# Patient Record
Sex: MALE | Race: WHITE | NOT HISPANIC OR LATINO
[De-identification: names, ages, dates, MRNs, and addresses within clinical notes are randomized per-mention and may not be internally consistent; named-entity substitution may affect disease eponyms.]

---

## 2017-06-16 ENCOUNTER — APPOINTMENT (OUTPATIENT)
Dept: PULMONOLOGY | Facility: CLINIC | Age: 72
End: 2017-06-16

## 2017-07-21 ENCOUNTER — APPOINTMENT (OUTPATIENT)
Dept: PULMONOLOGY | Facility: CLINIC | Age: 72
End: 2017-07-21

## 2017-07-21 VITALS — SYSTOLIC BLOOD PRESSURE: 128 MMHG | DIASTOLIC BLOOD PRESSURE: 80 MMHG

## 2017-07-21 VITALS — OXYGEN SATURATION: 99 %

## 2017-07-21 DIAGNOSIS — A31.0 PULMONARY MYCOBACTERIAL INFECTION: ICD-10-CM

## 2017-07-21 RX ORDER — EMPAGLIFLOZIN 25 MG/1
25 TABLET, FILM COATED ORAL
Qty: 90 | Refills: 0 | Status: ACTIVE | COMMUNITY
Start: 2017-03-09

## 2017-07-21 RX ORDER — METOPROLOL SUCCINATE 50 MG/1
50 TABLET, EXTENDED RELEASE ORAL
Qty: 90 | Refills: 0 | Status: ACTIVE | COMMUNITY
Start: 2017-06-07

## 2017-07-21 RX ORDER — HYDROCHLOROTHIAZIDE 25 MG/1
25 TABLET ORAL
Qty: 90 | Refills: 0 | Status: ACTIVE | COMMUNITY
Start: 2017-06-07

## 2019-11-26 ENCOUNTER — OUTPATIENT (OUTPATIENT)
Dept: OUTPATIENT SERVICES | Facility: HOSPITAL | Age: 74
LOS: 1 days | End: 2019-11-26
Payer: MEDICARE

## 2019-11-26 VITALS
HEART RATE: 57 BPM | TEMPERATURE: 98 F | SYSTOLIC BLOOD PRESSURE: 117 MMHG | DIASTOLIC BLOOD PRESSURE: 69 MMHG | RESPIRATION RATE: 20 BRPM | WEIGHT: 204.37 LBS

## 2019-11-26 DIAGNOSIS — E11.9 TYPE 2 DIABETES MELLITUS WITHOUT COMPLICATIONS: ICD-10-CM

## 2019-11-26 DIAGNOSIS — I25.10 ATHEROSCLEROTIC HEART DISEASE OF NATIVE CORONARY ARTERY WITHOUT ANGINA PECTORIS: ICD-10-CM

## 2019-11-26 DIAGNOSIS — Z98.89 OTHER SPECIFIED POSTPROCEDURAL STATES: Chronic | ICD-10-CM

## 2019-11-26 DIAGNOSIS — Z09 ENCOUNTER FOR FOLLOW-UP EXAMINATION AFTER COMPLETED TREATMENT FOR CONDITIONS OTHER THAN MALIGNANT NEOPLASM: Chronic | ICD-10-CM

## 2019-11-26 DIAGNOSIS — Z90.49 ACQUIRED ABSENCE OF OTHER SPECIFIED PARTS OF DIGESTIVE TRACT: Chronic | ICD-10-CM

## 2019-11-26 DIAGNOSIS — Z12.9 ENCOUNTER FOR SCREENING FOR MALIGNANT NEOPLASM, SITE UNSPECIFIED: ICD-10-CM

## 2019-11-26 DIAGNOSIS — Z29.9 ENCOUNTER FOR PROPHYLACTIC MEASURES, UNSPECIFIED: ICD-10-CM

## 2019-11-26 DIAGNOSIS — Z90.12 ACQUIRED ABSENCE OF LEFT BREAST AND NIPPLE: Chronic | ICD-10-CM

## 2019-11-26 DIAGNOSIS — Z01.818 ENCOUNTER FOR OTHER PREPROCEDURAL EXAMINATION: ICD-10-CM

## 2019-11-26 LAB
ALBUMIN SERPL ELPH-MCNC: 4.5 G/DL — SIGNIFICANT CHANGE UP (ref 3.3–5.2)
ALP SERPL-CCNC: 55 U/L — SIGNIFICANT CHANGE UP (ref 40–120)
ALT FLD-CCNC: 29 U/L — SIGNIFICANT CHANGE UP
ANION GAP SERPL CALC-SCNC: 17 MMOL/L — SIGNIFICANT CHANGE UP (ref 5–17)
APTT BLD: 34.6 SEC — SIGNIFICANT CHANGE UP (ref 27.5–36.3)
AST SERPL-CCNC: 24 U/L — SIGNIFICANT CHANGE UP
BASOPHILS # BLD AUTO: 0.04 K/UL — SIGNIFICANT CHANGE UP (ref 0–0.2)
BASOPHILS NFR BLD AUTO: 0.5 % — SIGNIFICANT CHANGE UP (ref 0–2)
BILIRUB SERPL-MCNC: 0.7 MG/DL — SIGNIFICANT CHANGE UP (ref 0.4–2)
BUN SERPL-MCNC: 22 MG/DL — HIGH (ref 8–20)
CALCIUM SERPL-MCNC: 9.8 MG/DL — SIGNIFICANT CHANGE UP (ref 8.6–10.2)
CHLORIDE SERPL-SCNC: 97 MMOL/L — LOW (ref 98–107)
CO2 SERPL-SCNC: 24 MMOL/L — SIGNIFICANT CHANGE UP (ref 22–29)
CREAT SERPL-MCNC: 1.04 MG/DL — SIGNIFICANT CHANGE UP (ref 0.5–1.3)
EOSINOPHIL # BLD AUTO: 0.13 K/UL — SIGNIFICANT CHANGE UP (ref 0–0.5)
EOSINOPHIL NFR BLD AUTO: 1.6 % — SIGNIFICANT CHANGE UP (ref 0–6)
GLUCOSE SERPL-MCNC: 132 MG/DL — HIGH (ref 70–115)
HBA1C BLD-MCNC: 7.3 % — HIGH (ref 4–5.6)
HCT VFR BLD CALC: 42.6 % — SIGNIFICANT CHANGE UP (ref 39–50)
HGB BLD-MCNC: 13.4 G/DL — SIGNIFICANT CHANGE UP (ref 13–17)
IMM GRANULOCYTES NFR BLD AUTO: 1.1 % — SIGNIFICANT CHANGE UP (ref 0–1.5)
INR BLD: 1.13 RATIO — SIGNIFICANT CHANGE UP (ref 0.88–1.16)
LYMPHOCYTES # BLD AUTO: 1.53 K/UL — SIGNIFICANT CHANGE UP (ref 1–3.3)
LYMPHOCYTES # BLD AUTO: 18.9 % — SIGNIFICANT CHANGE UP (ref 13–44)
MCHC RBC-ENTMCNC: 27.2 PG — SIGNIFICANT CHANGE UP (ref 27–34)
MCHC RBC-ENTMCNC: 31.5 GM/DL — LOW (ref 32–36)
MCV RBC AUTO: 86.6 FL — SIGNIFICANT CHANGE UP (ref 80–100)
MONOCYTES # BLD AUTO: 1 K/UL — HIGH (ref 0–0.9)
MONOCYTES NFR BLD AUTO: 12.4 % — SIGNIFICANT CHANGE UP (ref 2–14)
NEUTROPHILS # BLD AUTO: 5.3 K/UL — SIGNIFICANT CHANGE UP (ref 1.8–7.4)
NEUTROPHILS NFR BLD AUTO: 65.5 % — SIGNIFICANT CHANGE UP (ref 43–77)
PLATELET # BLD AUTO: 482 K/UL — HIGH (ref 150–400)
POTASSIUM SERPL-MCNC: 5.2 MMOL/L — SIGNIFICANT CHANGE UP (ref 3.5–5.3)
POTASSIUM SERPL-SCNC: 5.2 MMOL/L — SIGNIFICANT CHANGE UP (ref 3.5–5.3)
PROT SERPL-MCNC: 7.4 G/DL — SIGNIFICANT CHANGE UP (ref 6.6–8.7)
PROTHROM AB SERPL-ACNC: 13.1 SEC — HIGH (ref 10–12.9)
RBC # BLD: 4.92 M/UL — SIGNIFICANT CHANGE UP (ref 4.2–5.8)
RBC # FLD: 14.1 % — SIGNIFICANT CHANGE UP (ref 10.3–14.5)
SODIUM SERPL-SCNC: 138 MMOL/L — SIGNIFICANT CHANGE UP (ref 135–145)
WBC # BLD: 8.09 K/UL — SIGNIFICANT CHANGE UP (ref 3.8–10.5)
WBC # FLD AUTO: 8.09 K/UL — SIGNIFICANT CHANGE UP (ref 3.8–10.5)

## 2019-11-26 PROCEDURE — G0463: CPT

## 2019-11-26 PROCEDURE — 36415 COLL VENOUS BLD VENIPUNCTURE: CPT

## 2019-11-26 PROCEDURE — 93010 ELECTROCARDIOGRAM REPORT: CPT

## 2019-11-26 PROCEDURE — 93005 ELECTROCARDIOGRAM TRACING: CPT

## 2019-11-26 PROCEDURE — 71046 X-RAY EXAM CHEST 2 VIEWS: CPT

## 2019-11-26 PROCEDURE — 83036 HEMOGLOBIN GLYCOSYLATED A1C: CPT

## 2019-11-26 PROCEDURE — 80053 COMPREHEN METABOLIC PANEL: CPT

## 2019-11-26 PROCEDURE — 85610 PROTHROMBIN TIME: CPT

## 2019-11-26 PROCEDURE — 85730 THROMBOPLASTIN TIME PARTIAL: CPT

## 2019-11-26 PROCEDURE — 71046 X-RAY EXAM CHEST 2 VIEWS: CPT | Mod: 26

## 2019-11-26 PROCEDURE — 85027 COMPLETE CBC AUTOMATED: CPT

## 2019-11-26 NOTE — H&P PST ADULT - NSICDXPASTMEDICALHX_GEN_ALL_CORE_FT
PAST MEDICAL HISTORY:  Abnormal chest CT     Asbestosis     Coronary artery disease     Diabetes mellitus     Dyspnea on exertion     Heart valve disease leaky heart valve    Hyperlipemia     Hypertension     Urinary frequency

## 2019-11-26 NOTE — H&P PST ADULT - NSICDXPASTSURGICALHX_GEN_ALL_CORE_FT
PAST SURGICAL HISTORY:  S/P angioplasty with stent 2013 4 stents    S/P cholecystectomy     S/P colonoscopy 4 years ago, no polyp    S/P excision of skin lesion, follow-up exam benign testicle    S/P inguinal hernia repair bilateral    S/P mastectomy, left age 10 for benign mass    S/P ORIF (open reduction internal fixation) fracture right elbow 1962, LILLIANA 3 months later    S/P T&A (status post tonsillectomy and adenoidectomy) as child

## 2019-11-26 NOTE — H&P PST ADULT - ASSESSMENT
Pleasant 74 year old male in NAD  , pt has history of cough , diabetes, CAD  and family history of colon cancer . Pt is scheduled for colonoscopy and endoscopy with DR. Galindo on 12/3/19 .   OPIOID RISK TOOL    KELLY EACH BOX THAT APPLIES AND ADD TOTALS AT THE END    FAMILY HISTORY OF SUBSTANCE ABUSE                 FEMALE         MALE                                                Alcohol                             [  ]1 pt          [  ]3pts                                               Illegal Durgs                     [  ]2 pts        [  ]3pts                                               Rx Drugs                           [  ]4 pts        [  ]4 pts    PERSONAL HISTORY OF SUBSTANCE ABUSE                                                                                          Alcohol                             [  ]3 pts       [  ]3 pts                                               Illegal Durgs                     [  ]4 pts        [  ]4 pts                                               Rx Drugs                           [  ]5 pts        [  ]5 pts    AGE BETWEEN 16-45 YEARS                                      [  ]1 pt         [  ]1 pt    HISTORY OF PREADOLESCENT   SEXUAL ABUSE                                                             [  ]3 pts        [  ]0pts    PSYCHOLOGICAL DISEASE                     ADD, OCD, Bipolar, Schizophrenia        [  ]2 pts         [  ]2 pts                      Depression                                               [  ]1 pt           [  ]1 pt           SCORING TOTAL   (add numbers and type here)              (**0*)                                     A score of 3 or lower indicated LOW risk for future opiod abuse  A score of 4 to 7 indicated moderate risk for future opiod abuse  A score of 8 or higher indicates a high risk for opiod abuse  CAPRINI VTE 2.0 SCORE [CLOT updated 2019]    AGE RELATED RISK FACTORS                                                       MOBILITY RELATED FACTORS  [ ] Age 41-60 years                                            (1 Point)                    [ ] Bed rest                                                        (1 Point)  [ ] Age: 61-74 years                                           (2 Points)                  [ ] Plaster cast                                                   (2 Points)  [X ] Age= 75 years                                              (3 Points)                    [ ] Bed bound for more than 72 hours                 (2 Points)    DISEASE RELATED RISK FACTORS                                               GENDER SPECIFIC FACTORS  [X ] Edema in the lower extremities                       (1 Point)              [ ] Pregnancy                                                     (1 Point)  [ ] Varicose veins                                               (1 Point)                     [ ] Post-partum < 6 weeks                                   (1 Point)             [ ] BMI > 25 Kg/m2                                            (1 Point)                     [ ] Hormonal therapy  or oral contraception          (1 Point)                 [ ] Sepsis (in the previous month)                        (1 Point)               [ ] History of pregnancy complications                 (1 point)  [ ] Pneumonia or serious lung disease                                               [ ] Unexplained or recurrent                     (1 Point)           (in the previous month)                               (1 Point)  [ ] Abnormal pulmonary function test                     (1 Point)                 SURGERY RELATED RISK FACTORS  [ ] Acute myocardial infarction                              (1 Point)               [ ]  Section                                             (1 Point)  [ ] Congestive heart failure (in the previous month)  (1 Point)      [ X] Minor surgery                                                  (1 Point)   [ ] Inflammatory bowel disease                             (1 Point)               [ ] Arthroscopic surgery                                        (2 Points)  [ ] Central venous access                                      (2 Points)                [ ] General surgery lasting more than 45 minutes (2 points)  [ ] Malignancy- Present or previous                   (2 Points)                [ ] Elective arthroplasty                                         (5 points)    [ ] Stroke (in the previous month)                          (5 Points)                                                                                                                                                           HEMATOLOGY RELATED FACTORS                                                 TRAUMA RELATED RISK FACTORS  [ ] Prior episodes of VTE                                     (3 Points)                [ ] Fracture of the hip, pelvis, or leg                       (5 Points)  [ ] Positive family history for VTE                         (3 Points)             [ ] Acute spinal cord injury (in the previous month)  (5 Points)  [ ] Prothrombin 54444 A                                     (3 Points)               [ ] Paralysis  (less than 1 month)                             (5 Points)  [ ] Factor V Leiden                                             (3 Points)                  [ ] Multiple Trauma within 1 month                        (5 Points)  [ ] Lupus anticoagulants                                     (3 Points)                                                           [ ] Anticardiolipin antibodies                               (3 Points)                                                       [ ] High homocysteine in the blood                      (3 Points)                                             [ ] Other congenital or acquired thrombophilia      (3 Points)                                                [ ] Heparin induced thrombocytopenia                  (3 Points)                                     Total Score [      5    ]

## 2019-11-26 NOTE — H&P PST ADULT - HISTORY OF PRESENT ILLNESS
74 year old male presents for colonoscopy and endoscopy . Pt has family history of colon cancer ( sister ) last colonoscopy 2008 (normal as per pt ) Pt denies any abdominal pain or bleeding , has lost 40 lb over past 10 yrs and 1 out of 4 recent occult blood teste were positive in PMD office.

## 2019-11-26 NOTE — H&P PST ADULT - NSICDXPROBLEM_GEN_ALL_CORE_FT
PROBLEM DIAGNOSES  Problem: Diabetes mellitus  Assessment and Plan: fingerstick on admit   medical  clearance     Problem: CAD (coronary artery disease)  Assessment and Plan: cardiac clearance     Problem: Screening for cancer  Assessment and Plan: colonoscopy / egd with biopsy with DR. Galindo    Problem: Need for prophylactic measure  Assessment and Plan: caprini scor e5   surgical team assess need for dvt prophylaxis

## 2019-11-26 NOTE — H&P PST ADULT - NSICDXFAMILYHX_GEN_ALL_CORE_FT
FAMILY HISTORY:  Father  Still living? No  Family history of heart attack, Age at diagnosis: Age Unknown    Mother  Still living? No  Family history of cardiomegaly, Age at diagnosis: Age Unknown  Family history of diabetes mellitus, Age at diagnosis: Age Unknown  Family history of stroke, Age at diagnosis: Age Unknown    Sibling  Still living? No  Family history of colon cancer, Age at diagnosis: Age Unknown  Family history of diabetes mellitus, Age at diagnosis: Age Unknown  Family history of pancreatic cancer, Age at diagnosis: Age Unknown

## 2019-11-26 NOTE — H&P PST ADULT - NSANTHOSAYNRD_GEN_A_CORE
No. WILLA screening performed.  STOP BANG Legend: 0-2 = LOW Risk; 3-4 = INTERMEDIATE Risk; 5-8 = HIGH Risk

## 2019-11-26 NOTE — H&P PST ADULT - LAB RESULTS AND INTERPRETATION
hgb a1c 7.3 plt 482  s/w noelle at DR. Galindo office will discuss with DR. Galindo .  K DAmico NP 5 pm 11/26/19

## 2019-12-02 ENCOUNTER — TRANSCRIPTION ENCOUNTER (OUTPATIENT)
Age: 74
End: 2019-12-02

## 2019-12-03 ENCOUNTER — RESULT REVIEW (OUTPATIENT)
Age: 74
End: 2019-12-03

## 2019-12-03 ENCOUNTER — OUTPATIENT (OUTPATIENT)
Dept: OUTPATIENT SERVICES | Facility: HOSPITAL | Age: 74
LOS: 1 days | End: 2019-12-03
Payer: MEDICARE

## 2019-12-03 DIAGNOSIS — Z98.89 OTHER SPECIFIED POSTPROCEDURAL STATES: Chronic | ICD-10-CM

## 2019-12-03 DIAGNOSIS — Z90.49 ACQUIRED ABSENCE OF OTHER SPECIFIED PARTS OF DIGESTIVE TRACT: Chronic | ICD-10-CM

## 2019-12-03 DIAGNOSIS — Z12.11 ENCOUNTER FOR SCREENING FOR MALIGNANT NEOPLASM OF COLON: ICD-10-CM

## 2019-12-03 DIAGNOSIS — Z90.12 ACQUIRED ABSENCE OF LEFT BREAST AND NIPPLE: Chronic | ICD-10-CM

## 2019-12-03 DIAGNOSIS — Z09 ENCOUNTER FOR FOLLOW-UP EXAMINATION AFTER COMPLETED TREATMENT FOR CONDITIONS OTHER THAN MALIGNANT NEOPLASM: Chronic | ICD-10-CM

## 2019-12-03 LAB — GLUCOSE BLDC GLUCOMTR-MCNC: 133 MG/DL — HIGH (ref 70–99)

## 2019-12-03 PROCEDURE — 88342 IMHCHEM/IMCYTCHM 1ST ANTB: CPT

## 2019-12-03 PROCEDURE — 45378 DIAGNOSTIC COLONOSCOPY: CPT

## 2019-12-03 PROCEDURE — 88305 TISSUE EXAM BY PATHOLOGIST: CPT | Mod: 26

## 2019-12-03 PROCEDURE — 82962 GLUCOSE BLOOD TEST: CPT

## 2019-12-03 PROCEDURE — 43239 EGD BIOPSY SINGLE/MULTIPLE: CPT

## 2019-12-03 PROCEDURE — 88305 TISSUE EXAM BY PATHOLOGIST: CPT

## 2019-12-03 PROCEDURE — 88342 IMHCHEM/IMCYTCHM 1ST ANTB: CPT | Mod: 26

## 2019-12-06 LAB — SURGICAL PATHOLOGY STUDY: SIGNIFICANT CHANGE UP

## 2021-09-23 ENCOUNTER — APPOINTMENT (OUTPATIENT)
Dept: PULMONOLOGY | Facility: CLINIC | Age: 76
End: 2021-09-23
Payer: COMMERCIAL

## 2021-09-23 VITALS
OXYGEN SATURATION: 97 % | HEART RATE: 82 BPM | DIASTOLIC BLOOD PRESSURE: 78 MMHG | RESPIRATION RATE: 16 BRPM | SYSTOLIC BLOOD PRESSURE: 110 MMHG | WEIGHT: 189 LBS | HEIGHT: 75 IN | BODY MASS INDEX: 23.5 KG/M2

## 2021-09-23 PROCEDURE — 99204 OFFICE O/P NEW MOD 45 MIN: CPT

## 2021-09-23 NOTE — PHYSICAL EXAM
[No Acute Distress] : no acute distress [Normal Oropharynx] : normal oropharynx [Normal Appearance] : normal appearance [No Neck Mass] : no neck mass [Normal Rate/Rhythm] : normal rate/rhythm [Normal S1, S2] : normal s1, s2 [No Resp Distress] : no resp distress [No Murmurs] : no murmurs [Clear to Auscultation Bilaterally] : clear to auscultation bilaterally [No Abnormalities] : no abnormalities [Benign] : benign [Normal Gait] : normal gait [No Clubbing] : no clubbing [No Cyanosis] : no cyanosis [No Edema] : no edema [FROM] : FROM [Normal Color/ Pigmentation] : normal color/ pigmentation [No Focal Deficits] : no focal deficits [Normal Affect] : normal affect [Oriented x3] : oriented x3

## 2021-09-25 NOTE — H&P PST ADULT - ENMT
Thyroid Supplements Protocol Failed 09/25/2021 11:39 AM   Protocol Details  TSH value between 0.350 and 5.500 IU/ml    TSH test in past 12 months    Appointment in past 12 or next 3 months     Last refilled on 08/19/2020 for # 30 with 0 rf.    Last labs 08/ negative No oral lesions; no gross abnormalities

## 2021-09-29 NOTE — CONSULT LETTER
[Dear  ___] : Dear ~ROSETTA, [Consult Letter:] : I had the pleasure of evaluating your patient, [unfilled]. [Please see my note below.] : Please see my note below. [Consult Closing:] : Thank you very much for allowing me to participate in the care of this patient.  If you have any questions, please do not hesitate to contact me. [Sincerely,] : Sincerely, [FreeTextEntry3] : Solomon Wright MD FCCP\par Pulmonary/Critical Care/Sleep Medicine\par Department of Internal Medicine\par \par [facility] [employer]\par

## 2021-09-29 NOTE — DISCUSSION/SUMMARY
[FreeTextEntry1] : 76-year-old male seen today for the above.  Patient's history of asbestosis is confirmed on his current CAT scan without any significant change from previous studies performed in 2020.  No evidence of malignancy.  Routine follow-up on a yearly basis.

## 2021-09-29 NOTE — HISTORY OF PRESENT ILLNESS
[Never] : never [TextBox_4] : History of tobacco use seen today for routine follow-up of asbestosis.  Patient suffered asbestos exposure while in the  in the early 60s.  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  He denies any complaints of cough wheeze shortness of breath chest pains palpitations lightheadedness or dizziness.  There is no history of weight loss or changes in bowel or urinary habits.

## 2021-09-29 NOTE — CONSULT LETTER
[Dear  ___] : Dear ~ROSETTA, [Consult Letter:] : I had the pleasure of evaluating your patient, [unfilled]. [Please see my note below.] : Please see my note below. [Consult Closing:] : Thank you very much for allowing me to participate in the care of this patient.  If you have any questions, please do not hesitate to contact me. [Sincerely,] : Sincerely, [FreeTextEntry3] : Solomon Wright MD FCCP\par Pulmonary/Critical Care/Sleep Medicine\par Department of Internal Medicine\par \par [facility] [employer]\par

## 2022-01-13 ENCOUNTER — EMERGENCY (EMERGENCY)
Facility: HOSPITAL | Age: 77
LOS: 1 days | Discharge: HOME | End: 2022-01-13
Attending: EMERGENCY MEDICINE
Payer: MEDICARE

## 2022-01-13 VITALS
HEART RATE: 89 BPM | OXYGEN SATURATION: 96 % | WEIGHT: 160.06 LBS | SYSTOLIC BLOOD PRESSURE: 109 MMHG | DIASTOLIC BLOOD PRESSURE: 70 MMHG | RESPIRATION RATE: 16 BRPM | TEMPERATURE: 98 F | HEIGHT: 78 IN

## 2022-01-13 DIAGNOSIS — Z09 ENCOUNTER FOR FOLLOW-UP EXAMINATION AFTER COMPLETED TREATMENT FOR CONDITIONS OTHER THAN MALIGNANT NEOPLASM: Chronic | ICD-10-CM

## 2022-01-13 DIAGNOSIS — Z90.49 ACQUIRED ABSENCE OF OTHER SPECIFIED PARTS OF DIGESTIVE TRACT: Chronic | ICD-10-CM

## 2022-01-13 DIAGNOSIS — Z98.89 OTHER SPECIFIED POSTPROCEDURAL STATES: Chronic | ICD-10-CM

## 2022-01-13 DIAGNOSIS — Z90.12 ACQUIRED ABSENCE OF LEFT BREAST AND NIPPLE: Chronic | ICD-10-CM

## 2022-01-13 LAB
ALBUMIN SERPL ELPH-MCNC: 3.9 G/DL — SIGNIFICANT CHANGE UP (ref 3.3–5.2)
ALP SERPL-CCNC: 93 U/L — SIGNIFICANT CHANGE UP (ref 40–120)
ALT FLD-CCNC: 13 U/L — SIGNIFICANT CHANGE UP
ANION GAP SERPL CALC-SCNC: 20 MMOL/L — HIGH (ref 5–17)
AST SERPL-CCNC: 13 U/L — SIGNIFICANT CHANGE UP
BASOPHILS # BLD AUTO: 0.03 K/UL — SIGNIFICANT CHANGE UP (ref 0–0.2)
BASOPHILS NFR BLD AUTO: 0.3 % — SIGNIFICANT CHANGE UP (ref 0–2)
BILIRUB SERPL-MCNC: 0.8 MG/DL — SIGNIFICANT CHANGE UP (ref 0.4–2)
BUN SERPL-MCNC: 26.8 MG/DL — HIGH (ref 8–20)
CALCIUM SERPL-MCNC: 9 MG/DL — SIGNIFICANT CHANGE UP (ref 8.6–10.2)
CHLORIDE SERPL-SCNC: 93 MMOL/L — LOW (ref 98–107)
CO2 SERPL-SCNC: 22 MMOL/L — SIGNIFICANT CHANGE UP (ref 22–29)
CREAT SERPL-MCNC: 0.9 MG/DL — SIGNIFICANT CHANGE UP (ref 0.5–1.3)
EOSINOPHIL # BLD AUTO: 0.08 K/UL — SIGNIFICANT CHANGE UP (ref 0–0.5)
EOSINOPHIL NFR BLD AUTO: 0.8 % — SIGNIFICANT CHANGE UP (ref 0–6)
FLUAV AG NPH QL: SIGNIFICANT CHANGE UP
FLUBV AG NPH QL: SIGNIFICANT CHANGE UP
GLUCOSE SERPL-MCNC: 168 MG/DL — HIGH (ref 70–99)
HCT VFR BLD CALC: 43.4 % — SIGNIFICANT CHANGE UP (ref 39–50)
HGB BLD-MCNC: 15.4 G/DL — SIGNIFICANT CHANGE UP (ref 13–17)
IMM GRANULOCYTES NFR BLD AUTO: 1.1 % — SIGNIFICANT CHANGE UP (ref 0–1.5)
LYMPHOCYTES # BLD AUTO: 1.64 K/UL — SIGNIFICANT CHANGE UP (ref 1–3.3)
LYMPHOCYTES # BLD AUTO: 15.5 % — SIGNIFICANT CHANGE UP (ref 13–44)
MCHC RBC-ENTMCNC: 31.2 PG — SIGNIFICANT CHANGE UP (ref 27–34)
MCHC RBC-ENTMCNC: 35.5 GM/DL — SIGNIFICANT CHANGE UP (ref 32–36)
MCV RBC AUTO: 87.9 FL — SIGNIFICANT CHANGE UP (ref 80–100)
MONOCYTES # BLD AUTO: 1.4 K/UL — HIGH (ref 0–0.9)
MONOCYTES NFR BLD AUTO: 13.2 % — SIGNIFICANT CHANGE UP (ref 2–14)
NEUTROPHILS # BLD AUTO: 7.33 K/UL — SIGNIFICANT CHANGE UP (ref 1.8–7.4)
NEUTROPHILS NFR BLD AUTO: 69.1 % — SIGNIFICANT CHANGE UP (ref 43–77)
PLATELET # BLD AUTO: 483 K/UL — HIGH (ref 150–400)
POTASSIUM SERPL-MCNC: 3.2 MMOL/L — LOW (ref 3.5–5.3)
POTASSIUM SERPL-SCNC: 3.2 MMOL/L — LOW (ref 3.5–5.3)
PROT SERPL-MCNC: 6.8 G/DL — SIGNIFICANT CHANGE UP (ref 6.6–8.7)
RBC # BLD: 4.94 M/UL — SIGNIFICANT CHANGE UP (ref 4.2–5.8)
RBC # FLD: 12.8 % — SIGNIFICANT CHANGE UP (ref 10.3–14.5)
RSV RNA NPH QL NAA+NON-PROBE: SIGNIFICANT CHANGE UP
SARS-COV-2 RNA SPEC QL NAA+PROBE: DETECTED
SODIUM SERPL-SCNC: 135 MMOL/L — SIGNIFICANT CHANGE UP (ref 135–145)
TROPONIN T SERPL-MCNC: <0.01 NG/ML — SIGNIFICANT CHANGE UP (ref 0–0.06)
TSH SERPL-MCNC: 3.08 UIU/ML — SIGNIFICANT CHANGE UP (ref 0.27–4.2)
WBC # BLD: 10.6 K/UL — HIGH (ref 3.8–10.5)
WBC # FLD AUTO: 10.6 K/UL — HIGH (ref 3.8–10.5)

## 2022-01-13 PROCEDURE — 71045 X-RAY EXAM CHEST 1 VIEW: CPT | Mod: 26

## 2022-01-13 PROCEDURE — 99284 EMERGENCY DEPT VISIT MOD MDM: CPT | Mod: CS,GC

## 2022-01-13 RX ORDER — POTASSIUM CHLORIDE 20 MEQ
40 PACKET (EA) ORAL ONCE
Refills: 0 | Status: COMPLETED | OUTPATIENT
Start: 2022-01-13 | End: 2022-01-13

## 2022-01-13 NOTE — ED ADULT TRIAGE NOTE - CHIEF COMPLAINT QUOTE
Pt A&Ox4 states "Just not feeling great."  BIBA c/o recent wt loss and dec appetite. Patient wife COVID + at home, denies any other complaint

## 2022-01-13 NOTE — ED PROVIDER NOTE - NSFOLLOWUPINSTRUCTIONS_ED_ALL_ED_FT
- Return to the emergency department if your oxygen level falls below 92%  - Follow up with your doctor within 2-3 days.   - Return to the ED for any new or worsening symptoms including but not limited to difficulty breathing, severe weakness, confusion, etc.  - Take Tylenol (Acetaminophen) 650mg as needed for pain or fever  - Stay well hydrated.   - Avoid contact with anybody who is sick OR at risk populations including elderly, young, pregnant patients, immune compromised people  - Wash hands frequently in warm soapy water for at least 20 seconds   - Quarantine yourself at home for at least 10 days  - If you would like an appointment to be tested for COVID-19 (coronavirus) at one of the testing sites, contact [phone] for an appointment.      Viral Respiratory Infection    A viral respiratory infection is an illness that affects parts of the body used for breathing, like the lungs, nose, and throat. It is caused by a germ called a virus. Symptoms can include runny nose, coughing, sneezing, fatigue, body aches, sore throat, fever, or headache. Over the counter medicine can be used to manage the symptoms but the infection typically goes away on its own in 5 to 10 days.     SEEK IMMEDIATE MEDICAL CARE IF YOU HAVE ANY OF THE FOLLOWING SYMPTOMS: shortness of breath, chest pain, fever over 10 days, or lightheadedness/dizziness.

## 2022-01-13 NOTE — ED PROVIDER NOTE - PHYSICAL EXAMINATION
Gen: no acute distress; in back brace  Head: normocephalic, atraumatic  EENT: EOMI, PERRLA; dry mucous membranes  Lung: no increased work of breathing, CTABL, no wheezing, rales, rhonchi  CV: normal s1/s2, rrr, 2+ radial pulses b/l  Abd: soft, non-tender, non-distended, no rebound tenderness or guarding  MSK: No edema, no visible deformities, full range of motion in all 4 extremities  Neuro: CNII-XII intact, UE: 5/5 strength throughout b/l, LE: 5/5 strength throughout b/l, no dysmetria on FTN

## 2022-01-13 NOTE — ED PROVIDER NOTE - ATTENDING CONTRIBUTION TO CARE
Landon: I performed a face to face evaluation of this patient and performed a full history and physical examination on the patient.  I agree with the resident's history, physical examination, and plan of the patient unless otherwise noted. My brief assessment is as follows: pmh as documented, 2 days generalized weakness/fatigue, decreasd appetite, mild congestion, wife felt pt was sob so sent to hospital. pt denies any pain, no other complaints. well appearing, nl sats, afebrile ctab, rrr, abd benign, neuro intact, no swelling b/l LE. moving all extremities with nl strength. labs, flu/covid. reassess.

## 2022-01-13 NOTE — ED PROVIDER NOTE - OBJECTIVE STATEMENT
76yM with pmh htn, hld, DMII, hypothyroidism, asbestosis, CAD s/p stentsx3, on Eliquis presenting with 3 days of weakness. Patient reports "feeling off" for the past 2 days with decreased PO intake, decreased appetite, and fatigue. Wife noticed that patient was short of breath so called the ambulance. Endorses some congestion mostly in right nostril but denies sore throat, body aches, fevers, chills, nausea, vomiting, or diarrhea. Reports wife is covid+ at home. Vaccinated for COVIDx2. Follows with [facility] and has had stents x3

## 2022-01-13 NOTE — ED PROVIDER NOTE - CLINICAL SUMMARY MEDICAL DECISION MAKING FREE TEXT BOX
76yM with pmh htn, hld, DMII, hypothyroidism, CAD s/p stentsx3, on Eliquis presenting with 3 days of weakness. Patient overall well appearing with stable vitals. Not hypoxic at triage. WAs sent in by wife for dyspnea but clear on auscultation. Neuro exam non-focal. Etiology for weakness broad including anemia, electrolyte disturbance, vikash, cardiac, covid, pna. Will obtain UA, normal labs, trop, bnp, covid swab, ekg, and chest xray. 76yM with pmh htn, hld, DMII, hypothyroidism, CAD s/p stentsx3, on Eliquis presenting with 3 days of weakness. Patient overall well appearing with stable vitals. Not hypoxic at triage. WAs sent in by wife for dyspnea but clear on auscultation. Neuro exam non-focal. Etiology for weakness broad including anemia, electrolyte disturbance, vikash, cardiac, covid, pna. Will obtain UA, normal labs, trop, bnp, covid swab, and chest xray.

## 2022-01-13 NOTE — ED PROVIDER NOTE - PATIENT PORTAL LINK FT
You can access the FollowMyHealth Patient Portal offered by [facility] by registering at the following website: [email or web address]. By joining [facility]’s FollowMyHealth portal, you will also be able to view your health information using other applications (apps) compatible with our system.

## 2022-01-13 NOTE — ED ADULT NURSE NOTE - NSIMPLEMENTINTERV_GEN_ALL_ED
Implemented All Universal Safety Interventions:  [place] to call system. Call bell, personal items and telephone within reach. Instruct patient to call for assistance. Room bathroom lighting operational. Non-slip footwear when patient is off stretcher. Physically safe environment: no spills, clutter or unnecessary equipment. Stretcher in lowest position, wheels locked, appropriate side rails in place.

## 2022-01-13 NOTE — ED PROVIDER NOTE - PROGRESS NOTE DETAILS
patient COVID+; 97% on RA sitting and 96% RA ambulatory; Discussed findings with patient's son Jt Machuca and importance of returning to ED if SpO2 <92%. Patient and son confirm they have pulse oximeter at home. Strict return precautions given. Patient's son to  patient. Sydnie Henderson. PGY-1

## 2022-01-14 ENCOUNTER — APPOINTMENT (OUTPATIENT)
Dept: ORTHOPEDIC SURGERY | Facility: CLINIC | Age: 77
End: 2022-01-14

## 2022-01-14 VITALS
DIASTOLIC BLOOD PRESSURE: 62 MMHG | SYSTOLIC BLOOD PRESSURE: 110 MMHG | TEMPERATURE: 97 F | RESPIRATION RATE: 18 BRPM | HEART RATE: 98 BPM | OXYGEN SATURATION: 96 %

## 2022-01-14 PROCEDURE — 84484 ASSAY OF TROPONIN QUANT: CPT

## 2022-01-14 PROCEDURE — 80053 COMPREHEN METABOLIC PANEL: CPT

## 2022-01-14 PROCEDURE — 87637 SARSCOV2&INF A&B&RSV AMP PRB: CPT

## 2022-01-14 PROCEDURE — 85025 COMPLETE CBC W/AUTO DIFF WBC: CPT

## 2022-01-14 PROCEDURE — 84443 ASSAY THYROID STIM HORMONE: CPT

## 2022-01-14 PROCEDURE — 36415 COLL VENOUS BLD VENIPUNCTURE: CPT

## 2022-01-14 PROCEDURE — 99283 EMERGENCY DEPT VISIT LOW MDM: CPT | Mod: 25

## 2022-01-14 PROCEDURE — 71045 X-RAY EXAM CHEST 1 VIEW: CPT

## 2022-01-14 RX ADMIN — Medication 40 MILLIEQUIVALENT(S): at 00:27

## 2022-01-19 ENCOUNTER — INPATIENT (INPATIENT)
Facility: HOSPITAL | Age: 77
LOS: 8 days | Discharge: NURSING, REHAB OR LONG-TERM CARE | DRG: 28 | End: 2022-01-28
Attending: NEUROLOGICAL SURGERY | Admitting: FAMILY MEDICINE
Payer: MEDICARE

## 2022-01-19 VITALS
HEART RATE: 58 BPM | TEMPERATURE: 98 F | RESPIRATION RATE: 20 BRPM | HEIGHT: 78 IN | DIASTOLIC BLOOD PRESSURE: 59 MMHG | OXYGEN SATURATION: 97 % | SYSTOLIC BLOOD PRESSURE: 92 MMHG | WEIGHT: 149.91 LBS

## 2022-01-19 DIAGNOSIS — Z98.89 OTHER SPECIFIED POSTPROCEDURAL STATES: Chronic | ICD-10-CM

## 2022-01-19 DIAGNOSIS — Z90.12 ACQUIRED ABSENCE OF LEFT BREAST AND NIPPLE: Chronic | ICD-10-CM

## 2022-01-19 DIAGNOSIS — Z90.49 ACQUIRED ABSENCE OF OTHER SPECIFIED PARTS OF DIGESTIVE TRACT: Chronic | ICD-10-CM

## 2022-01-19 DIAGNOSIS — Z09 ENCOUNTER FOR FOLLOW-UP EXAMINATION AFTER COMPLETED TREATMENT FOR CONDITIONS OTHER THAN MALIGNANT NEOPLASM: Chronic | ICD-10-CM

## 2022-01-19 PROCEDURE — 99285 EMERGENCY DEPT VISIT HI MDM: CPT | Mod: CS

## 2022-01-19 NOTE — ED ADULT TRIAGE NOTE - CHIEF COMPLAINT QUOTE
C/O of numbness to bilateral feet for the past 2 days causing frequent falls.  +lower back pain. MAEx4. Denies hitting head or LOC. Take Eliquis. COVID positive on 1/13.

## 2022-01-20 ENCOUNTER — TRANSCRIPTION ENCOUNTER (OUTPATIENT)
Age: 77
End: 2022-01-20

## 2022-01-20 DIAGNOSIS — J92.9 PLEURAL PLAQUE WITHOUT ASBESTOS: ICD-10-CM

## 2022-01-20 DIAGNOSIS — S32.010A WEDGE COMPRESSION FRACTURE OF FIRST LUMBAR VERTEBRA, INITIAL ENCOUNTER FOR CLOSED FRACTURE: ICD-10-CM

## 2022-01-20 DIAGNOSIS — R93.89 ABNORMAL FINDINGS ON DIAGNOSTIC IMAGING OF OTHER SPECIFIED BODY STRUCTURES: ICD-10-CM

## 2022-01-20 DIAGNOSIS — U07.1 COVID-19: ICD-10-CM

## 2022-01-20 LAB
ALBUMIN SERPL ELPH-MCNC: 3.8 G/DL — SIGNIFICANT CHANGE UP (ref 3.3–5.2)
ALP SERPL-CCNC: 82 U/L — SIGNIFICANT CHANGE UP (ref 40–120)
ALT FLD-CCNC: 21 U/L — SIGNIFICANT CHANGE UP
ANION GAP SERPL CALC-SCNC: 17 MMOL/L — SIGNIFICANT CHANGE UP (ref 5–17)
APTT BLD: 32.9 SEC — SIGNIFICANT CHANGE UP (ref 27.5–35.5)
AST SERPL-CCNC: 31 U/L — SIGNIFICANT CHANGE UP
B PERT DNA SPEC QL NAA+PROBE: SIGNIFICANT CHANGE UP
BASE EXCESS BLDA CALC-SCNC: -1.9 MMOL/L — SIGNIFICANT CHANGE UP (ref -2–3)
BASOPHILS # BLD AUTO: 0 K/UL — SIGNIFICANT CHANGE UP (ref 0–0.2)
BASOPHILS NFR BLD AUTO: 0 % — SIGNIFICANT CHANGE UP (ref 0–2)
BILIRUB SERPL-MCNC: 0.6 MG/DL — SIGNIFICANT CHANGE UP (ref 0.4–2)
BLD GP AB SCN SERPL QL: SIGNIFICANT CHANGE UP
BUN SERPL-MCNC: 33.9 MG/DL — HIGH (ref 8–20)
C PNEUM DNA SPEC QL NAA+PROBE: SIGNIFICANT CHANGE UP
CALCIUM SERPL-MCNC: 9.1 MG/DL — SIGNIFICANT CHANGE UP (ref 8.6–10.2)
CHLORIDE SERPL-SCNC: 94 MMOL/L — LOW (ref 98–107)
CO2 SERPL-SCNC: 21 MMOL/L — LOW (ref 22–29)
CREAT SERPL-MCNC: 1.19 MG/DL — SIGNIFICANT CHANGE UP (ref 0.5–1.3)
EOSINOPHIL # BLD AUTO: 0.1 K/UL — SIGNIFICANT CHANGE UP (ref 0–0.5)
EOSINOPHIL NFR BLD AUTO: 0.9 % — SIGNIFICANT CHANGE UP (ref 0–6)
FLUAV H1 2009 PAND RNA SPEC QL NAA+PROBE: SIGNIFICANT CHANGE UP
FLUAV H1 RNA SPEC QL NAA+PROBE: SIGNIFICANT CHANGE UP
FLUAV H3 RNA SPEC QL NAA+PROBE: SIGNIFICANT CHANGE UP
FLUAV SUBTYP SPEC NAA+PROBE: SIGNIFICANT CHANGE UP
FLUBV RNA SPEC QL NAA+PROBE: SIGNIFICANT CHANGE UP
GAS PNL BLDA: SIGNIFICANT CHANGE UP
GIANT PLATELETS BLD QL SMEAR: PRESENT — SIGNIFICANT CHANGE UP
GLUCOSE BLDC GLUCOMTR-MCNC: 247 MG/DL — HIGH (ref 70–99)
GLUCOSE SERPL-MCNC: 155 MG/DL — HIGH (ref 70–99)
HADV DNA SPEC QL NAA+PROBE: SIGNIFICANT CHANGE UP
HCO3 BLDA-SCNC: 22 MMOL/L — SIGNIFICANT CHANGE UP (ref 21–28)
HCOV PNL SPEC NAA+PROBE: SIGNIFICANT CHANGE UP
HCT VFR BLD CALC: 41.5 % — SIGNIFICANT CHANGE UP (ref 39–50)
HGB BLD-MCNC: 14.7 G/DL — SIGNIFICANT CHANGE UP (ref 13–17)
HMPV RNA SPEC QL NAA+PROBE: SIGNIFICANT CHANGE UP
HOROWITZ INDEX BLDA+IHG-RTO: SIGNIFICANT CHANGE UP
HPIV1 RNA SPEC QL NAA+PROBE: SIGNIFICANT CHANGE UP
HPIV2 RNA SPEC QL NAA+PROBE: SIGNIFICANT CHANGE UP
HPIV3 RNA SPEC QL NAA+PROBE: SIGNIFICANT CHANGE UP
HPIV4 RNA SPEC QL NAA+PROBE: SIGNIFICANT CHANGE UP
INR BLD: 1.44 RATIO — HIGH (ref 0.88–1.16)
LYMPHOCYTES # BLD AUTO: 0.8 K/UL — LOW (ref 1–3.3)
LYMPHOCYTES # BLD AUTO: 6.9 % — LOW (ref 13–44)
MANUAL SMEAR VERIFICATION: SIGNIFICANT CHANGE UP
MCHC RBC-ENTMCNC: 31.1 PG — SIGNIFICANT CHANGE UP (ref 27–34)
MCHC RBC-ENTMCNC: 35.4 GM/DL — SIGNIFICANT CHANGE UP (ref 32–36)
MCV RBC AUTO: 87.7 FL — SIGNIFICANT CHANGE UP (ref 80–100)
METAMYELOCYTES # FLD: 2.6 % — HIGH (ref 0–0)
MONOCYTES # BLD AUTO: 1.42 K/UL — HIGH (ref 0–0.9)
MONOCYTES NFR BLD AUTO: 12.2 % — SIGNIFICANT CHANGE UP (ref 2–14)
NEUTROPHILS # BLD AUTO: 8.91 K/UL — HIGH (ref 1.8–7.4)
NEUTROPHILS NFR BLD AUTO: 75.6 % — SIGNIFICANT CHANGE UP (ref 43–77)
NEUTS BAND # BLD: 0.9 % — SIGNIFICANT CHANGE UP (ref 0–8)
NT-PROBNP SERPL-SCNC: 2444 PG/ML — HIGH (ref 0–300)
OVALOCYTES BLD QL SMEAR: SLIGHT — SIGNIFICANT CHANGE UP
PCO2 BLDA: 29 MMHG — LOW (ref 35–48)
PH BLDA: 7.48 — HIGH (ref 7.35–7.45)
PLAT MORPH BLD: NORMAL — SIGNIFICANT CHANGE UP
PLATELET # BLD AUTO: 577 K/UL — HIGH (ref 150–400)
PO2 BLDA: 87 MMHG — SIGNIFICANT CHANGE UP (ref 83–108)
POIKILOCYTOSIS BLD QL AUTO: SLIGHT — SIGNIFICANT CHANGE UP
POTASSIUM SERPL-MCNC: 4.6 MMOL/L — SIGNIFICANT CHANGE UP (ref 3.5–5.3)
POTASSIUM SERPL-SCNC: 4.6 MMOL/L — SIGNIFICANT CHANGE UP (ref 3.5–5.3)
PROT SERPL-MCNC: 6.8 G/DL — SIGNIFICANT CHANGE UP (ref 6.6–8.7)
PROTHROM AB SERPL-ACNC: 16.4 SEC — HIGH (ref 10.6–13.6)
RAPID RVP RESULT: DETECTED
RBC # BLD: 4.73 M/UL — SIGNIFICANT CHANGE UP (ref 4.2–5.8)
RBC # FLD: 12.6 % — SIGNIFICANT CHANGE UP (ref 10.3–14.5)
RBC BLD AUTO: NORMAL — SIGNIFICANT CHANGE UP
RV+EV RNA SPEC QL NAA+PROBE: SIGNIFICANT CHANGE UP
SAO2 % BLDA: 98.4 % — HIGH (ref 94–98)
SARS-COV-2 RNA SPEC QL NAA+PROBE: DETECTED
SMUDGE CELLS # BLD: PRESENT — SIGNIFICANT CHANGE UP
SODIUM SERPL-SCNC: 132 MMOL/L — LOW (ref 135–145)
VARIANT LYMPHS # BLD: 0.9 % — SIGNIFICANT CHANGE UP (ref 0–6)
WBC # BLD: 11.65 K/UL — HIGH (ref 3.8–10.5)
WBC # FLD AUTO: 11.65 K/UL — HIGH (ref 3.8–10.5)

## 2022-01-20 PROCEDURE — 72146 MRI CHEST SPINE W/O DYE: CPT | Mod: 26,MA

## 2022-01-20 PROCEDURE — 72148 MRI LUMBAR SPINE W/O DYE: CPT | Mod: 26,MA

## 2022-01-20 PROCEDURE — 99222 1ST HOSP IP/OBS MODERATE 55: CPT

## 2022-01-20 PROCEDURE — 99291 CRITICAL CARE FIRST HOUR: CPT

## 2022-01-20 PROCEDURE — 72131 CT LUMBAR SPINE W/O DYE: CPT | Mod: 26

## 2022-01-20 PROCEDURE — 70450 CT HEAD/BRAIN W/O DYE: CPT | Mod: 26

## 2022-01-20 PROCEDURE — 99223 1ST HOSP IP/OBS HIGH 75: CPT

## 2022-01-20 PROCEDURE — 93010 ELECTROCARDIOGRAM REPORT: CPT

## 2022-01-20 PROCEDURE — 93306 TTE W/DOPPLER COMPLETE: CPT | Mod: 26

## 2022-01-20 PROCEDURE — 71250 CT THORAX DX C-: CPT | Mod: 26

## 2022-01-20 PROCEDURE — 72128 CT CHEST SPINE W/O DYE: CPT | Mod: 26

## 2022-01-20 PROCEDURE — 51703 INSERT BLADDER CATH COMPLEX: CPT

## 2022-01-20 PROCEDURE — 71045 X-RAY EXAM CHEST 1 VIEW: CPT | Mod: 26

## 2022-01-20 RX ORDER — INSULIN LISPRO 100/ML
VIAL (ML) SUBCUTANEOUS EVERY 4 HOURS
Refills: 0 | Status: DISCONTINUED | OUTPATIENT
Start: 2022-01-20 | End: 2022-01-21

## 2022-01-20 RX ORDER — DIPHENHYDRAMINE HCL 50 MG
50 CAPSULE ORAL ONCE
Refills: 0 | Status: COMPLETED | OUTPATIENT
Start: 2022-01-20 | End: 2022-01-20

## 2022-01-20 RX ORDER — INSULIN LISPRO 100/ML
VIAL (ML) SUBCUTANEOUS EVERY 4 HOURS
Refills: 0 | Status: DISCONTINUED | OUTPATIENT
Start: 2022-01-20 | End: 2022-01-20

## 2022-01-20 RX ORDER — DEXTROSE 50 % IN WATER 50 %
25 SYRINGE (ML) INTRAVENOUS ONCE
Refills: 0 | Status: DISCONTINUED | OUTPATIENT
Start: 2022-01-20 | End: 2022-01-21

## 2022-01-20 RX ORDER — MORPHINE SULFATE 50 MG/1
2 CAPSULE, EXTENDED RELEASE ORAL EVERY 4 HOURS
Refills: 0 | Status: DISCONTINUED | OUTPATIENT
Start: 2022-01-20 | End: 2022-01-21

## 2022-01-20 RX ORDER — METOPROLOL TARTRATE 50 MG
25 TABLET ORAL DAILY
Refills: 0 | Status: DISCONTINUED | OUTPATIENT
Start: 2022-01-20 | End: 2022-01-21

## 2022-01-20 RX ORDER — DEXTROSE 50 % IN WATER 50 %
12.5 SYRINGE (ML) INTRAVENOUS ONCE
Refills: 0 | Status: DISCONTINUED | OUTPATIENT
Start: 2022-01-20 | End: 2022-01-21

## 2022-01-20 RX ORDER — DEXTROSE 50 % IN WATER 50 %
15 SYRINGE (ML) INTRAVENOUS ONCE
Refills: 0 | Status: DISCONTINUED | OUTPATIENT
Start: 2022-01-20 | End: 2022-01-21

## 2022-01-20 RX ORDER — OXYCODONE HYDROCHLORIDE 5 MG/1
5 TABLET ORAL EVERY 4 HOURS
Refills: 0 | Status: DISCONTINUED | OUTPATIENT
Start: 2022-01-20 | End: 2022-01-21

## 2022-01-20 RX ORDER — AMIODARONE HYDROCHLORIDE 400 MG/1
0 TABLET ORAL
Qty: 0 | Refills: 0 | DISCHARGE

## 2022-01-20 RX ORDER — ACETAMINOPHEN 500 MG
650 TABLET ORAL EVERY 6 HOURS
Refills: 0 | Status: DISCONTINUED | OUTPATIENT
Start: 2022-01-20 | End: 2022-01-21

## 2022-01-20 RX ORDER — LOSARTAN POTASSIUM 100 MG/1
100 TABLET, FILM COATED ORAL DAILY
Refills: 0 | Status: DISCONTINUED | OUTPATIENT
Start: 2022-01-20 | End: 2022-01-21

## 2022-01-20 RX ORDER — ACETAMINOPHEN 500 MG
650 TABLET ORAL ONCE
Refills: 0 | Status: COMPLETED | OUTPATIENT
Start: 2022-01-20 | End: 2022-01-20

## 2022-01-20 RX ORDER — ATORVASTATIN CALCIUM 80 MG/1
20 TABLET, FILM COATED ORAL AT BEDTIME
Refills: 0 | Status: DISCONTINUED | OUTPATIENT
Start: 2022-01-20 | End: 2022-01-21

## 2022-01-20 RX ORDER — LEVOTHYROXINE SODIUM 125 MCG
50 TABLET ORAL DAILY
Refills: 0 | Status: DISCONTINUED | OUTPATIENT
Start: 2022-01-20 | End: 2022-01-21

## 2022-01-20 RX ORDER — GLUCAGON INJECTION, SOLUTION 0.5 MG/.1ML
1 INJECTION, SOLUTION SUBCUTANEOUS ONCE
Refills: 0 | Status: DISCONTINUED | OUTPATIENT
Start: 2022-01-20 | End: 2022-01-21

## 2022-01-20 RX ORDER — SODIUM CHLORIDE 9 MG/ML
1000 INJECTION INTRAMUSCULAR; INTRAVENOUS; SUBCUTANEOUS
Refills: 0 | Status: DISCONTINUED | OUTPATIENT
Start: 2022-01-20 | End: 2022-01-21

## 2022-01-20 RX ORDER — SODIUM CHLORIDE 9 MG/ML
1000 INJECTION, SOLUTION INTRAVENOUS
Refills: 0 | Status: DISCONTINUED | OUTPATIENT
Start: 2022-01-20 | End: 2022-01-21

## 2022-01-20 RX ORDER — SODIUM CHLORIDE 9 MG/ML
1000 INJECTION, SOLUTION INTRAVENOUS
Refills: 0 | Status: DISCONTINUED | OUTPATIENT
Start: 2022-01-20 | End: 2022-01-20

## 2022-01-20 RX ORDER — INSULIN LISPRO 100/ML
VIAL (ML) SUBCUTANEOUS
Refills: 0 | Status: DISCONTINUED | OUTPATIENT
Start: 2022-01-20 | End: 2022-01-20

## 2022-01-20 RX ORDER — INSULIN LISPRO 100/ML
2 VIAL (ML) SUBCUTANEOUS
Refills: 0 | Status: DISCONTINUED | OUTPATIENT
Start: 2022-01-20 | End: 2022-01-20

## 2022-01-20 RX ADMIN — ATORVASTATIN CALCIUM 20 MILLIGRAM(S): 80 TABLET, FILM COATED ORAL at 21:41

## 2022-01-20 RX ADMIN — Medication 2: at 20:12

## 2022-01-20 RX ADMIN — OXYCODONE HYDROCHLORIDE 5 MILLIGRAM(S): 5 TABLET ORAL at 18:20

## 2022-01-20 RX ADMIN — SODIUM CHLORIDE 60 MILLILITER(S): 9 INJECTION INTRAMUSCULAR; INTRAVENOUS; SUBCUTANEOUS at 20:13

## 2022-01-20 RX ADMIN — Medication 50 MILLIGRAM(S): at 04:19

## 2022-01-20 NOTE — ED PROVIDER NOTE - PHYSICAL EXAMINATION
Gen: no acute distress  Head: normocephalic, atraumatic  Lung: CTAB, no respiratory distress, no wheezing, rales, rhonchi  CV: normal s1/s2, rrr,   Abd: soft, no tenderness, non-distended  Back: midline thoracic back pain, no stepoffs, no midline c/l spine tenderness  MSK: No edema, no visible deformities, full range of motion in all 4 extremities  Neuro: No focal neurologic deficits, 5/5 strength to all extremities, sensation intact, unsteady gait, unable to ambulate  Skin: No rash   Psych: normal affect

## 2022-01-20 NOTE — ED ADULT NURSE NOTE - OBJECTIVE STATEMENT
Patient A&O presents to the ED c/o bilateral extremity numbness.  Patient reports that he has a recent fall.  Patient reports difficulty ambulating due to the numbness.  Patient denies fevers and chills

## 2022-01-20 NOTE — ED PROVIDER NOTE - OBJECTIVE STATEMENT
75 y/o M pt with hx of htn, hld, DMII, hypothyroidism, asbestosis, CAD s/p stentsx3, on Eliquis presenting today with c/o numbness to bilateral feet. States that he recently fell at home and was dx with thoracic spine compression fx approx 2 months ago. Reports today that he noticed that his bilateral feet had become numb and this caused him to have difficulty ambulating and he has fallen several times today. Denies bowel/bladder incontinence, but reports difficulty starting to urinate, this has been present for months. Pt denies saddle anesthesia or worsening of back pain. Pt is incidentally covid+ Pt denies any chest pain, dyspnea, fevers, n/v/d, abdominal pain, dysuria, headache, cough, congestion, sore throat, neck pain, weakness, dizziness, syncope, or other complaint.

## 2022-01-20 NOTE — ED PROVIDER NOTE - PROGRESS NOTE DETAILS
No rectal tone noted on exam. MRI contacted for stat MRI. Will continue to monitor. - Jeffery Cruz, PGY-3 MRI results noted. Stat nsx consult placed, will come down to evaluate the pt. - Jeffery Cruz, PGY-3 Karl: Neurosurgery states that patient can be admitted to medicine for q2h neuro checks. NSGY will follow. Plan for admission.

## 2022-01-20 NOTE — PROGRESS NOTE ADULT - SUBJECTIVE AND OBJECTIVE BOX
Anesthesia Attending pre-op chart review    Chart reviewed in detail.  Aside from the question of when he can come to the OR in light of his covid positive status, the patient is not and  will not be an acceptable candidate for the anesthetic required for the  proposed surgery until he has had a cardiology evaluation to specifically delineate his pump function and valve abnormalities. Please obtain a cardiology consultation, which ideally will include an echocardiogram, prior to bringing the patient to the operating room. Thank you

## 2022-01-20 NOTE — CHART NOTE - NSCHARTNOTEFT_GEN_A_CORE
Called to evaluate the patient, but upon review of the chart the patient follows with Dr. Armenta. Primary team made aware. Thank you.

## 2022-01-20 NOTE — PROCEDURE NOTE - NSURITECHNIQUE_GU_A_CORE
Proper hand hygiene was performed/Sterile gloves were worn for the duration of the procedure/A sterile drape was used to cover all adjacent areas/The site was cleaned with soap/water and sterile solution (betadine)/The catheter was appropriately lubricated/The catheter was secured with a securement device (e.g. StatLock)/The urinary drainage system is closed at the end of the procedure

## 2022-01-20 NOTE — H&P ADULT - NSHPPHYSICALEXAM_GEN_ALL_CORE
T(C): 36.8 (01-20-22 @ 07:11), Max: 36.9 (01-20-22 @ 06:51)  HR: 86 (01-20-22 @ 07:11) (58 - 86)  BP: 108/76 (01-20-22 @ 07:11) (92/59 - 126/74)  RR: 18 (01-20-22 @ 07:11) (18 - 20)  SpO2: 100% (01-20-22 @ 07:11) (97% - 100%)    GEN - NAD  HEENT - NCAT, EOMI, PRANAY, no JVD/bruit.  RESP - CTA BL, no wheeze/stridor/rhonchi/crackles. not on supplemental O2.  CARDIO - NS1S2, RRR. No murmurs/rubs/gallops.  ABD - Soft/mild  tenderness suprapubic area /Non distended. Normal BS x4 quadrants.   Ext - No SHERIF. no signs of venous/arterial stasis   MSK - full ROM of BL upper and lower extremities without pain or restriction. BL 5/5 strength on upper and lower extremities. able to pull both feet against gravity. sensation intact  Neuro - cn 2-12 grossly intact. no visible seizure activity appreciated. no tremor. gait not observed.   Skin - clean, dry, intact. no rashes or lesions.    Psych- AAOx3. appropriate behaviour. attentive. normal affect.

## 2022-01-20 NOTE — CONSULT NOTE ADULT - ATTENDING COMMENTS
[facility] Discharge Instructions Endoscopy      4/19/2018    During your exam, the physician:    Removed polyp, Completed a thorough exam, biopsy/biopsies obtained and Lab results will be called/mailed to you within 7-14 days.  If you have not heard from the doctor within 10 days, call the office for results:      DIET INSTRUCTIONS:  Advance your diet as you tolerate it and Other: Gerd diet    PRESCRIPTIONS/MEDICATIONS  No new prescriptions given today    A RESPONSIBLE ADULT MUST ACCOMPANY YOU AND DRIVE YOU HOME    You had the following procedure(s) today:   Colonoscopy and Upper Endoscopy     1. A polyp/polyps removed today  There is no need to hold any aspirin or anti-inflammatory medications.  2. Following sedation, your judgment, perception and coordination are impaired for a minimum of 24 hours.   Therefore:  · Do not drive. Do not return to work today.  · It is strongly recommended to have someone stay with you at home the day of discharge and provide overnight care.   · Do not operate appliances or machinery that require quick reaction time  · Do not sign legal documents or be involved in work decisions  · Do not smoke or drink alcoholic beverages for 24 hours  · Plan to spend a few hours resting before resuming your normal routine    Please call your physician in the event that you experience any of the following or proceed to the nearest hospital in the event of an emergency:     UPPER ENDOSCOPY:    Difficulty swallowing or breathing  Neck swelling  Excessive pain, you may have mild chest pain or discomfort which should pass within 1-2 hours with the passage of air.  Nausea or Vomiting  Abdominal distention  Fever  A mild sore throat may follow this procedure.  Warm salt-water gargle or lozenges may relieve your discomfort.  ..  COLONOSCOPY  Fever  Severe abdominal distention or pain. Some mild distention and/or cramping are normal after these procedures but should pass within an hour or two with the passage  of air.  Rectal bleeding more than blood streaking on the toilet  tissue  Nausea or Vomiting    If you have any questions or concerns, contact Dr. Ba Jean-Baptiste [phone]    [facility] will be calling you within 3-5 business days to follow up after your procedure.     ADDITIONAL INSTRUCTIONS:     RECOMMENDATIONS:     Continue pantoprazole.  GERD diet.  We will schedule for steroid injection of abdominal wall.    Recommendations:  1. -If adenoma is present, repeat colonoscopy in 5 years.    2.     The patient was advised to return to our office for any significant GI symptoms such as GI bleeding.      Understanding Colon and Rectal Polyps  The colon (also called the large intestine) is a muscular tube that forms the last part of the digestive tract. It absorbs water and stores food waste. The colon is about 4 to 6 feet long. The rectum is the last 6 inches of the colon. The colon and rectum have a smooth lining composed of millions of cells. Changes in these cells can lead to growths in the colon that can become cancerous and should be removed.     The colon has a smooth lining composed of millions of cells.     When the Colon Lining Changes  Changes that occur in the cells that line the colon or rectum can lead to growths called polyps. Over a period of years, polyps can turn cancerous. Removing polyps early may prevent cancer from ever forming.       Polyps  Polyps are fleshy clumps of tissue that form on the lining of the colon or rectum. Small polyps are usually benign (not cancerous). However, over time, cells in a polyp can change and become cancerous. Certain types of polyps known as adenomatous polyps are premalignant. The risk for invasive cancer increases with the size of the polyp and certain cell and gene features. This means that they can become cancerous if they're not removed. Hyperplastic polyps are benign. They can grow quite large and not turn  cancerous.     Cancer  Almost all colorectal cancers start when polyp cells begin growing abnormally. As a cancerous tumor grows, it may involve more and more of the colon or rectum. In time, cancer can also grow beyond the colon or rectum and spread to nearby organs or to glands called lymph nodes. The cells can also travel to other parts of the body. This is known as metastasis. The earlier a cancerous tumor is removed, the better the chance of preventing its spread.  © 5182-3152 [employer]. [address] [place] [address]. All rights reserved. This information is not intended as a substitute for professional medical care. Always follow your healthcare professional's instructions.      Hemorrhoids    Hemorrhoids are swollen and inflamed veins inside the rectum and near the anus. The rectum is the last several inches of the colon. The anus is the passage between the rectum and the outside of the body.  Causes   The veins can become swollen due to increased pressure in them. This is most often caused by:  · Chronic constipation or diarrhea  · Straining when having a bowel movement  · Sitting too long on the toilet  · A low-fiber diet  · Pregnancy  Symptoms   · Bleeding from the rectum (this may be noticeable after bowel movements)  · Lump near the anus  · Itching around the anus  · Pain around the anus  There are different types of hemorrhoids. Depending on the type you have and the severity, you may be able to treat yourself at home. In some cases, a procedure may be the best treatment option. Your healthcare provider can tell you more about this, if needed.  Home care  General care  · To get relief from pain or itching, try:  ¨ Topical products. Your healthcare provider may prescribe or recommend creams, ointments, or pads that can be applied to the hemorrhoid. Use these exactly as directed.  ¨ Medicines. Your healthcare provider may recommend stool softeners, suppositories, or laxatives  to help manage constipation. Use these exactly as directed.  ¨ Sitz baths. A sitz bath involves sitting in a few inches of warm bath water. Be careful not to make the water so hot that you burn yourself--test it before sitting in it. Soak for about 10 to 15 minutes a few times a day. This may help relieve pain.  Tips to help prevent hemorrhoids  · Eat more fiber. Fiber adds bulk to stool and absorbs water as it moves through your colon. This makes stool softer and easier to pass.  ¨ Increase the fiber in your diet with more fiber-rich foods. These include fresh fruit, vegetables, and whole grains.  ¨ Take a fiber supplement or bulking agent, if advised to by your provider. These include products such as psyllium or methylcellulose.  · Drink plenty of water, if directed to by your provider. This can help keep stool soft.  · Be more active. Frequent exercise aids digestion and helps prevent constipation. It may also help make bowel movements more regular.  · Don’t strain during bowel movements. This can make hemorrhoids more likely. Also, don’t sit on the toilet for long periods of time.  Follow-up care  Follow up with your healthcare provider, or as advised. If a culture or imaging tests were done, you will be notified of the results when they are ready. This may take a few days or longer.  When to seek medical advice  Call your healthcare provider right away if any of these occur:  · Increased bleeding from the rectum  · Increased pain around the rectum or anus  · Weakness or dizziness   Call 911   Call 911 or return to the emergency department right away if any of these occur:  · Trouble breathing or swallowing  · Fainting or loss of consciousness  · Unusually fast heart rate  · Vomiting blood  · Large amounts of blood in stool     © 8004-8916 [employer]. [address] [place] [address], [place] [address]. All rights reserved. This information is not intended as a substitute for professional medical care.  Always follow your healthcare professional's instructions.      Esophagitis  Do you often have burning pain in your chest? You may have esophagitis. This is an inflammation of the lining of the esophagus. The esophagus is the tube that connects the throat to the stomach. This sheet tells you more about esophagitis. It also explains your treatment options.  Two Main Types of Esophagitis     With esophagitis, the lining of the esophagus is inflamed.   · Reflux esophagitis. This is the more common type. It’s also called GERD (gastroesophageal reflux disease). Stomach contents with stomach acide rise up into the esophagus. This happens repeatedly and leads to irritation. Risk factors can include:  ¨ Being overweight  ¨ Asthma  ¨ Smoking  ¨ Pregnancy  ¨ Frequent vomiting  ¨ Certain medications (such as aspirin and other anti-inflammatories)  ¨ Hiatal hernia, which is when the upper part of the stomach pushes upward through the diaphragm (the muscle between the chest and the abdomen)  · Infectious esophagitis. This condition is caused by an infection. Certain factors can make this more likely. These include a weakened immune system and poor nutrition. Antibiotic use can also be a factor. The infection is often due to the following:  ¨ A type of fungus (typically candida)  ¨ A virus, such as herpes simplex virus 1 or cytomegalovirus (CMV)  Symptoms of Esophagitis  The following symptoms can occur for both types of esophagitis:  · Pain when swallowing, or trouble swallowing  · Heartburn (pain behind the breastbone)  · Acid regurgitation  · Chronic sore throat  · Inflammation of the gums  · Cavities  · Bad breath  · Nausea  · Bleeding (indicated by bright red vomit or black, tarry stool)  These symptoms occur more often with reflux esophagitis:  · Coughing, wheezing, or asthma  · Hoarseness  Diagnosis of Esophagitis  Your healthcare provider will ask about your health history and symptoms. You’ll also be examined. Sometimes  certain tests are needed. These may include:  · Upper endoscopy. A thin, flexible tube with a tiny light and camera is used. It is inserted through the mouth down into the esophagus. This allows the doctor to look for damage. A biopsy may also be done. This is when a small sample of tissue is removed. The sample is sent to a lab for testing.  · Upper GI x-ray with barium. An x-ray is done after the patient drinks a substance called barium. Barium may make problems in the esophagus easier to see on an x-ray.  · Esophageal pH. A soft, thin tube is passed into the esophagus through the nose or mouth for 24 hours to measure the acid in the esophagus.  · Esophageal Manometry. A soft, thin tube is passed into the esophagus through the nose or mouth to measure muscle contractions in the esophagus.  Treatment of Esophagitis  · Medications can help treat either type. For infectious esophagitis, medications can help clear the infection. For reflux esophagitis, medications are prescribed based on symptoms. For instance, minor symptoms can be treated with antacids. These are taken after meals and at bedtime. For more severe symptoms, certain medications (PPIs or proton pump inhibitors, such as omeprazole, and H2 blockers such as ranitidine) can help reduce the amount of acid in the stomach. Other medications can help food move through the stomach more quickly.  · Lifestyle changes can help reduce irritation and ease symptoms:  ¨ Avoid spicy foods (pepper, chili powder, callaway). Also avoid hard foods (nuts, crackers, raw vegetables) and acidic foods and drinks (tomatoes, citrus fruits and juices). Other problem foods include chocolate, peppermint, nutmeg, and foods high in fat.  ¨ Until you can swallow without pain, follow a combined liquid and soft diet. Try foods such as cooked cereals, mashed potatoes, and soups.  ¨ Take small bites and chew your food thoroughly.  ¨ Avoid large meals and heavy evening meals. Don't lie down  within 2 to 3 hours of eating.  ¨ Get to or maintain a healthy weight.  ¨ Avoid alcohol, caffeine, and smoking or tobacco products.  ¨ Practice good oral hygiene.  ¨ Raise your upper body by 4 to 6 inches when lying in bed. This can be done using a foam wedge. Or put blocks under the legs at the head of your bed.  · Surgery may be needed for severe reflux esophagitis. Your doctor can tell you more.     Why Treatment Is Important  Without treatment, esophagitis can worsen. This is especially true with severe reflux esophagitis. For instance, continued symptoms can cause scarring of the esophagus. Over time, this can create a stricture, or narrowing. This can result in trouble passing food down to the stomach. Continued symptoms can also cause changes in the lining of the esophagus. These changes can put you at a slightly higher risk of cancer of the esophagus.   © 0455-0592 [employer]. [address] [place] [address]. All rights reserved. This information is not intended as a substitute for professional medical care. Always follow your healthcare professional's instructions.      What Is Acid Reflux?    Do you have to clear your throat or cough often? Are you hoarse? Do you have difficulty swallowing? If you have these or other throat symptoms, you may have acid reflux (when stomach acid washes up and irritates your throat).  Why You Have Throat Symptoms  At both ends of the esophagus (the tube that carries food to the stomach) are the esophageal sphincters. These muscles relax to let food pass, then tighten to keep stomach acid down. When the lower esophageal sphincter (LES) doesn’t tighten enough, acid can reflux from the stomach into the esophagus. This may cause heartburn. If the upper esophageal sphincter (UES) also doesn’t work well, acid can travel higher and enter your throat (pharynx). In many cases, this causes throat symptoms.  Common Throat Symptoms  · Frequent need to clear your  76M w/ PMHX  HTN, HLD, DM, hypothyroidism, asbestosis, CAD s/p stents x3, Atrial fibrillation on Eliquis, presents [facility] with severe compression fracture of L1 vertebral body w/ retropulsion.  Patient upgraded to NSICU after being noted to have urinary retention. Planned for OR in am.  Admit to NCCU for close observation with frequent neurochecks.  Pain control.  Plan to reverse DOAC with Kcentra in am - will be off AC for 24 hrs only, high risk of op bleeding.  Rest as above        Pt is critically ill and at high risk of rapid deterioration/death due to abovementioned conditions.   Still requires critical care interventions - ongoing frequent evaluations, interventions and management adjustment by the Attending and ICU team, - and included review of relevant history, clinical examination, review of data and images, discussion of treatment with the multidisciplinary team and any consultants involved in this patient’s care as well as family discussion. throat  · Feeling like you’re choking  · Chronic cough  · Hoarseness  · Trouble swallowing  · Sensation of having “a lump in the throat”  · Sour or acid taste  · Recurrent sore throat   © 3112-7702 [employer]. [address] [place] [address]. All rights reserved. This information is not intended as a substitute for professional medical care. Always follow your healthcare professional's instructions.        Tips to Control Acid Reflux  To control acid reflux, you’ll need to make some basic diet and lifestyle changes. The simple steps outlined below may be all you’ll need to relieve discomfort.  Watch What You Eat    · Avoid fatty foods and spicy foods.  · Eat fewer acidic foods, such as citrus and tomato-based foods. These can increase symptoms.  · Limit drinking alcohol, caffeine, and fizzy beverages. All increase acid reflux.  · Try limiting chocolate, peppermint, and spearmint. These can worsen acid reflux in some people.  Watch When You Eat  · Avoid lying down for 3 hours after eating.  · Do not snack before going to bed.  Raise Your Head    Raising your head and upper body by 4 inches to 6 inches helps limit reflux when you’re lying down. Put blocks under the head of the bed frame to raise it.  Other Changes  · Lose weight, if you need to.  · Don’t work out near bedtime.  · Avoid tight-fitting clothes.  · Limit aspirin and ibuprofen.  · Stop smoking.   © 6911-3645 [employer]. [address] [place] [address]. All rights reserved. This information is not intended as a substitute for professional medical care. Always follow your healthcare professional's instructions.

## 2022-01-20 NOTE — ED PROVIDER NOTE - CLINICAL SUMMARY MEDICAL DECISION MAKING FREE TEXT BOX
Pt with hx of recent thoracic compression fx, presenting today with c/o bilateral feet numbness and difficulty ambulating. Neuro intact, but unable to ambulate. Will order labs, CT t/l spine, reeval. Pt with hx of recent thoracic compression fx, presenting today with c/o bilateral feet numbness, difficulty urinating and difficulty ambulating s/p fall at home. No rectal tone on exam and unable to ambulate. Will order labs, emergent MR spine, reassess.

## 2022-01-20 NOTE — ED ADULT NURSE REASSESSMENT NOTE - NS ED NURSE REASSESS COMMENT FT1
Bladder scan <100
Patient resting in bed, awake, alert and oriented X4, VSS, no acute distress noted, awaiting for CT and dispo, will continue to monitor patient.
Report received from off-going RN at 19:30, VSS, pt resting comfortably in stretcher. No s/s of apparent distress noted. Will continue to monitor at this time.

## 2022-01-20 NOTE — H&P ADULT - NSCORESITESY/N_GEN_A_CORE_RD
INDRA/[employer]ENNIFER [employer]LLY [employer]  Referred by: Shirley Young No

## 2022-01-20 NOTE — H&P ADULT - ASSESSMENT
76M w/ PMHX  HTN, HLD, DM, hypothyroidism, asbestosis, CAD s/p stents x3, Atrial fibrillation on Eliquis, presenting today with c/o numbness to b/l feet.MRI T/L Spine- Severe compression fracture of L1 vertebral body w/ retropulsion.      Severe compression fracture of L1 vertebral body w/ retropulsion  -admit to step down  -- Strict flat bedrest  - CT T/L spine   - Q2 neuro checks at this time  - Pain control PRN  - Final plan for OR SCHEDULE is pending Now. DW neurosurgery, rec to resume diet for now until final date of surgery, Rec Hold AC/AP/ chemical DVT prophylaxis at this time.    Urinary retention sec above  -bladder scan 2200. will place on vel[place]  -renal us    CAD s/p stents x3, Atrial fibrillation on Eliquis,  - Afib on monitor  -EKG  -ECHO,BNP  - c/s cardiology([facility])  for further rec AC/AP/cardiac clearance      DM  -Issc, lantus,meal coverage.a1c  -monitor bg,     HTN, HLD,hypothyroidism, asbestosis  -resume home meds  -cxr  -      DVT PPX- scd's     CARE OF PLAN GARRY PT. CALLED WIFE LEFT MSG  GARRY neurosurgery team  garry rn 76M w/ PMHX  HTN, HLD, DM, hypothyroidism, asbestosis, CAD s/p stents x3, Atrial fibrillation on Eliquis, presenting today with c/o numbness to b/l feet.MRI T/L Spine- Severe compression fracture of L1 vertebral body w/ retropulsion.      Severe compression fracture of L1 vertebral body w/ retropulsion  -admit to step down  -- Strict flat bedrest  - CT T/L spine   - Q2 neuro checks at this time  - Pain control PRN  - Final plan for OR SCHEDULE is pending Now. DW neurosurgery, rec to resume diet for now until final date of surgery, Rec Hold AC/AP/ chemical DVT prophylaxis at this time.    Urinary retention sec above  -bladder scan 2200. will place on [place]  -renal us    covid19 infection  - positive since 01/13 ,result on sunrise  -so2 stable  -   -not a candidate for decadrone/remdisivir    CAD s/p stents x3, Atrial fibrillation on Eliquis,  - Afib on monitor  -EKG  -ECHO,BNP  - c/s cardiology([facility])  for further rec AC/AP/cardiac clearance      DM  -Issc, lantus,meal coverage.a1c  -monitor bg,     HTN, HLD,hypothyroidism, asbestosis  -resume home meds  -cxr  -      DVT PPX- scd's     CARE OF PLAN GARRY PT. CALLED WIFE LEFT MSG  [facility] neurosurgery team  garry rn

## 2022-01-20 NOTE — ED PROVIDER NOTE - ATTENDING CONTRIBUTION TO CARE
Acosta: I performed a face to face bedside interview with patient regarding history of present illness, review of symptoms and past medical history. I completed an independent physical exam.  I have discussed patient's plan of care with resident.   I agree with note as stated above including HISTORY OF PRESENT ILLNESS, HIV, PAST MEDICAL/SURGICAL/FAMILY/SOCIAL HISTORY, ALLERGIES AND HOME MEDICATIONS, REVIEW OF SYSTEMS, PHYSICAL EXAM, MEDICAL DECISION MAKING and any PROGRESS NOTES during the time I functioned as the attending physician for this patient unless otherwise noted. My brief assessment is as follows: Pt with hx of recent thoracic compression fx, presenting today with c/o bilateral feet numbness, difficulty urinating and difficulty ambulating s/p fall at home. No rectal tone on exam and unable to ambulate. Will order labs, emergent MR spine, reassess.

## 2022-01-20 NOTE — CONSULT NOTE ADULT - ATTENDING COMMENTS
NSGY Attg:    see above    patient seen and examined; case d/w his wife, Josefina Dumont ([phone])    Briefly, patient with LE weakness s/p fall on Thanksgiving.  Patient had difficulty standing/ambulating and has required assistance from his son for transfers.  Patient with urinary retention x 2 weeks with dribbling.  LE sensory changes x 24 hours with insertion of velasco earlier today.    Exam:  alert  conversant, confused  FORD to command  LE 4+/5 bilaterally  no saddle anesthesia    I explained the risks, benefits, and alternatives of surgical intervention to the patient and his wife as below:    benefit: hopeful decompression, hopeful stabilization of the spine, hopeful improvement in symptoms, hopeful prevention of progression of deficit   alternative: no surgical intervention; continued conservative therapy (PT, pain management, bracing)  risks: bleeding, infection, CSF leak, failure of procedure or instrumentation, pseudoarthrosis, adjacent level degeneration, need for re-operation, worsening pain, seizure, stroke, coma, death, DVT, PE, MI, PNA, UTI, difficulty/failure to intubate or extubate, new or worsening numbness, tingling, weakness, paralysis, sensory changes, difficulty/inability to ambulate, sexual dysfunction, incontinence    The patient and his wife verbalize their understanding of the above.  I have answered all their questions.  They wish to proceed with operative intervention.    A/P:  - cardiac optimization pending  - to OR tomorrow am if medically optimized

## 2022-01-20 NOTE — CHART NOTE - NSCHARTNOTEFT_GEN_A_CORE
PULMONARY ADDENDUM    FINDINGS:    Lungs/Airways/Pleura: Tubular density in the superior segment left lower   lobe on image 80 series 3. Extensive calcified pleural plaques with   subpleural scarring in the the mid and lower lungs. No pleural effusion   or pneumothorax.    Mediastinum/Lymph nodes: Nothoracic adenopathy. Large hiatal hernia   contains much of the stomach as well as loops of nonobstructed colon    Heart and Vessels: The heart is normal in size. There are three-vessel   coronary artery calcifications. No pericardial effusion. The mid   ascending aorta measures 4.1 cm.    Upper Abdomen: Unremarkable.    Osseous structures and Soft Tissues: There is diffuse qualitative   osteopenia. Nondisplaced right fourth rib fracture. There is mild wedging   deformity of the T6 vertebral body, present on 11/26/2019 lateral chest   radiograph.    IMPRESSION:    1.  Nondisplaced right fourth rib fracture. No pleural effusion or   pneumothorax    2.  Asbestos-related pleural disease    3.  Large hiatal hernia    --- End of Report ---            MACIE MORAN M.D., Attending Radiologist  This document has been electronically signed. Jan 20 2022  4:52PM        reviewed    no PTX no bleed  -coronary calcifications   cardiology  input   -aspiration precautions   - pain management  - please see  earlier  pulmonary note     -abg acceptable

## 2022-01-20 NOTE — PROCEDURE NOTE - ADDITIONAL PROCEDURE DETAILS
>2500 cc urine return in bag called by nurse to place difficult velasco after multiple attempts made by nursing staff. >2500 cc urine return in bag

## 2022-01-20 NOTE — ED ADULT NURSE NOTE - NSIMPLEMENTINTERV_GEN_ALL_ED
Implemented All Fall Risk Interventions:  Artesian to call system. Call bell, personal items and telephone within reach. Instruct patient to call for assistance. Room bathroom lighting operational. Non-slip footwear when patient is off stretcher. Physically safe environment: no spills, clutter or unnecessary equipment. Stretcher in lowest position, wheels locked, appropriate side rails in place. Provide visual cue, wrist band, yellow gown, etc. Monitor gait and stability. Monitor for mental status changes and reorient to person, place, and time. Review medications for side effects contributing to fall risk. Reinforce activity limits and safety measures with patient and family.

## 2022-01-20 NOTE — CONSULT NOTE ADULT - ASSESSMENT
77y/o male never smoker    1- covid +  with pulse ox 97% room air  2- for OR   - severe compression of L1 vertebra l body fracture with  central canal stenosis - feet numbness and urinary retention   3- afib/ CAD  on eliquis   4-  pleural plaque  with  likely restrictive ventilatory defect      - abg room air   -cxr  poor films - f/u official results   -  ct  chest   -d dimer    - if official cxr same then  no absolute pulmonary contraindications to surgery -  at risk for  respiratory complications due to recent covid + ,   extensive pleural  calcifications and advanced age    thank you kindly will follow    Please  feel free to reach out with any questions or suggestions    MIGUELINA TRUONG    PULMONARY and CRITICAL CARE   [facility] Physician [facility]     [phone]    
76M w/ PMHX  HTN, HLD, DM, hypothyroidism, asbestosis, CAD s/p stents x3, Atrial fibrillation on Eliquis, presents to [facility] 1/20/22 c/o numbness to b/l feet. Recent fall at home on Thanksgiving and dx with spinal  fx ~2 months ago on outpatient xray and given back brace that he wears "all the time."  MRI T/L Spine- Severe compression fracture of L1 vertebral body w/ retropulsion.  Patient upgraded to NSICU after being noted to have urinary retention. Cleared by cardiology for OR tomorrow with Dr. Gaitan. Plan for OR tomorrow for spine stabilization. Incidentally COVID+.    Plan:  -D/w Dr. Jimenez   -Q2hr neuro checks   -Plan for OR tomorrow for spine stabilization   -Tylenol PRN   -Morphine 2mg Q4hrs PRN   -Oxy 5mg Q4 PRN   -Strict flat bedrest   -SBP goal 100-140  -Lipitor 20mg daily   -Losartan 100mg daily   -Metoprolol 25mg daily   -Saturating well on room air   -Maintain SpO2>92%  -Cotton   -NS@60  -NPO except meds   -SCDs b/l for DVT prophylaxis   -Hold all AC/AP at this time   -Pending TTE  -No absolute cardiac contraindication to the planned urgent neurosurgical procedure   
76M w/ PMHX  HTN, HLD, DM, hypothyroidism, asbestosis, CAD s/p stents x3, on Eliquis, presenting today with c/o numbness to b/l feet. States that he recently fell at home on Thanksgiving and dx with spinal  fx ~2 months ago on outpatient xray and given back brace that he wears "all the time." Reports today that he noticed that his bilateral feet had become numb and this caused him to have difficulty ambulating and he has fallen several times today. States he was scheduled for an appt with a "back doctor today."  - Severe compression fracture of L1 vertebral body w/ retropulsion         Plan  - Imaging reviewed  - Strict flat bedrest  - CT T/L spine   - Q1 neuro checks at this time  - Pain control PRN  - NPO  - Full blood work   - Medical optimization   - Hold AC/AP/ chemical DVT prophylaxis at this time  - Final plan pending family discussion and AM rounds w/ Dr. Gaitan

## 2022-01-21 ENCOUNTER — APPOINTMENT (OUTPATIENT)
Dept: NEUROSURGERY | Facility: HOSPITAL | Age: 77
End: 2022-01-21
Payer: MEDICARE

## 2022-01-21 LAB
A1C WITH ESTIMATED AVERAGE GLUCOSE RESULT: 7.6 % — HIGH (ref 4–5.6)
ACANTHOCYTES BLD QL SMEAR: SLIGHT — SIGNIFICANT CHANGE UP
ALBUMIN SERPL ELPH-MCNC: 2.5 G/DL — LOW (ref 3.3–5.2)
ALBUMIN SERPL ELPH-MCNC: 2.6 G/DL — LOW (ref 3.3–5.2)
ALP SERPL-CCNC: 64 U/L — SIGNIFICANT CHANGE UP (ref 40–120)
ALP SERPL-CCNC: 68 U/L — SIGNIFICANT CHANGE UP (ref 40–120)
ALT FLD-CCNC: 12 U/L — SIGNIFICANT CHANGE UP
ALT FLD-CCNC: 14 U/L — SIGNIFICANT CHANGE UP
ANION GAP SERPL CALC-SCNC: 13 MMOL/L — SIGNIFICANT CHANGE UP (ref 5–17)
ANION GAP SERPL CALC-SCNC: 14 MMOL/L — SIGNIFICANT CHANGE UP (ref 5–17)
ANION GAP SERPL CALC-SCNC: 16 MMOL/L — SIGNIFICANT CHANGE UP (ref 5–17)
ANION GAP SERPL CALC-SCNC: 21 MMOL/L — HIGH (ref 5–17)
APTT BLD: 30.7 SEC — SIGNIFICANT CHANGE UP (ref 27.5–35.5)
APTT BLD: 31.4 SEC — SIGNIFICANT CHANGE UP (ref 27.5–35.5)
AST SERPL-CCNC: 13 U/L — SIGNIFICANT CHANGE UP
AST SERPL-CCNC: 13 U/L — SIGNIFICANT CHANGE UP
BASOPHILS # BLD AUTO: 0 K/UL — SIGNIFICANT CHANGE UP (ref 0–0.2)
BASOPHILS # BLD AUTO: 0.05 K/UL — SIGNIFICANT CHANGE UP (ref 0–0.2)
BASOPHILS NFR BLD AUTO: 0 % — SIGNIFICANT CHANGE UP (ref 0–2)
BASOPHILS NFR BLD AUTO: 0.5 % — SIGNIFICANT CHANGE UP (ref 0–2)
BILIRUB SERPL-MCNC: 0.6 MG/DL — SIGNIFICANT CHANGE UP (ref 0.4–2)
BILIRUB SERPL-MCNC: 0.6 MG/DL — SIGNIFICANT CHANGE UP (ref 0.4–2)
BUN SERPL-MCNC: 16.5 MG/DL — SIGNIFICANT CHANGE UP (ref 8–20)
BUN SERPL-MCNC: 17.3 MG/DL — SIGNIFICANT CHANGE UP (ref 8–20)
BUN SERPL-MCNC: 20 MG/DL — SIGNIFICANT CHANGE UP (ref 8–20)
BUN SERPL-MCNC: 23.2 MG/DL — HIGH (ref 8–20)
BURR CELLS BLD QL SMEAR: PRESENT — SIGNIFICANT CHANGE UP
CALCIUM SERPL-MCNC: 7.9 MG/DL — LOW (ref 8.6–10.2)
CALCIUM SERPL-MCNC: 8.3 MG/DL — LOW (ref 8.6–10.2)
CALCIUM SERPL-MCNC: 8.4 MG/DL — LOW (ref 8.6–10.2)
CALCIUM SERPL-MCNC: 8.8 MG/DL — SIGNIFICANT CHANGE UP (ref 8.6–10.2)
CHLORIDE SERPL-SCNC: 102 MMOL/L — SIGNIFICANT CHANGE UP (ref 98–107)
CHLORIDE SERPL-SCNC: 103 MMOL/L — SIGNIFICANT CHANGE UP (ref 98–107)
CHLORIDE SERPL-SCNC: 107 MMOL/L — SIGNIFICANT CHANGE UP (ref 98–107)
CHLORIDE SERPL-SCNC: 99 MMOL/L — SIGNIFICANT CHANGE UP (ref 98–107)
CHOLEST SERPL-MCNC: 107 MG/DL — SIGNIFICANT CHANGE UP
CO2 SERPL-SCNC: 14 MMOL/L — LOW (ref 22–29)
CO2 SERPL-SCNC: 18 MMOL/L — LOW (ref 22–29)
CO2 SERPL-SCNC: 19 MMOL/L — LOW (ref 22–29)
CO2 SERPL-SCNC: 21 MMOL/L — LOW (ref 22–29)
CREAT SERPL-MCNC: 0.43 MG/DL — LOW (ref 0.5–1.3)
CREAT SERPL-MCNC: 0.54 MG/DL — SIGNIFICANT CHANGE UP (ref 0.5–1.3)
CREAT SERPL-MCNC: 0.56 MG/DL — SIGNIFICANT CHANGE UP (ref 0.5–1.3)
CREAT SERPL-MCNC: 0.58 MG/DL — SIGNIFICANT CHANGE UP (ref 0.5–1.3)
EOSINOPHIL # BLD AUTO: 0 K/UL — SIGNIFICANT CHANGE UP (ref 0–0.5)
EOSINOPHIL # BLD AUTO: 0.15 K/UL — SIGNIFICANT CHANGE UP (ref 0–0.5)
EOSINOPHIL NFR BLD AUTO: 0 % — SIGNIFICANT CHANGE UP (ref 0–6)
EOSINOPHIL NFR BLD AUTO: 1.4 % — SIGNIFICANT CHANGE UP (ref 0–6)
ESTIMATED AVERAGE GLUCOSE: 171 MG/DL — HIGH (ref 68–114)
GAS PNL BLDA: SIGNIFICANT CHANGE UP
GAS PNL BLDA: SIGNIFICANT CHANGE UP
GIANT PLATELETS BLD QL SMEAR: PRESENT — SIGNIFICANT CHANGE UP
GLUCOSE BLDC GLUCOMTR-MCNC: 116 MG/DL — HIGH (ref 70–99)
GLUCOSE BLDC GLUCOMTR-MCNC: 139 MG/DL — HIGH (ref 70–99)
GLUCOSE BLDC GLUCOMTR-MCNC: 151 MG/DL — HIGH (ref 70–99)
GLUCOSE BLDC GLUCOMTR-MCNC: 191 MG/DL — HIGH (ref 70–99)
GLUCOSE SERPL-MCNC: 121 MG/DL — HIGH (ref 70–99)
GLUCOSE SERPL-MCNC: 123 MG/DL — HIGH (ref 70–99)
GLUCOSE SERPL-MCNC: 125 MG/DL — HIGH (ref 70–99)
GLUCOSE SERPL-MCNC: 185 MG/DL — HIGH (ref 70–99)
HCT VFR BLD CALC: 34.2 % — LOW (ref 39–50)
HCT VFR BLD CALC: 37.2 % — LOW (ref 39–50)
HDLC SERPL-MCNC: 32 MG/DL — LOW
HGB BLD-MCNC: 12 G/DL — LOW (ref 13–17)
HGB BLD-MCNC: 13.4 G/DL — SIGNIFICANT CHANGE UP (ref 13–17)
HIV 1 & 2 AB SERPL IA.RAPID: SIGNIFICANT CHANGE UP
IMM GRANULOCYTES NFR BLD AUTO: 1.5 % — SIGNIFICANT CHANGE UP (ref 0–1.5)
INR BLD: 1.11 RATIO — SIGNIFICANT CHANGE UP (ref 0.88–1.16)
INR BLD: 1.26 RATIO — HIGH (ref 0.88–1.16)
LIPID PNL WITH DIRECT LDL SERPL: 57 MG/DL — SIGNIFICANT CHANGE UP
LYMPHOCYTES # BLD AUTO: 0.52 K/UL — LOW (ref 1–3.3)
LYMPHOCYTES # BLD AUTO: 1.48 K/UL — SIGNIFICANT CHANGE UP (ref 1–3.3)
LYMPHOCYTES # BLD AUTO: 13.6 % — SIGNIFICANT CHANGE UP (ref 13–44)
LYMPHOCYTES # BLD AUTO: 2.6 % — LOW (ref 13–44)
MAGNESIUM SERPL-MCNC: 1.4 MG/DL — LOW (ref 1.6–2.6)
MAGNESIUM SERPL-MCNC: 1.4 MG/DL — LOW (ref 1.8–2.6)
MAGNESIUM SERPL-MCNC: 1.6 MG/DL — SIGNIFICANT CHANGE UP (ref 1.6–2.6)
MANUAL SMEAR VERIFICATION: SIGNIFICANT CHANGE UP
MCHC RBC-ENTMCNC: 31.3 PG — SIGNIFICANT CHANGE UP (ref 27–34)
MCHC RBC-ENTMCNC: 32.2 PG — SIGNIFICANT CHANGE UP (ref 27–34)
MCHC RBC-ENTMCNC: 35.1 GM/DL — SIGNIFICANT CHANGE UP (ref 32–36)
MCHC RBC-ENTMCNC: 36 GM/DL — SIGNIFICANT CHANGE UP (ref 32–36)
MCV RBC AUTO: 86.9 FL — SIGNIFICANT CHANGE UP (ref 80–100)
MCV RBC AUTO: 91.7 FL — SIGNIFICANT CHANGE UP (ref 80–100)
METAMYELOCYTES # FLD: 0.9 % — HIGH (ref 0–0)
MONOCYTES # BLD AUTO: 0.7 K/UL — SIGNIFICANT CHANGE UP (ref 0–0.9)
MONOCYTES # BLD AUTO: 1.33 K/UL — HIGH (ref 0–0.9)
MONOCYTES NFR BLD AUTO: 12.2 % — SIGNIFICANT CHANGE UP (ref 2–14)
MONOCYTES NFR BLD AUTO: 3.5 % — SIGNIFICANT CHANGE UP (ref 2–14)
NEUTROPHILS # BLD AUTO: 18.55 K/UL — HIGH (ref 1.8–7.4)
NEUTROPHILS # BLD AUTO: 7.7 K/UL — HIGH (ref 1.8–7.4)
NEUTROPHILS NFR BLD AUTO: 70.8 % — SIGNIFICANT CHANGE UP (ref 43–77)
NEUTROPHILS NFR BLD AUTO: 93 % — HIGH (ref 43–77)
NON HDL CHOLESTEROL: 75 MG/DL — SIGNIFICANT CHANGE UP
OVALOCYTES BLD QL SMEAR: SLIGHT — SIGNIFICANT CHANGE UP
PHOSPHATE SERPL-MCNC: 3.2 MG/DL — SIGNIFICANT CHANGE UP (ref 2.4–4.7)
PHOSPHATE SERPL-MCNC: 3.4 MG/DL — SIGNIFICANT CHANGE UP (ref 2.4–4.7)
PLAT MORPH BLD: NORMAL — SIGNIFICANT CHANGE UP
PLATELET # BLD AUTO: 479 K/UL — HIGH (ref 150–400)
PLATELET # BLD AUTO: 491 K/UL — HIGH (ref 150–400)
POIKILOCYTOSIS BLD QL AUTO: SIGNIFICANT CHANGE UP
POTASSIUM SERPL-MCNC: 2.9 MMOL/L — CRITICAL LOW (ref 3.5–5.3)
POTASSIUM SERPL-MCNC: 3.4 MMOL/L — LOW (ref 3.5–5.3)
POTASSIUM SERPL-MCNC: 3.9 MMOL/L — SIGNIFICANT CHANGE UP (ref 3.5–5.3)
POTASSIUM SERPL-MCNC: 4 MMOL/L — SIGNIFICANT CHANGE UP (ref 3.5–5.3)
POTASSIUM SERPL-SCNC: 2.9 MMOL/L — CRITICAL LOW (ref 3.5–5.3)
POTASSIUM SERPL-SCNC: 3.4 MMOL/L — LOW (ref 3.5–5.3)
POTASSIUM SERPL-SCNC: 3.9 MMOL/L — SIGNIFICANT CHANGE UP (ref 3.5–5.3)
POTASSIUM SERPL-SCNC: 4 MMOL/L — SIGNIFICANT CHANGE UP (ref 3.5–5.3)
PROT SERPL-MCNC: 4.7 G/DL — LOW (ref 6.6–8.7)
PROT SERPL-MCNC: 4.9 G/DL — LOW (ref 6.6–8.7)
PROTHROM AB SERPL-ACNC: 12.8 SEC — SIGNIFICANT CHANGE UP (ref 10.6–13.6)
PROTHROM AB SERPL-ACNC: 14.5 SEC — HIGH (ref 10.6–13.6)
RBC # BLD: 3.73 M/UL — LOW (ref 4.2–5.8)
RBC # BLD: 4.28 M/UL — SIGNIFICANT CHANGE UP (ref 4.2–5.8)
RBC # FLD: 12.7 % — SIGNIFICANT CHANGE UP (ref 10.3–14.5)
RBC # FLD: 12.9 % — SIGNIFICANT CHANGE UP (ref 10.3–14.5)
RBC BLD AUTO: ABNORMAL
SODIUM SERPL-SCNC: 134 MMOL/L — LOW (ref 135–145)
SODIUM SERPL-SCNC: 136 MMOL/L — SIGNIFICANT CHANGE UP (ref 135–145)
SODIUM SERPL-SCNC: 138 MMOL/L — SIGNIFICANT CHANGE UP (ref 135–145)
SODIUM SERPL-SCNC: 139 MMOL/L — SIGNIFICANT CHANGE UP (ref 135–145)
TRIGL SERPL-MCNC: 88 MG/DL — SIGNIFICANT CHANGE UP
TSH SERPL-MCNC: 2.13 UIU/ML — SIGNIFICANT CHANGE UP (ref 0.27–4.2)
WBC # BLD: 10.87 K/UL — HIGH (ref 3.8–10.5)
WBC # BLD: 19.95 K/UL — HIGH (ref 3.8–10.5)
WBC # FLD AUTO: 10.87 K/UL — HIGH (ref 3.8–10.5)
WBC # FLD AUTO: 19.95 K/UL — HIGH (ref 3.8–10.5)

## 2022-01-21 PROCEDURE — 22614 ARTHRD PST TQ 1NTRSPC EA ADD: CPT

## 2022-01-21 PROCEDURE — 63005 REMOVE SPINE LAMINA 1/2 LMBR: CPT | Mod: 62

## 2022-01-21 PROCEDURE — 22842 INSERT SPINE FIXATION DEVICE: CPT

## 2022-01-21 PROCEDURE — 22614 ARTHRD PST TQ 1NTRSPC EA ADD: CPT | Mod: 82

## 2022-01-21 PROCEDURE — 22325 TREAT SPINE FRACTURE: CPT | Mod: 62

## 2022-01-21 PROCEDURE — 22610 ARTHRD PST TQ 1NTRSPC THRC: CPT

## 2022-01-21 PROCEDURE — 22842 INSERT SPINE FIXATION DEVICE: CPT | Mod: 82

## 2022-01-21 PROCEDURE — 99291 CRITICAL CARE FIRST HOUR: CPT

## 2022-01-21 PROCEDURE — 22610 ARTHRD PST TQ 1NTRSPC THRC: CPT | Mod: 82

## 2022-01-21 DEVICE — OSTEOSELECT DBM PLUS 2.5CC: Type: IMPLANTABLE DEVICE | Status: FUNCTIONAL

## 2022-01-21 DEVICE — SCREW XIA 3 SERRATO 5.5X50MM: Type: IMPLANTABLE DEVICE | Status: FUNCTIONAL

## 2022-01-21 DEVICE — SCREW BLOCKER BONE XIA: Type: IMPLANTABLE DEVICE | Status: FUNCTIONAL

## 2022-01-21 DEVICE — SPONGE GELFOAM SZ 100 UNCOMPRESSED: Type: IMPLANTABLE DEVICE | Status: FUNCTIONAL

## 2022-01-21 DEVICE — SPONGE HSTAT SURGICEL 2X14": Type: IMPLANTABLE DEVICE | Status: FUNCTIONAL

## 2022-01-21 DEVICE — SCREW POLY 6.5X50MM: Type: IMPLANTABLE DEVICE | Status: FUNCTIONAL

## 2022-01-21 DEVICE — KIT A-LINE 1LUM 20GX12CM SAFE KIT: Type: IMPLANTABLE DEVICE | Status: FUNCTIONAL

## 2022-01-21 DEVICE — SCREW SERRATO 6.5X45MM: Type: IMPLANTABLE DEVICE | Status: FUNCTIONAL

## 2022-01-21 DEVICE — IMPLANTABLE DEVICE: Type: IMPLANTABLE DEVICE | Status: FUNCTIONAL

## 2022-01-21 DEVICE — SEALANT FLOSEAL FAST PREP HEMOSTATIC MATRIX 10ML: Type: IMPLANTABLE DEVICE | Status: FUNCTIONAL

## 2022-01-21 DEVICE — GRAFT VITOSIS BIMODAL 10CC: Type: IMPLANTABLE DEVICE | Status: FUNCTIONAL

## 2022-01-21 DEVICE — ROD TITANIUM ALLOY 5.5 X 500MM: Type: IMPLANTABLE DEVICE | Status: FUNCTIONAL

## 2022-01-21 DEVICE — SCREW SERRATO POLY 4.5X45MM: Type: IMPLANTABLE DEVICE | Status: FUNCTIONAL

## 2022-01-21 DEVICE — SURGIFOAM PAD SZ 100: Type: IMPLANTABLE DEVICE | Status: FUNCTIONAL

## 2022-01-21 RX ORDER — INSULIN LISPRO 100/ML
VIAL (ML) SUBCUTANEOUS EVERY 6 HOURS
Refills: 0 | Status: DISCONTINUED | OUTPATIENT
Start: 2022-01-21 | End: 2022-01-21

## 2022-01-21 RX ORDER — MAGNESIUM SULFATE 500 MG/ML
2 VIAL (ML) INJECTION ONCE
Refills: 0 | Status: COMPLETED | OUTPATIENT
Start: 2022-01-21 | End: 2022-01-21

## 2022-01-21 RX ORDER — GLUCAGON INJECTION, SOLUTION 0.5 MG/.1ML
1 INJECTION, SOLUTION SUBCUTANEOUS ONCE
Refills: 0 | Status: DISCONTINUED | OUTPATIENT
Start: 2022-01-21 | End: 2022-01-28

## 2022-01-21 RX ORDER — DEXTROSE 50 % IN WATER 50 %
15 SYRINGE (ML) INTRAVENOUS ONCE
Refills: 0 | Status: DISCONTINUED | OUTPATIENT
Start: 2022-01-21 | End: 2022-01-28

## 2022-01-21 RX ORDER — PROTHROMBIN COMPLEX CONCENTRATE (HUMAN) 25.5; 16.5; 24; 22; 22; 26 [IU]/ML; [IU]/ML; [IU]/ML; [IU]/ML; [IU]/ML; [IU]/ML
3000 POWDER, FOR SOLUTION INTRAVENOUS ONCE
Refills: 0 | Status: COMPLETED | OUTPATIENT
Start: 2022-01-21 | End: 2022-01-21

## 2022-01-21 RX ORDER — METOPROLOL TARTRATE 50 MG
12.5 TABLET ORAL EVERY 12 HOURS
Refills: 0 | Status: DISCONTINUED | OUTPATIENT
Start: 2022-01-21 | End: 2022-01-21

## 2022-01-21 RX ORDER — DEXTROSE 50 % IN WATER 50 %
25 SYRINGE (ML) INTRAVENOUS ONCE
Refills: 0 | Status: DISCONTINUED | OUTPATIENT
Start: 2022-01-21 | End: 2022-01-28

## 2022-01-21 RX ORDER — PHENYLEPHRINE HYDROCHLORIDE 10 MG/ML
0.5 INJECTION INTRAVENOUS
Qty: 160 | Refills: 0 | Status: DISCONTINUED | OUTPATIENT
Start: 2022-01-21 | End: 2022-01-24

## 2022-01-21 RX ORDER — SODIUM CHLORIDE 9 MG/ML
1000 INJECTION, SOLUTION INTRAVENOUS
Refills: 0 | Status: DISCONTINUED | OUTPATIENT
Start: 2022-01-21 | End: 2022-01-28

## 2022-01-21 RX ORDER — SODIUM CHLORIDE 9 MG/ML
500 INJECTION INTRAMUSCULAR; INTRAVENOUS; SUBCUTANEOUS ONCE
Refills: 0 | Status: COMPLETED | OUTPATIENT
Start: 2022-01-21 | End: 2022-01-21

## 2022-01-21 RX ORDER — POTASSIUM CHLORIDE 20 MEQ
10 PACKET (EA) ORAL ONCE
Refills: 0 | Status: COMPLETED | OUTPATIENT
Start: 2022-01-21 | End: 2022-01-21

## 2022-01-21 RX ORDER — ACETAMINOPHEN 500 MG
650 TABLET ORAL EVERY 6 HOURS
Refills: 0 | Status: DISCONTINUED | OUTPATIENT
Start: 2022-01-21 | End: 2022-01-28

## 2022-01-21 RX ORDER — SODIUM CHLORIDE 9 MG/ML
1000 INJECTION INTRAMUSCULAR; INTRAVENOUS; SUBCUTANEOUS
Refills: 0 | Status: DISCONTINUED | OUTPATIENT
Start: 2022-01-21 | End: 2022-01-23

## 2022-01-21 RX ORDER — OXYCODONE HYDROCHLORIDE 5 MG/1
5 TABLET ORAL EVERY 4 HOURS
Refills: 0 | Status: DISCONTINUED | OUTPATIENT
Start: 2022-01-21 | End: 2022-01-28

## 2022-01-21 RX ORDER — DEXTROSE 50 % IN WATER 50 %
12.5 SYRINGE (ML) INTRAVENOUS ONCE
Refills: 0 | Status: DISCONTINUED | OUTPATIENT
Start: 2022-01-21 | End: 2022-01-28

## 2022-01-21 RX ORDER — INSULIN LISPRO 100/ML
VIAL (ML) SUBCUTANEOUS EVERY 6 HOURS
Refills: 0 | Status: DISCONTINUED | OUTPATIENT
Start: 2022-01-21 | End: 2022-01-22

## 2022-01-21 RX ORDER — CEFAZOLIN SODIUM 1 G
2000 VIAL (EA) INJECTION EVERY 8 HOURS
Refills: 0 | Status: COMPLETED | OUTPATIENT
Start: 2022-01-21 | End: 2022-01-23

## 2022-01-21 RX ORDER — LEVOTHYROXINE SODIUM 125 MCG
50 TABLET ORAL DAILY
Refills: 0 | Status: DISCONTINUED | OUTPATIENT
Start: 2022-01-21 | End: 2022-01-28

## 2022-01-21 RX ORDER — ATORVASTATIN CALCIUM 80 MG/1
20 TABLET, FILM COATED ORAL AT BEDTIME
Refills: 0 | Status: DISCONTINUED | OUTPATIENT
Start: 2022-01-21 | End: 2022-01-28

## 2022-01-21 RX ORDER — SODIUM CHLORIDE 9 MG/ML
500 INJECTION, SOLUTION INTRAVENOUS ONCE
Refills: 0 | Status: COMPLETED | OUTPATIENT
Start: 2022-01-21 | End: 2022-01-21

## 2022-01-21 RX ORDER — POTASSIUM CHLORIDE 20 MEQ
10 PACKET (EA) ORAL
Refills: 0 | Status: COMPLETED | OUTPATIENT
Start: 2022-01-21 | End: 2022-01-21

## 2022-01-21 RX ORDER — POTASSIUM CHLORIDE 20 MEQ
40 PACKET (EA) ORAL ONCE
Refills: 0 | Status: COMPLETED | OUTPATIENT
Start: 2022-01-21 | End: 2022-01-21

## 2022-01-21 RX ADMIN — Medication 50 MILLIEQUIVALENT(S): at 21:55

## 2022-01-21 RX ADMIN — MORPHINE SULFATE 2 MILLIGRAM(S): 50 CAPSULE, EXTENDED RELEASE ORAL at 01:12

## 2022-01-21 RX ADMIN — SODIUM CHLORIDE 250 MILLILITER(S): 9 INJECTION INTRAMUSCULAR; INTRAVENOUS; SUBCUTANEOUS at 23:37

## 2022-01-21 RX ADMIN — PROTHROMBIN COMPLEX CONCENTRATE (HUMAN) 400 INTERNATIONAL UNIT(S): 25.5; 16.5; 24; 22; 22; 26 POWDER, FOR SOLUTION INTRAVENOUS at 07:58

## 2022-01-21 RX ADMIN — Medication 25 GRAM(S): at 18:32

## 2022-01-21 RX ADMIN — PHENYLEPHRINE HYDROCHLORIDE 7.04 MICROGRAM(S)/KG/MIN: 10 INJECTION INTRAVENOUS at 21:55

## 2022-01-21 RX ADMIN — Medication 100 MILLIEQUIVALENT(S): at 07:00

## 2022-01-21 RX ADMIN — ATORVASTATIN CALCIUM 20 MILLIGRAM(S): 80 TABLET, FILM COATED ORAL at 21:56

## 2022-01-21 RX ADMIN — Medication 100 MILLIEQUIVALENT(S): at 06:09

## 2022-01-21 RX ADMIN — Medication 40 MILLIEQUIVALENT(S): at 04:43

## 2022-01-21 RX ADMIN — Medication 100 MILLIEQUIVALENT(S): at 04:44

## 2022-01-21 RX ADMIN — Medication 25 GRAM(S): at 04:43

## 2022-01-21 RX ADMIN — Medication 1: at 18:00

## 2022-01-21 RX ADMIN — SODIUM CHLORIDE 1000 MILLILITER(S): 9 INJECTION, SOLUTION INTRAVENOUS at 15:45

## 2022-01-21 RX ADMIN — Medication 100 MILLIGRAM(S): at 21:56

## 2022-01-21 NOTE — PROGRESS NOTE ADULT - SUBJECTIVE AND OBJECTIVE BOX
INTERVAL HPI/OVERNIGHT EVENTS:  76y Male extensive PMH including HTN, HLD, DM, hypothyroidism, asbestosis, CAD s/p PCI, afib on Eliquis, now presents to [facility] 1/20/22 with b/l LE weakness and difficulty ambulating since fall on Thanksgiving, urinary retention with dribbling x 2 weeks, and b/l LE sensory changes prompting ED visit, MRI found with 3 column injury of L1 vertebral body. Patient seen earlier this AM, exam stable    Vital Signs Last 24 Hrs  T(C): 36.9 (21 Jan 2022 02:10), Max: 36.9 (20 Jan 2022 06:51)  T(F): 98.4 (21 Jan 2022 02:10), Max: 98.4 (20 Jan 2022 06:51)  HR: 78 (21 Jan 2022 02:10) (69 - 86)  BP: 119/70 (21 Jan 2022 02:10) (108/76 - 126/74)  BP(mean): --  RR: 18 (21 Jan 2022 02:10) (18 - 18)  SpO2: 99% (21 Jan 2022 02:10) (97% - 100%)    PHYSICAL EXAM:  GENERAL: NAD  HEAD: Atraumatic, normocephalic  CAROLINA COMA SCORE: E- 4 V- 5 M- 6 =15  MENTAL STATUS: AAO x3; Generally confused; answer questions appropriately; following commands  CRANIAL NERVES: Tracks examiner. Face symmetric. Hearing/speech intact/clear   MOTOR: strength 5/5 b/l upper extremities, 4+/5 b/l LE  SENSATION: Diminished b/l LE from knees to feet    LABS:                        14.7   11.65 )-----------( 577      ( 20 Jan 2022 03:55 )             41.5     01-20    132<L>  |  94<L>  |  33.9<H>  ----------------------------<  155<H>  4.6   |  21.0<L>  |  1.19    Ca    9.1      20 Jan 2022 03:55    TPro  6.8  /  Alb  3.8  /  TBili  0.6  /  DBili  x   /  AST  31  /  ALT  21  /  AlkPhos  82  01-20    PT/INR - ( 20 Jan 2022 03:56 )   PT: 16.4 sec;   INR: 1.44 ratio      PTT - ( 20 Jan 2022 03:56 )  PTT:32.9 sec    01-20 @ 07:01  -  01-21 @ 02:22  --------------------------------------------------------  IN: 0 mL / OUT: 2600 mL / NET: -2600 mL    RADIOLOGY & ADDITIONAL TESTS:  CT Lumbar Spine No Cont (01.20.22 @ 10:10)  IMPRESSION:  THORACIC SPINE: No fracture or acute traumatic malalignment involving the   thoracic spine.  LUMBAR SPINE: 3 column fracture involving the L1 vertebral body with   severe compression deformity of the L1 vertebral body. There is   associated retropulsed bony fragment which contributes to high-grade   central canal stenosis as detailed on the corresponding MRI of the lumbar   spine.  Markedly dilated urinary bladder.  Extensive bilateral calcified pleural plaques.    MR Lumbar Spine No Cont (01.20.22 @ 05:06)  IMPRESSION:  Acute to subacute severe compression fracture at L1 with fracture   fragment of the posterior superior aspect of the vertebral body with   retropulsion into the spinal canal contributing to crowding of the cauda   equina nerve roots and compression of the conus medullaris.  T2/STIR signal hyperintensity involving the lamina and pedicles,   suspicious for fracture of the posterior elements. Facet articular   rupture cannot be entirely excluded.

## 2022-01-21 NOTE — PROGRESS NOTE ADULT - SUBJECTIVE AND OBJECTIVE BOX
HPI:  76M w/ PMHX  HTN, HLD, DM, hypothyroidism, asbestosis, CAD s/p stents x3, Atrial fibrillation on Eliquis, presenting today with c/o numbness to b/l feet. States that he recently fell at home on Thanksgiving and dx with spinal  fx ~2 months ago on outpatient xray and given back brace that he wears "all the time." Reports today that he noticed that his bilateral feet had become numb and this caused him to have difficulty ambulating and he has fallen several times today. States he was scheduled for an appt with a "back doctor today."  MRI T/L Spine- Severe compression fracture of L1 vertebral body w/ retropulsion.  c/o lower back discomfort, voiding urine but feeling of incomplete voiding. denies cp,sob,any weakness but has dull feeling of feet.    Denies saddle anesthesia or worsening of back pain. COVID+ since 01/13/22, pt states he completed 5 days of isolation as well her wife. Patient alert and oriented but confused w/ extensive medical questioning. States he heard back fractures heal on their own and has CHRONIC DISCOMFORT. Pt just voids urine but has some suprapubic discomfort on palpation.  (1/20/22)    Admitted to NCCU.  Underwent Laminectomy, thoracolumbar, with fusion 1/21.  VS, imaging, labs reviewed.    Exam: NAd  DRAINS: epidural drain to bulb suction, Serosanguinous drainage noted.  WOUND: Dressing clean dry intact  MENTAL STATUS: AAO x3; Awake; Opens eyes spontaneously; Appropriately conversant without aphasia; following simple commands  CRANIAL NERVES: MIK. EOMI without nystagmus. Facial sensation intact V1-3 distribution b/l. Face symmetric w/ normal eye closure and smile, tongue midline. Hearing grossly intact. Speech clear   MOTOR: strength 5/5 b/l upper and 4+/5 lower extremities  SENSATION: Diminished b/l LE from knees to feet  CHEST/LUNG: +breath sounds bilaterally;   HEART: +S1/+S2;  ABDOMEN: Soft, nontender, nondistended;

## 2022-01-21 NOTE — PROGRESS NOTE ADULT - ASSESSMENT
76y Male extensive PMH including HTN, HLD, DM, hypothyroidism, asbestosis, CAD s/p PCI, afib on Eliquis, now presents to [facility] 1/20/22 with b/l LE weakness and difficulty ambulating since fall on Thanksgiving, urinary retention with dribbling x 2 weeks, and b/l LE sensory changes prompting ED visit, MRI found with 3 column injury of L1 vertebral body    PLAN:  - Will d/w attending  - Q1 neuro checks  - Pulmonology consult/evaluation appreciated- no absolute pulmonary contraindications to surgery; at risk for respiratory complications due to recent COVID  - Cardiology consult/evaluation appreciated- no absolute cardiac contraindication to planned urgent neurosurgical procedure  - F/u with anesthesia in AM confirming surgical clearance now that cardiac evaluation completed  - Hold ACT/APT  - OR in AM if anesthesia accepts cardiac optimization  - Supportive care/further medical management per NSICU  - Final plan pending AM discussion and rounds with attending 76y Male extensive PMH including HTN, HLD, DM, hypothyroidism, asbestosis, CAD s/p PCI, afib on Eliquis, now presents to [facility] 1/20/22 with b/l LE weakness and difficulty ambulating since fall on Thanksgiving, urinary retention with dribbling x 2 weeks, and b/l LE sensory changes prompting ED visit, MRI found with 3 column injury of L1 vertebral body    PLAN:  - Will d/w attending  - Q2 neuro checks  - Pulmonology consult/evaluation appreciated- no absolute pulmonary contraindications to surgery; at risk for respiratory complications due to recent COVID  - Cardiology consult/evaluation appreciated- no absolute cardiac contraindication to planned urgent neurosurgical procedure  - F/u with anesthesia in AM confirming surgical clearance now that cardiac evaluation completed  - Hold ACT/APT  - OR in AM if anesthesia accepts cardiac optimization  - Supportive care/further medical management per NSICU  - Final plan pending AM discussion and rounds with attending

## 2022-01-21 NOTE — PROGRESS NOTE ADULT - ASSESSMENT
76y male with b/l LE weakness and difficulty ambulating since fall on Thanksgiving, urinary retention with dribbling x 2 weeks, and b/l LE sensory changes prompting ED visit, MRI found with 3 column injury of L1 vertebral body. Pt now s/p T12 -L1 laminectomy, T10 to L4 fusion POD#0.     -Pt seen and evaluated post operatively  -transfer pt to ICU  -pain control as needed, avoid over sedation  -SCDs for DVT prophylaxis  -MERCEDES to full suction  -ancef x 5 doses   -f/u labs, replace lytes PRN  -maintain MAP >65  -continue to coordinate care w/ ICU team

## 2022-01-21 NOTE — PROGRESS NOTE ADULT - SUBJECTIVE AND OBJECTIVE BOX
POST-OPERATIVE NOTE    Procedure: T12 -L1 laminectomy, T10 to L4 fusion, MERCEDES x 1    Diagnosis/Indication: L1 burst fracture with retropulsion    Surgeon: Arnie    INTERVAL HPI/ACUTE EVENTS:  76y Male extensive PMH including HTN, HLD, DM, hypothyroidism, asbestosis, CAD s/p PCI, afib on Eliquis, [place], now presents to [facility] 1/20/22 with b/l LE weakness and difficulty ambulating since fall on Thanksgiving, urinary retention with dribbling x 2 weeks, and b/l LE sensory changes prompting ED visit, MRI found with 3 column injury of L1 vertebral body. Pt now s/p T12 -L1 laminectomy, T10 to L4 fusion POD#0.     VITALS:  T(C): 36.6 (01-21-22 @ 16:12), Max: 36.9 (01-21-22 @ 00:03)  HR: 76 (01-21-22 @ 18:00) (68 - 106)  BP: 96/65 (01-21-22 @ 15:45) (73/50 - 157/11)  RR: 19 (01-21-22 @ 18:00) (16 - 19)  SpO2: 94% (01-21-22 @ 18:00) (94% - 100%)  Wt(kg): --    PHYSICAL EXAM:  GENERAL: NAD, well-groomed  HEAD:  AT  DRAINS: epidural drain to bulb suction, Serosanguinous drainage noted.  WOUND: Dressing clean dry intact  [place] COMA SCORE: E-4 V-5 M-6 =15       E: 4= opens eyes spontaneously 3= to voice 2= to noxious 1= no opening       V: 5= oriented 4= confused 3= inappropriate words 2= incomprehensible sounds 1= nonverbal 1T= intubated       M: 6= follows commands 5= localizes 4= withdraws 3= flexor posturing 2= extensor posturing 1= no movement  MENTAL STATUS: AAO x3; Awake; Opens eyes spontaneously; Appropriately conversant without aphasia; following simple commands  CRANIAL NERVES: MIK. EOMI without nystagmus. Facial sensation intact V1-3 distribution b/l. Face symmetric w/ normal eye closure and smile, tongue midline. Hearing grossly intact. Speech clear   REFLEXES: MIK. Negative Franco's b/l. Negative clonus b/l  MOTOR: strength 5/5 b/l upper and lower extremities  Uppers     Delt (C5/6)     Bicep (C5/6)     Wrist Extend (C6)     Tricep (C7)     HG (C8/T1)  R                     5/5                 5/5                         5/5                           5/5                   5/5  L                      5/5                 5/5                         5/5                           5/5                   5/5  Lowers      HF(L1/L2)     KE (L3)     DF (L4)     EHL (L5)     PF (S1)      R                     5/5              5/5           5/5           5/5            5/5  L                     5/5               5/5          5/5            5/5            5/5  SENSATION: grossly intact to light touch all extremities  CHEST/LUNG: +breath sounds bilaterally; no rales, rhonchi, wheezing, appreciated   HEART: +S1/+S2; irregular rate and irregular rhythm; no murmurs, rubs  ABDOMEN: Soft, nontender, nondistended; bowel sounds present   EXTREMITIES: no clubbing, cyanosis  SKIN: Warm, dry    LABS:                        12.0   19.95 )-----------( 491      ( 21 Jan 2022 16:58 )             34.2     01-21    138  |  103  |  17.3  ----------------------------<  185<H>  3.9   |  14.0<L>  |  0.58    Ca    8.8      21 Jan 2022 16:58  Phos  3.4     01-21  Mg     1.4     01-21    TPro  4.9<L>  /  Alb  2.5<L>  /  TBili  0.6  /  DBili  x   /  AST  13  /  ALT  14  /  AlkPhos  68  01-21      CAPRINI SCORE [CLOT]:  Patient has an estimated Caprini score of greater than 5.  However, the patient's unique clinical situation will be addressed in an individual manner to determine appropriate anticoagulation treatment, if any.

## 2022-01-21 NOTE — PROGRESS NOTE ADULT - ASSESSMENT
76M w/ PMHX  HTN, HLD, DM, hypothyroidism, asbestosis, CAD s/p stents x3, Atrial fibrillation on Eliquis, now with severe compression fracture of L1 vertebral body w/ retropulsion.  S/p TL laminectomy/fusion.    Plan:  -Q1hr neuro checks   -drains per NSG  -Tylenol PRN; Morphine 2mg Q4hrs PRN; Oxy 5mg Q4 PRN   -SBP goal 100-140  -Lipitor 20mg daily   -Losartan 100mg daily   -Metoprolol 25mg daily   -Saturating well on room air; maintain SpO2>92%  -Cotton; NS@60 till full PO intake  -SCDs b/l for DVT prophylaxis   -Hold all AC/AP at this time       Pt is critically ill and at high risk of rapid deterioration/death due to abovementioned conditions.   Still requires critical care interventions - ongoing frequent evaluations, interventions and management adjustment by the Attending and ICU team, - and included review of relevant history, clinical examination, review of data and images, discussion of treatment with the multidisciplinary team and any consultants involved in this patient’s care as well as family discussion.

## 2022-01-22 ENCOUNTER — TRANSCRIPTION ENCOUNTER (OUTPATIENT)
Age: 77
End: 2022-01-22

## 2022-01-22 LAB
ANION GAP SERPL CALC-SCNC: 16 MMOL/L — SIGNIFICANT CHANGE UP (ref 5–17)
BUN SERPL-MCNC: 16.4 MG/DL — SIGNIFICANT CHANGE UP (ref 8–20)
CALCIUM SERPL-MCNC: 7.9 MG/DL — LOW (ref 8.6–10.2)
CHLORIDE SERPL-SCNC: 105 MMOL/L — SIGNIFICANT CHANGE UP (ref 98–107)
CO2 SERPL-SCNC: 17 MMOL/L — LOW (ref 22–29)
CREAT SERPL-MCNC: 0.54 MG/DL — SIGNIFICANT CHANGE UP (ref 0.5–1.3)
GLUCOSE BLDC GLUCOMTR-MCNC: 108 MG/DL — HIGH (ref 70–99)
GLUCOSE BLDC GLUCOMTR-MCNC: 114 MG/DL — HIGH (ref 70–99)
GLUCOSE BLDC GLUCOMTR-MCNC: 186 MG/DL — HIGH (ref 70–99)
GLUCOSE BLDC GLUCOMTR-MCNC: 195 MG/DL — HIGH (ref 70–99)
GLUCOSE BLDC GLUCOMTR-MCNC: 206 MG/DL — HIGH (ref 70–99)
GLUCOSE SERPL-MCNC: 121 MG/DL — HIGH (ref 70–99)
HAV IGM SER-ACNC: SIGNIFICANT CHANGE UP
HBV CORE IGM SER-ACNC: SIGNIFICANT CHANGE UP
HBV SURFACE AG SER-ACNC: SIGNIFICANT CHANGE UP
HCT VFR BLD CALC: 33.1 % — LOW (ref 39–50)
HCV AB S/CO SERPL IA: 0.14 S/CO — SIGNIFICANT CHANGE UP (ref 0–0.99)
HCV AB SERPL-IMP: SIGNIFICANT CHANGE UP
HGB BLD-MCNC: 10.9 G/DL — LOW (ref 13–17)
MAGNESIUM SERPL-MCNC: 1.4 MG/DL — LOW (ref 1.6–2.6)
MCHC RBC-ENTMCNC: 31.3 PG — SIGNIFICANT CHANGE UP (ref 27–34)
MCHC RBC-ENTMCNC: 32.9 GM/DL — SIGNIFICANT CHANGE UP (ref 32–36)
MCV RBC AUTO: 95.1 FL — SIGNIFICANT CHANGE UP (ref 80–100)
PHOSPHATE SERPL-MCNC: 2.8 MG/DL — SIGNIFICANT CHANGE UP (ref 2.4–4.7)
PLATELET # BLD AUTO: 512 K/UL — HIGH (ref 150–400)
POTASSIUM SERPL-MCNC: 3.6 MMOL/L — SIGNIFICANT CHANGE UP (ref 3.5–5.3)
POTASSIUM SERPL-SCNC: 3.6 MMOL/L — SIGNIFICANT CHANGE UP (ref 3.5–5.3)
RBC # BLD: 3.48 M/UL — LOW (ref 4.2–5.8)
RBC # FLD: 13.2 % — SIGNIFICANT CHANGE UP (ref 10.3–14.5)
SODIUM SERPL-SCNC: 138 MMOL/L — SIGNIFICANT CHANGE UP (ref 135–145)
WBC # BLD: 14.2 K/UL — HIGH (ref 3.8–10.5)
WBC # FLD AUTO: 14.2 K/UL — HIGH (ref 3.8–10.5)

## 2022-01-22 PROCEDURE — 72128 CT CHEST SPINE W/O DYE: CPT | Mod: 26

## 2022-01-22 PROCEDURE — 72131 CT LUMBAR SPINE W/O DYE: CPT | Mod: 26

## 2022-01-22 PROCEDURE — 99291 CRITICAL CARE FIRST HOUR: CPT

## 2022-01-22 RX ORDER — SODIUM CHLORIDE 9 MG/ML
500 INJECTION, SOLUTION INTRAVENOUS ONCE
Refills: 0 | Status: COMPLETED | OUTPATIENT
Start: 2022-01-22 | End: 2022-01-22

## 2022-01-22 RX ORDER — MAGNESIUM SULFATE 500 MG/ML
2 VIAL (ML) INJECTION ONCE
Refills: 0 | Status: COMPLETED | OUTPATIENT
Start: 2022-01-22 | End: 2022-01-22

## 2022-01-22 RX ORDER — LIDOCAINE 4 G/100G
1 CREAM TOPICAL ONCE
Refills: 0 | Status: COMPLETED | OUTPATIENT
Start: 2022-01-22 | End: 2022-01-22

## 2022-01-22 RX ORDER — POTASSIUM PHOSPHATE, MONOBASIC POTASSIUM PHOSPHATE, DIBASIC 236; 224 MG/ML; MG/ML
30 INJECTION, SOLUTION INTRAVENOUS ONCE
Refills: 0 | Status: COMPLETED | OUTPATIENT
Start: 2022-01-22 | End: 2022-01-22

## 2022-01-22 RX ORDER — INSULIN LISPRO 100/ML
VIAL (ML) SUBCUTANEOUS
Refills: 0 | Status: DISCONTINUED | OUTPATIENT
Start: 2022-01-22 | End: 2022-01-28

## 2022-01-22 RX ORDER — NYSTATIN CREAM 100000 [USP'U]/G
1 CREAM TOPICAL
Refills: 0 | Status: DISCONTINUED | OUTPATIENT
Start: 2022-01-22 | End: 2022-01-28

## 2022-01-22 RX ADMIN — SODIUM CHLORIDE 1000 MILLILITER(S): 9 INJECTION, SOLUTION INTRAVENOUS at 11:45

## 2022-01-22 RX ADMIN — ATORVASTATIN CALCIUM 20 MILLIGRAM(S): 80 TABLET, FILM COATED ORAL at 21:29

## 2022-01-22 RX ADMIN — OXYCODONE HYDROCHLORIDE 5 MILLIGRAM(S): 5 TABLET ORAL at 17:50

## 2022-01-22 RX ADMIN — OXYCODONE HYDROCHLORIDE 5 MILLIGRAM(S): 5 TABLET ORAL at 18:20

## 2022-01-22 RX ADMIN — OXYCODONE HYDROCHLORIDE 5 MILLIGRAM(S): 5 TABLET ORAL at 05:33

## 2022-01-22 RX ADMIN — Medication 1: at 17:12

## 2022-01-22 RX ADMIN — Medication 100 MILLIGRAM(S): at 15:05

## 2022-01-22 RX ADMIN — POTASSIUM PHOSPHATE, MONOBASIC POTASSIUM PHOSPHATE, DIBASIC 83.33 MILLIMOLE(S): 236; 224 INJECTION, SOLUTION INTRAVENOUS at 11:51

## 2022-01-22 RX ADMIN — NYSTATIN CREAM 1 APPLICATION(S): 100000 CREAM TOPICAL at 18:51

## 2022-01-22 RX ADMIN — Medication 2: at 00:40

## 2022-01-22 RX ADMIN — Medication 25 GRAM(S): at 11:51

## 2022-01-22 RX ADMIN — Medication 1: at 21:29

## 2022-01-22 RX ADMIN — Medication 50 MICROGRAM(S): at 05:22

## 2022-01-22 RX ADMIN — Medication 100 MILLIGRAM(S): at 21:29

## 2022-01-22 RX ADMIN — LIDOCAINE 1 APPLICATION(S): 4 CREAM TOPICAL at 19:10

## 2022-01-22 RX ADMIN — SODIUM CHLORIDE 75 MILLILITER(S): 9 INJECTION INTRAMUSCULAR; INTRAVENOUS; SUBCUTANEOUS at 21:28

## 2022-01-22 RX ADMIN — Medication 100 MILLIGRAM(S): at 05:23

## 2022-01-22 NOTE — PROGRESS NOTE ADULT - ASSESSMENT
76M w/ PMHX  HTN, HLD, DM, hypothyroidism, asbestosis, CAD s/p stents x3, Atrial fibrillation on Eliquis, now with severe compression fracture of L1 vertebral body w/ retropulsion.  S/p TL laminectomy/fusion.  Acute postop anemia.    Plan:  -Q2hr neuro checks   -drains per NSG, keep for now  -Tylenol PRN; Morphine 2mg Q4hrs PRN; Oxy 5mg Q4 PRN   -SBP goal 100-140, MAP >65, on Harry  -Lipitor 20mg daily   -d/c hypotensive meds as on Harry  -Saturating well on room air; maintain SpO2>92%  -bladder scan q4hrs - low threshold to replace Cotton, will need Urology involvement; NS@60 till full PO intake  -SCDs b/l for DVT prophylaxis   -Hold all AC/AP at this time   -repeat CBC, may need transfusion      Pt is critically ill and at high risk of rapid deterioration/death due to abovementioned conditions.   Still requires critical care interventions - ongoing frequent evaluations, interventions and management adjustment by the Attending and ICU team, - and included review of relevant history, clinical examination, review of data and images, discussion of treatment with the multidisciplinary team and any consultants involved in this patient’s care as well as family discussion.

## 2022-01-22 NOTE — PROGRESS NOTE ADULT - ASSESSMENT
Assessment:  76y Male extensive PMH including HTN, HLD, DM, hypothyroidism, asbestosis, CAD s/p PCI, afib on Eliquis, now presents to [facility] 1/20/22 with b/l LE weakness and difficulty ambulating since fall on Thanksgiving, urinary retention with dribbling x 2 weeks, and b/l LE sensory changes prompting ED visit, MRI found with 3 column injury of L1 vertebral body    PLAN:  - cont neuro checks q2hrs,   - cont pain management  - monitor MERCEDES output  - Keep MAP >65, eva gtt as needed, wean off if possible, might need midodrin  - cont diet  - d/c velasco in am  - ancef x 5 doses for prophylaxis  - PT/OOB  - medical management as per NSICU

## 2022-01-22 NOTE — PROGRESS NOTE ADULT - SUBJECTIVE AND OBJECTIVE BOX
HPI: 76y Male extensive PMH including HTN, HLD, DM, hypothyroidism, asbestosis, CAD s/p PCI, afib on Eliquis, now presents to [facility] 1/20/22 with b/l LE weakness and difficulty ambulating since fall on Thanksgiving, urinary retention with dribbling x 2 weeks, and b/l LE sensory changes prompting ED visit, MRI found with 3 column injury of L1 vertebral body.    Interval history: 1/21 patient underwent T12-L1 lami with T10-L4 fusion. Patient was hypotensive periop & post op requiring eva gtt for MAP>65. Patient has minimal  back pain when moves.     Physical Exam  Constitutional: NAD, frail, cachectic     Neuro  * Mental Status:  GCS 15: Awake, alert, oriented to conversation. Speech is clear, face symmetric,   * Cranial Nerves: Cnii-Cnxii grossly intact. PERRL, EOMI, tongue midline, no gaze deviation  * Motor: RUE 5/5, LUE 5/5, RLE 5/5, LLE 5/5, no drift  * Sensory: Sensation intact to light touch  * Reflexes: Not assessed  * Gait: Not assessed  * Wound: mepilex dressing is c/d/i, MERCEDES in place    Vital Signs Last 24 Hrs  T(C): 36.6 (21 Jan 2022 16:12), Max: 36.9 (21 Jan 2022 02:10)  T(F): 97.9 (21 Jan 2022 16:12), Max: 98.5 (21 Jan 2022 04:57)  HR: 76 (21 Jan 2022 18:00) (68 - 106)  BP: 96/65 (21 Jan 2022 15:45) (73/50 - 157/11)  BP(mean): 57 (21 Jan 2022 15:30) (57 - 150)  RR: 19 (21 Jan 2022 18:00) (16 - 19)  SpO2: 94% (21 Jan 2022 18:00) (94% - 100%)    I&O's Summary    20 Jan 2022 07:01  -  21 Jan 2022 07:00  --------------------------------------------------------  IN: 120 mL / OUT: 6100 mL / NET: -5980 mL    21 Jan 2022 07:01  -  22 Jan 2022 01:14  --------------------------------------------------------  IN: 931.7 mL / OUT: 1116 mL / NET: -184.3 mL    Labs:                       12.0   19.95 )-----------( 491      ( 21 Jan 2022 16:58 )             34.2     138  |  103  |  17.3  ----------------------------<  185<H>  3.9   |  14.0<L>  |  0.58    Ca    8.8      21 Jan 2022 16:58  Phos  3.4     01-21  Mg     1.4     01-21    TPro  4.9<L>  /  Alb  2.5<L>  /  TBili  0.6  /  DBili  x   /  AST  13  /  ALT  14  /  AlkPhos  68  01-21  LIVER FUNCTIONS - ( 21 Jan 2022 16:58 )  Alb: 2.5 g/dL / Pro: 4.9 g/dL / ALK PHOS: 68 U/L / ALT: 14 U/L / AST: 13 U/L / GGT: x         PT/INR - ( 21 Jan 2022 16:58 )   PT: 12.8 sec;   INR: 1.11 ratio    PTT - ( 21 Jan 2022 16:58 )  PTT:31.4 sec    RADIOLOGY & ADDITIONAL TESTS:  CT Lumbar Spine No Cont (01.20.22 @ 10:10)  IMPRESSION:  THORACIC SPINE: No fracture or acute traumatic malalignment involving the   thoracic spine.  LUMBAR SPINE: 3 column fracture involving the L1 vertebral body with   severe compression deformity of the L1 vertebral body. There is   associated retropulsed bony fragment which contributes to high-grade   central canal stenosis as detailed on the corresponding MRI of the lumbar   spine.  Markedly dilated urinary bladder.  Extensive bilateral calcified pleural plaques.    MR Lumbar Spine No Cont (01.20.22 @ 05:06)  IMPRESSION:  Acute to subacute severe compression fracture at L1 with fracture   fragment of the posterior superior aspect of the vertebral body with   retropulsion into the spinal canal contributing to crowding of the cauda   equina nerve roots and compression of the conus medullaris.  T2/STIR signal hyperintensity involving the lamina and pedicles,   suspicious for fracture of the posterior elements. Facet articular   rupture cannot be entirely excluded.

## 2022-01-22 NOTE — PROGRESS NOTE ADULT - SUBJECTIVE AND OBJECTIVE BOX
HPI:  76M w/ PMHX  HTN, HLD, DM, hypothyroidism, asbestosis, CAD s/p stents x3, Atrial fibrillation on Eliquis, presenting today with c/o numbness to b/l feet. States that he recently fell at home on Thanksgiving and dx with spinal  fx ~2 months ago on outpatient xray and given back brace that he wears "all the time." Reports today that he noticed that his bilateral feet had become numb and this caused him to have difficulty ambulating and he has fallen several times today. States he was scheduled for an appt with a "back doctor today."  MRI T/L Spine- Severe compression fracture of L1 vertebral body w/ retropulsion.  c/o lower back discomfort, voiding urine but feeling of incomplete voiding. denies cp,sob,any weakness but has dull feeling of feet.    Denies saddle anesthesia or worsening of back pain. COVID+ since 01/13/22, pt states he completed 5 days of isolation as well her wife. Patient alert and oriented but confused w/ extensive medical questioning. States he heard back fractures heal on their own and has CHRONIC DISCOMFORT. Pt just voids urine but has some suprapubic discomfort on palpation.  (1/20/22)    24 hr events: Underwent Laminectomy, thoracolumbar, with fusion 1/21.  Still requires Harry to maintain MAP >65. Large drain output noted.    VS, imaging, labs reviewed.    Exam: NAD, lying in bed, cooperative.  DRAINS: epidural drain to bulb suction, Serosanguinous drainage noted.  WOUND: Dressing clean dry intact  MENTAL STATUS: AAO x3; Awake; Opens eyes spontaneously; Appropriately conversant without aphasia; following commands.  CRANIAL NERVES: MIK. EOMI without nystagmus. Facial sensation intact V1-3 distribution b/l. Face symmetric w/ normal eye closure and smile, tongue midline. Hearing grossly intact. Speech clear   MOTOR: strength 5/5 b/l upper and 4+/5 lower extremities  SENSATION: Diminished b/l LE from knees to feet but significantly improved compared to yesterday.  CHEST/LUNG: +breath sounds bilaterally;   HEART: +S1/+S2;  ABDOMEN: Soft, nontender, nondistended;

## 2022-01-23 LAB
ANION GAP SERPL CALC-SCNC: 14 MMOL/L — SIGNIFICANT CHANGE UP (ref 5–17)
BUN SERPL-MCNC: 16.9 MG/DL — SIGNIFICANT CHANGE UP (ref 8–20)
CALCIUM SERPL-MCNC: 7.7 MG/DL — LOW (ref 8.6–10.2)
CHLORIDE SERPL-SCNC: 105 MMOL/L — SIGNIFICANT CHANGE UP (ref 98–107)
CO2 SERPL-SCNC: 20 MMOL/L — LOW (ref 22–29)
CREAT SERPL-MCNC: 0.46 MG/DL — LOW (ref 0.5–1.3)
GLUCOSE BLDC GLUCOMTR-MCNC: 122 MG/DL — HIGH (ref 70–99)
GLUCOSE BLDC GLUCOMTR-MCNC: 165 MG/DL — HIGH (ref 70–99)
GLUCOSE BLDC GLUCOMTR-MCNC: 171 MG/DL — HIGH (ref 70–99)
GLUCOSE BLDC GLUCOMTR-MCNC: 172 MG/DL — HIGH (ref 70–99)
GLUCOSE SERPL-MCNC: 111 MG/DL — HIGH (ref 70–99)
HCT VFR BLD CALC: 31 % — LOW (ref 39–50)
HCT VFR BLD CALC: 31.2 % — LOW (ref 39–50)
HGB BLD-MCNC: 10.8 G/DL — LOW (ref 13–17)
HGB BLD-MCNC: 10.8 G/DL — LOW (ref 13–17)
MAGNESIUM SERPL-MCNC: 1.5 MG/DL — LOW (ref 1.6–2.6)
MCHC RBC-ENTMCNC: 31.5 PG — SIGNIFICANT CHANGE UP (ref 27–34)
MCHC RBC-ENTMCNC: 31.7 PG — SIGNIFICANT CHANGE UP (ref 27–34)
MCHC RBC-ENTMCNC: 34.6 GM/DL — SIGNIFICANT CHANGE UP (ref 32–36)
MCHC RBC-ENTMCNC: 34.8 GM/DL — SIGNIFICANT CHANGE UP (ref 32–36)
MCV RBC AUTO: 90.9 FL — SIGNIFICANT CHANGE UP (ref 80–100)
MCV RBC AUTO: 91 FL — SIGNIFICANT CHANGE UP (ref 80–100)
NT-PROBNP SERPL-SCNC: 6169 PG/ML — HIGH (ref 0–300)
PHOSPHATE SERPL-MCNC: 1.8 MG/DL — LOW (ref 2.4–4.7)
PLATELET # BLD AUTO: 411 K/UL — HIGH (ref 150–400)
PLATELET # BLD AUTO: 428 K/UL — HIGH (ref 150–400)
POTASSIUM SERPL-MCNC: 3.6 MMOL/L — SIGNIFICANT CHANGE UP (ref 3.5–5.3)
POTASSIUM SERPL-SCNC: 3.6 MMOL/L — SIGNIFICANT CHANGE UP (ref 3.5–5.3)
RBC # BLD: 3.41 M/UL — LOW (ref 4.2–5.8)
RBC # BLD: 3.43 M/UL — LOW (ref 4.2–5.8)
RBC # FLD: 13.2 % — SIGNIFICANT CHANGE UP (ref 10.3–14.5)
RBC # FLD: 13.2 % — SIGNIFICANT CHANGE UP (ref 10.3–14.5)
SODIUM SERPL-SCNC: 139 MMOL/L — SIGNIFICANT CHANGE UP (ref 135–145)
TROPONIN T SERPL-MCNC: <0.01 NG/ML — SIGNIFICANT CHANGE UP (ref 0–0.06)
WBC # BLD: 15.36 K/UL — HIGH (ref 3.8–10.5)
WBC # BLD: 15.41 K/UL — HIGH (ref 3.8–10.5)
WBC # FLD AUTO: 15.36 K/UL — HIGH (ref 3.8–10.5)
WBC # FLD AUTO: 15.41 K/UL — HIGH (ref 3.8–10.5)

## 2022-01-23 PROCEDURE — 93010 ELECTROCARDIOGRAM REPORT: CPT

## 2022-01-23 PROCEDURE — 99291 CRITICAL CARE FIRST HOUR: CPT

## 2022-01-23 RX ORDER — MIDODRINE HYDROCHLORIDE 2.5 MG/1
5 TABLET ORAL EVERY 8 HOURS
Refills: 0 | Status: DISCONTINUED | OUTPATIENT
Start: 2022-01-23 | End: 2022-01-24

## 2022-01-23 RX ORDER — POTASSIUM PHOSPHATE, MONOBASIC POTASSIUM PHOSPHATE, DIBASIC 236; 224 MG/ML; MG/ML
30 INJECTION, SOLUTION INTRAVENOUS ONCE
Refills: 0 | Status: COMPLETED | OUTPATIENT
Start: 2022-01-23 | End: 2022-01-23

## 2022-01-23 RX ORDER — MAGNESIUM SULFATE 500 MG/ML
2 VIAL (ML) INJECTION ONCE
Refills: 0 | Status: COMPLETED | OUTPATIENT
Start: 2022-01-23 | End: 2022-01-23

## 2022-01-23 RX ADMIN — Medication 100 MILLIGRAM(S): at 05:17

## 2022-01-23 RX ADMIN — POTASSIUM PHOSPHATE, MONOBASIC POTASSIUM PHOSPHATE, DIBASIC 83.33 MILLIMOLE(S): 236; 224 INJECTION, SOLUTION INTRAVENOUS at 10:09

## 2022-01-23 RX ADMIN — OXYCODONE HYDROCHLORIDE 5 MILLIGRAM(S): 5 TABLET ORAL at 08:00

## 2022-01-23 RX ADMIN — ATORVASTATIN CALCIUM 20 MILLIGRAM(S): 80 TABLET, FILM COATED ORAL at 22:54

## 2022-01-23 RX ADMIN — NYSTATIN CREAM 1 APPLICATION(S): 100000 CREAM TOPICAL at 18:11

## 2022-01-23 RX ADMIN — MIDODRINE HYDROCHLORIDE 5 MILLIGRAM(S): 2.5 TABLET ORAL at 14:09

## 2022-01-23 RX ADMIN — Medication 50 MICROGRAM(S): at 05:17

## 2022-01-23 RX ADMIN — Medication 1: at 22:53

## 2022-01-23 RX ADMIN — Medication 1: at 12:12

## 2022-01-23 RX ADMIN — NYSTATIN CREAM 1 APPLICATION(S): 100000 CREAM TOPICAL at 05:17

## 2022-01-23 RX ADMIN — Medication 25 GRAM(S): at 07:56

## 2022-01-23 RX ADMIN — Medication 1: at 17:49

## 2022-01-23 RX ADMIN — MORPHINE SULFATE 2 MILLIGRAM(S): 50 CAPSULE, EXTENDED RELEASE ORAL at 08:00

## 2022-01-23 RX ADMIN — MIDODRINE HYDROCHLORIDE 5 MILLIGRAM(S): 2.5 TABLET ORAL at 22:54

## 2022-01-23 NOTE — PROGRESS NOTE ADULT - SUBJECTIVE AND OBJECTIVE BOX
HPI: 76y Male extensive PMH including HTN, HLD, DM, hypothyroidism, asbestosis, CAD s/p PCI, afib on Eliquis, now presents to [facility] 1/20/22 with b/l LE weakness and difficulty ambulating since fall on Thanksgiving, urinary retention with dribbling x 2 weeks, and b/l LE sensory changes prompting ED visit, MRI found with 3 column injury of L1 vertebral body.    Interval history: 1/21 patient underwent T12-L1 lami with T10-L4 fusion. Patient was hypotensive periop & post op requiring eva gtt for MAP>65. Patient has minimal  back pain when moves.     Physical Exam  Constitutional: NAD, frail, cachectic     Neuro  * Mental Status:  GCS 15: Awake, alert, oriented to conversation. Speech is clear, face symmetric,   * Cranial Nerves: Cnii-Cnxii grossly intact. PERRL, EOMI, tongue midline, no gaze deviation  * Motor: RUE 5/5, LUE 5/5, RLE 5/5, LLE 5/5, no drift  * Sensory: Sensation intact to light touch  * Reflexes: Not assessed  * Gait: Not assessed  * Wound: mepilex dressing is c/d/i, MERCEDES in place    Vital Signs Last 24 Hrs  T(C): 36.6 (21 Jan 2022 16:12), Max: 36.9 (21 Jan 2022 02:10)  T(F): 97.9 (21 Jan 2022 16:12), Max: 98.5 (21 Jan 2022 04:57)  HR: 76 (21 Jan 2022 18:00) (68 - 106)  BP: 96/65 (21 Jan 2022 15:45) (73/50 - 157/11)  BP(mean): 57 (21 Jan 2022 15:30) (57 - 150)  RR: 19 (21 Jan 2022 18:00) (16 - 19)  SpO2: 94% (21 Jan 2022 18:00) (94% - 100%)    I&O's Summary    20 Jan 2022 07:01  -  21 Jan 2022 07:00  --------------------------------------------------------  IN: 120 mL / OUT: 6100 mL / NET: -5980 mL    21 Jan 2022 07:01  -  22 Jan 2022 01:14  --------------------------------------------------------  IN: 931.7 mL / OUT: 1116 mL / NET: -184.3 mL    Labs:                       12.0   19.95 )-----------( 491      ( 21 Jan 2022 16:58 )             34.2     138  |  103  |  17.3  ----------------------------<  185<H>  3.9   |  14.0<L>  |  0.58    Ca    8.8      21 Jan 2022 16:58  Phos  3.4     01-21  Mg     1.4     01-21    TPro  4.9<L>  /  Alb  2.5<L>  /  TBili  0.6  /  DBili  x   /  AST  13  /  ALT  14  /  AlkPhos  68  01-21  LIVER FUNCTIONS - ( 21 Jan 2022 16:58 )  Alb: 2.5 g/dL / Pro: 4.9 g/dL / ALK PHOS: 68 U/L / ALT: 14 U/L / AST: 13 U/L / GGT: x         PT/INR - ( 21 Jan 2022 16:58 )   PT: 12.8 sec;   INR: 1.11 ratio    PTT - ( 21 Jan 2022 16:58 )  PTT:31.4 sec    RADIOLOGY & ADDITIONAL TESTS:  CT Lumbar Spine No Cont (01.20.22 @ 10:10)  IMPRESSION:  THORACIC SPINE: No fracture or acute traumatic malalignment involving the   thoracic spine.  LUMBAR SPINE: 3 column fracture involving the L1 vertebral body with   severe compression deformity of the L1 vertebral body. There is   associated retropulsed bony fragment which contributes to high-grade   central canal stenosis as detailed on the corresponding MRI of the lumbar   spine.  Markedly dilated urinary bladder.  Extensive bilateral calcified pleural plaques.    MR Lumbar Spine No Cont (01.20.22 @ 05:06)  IMPRESSION:  Acute to subacute severe compression fracture at L1 with fracture   fragment of the posterior superior aspect of the vertebral body with   retropulsion into the spinal canal contributing to crowding of the cauda   equina nerve roots and compression of the conus medullaris.  T2/STIR signal hyperintensity involving the lamina and pedicles,   suspicious for fracture of the posterior elements. Facet articular   rupture cannot be entirely excluded. HPI: 76y Male extensive PMH including HTN, HLD, DM, hypothyroidism, asbestosis, CAD s/p PCI, afib on Eliquis, now presents to [facility] 1/20/22 with b/l LE weakness and difficulty ambulating since fall on Thanksgiving, urinary retention with dribbling x 2 weeks, and b/l LE sensory changes prompting ED visit, MRI found with 3 column injury of L1 vertebral body.    Interval history: 1/21 patient underwent T12-L1 lami with T10-L4 fusion. Patient was hypotensive periop & post op requiring eva gtt for MAP>65. Patient has minimal  back pain when moves.   1/23 Patient remains on eva gtt for MAP >65. Pt is in retention & required urology place a velasco cath.     Physical Exam  Constitutional: NAD, frail, cachectic     Neuro  * Mental Status:  GCS 15: Awake, alert, oriented to conversation. Speech is clear, face symmetric,   * Cranial Nerves: Cnii-Cnxii grossly intact. PERRL, EOMI, tongue midline, no gaze deviation  * Motor: RUE 5/5, LUE 5/5, RLE 5/5, LLE 5/5, no drift  * Sensory: Sensation intact to light touch  * Reflexes: Not assessed  * Gait: Not assessed  * Wound: mepilex dressing is c/d/i, MERCEDES in place    Vital Signs Last 24 Hrs  T(C): 36.7 (22 Jan 2022 12:27), Max: 37 (22 Jan 2022 07:28)  T(F): 98.1 (22 Jan 2022 12:27), Max: 98.6 (22 Jan 2022 07:28)  HR: 82 (23 Jan 2022 01:00) (71 - 90)  BP: 78/54 (23 Jan 2022 01:00) (68/56 - 113/76)  BP(mean): 62 (23 Jan 2022 01:00) (61 - 89)  RR: 19 (23 Jan 2022 01:00) (15 - 22)  SpO2: 97% (23 Jan 2022 01:00) (92% - 100%)    I&O's Summary    21 Jan 2022 07:01  -  22 Jan 2022 07:00  --------------------------------------------------------  IN: 1726.3 mL / OUT: 1941 mL / NET: -214.7 mL    22 Jan 2022 07:01  -  23 Jan 2022 01:20  --------------------------------------------------------  IN: 3139 mL / OUT: 1373 mL / NET: 1766 mL    Labs:                      10.9   14.20 )-----------( 512      ( 22 Jan 2022 05:41 )             33.1     138  |  105  |  16.4  ----------------------------<  121<H>  3.6   |  17.0<L>  |  0.54    Ca    7.9<L>      22 Jan 2022 05:41  Phos  2.8     01-22  Mg     1.4     01-22  TPro  4.9<L>  /  Alb  2.5<L>  /  TBili  0.6  /  DBili  x   /  AST  13  /  ALT  14  /  AlkPhos  68  01-21  LIVER FUNCTIONS - ( 21 Jan 2022 16:58 )  Alb: 2.5 g/dL / Pro: 4.9 g/dL / ALK PHOS: 68 U/L / ALT: 14 U/L / AST: 13 U/L / GGT: x         PT/INR - ( 21 Jan 2022 16:58 )   PT: 12.8 sec;   INR: 1.11 ratio     PTT - ( 21 Jan 2022 16:58 )  PTT:31.4 sec    RADIOLOGY & ADDITIONAL TESTS:  CT Lumbar Spine No Cont (01.20.22 @ 10:10)  IMPRESSION:  THORACIC SPINE: No fracture or acute traumatic malalignment involving the   thoracic spine.  LUMBAR SPINE: 3 column fracture involving the L1 vertebral body with   severe compression deformity of the L1 vertebral body. There is   associated retropulsed bony fragment which contributes to high-grade   central canal stenosis as detailed on the corresponding MRI of the lumbar   spine.  Markedly dilated urinary bladder.  Extensive bilateral calcified pleural plaques.    MR Lumbar Spine No Cont (01.20.22 @ 05:06)  IMPRESSION:  Acute to subacute severe compression fracture at L1 with fracture   fragment of the posterior superior aspect of the vertebral body with   retropulsion into the spinal canal contributing to crowding of the cauda   equina nerve roots and compression of the conus medullaris.  T2/STIR signal hyperintensity involving the lamina and pedicles,   suspicious for fracture of the posterior elements. Facet articular   rupture cannot be entirely excluded.

## 2022-01-23 NOTE — PROGRESS NOTE ADULT - SUBJECTIVE AND OBJECTIVE BOX
HPI:  76M w/ PMHX  HTN, HLD, DM, hypothyroidism, asbestosis, CAD s/p stents x3, Atrial fibrillation on Eliquis, presenting today with c/o numbness to b/l feet. States that he recently fell at home on Thanksgiving and dx with spinal  fx ~2 months ago on outpatient xray and given back brace that he wears "all the time." Reports today that he noticed that his bilateral feet had become numb and this caused him to have difficulty ambulating and he has fallen several times today. States he was scheduled for an appt with a "back doctor today."  MRI T/L Spine- Severe compression fracture of L1 vertebral body w/ retropulsion.  c/o lower back discomfort, voiding urine but feeling of incomplete voiding. denies cp,sob,any weakness but has dull feeling of feet.    Denies saddle anesthesia or worsening of back pain. COVID+ since 01/13/22, pt states he completed 5 days of isolation as well her wife. Patient alert and oriented but confused w/ extensive medical questioning. States he heard back fractures heal on their own and has CHRONIC DISCOMFORT. Pt just voids urine but has some suprapubic discomfort on palpation.  (1/20/22)    24 hr events: none rpeoted. Still requires Harry for MAP>65.  Still requires Harry to maintain MAP >65. Large drain output noted.    VS, imaging, labs reviewed.    Exam: NAD, lying in bed, cooperative.  DRAINS: epidural drain to bulb suction, Serosanguinous drainage noted.  WOUND: Dressing clean dry intact  MENTAL STATUS: AAO x3; Awake; Opens eyes spontaneously; Appropriately conversant without aphasia; following commands.  CRANIAL NERVES: MIK. EOMI without nystagmus. Facial sensation intact V1-3 distribution b/l. Face symmetric w/ normal eye closure and smile, tongue midline. Hearing grossly intact. Speech clear   MOTOR: strength 5/5 b/l upper and 4+/5 lower extremities  SENSATION: Diminished b/l LE from knees to feet but significantly improved compared to yesterday.  CHEST/LUNG: +breath sounds bilaterally;   HEART: +S1/+S2;  ABDOMEN: Soft, nontender, nondistended;

## 2022-01-23 NOTE — PROGRESS NOTE ADULT - ASSESSMENT
Assessment:  76y Male extensive PMH including HTN, HLD, DM, hypothyroidism, asbestosis, CAD s/p PCI, afib on Eliquis, now presents to [facility] 1/20/22 with b/l LE weakness and difficulty ambulating since fall on Thanksgiving, urinary retention with dribbling x 2 weeks, and b/l LE sensory changes prompting ED visit, MRI found with 3 column injury of L1 vertebral body    PLAN:  - cont neuro checks q2hrs,   - cont pain management  - monitor MERCEDES output  - Keep MAP >65, eva gtt as needed, wean off if possible, might need midodrin  - cont diet  - d/c velasco in am  - ancef x 5 doses for prophylaxis  - PT/OOB  - medical management as per NSICU Assessment:  76y Male extensive PMH including HTN, HLD, DM, hypothyroidism, asbestosis, CAD s/p PCI, afib on Eliquis, now presents to [facility] 1/20/22 with b/l LE weakness and difficulty ambulating since fall on Thanksgiving, urinary retention with dribbling x 2 weeks, and b/l LE sensory changes prompting ED visit, MRI found with 3 column injury of L1 vertebral body    PLAN:  - cont neuro checks q2hrs,   - cont pain management  - monitor MERCEDES output  - Keep MAP >65, eva gtt as needed, wean off if possible, might need midodrin  - cont diet  - patient failed TOV & required coude cath by urology  - ancef x 5 doses for prophylaxis  - PT/OOB  - medical management as per NSICU

## 2022-01-23 NOTE — PROGRESS NOTE ADULT - SUBJECTIVE AND OBJECTIVE BOX
Jt was awake and alert in bed as I measured and sized an Aspen TLSO with shoulder straps for him to use OOB. I demonstrated donning and adjustment of the brace which he was already familiar with. The brace was labeled and placed on bedside chair for later use. Printed instructions and contact info were provided.  [facility]  [phone]

## 2022-01-23 NOTE — PROGRESS NOTE ADULT - ASSESSMENT
76M w/ PMHX  HTN, HLD, DM, hypothyroidism, asbestosis, CAD s/p stents x3, Atrial fibrillation on Eliquis, now with severe compression fracture of L1 vertebral body w/ retropulsion.  S/p TL laminectomy/fusion.  Hypotension likely due to acute postop anemia.    Plan:  -Q2hr neuro checks   -drains per NSG  -Tylenol PRN; Morphine 2mg Q4hrs PRN; Oxy 5mg Q4 PRN   -SBP goal 100-140, MAP >65, on Harry; add Midodrine 5 q8hrs  -Lipitor 20mg daily   -Saturating well on room air; maintain SpO2>92%  -bladder scan q4hrs - low threshold to replace Cotton, will need Urology involvement; NS@60 till full PO intake  -SCDs b/l for DVT prophylaxis   -Hold all AC/AP at this time       Pt is critically ill and at high risk of rapid deterioration/death due to abovementioned conditions.   Still requires critical care interventions - ongoing frequent evaluations, interventions and management adjustment by the Attending and ICU team, - and included review of relevant history, clinical examination, review of data and images, discussion of treatment with the multidisciplinary team and any consultants involved in this patient’s care as well as family discussion.

## 2022-01-24 LAB
ANION GAP SERPL CALC-SCNC: 16 MMOL/L — SIGNIFICANT CHANGE UP (ref 5–17)
BUN SERPL-MCNC: 15.2 MG/DL — SIGNIFICANT CHANGE UP (ref 8–20)
CALCIUM SERPL-MCNC: 7.8 MG/DL — LOW (ref 8.6–10.2)
CHLORIDE SERPL-SCNC: 102 MMOL/L — SIGNIFICANT CHANGE UP (ref 98–107)
CO2 SERPL-SCNC: 20 MMOL/L — LOW (ref 22–29)
CREAT SERPL-MCNC: 0.53 MG/DL — SIGNIFICANT CHANGE UP (ref 0.5–1.3)
GLUCOSE BLDC GLUCOMTR-MCNC: 152 MG/DL — HIGH (ref 70–99)
GLUCOSE BLDC GLUCOMTR-MCNC: 154 MG/DL — HIGH (ref 70–99)
GLUCOSE BLDC GLUCOMTR-MCNC: 167 MG/DL — HIGH (ref 70–99)
GLUCOSE BLDC GLUCOMTR-MCNC: 272 MG/DL — HIGH (ref 70–99)
GLUCOSE SERPL-MCNC: 131 MG/DL — HIGH (ref 70–99)
HCT VFR BLD CALC: 29.1 % — LOW (ref 39–50)
HGB BLD-MCNC: 10.1 G/DL — LOW (ref 13–17)
MAGNESIUM SERPL-MCNC: 1.6 MG/DL — SIGNIFICANT CHANGE UP (ref 1.6–2.6)
MCHC RBC-ENTMCNC: 31.7 PG — SIGNIFICANT CHANGE UP (ref 27–34)
MCHC RBC-ENTMCNC: 34.7 GM/DL — SIGNIFICANT CHANGE UP (ref 32–36)
MCV RBC AUTO: 91.2 FL — SIGNIFICANT CHANGE UP (ref 80–100)
PHOSPHATE SERPL-MCNC: 2.1 MG/DL — LOW (ref 2.4–4.7)
PLATELET # BLD AUTO: 424 K/UL — HIGH (ref 150–400)
POTASSIUM SERPL-MCNC: 3.3 MMOL/L — LOW (ref 3.5–5.3)
POTASSIUM SERPL-SCNC: 3.3 MMOL/L — LOW (ref 3.5–5.3)
RBC # BLD: 3.19 M/UL — LOW (ref 4.2–5.8)
RBC # FLD: 12.9 % — SIGNIFICANT CHANGE UP (ref 10.3–14.5)
SODIUM SERPL-SCNC: 138 MMOL/L — SIGNIFICANT CHANGE UP (ref 135–145)
WBC # BLD: 10.95 K/UL — HIGH (ref 3.8–10.5)
WBC # FLD AUTO: 10.95 K/UL — HIGH (ref 3.8–10.5)

## 2022-01-24 PROCEDURE — 99232 SBSQ HOSP IP/OBS MODERATE 35: CPT

## 2022-01-24 RX ORDER — METFORMIN HYDROCHLORIDE 850 MG/1
0 TABLET ORAL
Qty: 0 | Refills: 0 | DISCHARGE

## 2022-01-24 RX ORDER — PHENYLEPHRINE HYDROCHLORIDE 10 MG/ML
0.5 INJECTION INTRAVENOUS
Qty: 160 | Refills: 0 | Status: DISCONTINUED | OUTPATIENT
Start: 2022-01-24 | End: 2022-01-24

## 2022-01-24 RX ORDER — SODIUM,POTASSIUM PHOSPHATES 278-250MG
21 POWDER IN PACKET (EA) ORAL EVERY 4 HOURS
Refills: 0 | Status: DISCONTINUED | OUTPATIENT
Start: 2022-01-24 | End: 2022-01-24

## 2022-01-24 RX ORDER — DULAGLUTIDE 4.5 MG/.5ML
0 INJECTION, SOLUTION SUBCUTANEOUS
Qty: 0 | Refills: 0 | DISCHARGE

## 2022-01-24 RX ORDER — MIDODRINE HYDROCHLORIDE 2.5 MG/1
10 TABLET ORAL EVERY 8 HOURS
Refills: 0 | Status: DISCONTINUED | OUTPATIENT
Start: 2022-01-24 | End: 2022-01-27

## 2022-01-24 RX ORDER — SENNA PLUS 8.6 MG/1
2 TABLET ORAL AT BEDTIME
Refills: 0 | Status: DISCONTINUED | OUTPATIENT
Start: 2022-01-24 | End: 2022-01-28

## 2022-01-24 RX ORDER — LEVOTHYROXINE SODIUM 125 MCG
1 TABLET ORAL
Qty: 0 | Refills: 0 | DISCHARGE

## 2022-01-24 RX ORDER — MAGNESIUM SULFATE 500 MG/ML
2 VIAL (ML) INJECTION ONCE
Refills: 0 | Status: COMPLETED | OUTPATIENT
Start: 2022-01-24 | End: 2022-01-24

## 2022-01-24 RX ORDER — POLYETHYLENE GLYCOL 3350 17 G/17G
17 POWDER, FOR SOLUTION ORAL DAILY
Refills: 0 | Status: DISCONTINUED | OUTPATIENT
Start: 2022-01-24 | End: 2022-01-28

## 2022-01-24 RX ORDER — APIXABAN 2.5 MG/1
0 TABLET, FILM COATED ORAL
Qty: 0 | Refills: 0 | DISCHARGE

## 2022-01-24 RX ORDER — METOPROLOL TARTRATE 50 MG
1 TABLET ORAL
Qty: 0 | Refills: 0 | DISCHARGE

## 2022-01-24 RX ADMIN — ATORVASTATIN CALCIUM 20 MILLIGRAM(S): 80 TABLET, FILM COATED ORAL at 21:55

## 2022-01-24 RX ADMIN — MIDODRINE HYDROCHLORIDE 10 MILLIGRAM(S): 2.5 TABLET ORAL at 21:55

## 2022-01-24 RX ADMIN — Medication 3: at 11:31

## 2022-01-24 RX ADMIN — Medication 1: at 17:18

## 2022-01-24 RX ADMIN — Medication 50 MICROGRAM(S): at 05:39

## 2022-01-24 RX ADMIN — Medication 25 GRAM(S): at 06:47

## 2022-01-24 RX ADMIN — NYSTATIN CREAM 1 APPLICATION(S): 100000 CREAM TOPICAL at 17:00

## 2022-01-24 RX ADMIN — MIDODRINE HYDROCHLORIDE 5 MILLIGRAM(S): 2.5 TABLET ORAL at 05:39

## 2022-01-24 RX ADMIN — NYSTATIN CREAM 1 APPLICATION(S): 100000 CREAM TOPICAL at 05:43

## 2022-01-24 RX ADMIN — Medication 1: at 08:33

## 2022-01-24 RX ADMIN — SENNA PLUS 2 TABLET(S): 8.6 TABLET ORAL at 21:56

## 2022-01-24 RX ADMIN — Medication 1: at 22:46

## 2022-01-24 RX ADMIN — MIDODRINE HYDROCHLORIDE 10 MILLIGRAM(S): 2.5 TABLET ORAL at 14:09

## 2022-01-24 RX ADMIN — OXYCODONE HYDROCHLORIDE 5 MILLIGRAM(S): 5 TABLET ORAL at 05:39

## 2022-01-24 NOTE — PROGRESS NOTE ADULT - SUBJECTIVE AND OBJECTIVE BOX
HPI: 76y Male extensive PMH including HTN, HLD, DM, hypothyroidism, asbestosis, CAD s/p PCI, afib on Eliquis, now presents to [facility] 1/20/22 with b/l LE weakness and difficulty ambulating since fall on Thanksgiving, urinary retention with dribbling x 2 weeks, and b/l LE sensory changes prompting ED visit, MRI found with 3 column injury of L1 vertebral body.    Interval history: 1/21 patient underwent T12-L1 lami with T10-L4 fusion. Patient was hypotensive periop & post op requiring eva gtt for MAP>65. Patient has minimal  back pain when moves.   1/23 Patient remains on eva gtt for MAP >65. Pt is in retention & required urology place a velasco cath.     Physical Exam  Constitutional: NAD, frail, cachectic     Neuro  * Mental Status:  GCS 15: Awake, alert, oriented to conversation. Speech is clear, face symmetric,   * Cranial Nerves: Cnii-Cnxii grossly intact. PERRL, EOMI, tongue midline, no gaze deviation  * Motor: RUE 5/5, LUE 5/5, RLE 5/5, LLE 5/5, no drift  * Sensory: Sensation intact to light touch  * Reflexes: Not assessed  * Gait: Not assessed  * Wound: mepilex dressing is c/d/i, MERCEDES in place    Vital Signs Last 24 Hrs  T(C): 37.1 (23 Jan 2022 22:00), Max: 37.1 (23 Jan 2022 17:30)  T(F): 98.7 (23 Jan 2022 22:00), Max: 98.8 (23 Jan 2022 17:30)  HR: 94 (24 Jan 2022 03:00) (27 - 104)  BP: 103/62 (24 Jan 2022 03:00) (81/62 - 114/61)  BP(mean): 74 (24 Jan 2022 03:00) (58 - 92)  RR: 19 (24 Jan 2022 03:00) (17 - 25)  SpO2: 99% (24 Jan 2022 03:00) (96% - 100%)    I&O's Summary    22 Jan 2022 07:01  -  23 Jan 2022 07:00  --------------------------------------------------------  IN: 3722.7 mL / OUT: 1783 mL / NET: 1939.7 mL    23 Jan 2022 07:01  -  24 Jan 2022 04:08  --------------------------------------------------------  IN: 805.5 mL / OUT: 1718 mL / NET: -912.5 mL    Labs:                      10.8   15.36 )-----------( 428      ( 23 Jan 2022 12:07 )             31.2     139  |  105  |  16.9  ----------------------------<  111<H>  3.6   |  20.0<L>  |  0.46<L>    Ca    7.7<L>      23 Jan 2022 05:25  Phos  1.8     01-23  Mg     1.5     01-23    RADIOLOGY & ADDITIONAL TESTS:  CT Lumbar Spine No Cont (01.20.22 @ 10:10)  IMPRESSION:  THORACIC SPINE: No fracture or acute traumatic malalignment involving the   thoracic spine.  LUMBAR SPINE: 3 column fracture involving the L1 vertebral body with   severe compression deformity of the L1 vertebral body. There is   associated retropulsed bony fragment which contributes to high-grade   central canal stenosis as detailed on the corresponding MRI of the lumbar   spine.  Markedly dilated urinary bladder.  Extensive bilateral calcified pleural plaques.    MR Lumbar Spine No Cont (01.20.22 @ 05:06)  IMPRESSION:  Acute to subacute severe compression fracture at L1 with fracture   fragment of the posterior superior aspect of the vertebral body with   retropulsion into the spinal canal contributing to crowding of the cauda   equina nerve roots and compression of the conus medullaris.  T2/STIR signal hyperintensity involving the lamina and pedicles,   suspicious for fracture of the posterior elements. Facet articular   rupture cannot be entirely excluded.

## 2022-01-24 NOTE — CHART NOTE - NSCHARTNOTEFT_GEN_A_CORE
HPI:  76M w/ PMHX  HTN, HLD, DM, hypothyroidism, asbestosis, CAD s/p stents x3, Atrial fibrillation on Eliquis, presenting today with c/o numbness to b/l feet. States that he recently fell at home on Thanksgiving and dx with spinal  fx ~2 months ago on outpatient xray and given back brace that he wears "all the time." Reports today that he noticed that his bilateral feet had become numb and this caused him to have difficulty ambulating and he has fallen several times today. States he was scheduled for an appt with a "back doctor today."  MRI T/L Spine- Severe compression fracture of L1 vertebral body w/ retropulsion.  c/o lower back discomfort, voiding urine but feeling of incomplete voiding. denies cp,sob,any weakness but has dull feeling of feet.    Denies saddle anesthesia or worsening of back pain. COVID+ since 01/13/22, pt states he completed 5 days of isolation as well her wife. Patient alert and oriented but confused w/ extensive medical questioning. States he heard back fractures heal on their own and has CHRONIC DISCOMFORT. Pt just voids urine but has some suprapubic discomfort on palpation.    Hospital Course:  1/20: Admitted to medicine after MRI T/L Spine showed severe compression fracture of L1 vertebral body w/ retropulsion. Patient was retaining urine, Coude placed by urology. Upgraded to NSICU.   1/21: OR with Dr. Gaitan for T12-L1 laminectomy, T10-L4 fusion. Requiring harry post op for MAP>65.   1/22: Still requiring Harry gtt for BP goal. Cotton removed, patient retaining urine. Urology replaced Coude.   1/23: Midodrine started.   1/24: Midodrine increased, off Harry gtt.       Vital Signs Last 24 Hrs  T(C): 36.4 (24 Jan 2022 11:33), Max: 37.1 (23 Jan 2022 17:30)  T(F): 97.5 (24 Jan 2022 11:33), Max: 98.8 (23 Jan 2022 17:30)  HR: 91 (24 Jan 2022 12:00) (27 - 106)  BP: 97/72 (24 Jan 2022 12:00) (83/46 - 130/92)  BP(mean): 82 (24 Jan 2022 12:00) (58 - 101)  RR: 21 (24 Jan 2022 12:00) (17 - 22)  SpO2: 98% (24 Jan 2022 12:00) (95% - 100%)    NOTE INCOMPLETE HPI:  76M w/ PMHX  HTN, HLD, DM, hypothyroidism, asbestosis, CAD s/p stents x3, Atrial fibrillation on Eliquis, presenting today with c/o numbness to b/l feet. States that he recently fell at home on Thanksgiving and dx with spinal  fx ~2 months ago on outpatient xray and given back brace that he wears "all the time." Reports today that he noticed that his bilateral feet had become numb and this caused him to have difficulty ambulating and he has fallen several times today. States he was scheduled for an appt with a "back doctor today."  MRI T/L Spine- Severe compression fracture of L1 vertebral body w/ retropulsion.  c/o lower back discomfort, voiding urine but feeling of incomplete voiding. denies cp,sob,any weakness but has dull feeling of feet.    Denies saddle anesthesia or worsening of back pain. COVID+ since 01/13/22, pt states he completed 5 days of isolation as well her wife. Patient alert and oriented but confused w/ extensive medical questioning. States he heard back fractures heal on their own and has CHRONIC DISCOMFORT. Pt just voids urine but has some suprapubic discomfort on palpation.    Hospital Course:  1/20: Admitted to medicine after MRI T/L Spine showed severe compression fracture of L1 vertebral body w/ retropulsion. Patient was retaining urine, Coude placed by urology. Upgraded to NSICU.   1/21: OR with Dr. Gaitan for T12-L1 laminectomy, T10-L4 fusion. Requiring harry post op for MAP>65.   1/22: Still requiring Harry gtt for BP goal. Cotton removed, patient retaining urine. Urology replaced Coude.   1/23: Midodrine started.   1/24: Midodrine increased, off Harry gtt.     Vital Signs Last 24 Hrs  T(C): 36.4 (24 Jan 2022 11:33), Max: 37.1 (23 Jan 2022 17:30)  T(F): 97.5 (24 Jan 2022 11:33), Max: 98.8 (23 Jan 2022 17:30)  HR: 91 (24 Jan 2022 12:00) (27 - 106)  BP: 97/72 (24 Jan 2022 12:00) (83/46 - 130/92)  BP(mean): 82 (24 Jan 2022 12:00) (58 - 101)  RR: 21 (24 Jan 2022 12:00) (17 - 22)  SpO2: 98% (24 Jan 2022 12:00) (95% - 100%)    PHYSICAL EXAM:  GENERAL: NAD, calm, pleasant   HEAD: Atraumatic, normocephalic  DRAINS: Epidural MERCEDES drain to full bulb suction with serosanguinous drainage in bulb   WOUND: Dressing C/D/I  [place] COMA SCORE: E-4 V-5 M-6 = 15  MENTAL STATUS: AAO x3; Awake; Opens eyes spontaneously; Appropriately conversant without aphasia; Following commands   CRANIAL NERVES: Visual fields full to confrontation, PERRL. EOMI without nystagmus. Facial sensation intact V1-3 distribution b/l. Face symmetric w/ normal eye closure and smile, tongue midline. Hearing grossly intact  MOTOR: Strength 5/5 RUE, 5/5 LUE, RLE 5/5 with encouragement, LLE 5/5 except HF 4/5 with encouragement   SENSATION: Grossly intact to light touch all extremities  CHEST/LUNG: Nonlabored breathing, no signs of respiratory distress   HEART: +S1/+S2; Regular rate and rhythm  ABDOMEN: Soft, nontender, nondistended  EXTREMITIES: No calf tenderness b/l  SKIN: Warm, dry    LABS:                        10.1   10.95 )-----------( 424      ( 24 Jan 2022 05:28 )             29.1     01-24    138  |  102  |  15.2  ----------------------------<  131<H>  3.3<L>   |  20.0<L>  |  0.53    Ca    7.8<L>      24 Jan 2022 05:28  Phos  2.1     01-24  Mg     1.6     01-24 01-23 @ 07:01  -  01-24 @ 07:00  --------------------------------------------------------  IN: 887.5 mL / OUT: 2040 mL / NET: -1152.5 mL    01-24 @ 07:01  -  01-24 @ 17:18  --------------------------------------------------------  IN: 102.8 mL / OUT: 580 mL / NET: -477.2 mL        RADIOLOGY & ADDITIONAL TESTS:  CT Thoracic & Lumbar Spine No Cont (01.22.22 @ 16:48):  IMPRESSION:  STATUS POST T12-L1 LAMINECTOMY AND T10-L4 POSTERIOR FUSION TO RELIEVE AN   L1 BURST FRACTURE.  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    CT Head No Cont (01.20.22 @ 15:57):  IMPRESSION:  No evidence of acute intracranial abnormality.  No evidence of hemorrhage.  Chronic changes as above.    CT Thoracic & Lumbar Spine No Cont (01.20.22 @ 10:10):  IMPRESSION:  THORACIC SPINE: No fracture or acute traumatic malalignment involving the   thoracic spine.  LUMBAR SPINE: 3 column fracture involving the L1 vertebral body with   severe compression deformity of the L1 vertebral body. There is   associated retropulsed bony fragment which contributes to high-grade   central canal stenosis as detailed on the corresponding MRI of the lumbar   spine.  Markedly dilated urinary bladder.  Extensive bilateral calcified pleural plaques.    MR Thoracic & Lumbar Spine No Cont (01.20.22 @ 05:06):  IMPRESSION:   Partially visualized acute to subacute severe compression fracture at L1  vertebral body for which detailed description will be provided in the MRI   of the lumbar spine.  No fracture is seen in the thoracic spine.    Assessment:  76 year old male with PMH HTN, DM, hypothyroidism, asbestosis, CAD s/p stents x3, Afib on Eliqiuis presents to [facility] 1/20 with worsening b/l foot numbness and difficultly ambulating s/p fall 2 months prior and known spinal fracture. Incidentally COVID+. MRI T/L Spine- Severe compression fracture of L1 vertebral body w/ retropulsion. Patient with urinary retention requiring catheter. Now POD#3 s/p T12-L1 laminectomy, T10-L4 fusion with Dr. Gaitan. Requiring harry post op for MAP goal >65. Now off Harry gtt. Clinically and radiographically stable.     Plan:  -D/w Dr. Garcia  -Patient neurologically stable and appropriate for downgrade to telemetry floor under neurosurgery. Patient signed out to neurosurgery team, all questions answered   -Q4hr neuro checks   -Epidural MERCEDES drain in place - monitor and record output. Will likely D/C 1/25   -Oxycodone 5mg Q4hrs and Tylenol PRN for pain control   -MAP goal >65  -Midodrine 10mg Q8hrs - wean as tolerated   -Lipitor 20mg QHS  -Saturating well on room air   -Maintain SpO2 >92%  -Soft and bite sized diet   -Senna / Miralax   -Coude catheter in place   -SCDs b/l for DVT prophylaxis   -Hold AC/AP use at this time   -Synthroid 50mcg daily   -ROSALINDA  -PT/OT  -OOB with assist as tolerated

## 2022-01-24 NOTE — PROGRESS NOTE ADULT - ASSESSMENT
Assessment:  76y Male extensive PMH including HTN, HLD, DM, hypothyroidism, asbestosis, CAD s/p PCI, afib on Eliquis, now presents to [facility] 1/20/22 with b/l LE weakness and difficulty ambulating since fall on Thanksgiving, urinary retention with dribbling x 2 weeks, and b/l LE sensory changes prompting ED visit, MRI found with 3 column injury of L1 vertebral body    PLAN:  - cont neuro checks q2hrs,   - cont pain management  - monitor MERCEDES output  - Keep MAP >65, eva gtt as needed, wean off if possible, cont midodrin  - cont diet  - patient failed TOV & required coude cath by urology  - Eliquis in 2 weeks post -op  - mechanical DVt prophylaxis with SCDs  - PT/OOB  - medical management as per NSICU

## 2022-01-24 NOTE — PROGRESS NOTE ADULT - SUBJECTIVE AND OBJECTIVE BOX
EVENTS:   No acute events overnight.    VITALS:  T(C): , Max: 37.1 (01-23-22 @ 17:30)  HR:  (27 - 106)  BP:  (81/62 - 130/92)  ABP: --  RR:  (17 - 22)  SpO2:  (95% - 100%)  Wt(kg): --      01-23-22 @ 07:01  -  01-24-22 @ 07:00  --------------------------------------------------------  IN: 887.5 mL / OUT: 2040 mL / NET: -1152.5 mL    01-24-22 @ 07:01  -  01-24-22 @ 09:14  --------------------------------------------------------  IN: 102.8 mL / OUT: 130 mL / NET: -27.2 mL      LABS:  Na: 138 (01-24 @ 05:28), 139 (01-23 @ 05:25), 138 (01-22 @ 05:41), 138 (01-21 @ 16:58), 139 (01-21 @ 11:03)  K: 3.3 (01-24 @ 05:28), 3.6 (01-23 @ 05:25), 3.6 (01-22 @ 05:41), 3.9 (01-21 @ 16:58), 3.4 (01-21 @ 11:03)  Cl: 102 (01-24 @ 05:28), 105 (01-23 @ 05:25), 105 (01-22 @ 05:41), 103 (01-21 @ 16:58), 107 (01-21 @ 11:03)  CO2: 20.0 (01-24 @ 05:28), 20.0 (01-23 @ 05:25), 17.0 (01-22 @ 05:41), 14.0 (01-21 @ 16:58), 18.0 (01-21 @ 11:03)  BUN: 15.2 (01-24 @ 05:28), 16.9 (01-23 @ 05:25), 16.4 (01-22 @ 05:41), 17.3 (01-21 @ 16:58), 16.5 (01-21 @ 11:03)  Cr: 0.53 (01-24 @ 05:28), 0.46 (01-23 @ 05:25), 0.54 (01-22 @ 05:41), 0.58 (01-21 @ 16:58), 0.43 (01-21 @ 11:03)  Glu: 131(01-24 @ 05:28), 111(01-23 @ 05:25), 121(01-22 @ 05:41), 185(01-21 @ 16:58), 121(01-21 @ 11:03)    Hgb: 10.1 (01-24 @ 05:28), 10.8 (01-23 @ 12:07), 10.8 (01-23 @ 05:25), 10.9 (01-22 @ 05:41), 12.0 (01-21 @ 16:58)  Hct: 29.1 (01-24 @ 05:28), 31.2 (01-23 @ 12:07), 31.0 (01-23 @ 05:25), 33.1 (01-22 @ 05:41), 34.2 (01-21 @ 16:58)  WBC: 10.95 (01-24 @ 05:28), 15.36 (01-23 @ 12:07), 15.41 (01-23 @ 05:25), 14.20 (01-22 @ 05:41), 19.95 (01-21 @ 16:58)  Plt: 424 (01-24 @ 05:28), 428 (01-23 @ 12:07), 411 (01-23 @ 05:25), 512 (01-22 @ 05:41), 491 (01-21 @ 16:58)    INR: 1.11 01-21-22 @ 16:58  PTT: 31.4 01-21-22 @ 16:58    MEDICATIONS:  acetaminophen     Tablet .. 650 milliGRAM(s) Oral every 6 hours PRN  atorvastatin 20 milliGRAM(s) Oral at bedtime  dextrose 40% Gel 15 Gram(s) Oral once  dextrose 5%. 1000 milliLiter(s) IV Continuous <Continuous>  dextrose 5%. 1000 milliLiter(s) IV Continuous <Continuous>  dextrose 50% Injectable 25 Gram(s) IV Push once  dextrose 50% Injectable 12.5 Gram(s) IV Push once  dextrose 50% Injectable 25 Gram(s) IV Push once  glucagon  Injectable 1 milliGRAM(s) IntraMuscular once  insulin lispro (ADMELOG) corrective regimen sliding scale   SubCutaneous Before meals and at bedtime  levothyroxine 50 MICROGram(s) Oral daily  midodrine 10 milliGRAM(s) Oral every 8 hours  nystatin Powder 1 Application(s) Topical two times a day  oxyCODONE    IR 5 milliGRAM(s) Oral every 4 hours PRN  phenylephrine    Infusion 0.5 MICROgram(s)/kG/Min IV Continuous <Continuous>    EXAMINATION:  General:  in NAD  HEENT:  MMM  Neuro:  awake, alert, oriented x 3, follows commands, EOMI, face symmetric, no PD, DF 5/5   Cards:  RRR  Respiratory:  no respiratory distress  Abdomen:  soft  Extremities:  no LE edema    Assessment/Plan:   76M w/ PMHX  HTN, HLD, DM, hypothyroidism, asbestosis, CAD s/p stents x3, Atrial fibrillation on Eliquis, now with severe compression fracture of L1 vertebral body w/ retropulsion.  S/p TL laminectomy/fusion.  Hypotension likely due to acute postop anemia.    Plan:  -Q2hr neuro checks   -drains per NSG  -Tylenol PRN; Morphine 2mg Q4hrs PRN; Oxy 5mg Q4 PRN   -SBP goal 100-140, MAP >65, on Harry; add Midodrine 5 q8hrs  -Lipitor 20mg daily   -Saturating well on room air; maintain SpO2>92%  -bladder scan q4hrs - low threshold to replace Cotton, will need Urology involvement; NS@60 till full PO intake  -SCDs b/l for DVT prophylaxis   -Hold all AC/AP at this time

## 2022-01-25 LAB
ANION GAP SERPL CALC-SCNC: 13 MMOL/L — SIGNIFICANT CHANGE UP (ref 5–17)
BUN SERPL-MCNC: 14.3 MG/DL — SIGNIFICANT CHANGE UP (ref 8–20)
CALCIUM SERPL-MCNC: 7.6 MG/DL — LOW (ref 8.6–10.2)
CHLORIDE SERPL-SCNC: 100 MMOL/L — SIGNIFICANT CHANGE UP (ref 98–107)
CO2 SERPL-SCNC: 22 MMOL/L — SIGNIFICANT CHANGE UP (ref 22–29)
CREAT SERPL-MCNC: 0.43 MG/DL — LOW (ref 0.5–1.3)
GLUCOSE BLDC GLUCOMTR-MCNC: 136 MG/DL — HIGH (ref 70–99)
GLUCOSE BLDC GLUCOMTR-MCNC: 138 MG/DL — HIGH (ref 70–99)
GLUCOSE BLDC GLUCOMTR-MCNC: 151 MG/DL — HIGH (ref 70–99)
GLUCOSE BLDC GLUCOMTR-MCNC: 156 MG/DL — HIGH (ref 70–99)
GLUCOSE SERPL-MCNC: 155 MG/DL — HIGH (ref 70–99)
HCT VFR BLD CALC: 29.9 % — LOW (ref 39–50)
HGB BLD-MCNC: 10.5 G/DL — LOW (ref 13–17)
MAGNESIUM SERPL-MCNC: 1.7 MG/DL — SIGNIFICANT CHANGE UP (ref 1.6–2.6)
MCHC RBC-ENTMCNC: 31.9 PG — SIGNIFICANT CHANGE UP (ref 27–34)
MCHC RBC-ENTMCNC: 35.1 GM/DL — SIGNIFICANT CHANGE UP (ref 32–36)
MCV RBC AUTO: 90.9 FL — SIGNIFICANT CHANGE UP (ref 80–100)
PHOSPHATE SERPL-MCNC: 2.2 MG/DL — LOW (ref 2.4–4.7)
PLATELET # BLD AUTO: 455 K/UL — HIGH (ref 150–400)
POTASSIUM SERPL-MCNC: 3.6 MMOL/L — SIGNIFICANT CHANGE UP (ref 3.5–5.3)
POTASSIUM SERPL-SCNC: 3.6 MMOL/L — SIGNIFICANT CHANGE UP (ref 3.5–5.3)
RBC # BLD: 3.29 M/UL — LOW (ref 4.2–5.8)
RBC # FLD: 13.1 % — SIGNIFICANT CHANGE UP (ref 10.3–14.5)
SODIUM SERPL-SCNC: 134 MMOL/L — LOW (ref 135–145)
WBC # BLD: 11.67 K/UL — HIGH (ref 3.8–10.5)
WBC # FLD AUTO: 11.67 K/UL — HIGH (ref 3.8–10.5)

## 2022-01-25 RX ORDER — ENOXAPARIN SODIUM 100 MG/ML
40 INJECTION SUBCUTANEOUS DAILY
Refills: 0 | Status: DISCONTINUED | OUTPATIENT
Start: 2022-01-25 | End: 2022-01-28

## 2022-01-25 RX ADMIN — ENOXAPARIN SODIUM 40 MILLIGRAM(S): 100 INJECTION SUBCUTANEOUS at 15:08

## 2022-01-25 RX ADMIN — ATORVASTATIN CALCIUM 20 MILLIGRAM(S): 80 TABLET, FILM COATED ORAL at 22:15

## 2022-01-25 RX ADMIN — OXYCODONE HYDROCHLORIDE 5 MILLIGRAM(S): 5 TABLET ORAL at 16:00

## 2022-01-25 RX ADMIN — Medication 1: at 17:40

## 2022-01-25 RX ADMIN — MIDODRINE HYDROCHLORIDE 10 MILLIGRAM(S): 2.5 TABLET ORAL at 15:08

## 2022-01-25 RX ADMIN — OXYCODONE HYDROCHLORIDE 5 MILLIGRAM(S): 5 TABLET ORAL at 16:14

## 2022-01-25 RX ADMIN — Medication 1: at 22:20

## 2022-01-25 RX ADMIN — NYSTATIN CREAM 1 APPLICATION(S): 100000 CREAM TOPICAL at 17:40

## 2022-01-25 RX ADMIN — Medication 50 MICROGRAM(S): at 05:56

## 2022-01-25 RX ADMIN — NYSTATIN CREAM 1 APPLICATION(S): 100000 CREAM TOPICAL at 05:56

## 2022-01-25 RX ADMIN — SENNA PLUS 2 TABLET(S): 8.6 TABLET ORAL at 22:15

## 2022-01-25 RX ADMIN — MIDODRINE HYDROCHLORIDE 10 MILLIGRAM(S): 2.5 TABLET ORAL at 22:16

## 2022-01-25 RX ADMIN — MIDODRINE HYDROCHLORIDE 10 MILLIGRAM(S): 2.5 TABLET ORAL at 05:56

## 2022-01-25 RX ADMIN — POLYETHYLENE GLYCOL 3350 17 GRAM(S): 17 POWDER, FOR SOLUTION ORAL at 12:55

## 2022-01-25 NOTE — PROGRESS NOTE ADULT - SUBJECTIVE AND OBJECTIVE BOX
HPI: 76M s/p T10-L4 fusion for L1 compression Fx POD#4, no issues overnight, has velasco in place    Na 138  Crit 29%  Plt 424    Afebrile 83 20 104/66  MERCEDES 85mL/ 12h  MERCEDES removed, DSD applied    Imp Postop as above    Plan begin Lovenox,, OOB, PT/OT

## 2022-01-25 NOTE — PROGRESS NOTE ADULT - REASON FOR ADMISSION
L1 compression fracture
back pain
vertebral fracture

## 2022-01-26 LAB
ANION GAP SERPL CALC-SCNC: 13 MMOL/L — SIGNIFICANT CHANGE UP (ref 5–17)
BUN SERPL-MCNC: 15.9 MG/DL — SIGNIFICANT CHANGE UP (ref 8–20)
CALCIUM SERPL-MCNC: 7.8 MG/DL — LOW (ref 8.6–10.2)
CHLORIDE SERPL-SCNC: 100 MMOL/L — SIGNIFICANT CHANGE UP (ref 98–107)
CO2 SERPL-SCNC: 22 MMOL/L — SIGNIFICANT CHANGE UP (ref 22–29)
CREAT SERPL-MCNC: 0.47 MG/DL — LOW (ref 0.5–1.3)
GLUCOSE BLDC GLUCOMTR-MCNC: 118 MG/DL — HIGH (ref 70–99)
GLUCOSE BLDC GLUCOMTR-MCNC: 135 MG/DL — HIGH (ref 70–99)
GLUCOSE BLDC GLUCOMTR-MCNC: 136 MG/DL — HIGH (ref 70–99)
GLUCOSE BLDC GLUCOMTR-MCNC: 158 MG/DL — HIGH (ref 70–99)
GLUCOSE SERPL-MCNC: 126 MG/DL — HIGH (ref 70–99)
POTASSIUM SERPL-MCNC: 4.1 MMOL/L — SIGNIFICANT CHANGE UP (ref 3.5–5.3)
POTASSIUM SERPL-SCNC: 4.1 MMOL/L — SIGNIFICANT CHANGE UP (ref 3.5–5.3)
SODIUM SERPL-SCNC: 135 MMOL/L — SIGNIFICANT CHANGE UP (ref 135–145)

## 2022-01-26 RX ORDER — SODIUM CHLORIDE 9 MG/ML
1 INJECTION INTRAMUSCULAR; INTRAVENOUS; SUBCUTANEOUS EVERY 8 HOURS
Refills: 0 | Status: DISCONTINUED | OUTPATIENT
Start: 2022-01-26 | End: 2022-01-27

## 2022-01-26 RX ADMIN — SODIUM CHLORIDE 1 GRAM(S): 9 INJECTION INTRAMUSCULAR; INTRAVENOUS; SUBCUTANEOUS at 12:31

## 2022-01-26 RX ADMIN — ATORVASTATIN CALCIUM 20 MILLIGRAM(S): 80 TABLET, FILM COATED ORAL at 22:31

## 2022-01-26 RX ADMIN — MIDODRINE HYDROCHLORIDE 10 MILLIGRAM(S): 2.5 TABLET ORAL at 22:31

## 2022-01-26 RX ADMIN — OXYCODONE HYDROCHLORIDE 5 MILLIGRAM(S): 5 TABLET ORAL at 23:00

## 2022-01-26 RX ADMIN — MIDODRINE HYDROCHLORIDE 10 MILLIGRAM(S): 2.5 TABLET ORAL at 05:01

## 2022-01-26 RX ADMIN — MIDODRINE HYDROCHLORIDE 10 MILLIGRAM(S): 2.5 TABLET ORAL at 13:21

## 2022-01-26 RX ADMIN — NYSTATIN CREAM 1 APPLICATION(S): 100000 CREAM TOPICAL at 12:31

## 2022-01-26 RX ADMIN — OXYCODONE HYDROCHLORIDE 5 MILLIGRAM(S): 5 TABLET ORAL at 18:26

## 2022-01-26 RX ADMIN — OXYCODONE HYDROCHLORIDE 5 MILLIGRAM(S): 5 TABLET ORAL at 12:39

## 2022-01-26 RX ADMIN — OXYCODONE HYDROCHLORIDE 5 MILLIGRAM(S): 5 TABLET ORAL at 03:34

## 2022-01-26 RX ADMIN — POLYETHYLENE GLYCOL 3350 17 GRAM(S): 17 POWDER, FOR SOLUTION ORAL at 12:32

## 2022-01-26 RX ADMIN — OXYCODONE HYDROCHLORIDE 5 MILLIGRAM(S): 5 TABLET ORAL at 22:31

## 2022-01-26 RX ADMIN — Medication 50 MICROGRAM(S): at 05:01

## 2022-01-26 RX ADMIN — SENNA PLUS 2 TABLET(S): 8.6 TABLET ORAL at 22:31

## 2022-01-26 RX ADMIN — SODIUM CHLORIDE 1 GRAM(S): 9 INJECTION INTRAMUSCULAR; INTRAVENOUS; SUBCUTANEOUS at 22:32

## 2022-01-26 RX ADMIN — ENOXAPARIN SODIUM 40 MILLIGRAM(S): 100 INJECTION SUBCUTANEOUS at 12:31

## 2022-01-26 RX ADMIN — NYSTATIN CREAM 1 APPLICATION(S): 100000 CREAM TOPICAL at 17:01

## 2022-01-26 RX ADMIN — Medication 1: at 22:32

## 2022-01-26 NOTE — PHYSICAL THERAPY INITIAL EVALUATION ADULT - PERTINENT HX OF CURRENT PROBLEM, REHAB EVAL
76M w/ PMHX  HTN, HLD, DM, hypothyroidism, asbestosis, CAD s/p stents x3, Atrial fibrillation on Eliquis, presenting today with c/o numbness to b/l feet. States that he recently fell at home on Thanksgiving and dx with spinal  fx ~2 months ago on outpatient xray and given back brace that he wears "all the time." Reports today that he noticed that his bilateral feet had become numb and this caused him to have difficulty ambulating and he has fallen several times today. now s/p TL lami/fusion.

## 2022-01-26 NOTE — PHYSICAL THERAPY INITIAL EVALUATION ADULT - GAIT DEVIATIONS NOTED, PT EVAL
flexed posture/decreased trav/decreased step length/decreased stride length/decreased weight-shifting ability

## 2022-01-26 NOTE — PROGRESS NOTE ADULT - SUBJECTIVE AND OBJECTIVE BOX
HPI:  76M w/ PMHX  HTN, HLD, DM, hypothyroidism, asbestosis, CAD s/p stents x3, Atrial fibrillation on Eliquis, presenting today with c/o numbness to b/l feet. States that he recently fell at home on Thanksgiving and dx with spinal  fx ~2 months ago on outpatient xray and given back brace that he wears "all the time." Reports today that he noticed that his bilateral feet had become numb and this caused him to have difficulty ambulating and he has fallen several times today. States he was scheduled for an appt with a "back doctor today."  MRI T/L Spine- Severe compression fracture of L1 vertebral body w/ retropulsion.  c/o lower back discomfort, voiding urine but feeling of incomplete voiding. denies cp,sob,any weakness but has dull feeling of feet.    Denies saddle anesthesia or worsening of back pain. COVID+ since 01/13/22, pt states he completed 5 days of isolation as well her wife. Patient alert and oriented but confused w/ extensive medical questioning. States he heard back fractures heal on their own and has CHRONIC DISCOMFORT. Pt just voids urine but has some suprapubic discomfort on palpation.    76M s/p T10-L4 fusion for L1 comp fx POD#5, TLSO at bedside, no issues overnight, pain control adequate, not OOB yet, velasco in place    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  S/P ORIF (open reduction internal fixation) fracture  right elbow 1962, LILLIANA 3 months later  S/P cholecystectomy  S/P inguinal hernia repair  bilateral  S/P T&amp;A (status post tonsillectomy and adenoidectomy)  as child  S/P angioplasty with stent  2013 4 stents  S/P excision of skin lesion, follow-up exam  benign testicle  S/P colonoscopy  4 years ago, no polyp      FAMILY HISTORY:  Family history of heart attack (Father)  Family history of stroke (Mother)  Family history of diabetes mellitus (Mother, Sibling)  Family history of cardiomegaly (Mother)  Family history of colon cancer (Sibling)  Family history of pancreatic cancer (Sibling)      social history:  Remote smoker,denies any alcohol/illicit drugs uses. (20 Jan 2022 11:20)        MEDICATIONS  (STANDING):  atorvastatin 20 milliGRAM(s) Oral at bedtime  dextrose 40% Gel 15 Gram(s) Oral once  dextrose 5%. 1000 milliLiter(s) (50 mL/Hr) IV Continuous <Continuous>  dextrose 5%. 1000 milliLiter(s) (100 mL/Hr) IV Continuous <Continuous>  dextrose 50% Injectable 25 Gram(s) IV Push once  dextrose 50% Injectable 12.5 Gram(s) IV Push once  dextrose 50% Injectable 25 Gram(s) IV Push once  enoxaparin Injectable 40 milliGRAM(s) SubCutaneous daily  glucagon  Injectable 1 milliGRAM(s) IntraMuscular once  insulin lispro (ADMELOG) corrective regimen sliding scale   SubCutaneous Before meals and at bedtime  levothyroxine 50 MICROGram(s) Oral daily  midodrine 10 milliGRAM(s) Oral every 8 hours  nystatin Powder 1 Application(s) Topical two times a day  polyethylene glycol 3350 17 Gram(s) Oral daily  senna 2 Tablet(s) Oral at bedtime    MEDICATIONS  (PRN):  acetaminophen     Tablet .. 650 milliGRAM(s) Oral every 6 hours PRN Temp greater or equal to 38C (100.4F), Mild Pain (1 - 3)  oxyCODONE    IR 5 milliGRAM(s) Oral every 4 hours PRN Moderate Pain (4 - 6)      SARS-CoV-2: Detected (20 Jan 2022 03:55)                          10.5   11.67 )-----------( 455      ( 25 Jan 2022 17:40 )             29.9     01-25    134<L>  |  100  |  14.3  ----------------------------<  155<H>  3.6   |  22.0  |  0.43<L>    Ca    7.6<L>      25 Jan 2022 17:40  Phos  2.2     01-25  Mg     1.7     01-25        Vital Signs Last 24 Hrs  T(C): 36.8 (26 Jan 2022 04:57), Max: 36.8 (26 Jan 2022 04:57)  T(F): 98.2 (26 Jan 2022 04:57), Max: 98.2 (26 Jan 2022 04:57)  HR: 95 (26 Jan 2022 04:57) (91 - 95)  BP: 118/77 (26 Jan 2022 04:57) (107/71 - 118/77)  BP(mean): --  RR: 19 (26 Jan 2022 04:57) (18 - 19)  SpO2: 95% (26 Jan 2022 04:57) (95% - 95%)  I&O's Detail    25 Jan 2022 07:01  -  26 Jan 2022 07:00  --------------------------------------------------------  IN:  Total IN: 0 mL    OUT:    Voided (mL): 1400 mL  Total OUT: 1400 mL    Total NET: -1400 mL          PHYSICAL EXAM:  AVSS DSD intact  SCD's in place  M 4/5 IP Quad TA EHL Gastroc b/l  SILT L3-S1  DTR 2+ = K/A no clonus  no calf tenderness      Impression postop T10-L4 fusion, hyponatremic     Plan salt tabs, repeat labs, OOB with assistance & TLSO

## 2022-01-26 NOTE — PHYSICAL THERAPY INITIAL EVALUATION ADULT - ADDITIONAL COMMENTS
done pt states he lives with his wife and son in a 1 story house with 3 steps to enter (+2 rails). pt has a cane, RW, shower chair, raised toilet seat. amb with devices at times. son not working and assisted with ADLs.

## 2022-01-26 NOTE — PHYSICAL THERAPY INITIAL EVALUATION ADULT - IMPAIRED TRANSFERS: SIT/STAND, REHAB EVAL
D&C, hysteroscopy polypectomy, laparoscopic right sided salpingo-oophorectomy
impaired balance/pain/decreased strength

## 2022-01-26 NOTE — PHYSICAL THERAPY INITIAL EVALUATION ADULT - NEUROVASCULAR ASSESSMENT LUE
warm/no numbness/no tingling/no discoloration Rhombic Flap Text: Given the location of the defect, inherent tension at the surgical site, and the proximity to free margins a rhombic transposition flap was deemed most appropriate for wound reconstruction. The risks, benefits, and possible outcomes of this procedure were discussed along with the risks, benefits, and possible outcomes of other options for wound repair (including but not limited to: complex closure, other flaps, grafts, and second intention). The patient verbalized understanding and consent to the outlined procedure. The patient verbalized understanding that intraoperative conditions may necessitate a change in the outlined procedure resulting in modification of the original surgical plan. This decision may be made at the discretion of the surgeon, and is due to factors subject to change or that are difficult to predict preoperatively. Using a sterile surgical marker, an appropriate rhombic transposition flap was drawn incorporating the defect and placing the expected incisions within the relaxed skin tension lines where possible. The surgical site and surrounding skin were prepped and draped in sterile fashion.  A single standing cone was removed at the expected pivot point of the transposition flap in the appropriate surgical plane, repositioning as necessary based upon local tension, proximity to free margins/other anatomic structures, and position of the relaxed skin tension lines. The flap (designed approximately 90 degrees from one surgical axis) was incised and raised in the appropriate surgical plane (just below the level of the nasalis musculature). The resulting defect was undermined widely and bilaterally in the appropriate surgical plane taking care to preserve local arteries, veins, nerves, and other structures of anatomic importance. This created a flap capable of transposing across the defect to close the wound under minimal tension, without distortion of adjacent free margins. Hemostasis was achieved and then the wound was sutured in layered fashion.

## 2022-01-27 LAB
ANION GAP SERPL CALC-SCNC: 16 MMOL/L — SIGNIFICANT CHANGE UP (ref 5–17)
BUN SERPL-MCNC: 15.4 MG/DL — SIGNIFICANT CHANGE UP (ref 8–20)
CALCIUM SERPL-MCNC: 7.8 MG/DL — LOW (ref 8.6–10.2)
CHLORIDE SERPL-SCNC: 97 MMOL/L — LOW (ref 98–107)
CO2 SERPL-SCNC: 22 MMOL/L — SIGNIFICANT CHANGE UP (ref 22–29)
CREAT SERPL-MCNC: 0.46 MG/DL — LOW (ref 0.5–1.3)
GLUCOSE BLDC GLUCOMTR-MCNC: 130 MG/DL — HIGH (ref 70–99)
GLUCOSE BLDC GLUCOMTR-MCNC: 133 MG/DL — HIGH (ref 70–99)
GLUCOSE BLDC GLUCOMTR-MCNC: 166 MG/DL — HIGH (ref 70–99)
GLUCOSE BLDC GLUCOMTR-MCNC: 175 MG/DL — HIGH (ref 70–99)
GLUCOSE SERPL-MCNC: 134 MG/DL — HIGH (ref 70–99)
HCT VFR BLD CALC: 32.2 % — LOW (ref 39–50)
HGB BLD-MCNC: 10.9 G/DL — LOW (ref 13–17)
MAGNESIUM SERPL-MCNC: 1.5 MG/DL — LOW (ref 1.6–2.6)
MCHC RBC-ENTMCNC: 31.7 PG — SIGNIFICANT CHANGE UP (ref 27–34)
MCHC RBC-ENTMCNC: 33.9 GM/DL — SIGNIFICANT CHANGE UP (ref 32–36)
MCV RBC AUTO: 93.6 FL — SIGNIFICANT CHANGE UP (ref 80–100)
PHOSPHATE SERPL-MCNC: 3 MG/DL — SIGNIFICANT CHANGE UP (ref 2.4–4.7)
PLATELET # BLD AUTO: 463 K/UL — HIGH (ref 150–400)
POTASSIUM SERPL-MCNC: 3.3 MMOL/L — LOW (ref 3.5–5.3)
POTASSIUM SERPL-SCNC: 3.3 MMOL/L — LOW (ref 3.5–5.3)
RBC # BLD: 3.44 M/UL — LOW (ref 4.2–5.8)
RBC # FLD: 13.7 % — SIGNIFICANT CHANGE UP (ref 10.3–14.5)
SODIUM SERPL-SCNC: 135 MMOL/L — SIGNIFICANT CHANGE UP (ref 135–145)
WBC # BLD: 10.86 K/UL — HIGH (ref 3.8–10.5)
WBC # FLD AUTO: 10.86 K/UL — HIGH (ref 3.8–10.5)

## 2022-01-27 PROCEDURE — 99222 1ST HOSP IP/OBS MODERATE 55: CPT

## 2022-01-27 RX ORDER — MAGNESIUM SULFATE 500 MG/ML
2 VIAL (ML) INJECTION ONCE
Refills: 0 | Status: COMPLETED | OUTPATIENT
Start: 2022-01-27 | End: 2022-01-27

## 2022-01-27 RX ORDER — POTASSIUM CHLORIDE 20 MEQ
40 PACKET (EA) ORAL ONCE
Refills: 0 | Status: COMPLETED | OUTPATIENT
Start: 2022-01-27 | End: 2022-01-27

## 2022-01-27 RX ORDER — MULTIVIT-MIN/FERROUS GLUCONATE 9 MG/15 ML
1 LIQUID (ML) ORAL DAILY
Refills: 0 | Status: DISCONTINUED | OUTPATIENT
Start: 2022-01-27 | End: 2022-01-28

## 2022-01-27 RX ORDER — ASCORBIC ACID 60 MG
500 TABLET,CHEWABLE ORAL DAILY
Refills: 0 | Status: DISCONTINUED | OUTPATIENT
Start: 2022-01-27 | End: 2022-01-28

## 2022-01-27 RX ORDER — SODIUM CHLORIDE 9 MG/ML
1 INJECTION INTRAMUSCULAR; INTRAVENOUS; SUBCUTANEOUS EVERY 12 HOURS
Refills: 0 | Status: DISCONTINUED | OUTPATIENT
Start: 2022-01-27 | End: 2022-01-28

## 2022-01-27 RX ORDER — MIDODRINE HYDROCHLORIDE 2.5 MG/1
5 TABLET ORAL EVERY 8 HOURS
Refills: 0 | Status: DISCONTINUED | OUTPATIENT
Start: 2022-01-27 | End: 2022-01-28

## 2022-01-27 RX ORDER — POTASSIUM CHLORIDE 20 MEQ
10 PACKET (EA) ORAL
Refills: 0 | Status: DISCONTINUED | OUTPATIENT
Start: 2022-01-27 | End: 2022-01-27

## 2022-01-27 RX ADMIN — MIDODRINE HYDROCHLORIDE 5 MILLIGRAM(S): 2.5 TABLET ORAL at 21:47

## 2022-01-27 RX ADMIN — NYSTATIN CREAM 1 APPLICATION(S): 100000 CREAM TOPICAL at 18:10

## 2022-01-27 RX ADMIN — Medication 25 GRAM(S): at 17:11

## 2022-01-27 RX ADMIN — Medication 1: at 15:53

## 2022-01-27 RX ADMIN — Medication 1: at 21:27

## 2022-01-27 RX ADMIN — OXYCODONE HYDROCHLORIDE 5 MILLIGRAM(S): 5 TABLET ORAL at 09:51

## 2022-01-27 RX ADMIN — Medication 40 MILLIEQUIVALENT(S): at 21:36

## 2022-01-27 RX ADMIN — SODIUM CHLORIDE 1 GRAM(S): 9 INJECTION INTRAMUSCULAR; INTRAVENOUS; SUBCUTANEOUS at 05:04

## 2022-01-27 RX ADMIN — OXYCODONE HYDROCHLORIDE 5 MILLIGRAM(S): 5 TABLET ORAL at 21:36

## 2022-01-27 RX ADMIN — ENOXAPARIN SODIUM 40 MILLIGRAM(S): 100 INJECTION SUBCUTANEOUS at 15:51

## 2022-01-27 RX ADMIN — Medication 100 MILLIEQUIVALENT(S): at 17:12

## 2022-01-27 RX ADMIN — ATORVASTATIN CALCIUM 20 MILLIGRAM(S): 80 TABLET, FILM COATED ORAL at 21:34

## 2022-01-27 RX ADMIN — MIDODRINE HYDROCHLORIDE 10 MILLIGRAM(S): 2.5 TABLET ORAL at 05:04

## 2022-01-27 RX ADMIN — MIDODRINE HYDROCHLORIDE 10 MILLIGRAM(S): 2.5 TABLET ORAL at 15:52

## 2022-01-27 RX ADMIN — NYSTATIN CREAM 1 APPLICATION(S): 100000 CREAM TOPICAL at 05:05

## 2022-01-27 RX ADMIN — SODIUM CHLORIDE 1 GRAM(S): 9 INJECTION INTRAMUSCULAR; INTRAVENOUS; SUBCUTANEOUS at 15:52

## 2022-01-27 RX ADMIN — Medication 40 MILLIEQUIVALENT(S): at 17:12

## 2022-01-27 RX ADMIN — SENNA PLUS 2 TABLET(S): 8.6 TABLET ORAL at 21:28

## 2022-01-27 RX ADMIN — OXYCODONE HYDROCHLORIDE 5 MILLIGRAM(S): 5 TABLET ORAL at 22:30

## 2022-01-27 RX ADMIN — Medication 500 MILLIGRAM(S): at 17:12

## 2022-01-27 RX ADMIN — Medication 50 MICROGRAM(S): at 05:04

## 2022-01-27 RX ADMIN — POLYETHYLENE GLYCOL 3350 17 GRAM(S): 17 POWDER, FOR SOLUTION ORAL at 15:51

## 2022-01-27 NOTE — OCCUPATIONAL THERAPY INITIAL EVALUATION ADULT - LIVES WITH, PROFILE
Pt reports lives with his wife and son in a 1 story house with 3 steps to enter with bilateral hand rails (however too far to reach at same time) and no steps inside to negotiate, bed/bath on main level./children/spouse

## 2022-01-27 NOTE — OCCUPATIONAL THERAPY INITIAL EVALUATION ADULT - PHYSICAL ASSIST/NONPHYSICAL ASSIST: SIT/STAND, REHAB EVAL
pt limited by pain and weakness/verbal cues/2 person assist Purse String (Intermediate) Text: Given the location of the defect and the characteristics of the surrounding skin a purse string intermediate closure was deemed most appropriate.  Undermining was performed circumfirentially around the surgical defect.  A purse string suture was then placed and tightened.

## 2022-01-27 NOTE — OCCUPATIONAL THERAPY INITIAL EVALUATION ADULT - GENERAL OBSERVATIONS, REHAB EVAL
Received pt semifowler in bed, NAD, +alarm, +IV lock, +Tele/, +Cotton, +bilateral VCB, +on RA with brace bedside. TLSO donned for session. Pre/post pain 9/10-back. Patient agreeable to OT evaluation

## 2022-01-27 NOTE — DIETITIAN INITIAL EVALUATION ADULT. - OTHER INFO
76M w/ PMHX  HTN, HLD, DM, hypothyroidism, asbestosis, CAD s/p stents x3, Atrial fibrillation on Eliquis, presenting today with c/o numbness to B/L feet. States that he recently fell at home on Thanksgiving and dx with spinal  fx ~2 months ago on outpatient xray and given back brace that he wears "all the time." Reports today that he noticed that his B/L feet had become numb and this caused him to have difficulty ambulating and he has fallen several times today. MRI T/L Spine- Severe compression fracture of L1 vertebral body w/ retropulsion. COVID+ since 01/13/22, pt states he completed 5 days of isolation as well her wife. Patient alert and oriented but confused w/ extensive medical questioning. Pt s/p T10-L4 fusion for L1 compression fx (1/21).

## 2022-01-27 NOTE — PROGRESS NOTE ADULT - ASSESSMENT
76y Male extensive PMH including HTN, HLD, DM, hypothyroidism, asbestosis, CAD s/p PCI, afib on Eliquis, now presents to [facility] 1/20/22 with b/l LE weakness and difficulty ambulating since fall on Thanksgiving, urinary retention with dribbling x 2 weeks, and b/l LE sensory changes prompting ED visit, MRI found with 3 column injury of L1 vertebral body, now s/p T12-L1 laminectomies, T10-L4 posterior fusion 1/21/22 now POD#6    PLAN:  - D/w Dr. Gaitan  - Q4 neuro checks  - TLSO when OOB  - Midodrine- decreased to 5 Q8, will monitor pressures  - On diet soft bite size; ensure enlive/pudding added per dietician recommendations; Miralax/senna  - On salt tabs Q8   - Continue synthroid 50 mcg  - Continue velasco for now- unable to add cardura/flomax at this time with soft pressures; pending tolerance to decrease in midodrine will follow up. Otherwise, will likely need eventual urology follow up to determine  - Lovenox 40 for DVT ppx   - PT/OT/PMR following- candidate for AR  - D/w case coordinator, process started for AR, dispo pending bed acceptance 76y Male extensive PMH including HTN, HLD, DM, hypothyroidism, asbestosis, CAD s/p PCI, afib on Eliquis, now presents to [facility] 1/20/22 with b/l LE weakness and difficulty ambulating since fall on Thanksgiving, urinary retention with dribbling x 2 weeks, and b/l LE sensory changes prompting ED visit, MRI found with 3 column injury of L1 vertebral body, now s/p T12-L1 laminectomies, T10-L4 posterior fusion 1/21/22 now POD#6    PLAN:  - D/w Dr. Gaitan  - Q4 neuro checks  - TLSO when OOB  - Midodrine- decreased to 5 Q8, will monitor pressures  - On diet soft bite size; ensure enlive/pudding added per dietician recommendations; Miralax/senna  - On salt tabs Q8   - Continue synthroid 50 mcg  - Continue velasco for now- unable to add cardura/flomax at this time with soft pressures; pending tolerance to decrease in midodrine will follow up. Otherwise, will likely need eventual urology follow up to determine  - Lovenox 40 for DVT ppx   - PT/OT/PMR following- candidate for AR  - D/w case coordinator, process started for AR, dispo pending bed acceptance  - D/w RN, encourage OOB daily. Advised to obtain wedge to rotate placing on each side of patient to avoid constant pressure on incision to promote optimal wound healing

## 2022-01-27 NOTE — DIETITIAN INITIAL EVALUATION ADULT. - PERTINENT LABORATORY DATA
01-26 Na135 mmol/L Glu 126 mg/dL<H> K+ 4.1 mmol/L Cr  0.47 mg/dL<L> BUN 15.9 mg/dL Phos n/a   Alb n/a   PAB n/a

## 2022-01-27 NOTE — DIETITIAN INITIAL EVALUATION ADULT. - ORAL INTAKE PTA/DIET HISTORY
Brief interview conducted, Pt dismissive of RD. Pt reports UBW x few weeks ago 185lbs, current bedscale weight 180lbs, unclear accuracy of admission weight. Pt states not liking foods offered in house, drinking fluids well. Pt refused to provide food preferences.

## 2022-01-27 NOTE — PATIENT PROFILE ADULT - FALL HARM RISK - FACTORS
March 5, 2019     Patient: Lizz Hays   YOB: 2014   Date of Visit: 3/5/2019       To Whom it May Concern:    Lizz Hays was seen in my clinic on 3/5/2019 at 8:25 am. Please excuse Dub Aime for her absence from school on this day to make the appointment. She may return to school today. If you have any questions or concerns, please don't hesitate to call.       Sincerely,         Ede Crain MD
Post Op/Weakness

## 2022-01-27 NOTE — OCCUPATIONAL THERAPY INITIAL EVALUATION ADULT - LEVEL OF INDEPENDENCE: SIT/STAND, REHAB EVAL
Attempted with maxAx2 + assistance of ariel; unable to achieve full upright posture and hip/trunk extension/maximum assist (25% patients effort)

## 2022-01-27 NOTE — OCCUPATIONAL THERAPY INITIAL EVALUATION ADULT - PHYSICAL ASSIST/NONPHYSICAL ASSIST: SUPINE/SIT, REHAB EVAL
Pt with Right lateral lean noted, difficulty maintaining upright position with trunk in midline. Attempted to use bilateral hands and bed rails to support self. Pt with poor tolerance EOB due to pain, required mod A at times to maintain upright posture/position/verbal cues/1 person assist

## 2022-01-27 NOTE — PROGRESS NOTE ADULT - ATTENDING COMMENTS
NSGY Attg:    see above    patient seen and examined    LE 4+/5 bilaterally  LT grossly intact    I re-discussed the plan with the patient and his wife and answered all their additional questions.  They wish to proceed with surgical intervention.    A/P:  - to OR for T12-L1 laminectomy, T10-L4 fusion, possible L1 corpectomy, possible multilevel thoracolumbar decompression and fusion
NSGY Attg:    see above    patient seen and examined     post-op CT reviewed along with official report    agree with exam and plan as above
NSGY Attg:    see above    patient seen and examined    LE 4+/5 bilaterally  LT grossly intact    I re-discussed the plan with the patient and his wife and answered all their additional questions.  They wish to proceed with surgical intervention.    A/P:  - to OR for T12-L1 laminectomy, T10-L4 fusion, possible L1 corpectomy, possible multilevel thoracolumbar decompression and fusion
NSGY Attg:    see above    patient seen and examined    agree with exam and plan as above

## 2022-01-27 NOTE — CONSULT NOTE ADULT - SUBJECTIVE AND OBJECTIVE BOX
Patient is a 76 year old Male who presents with a chief complaint of b/l feet numbness       HISTORY OF PRESENT ILLNESS:   76 year old Male w/ PMHX  HTN, HLD, DM, hypothyroidism, asbestosis, CAD s/p stents x3, on Eliquis, presenting today with c/o numbness to b/l feet. States that he recently fell at home on Thanksgiving and dx with spinal  fx ~2 months ago on outpatient xray and given back brace that he wears "all the time." Reports today that he noticed that his bilateral feet had become numb and this caused him to have difficulty ambulating and he has fallen several times today. States he was scheduled for an appt with a "back doctor today." Denies bowel/bladder incontinence, but reports difficulty starting to urinate, this has been present for months. Denies saddle anesthesia or worsening of back pain. Incidentally COVID+. Patient alert and oriented but confused w/ extensive medical questioning. 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  S/P ORIF (open reduction internal fixation) fracture  right elbow 1962, LILLIANA 3 months later  S/P cholecystectomy  S/P inguinal hernia repair  bilateral  S/P T&amp;A (status post tonsillectomy and adenoidectomy)  as child  S/P angioplasty with stent  2013 4 stents  S/P excision of skin lesion, follow-up exam  benign testicle  S/P colonoscopy  4 years ago, no polyp      FAMILY HISTORY:  Family history of heart attack (Father)  Family history of stroke (Mother)  Family history of diabetes mellitus (Mother, Sibling)  Family history of cardiomegaly (Mother)  Family history of colon cancer (Sibling)  Family history of pancreatic cancer (Sibling)      Allergies  No Known Allergies    REVIEW OF SYSTEMS  Negative except as noted in HPI    HOME MEDICATIONS:  amiodarone 100 mg oral tablet: orally once a day (26 Nov 2019 11:36)  Aspirin Low Dose 81 mg oral delayed release tablet: 1 tab(s) orally once a day (26 Nov 2019 11:36)  atorvastatin 20 mg oral tablet: 1 tab(s) orally once a day (26 Nov 2019 11:36)  Eliquis 5 mg oral tablet: orally once a day (26 Nov 2019 11:36)  hydroCHLOROthiazide 25 mg oral tablet: 1 tab(s) orally once a day (26 Nov 2019 11:36)  irbesartan 300 mg oral tablet: 1 tab(s) orally once a day (at bedtime) (26 Nov 2019 11:36)  Jardiance 25 mg oral tablet: 1 tab(s) orally once a day (in the morning) (26 Nov 2019 11:36)  levothyroxine 50 mcg (0.05 mg) oral tablet: 1 tab(s) orally once a day (26 Nov 2019 11:36)  metFORMIN 500 mg oral tablet: orally 3 times a day (26 Nov 2019 11:36)  Metoprolol Succinate ER 25 mg oral tablet, extended release: 1 tab(s) orally once a day (26 Nov 2019 11:36)  Multi Hazel Bets and Fluoride and Iron: 1 cap(s)  once a day (26 Nov 2019 11:36)      MEDICATIONS:  Antibiotics:    Neuro:  acetaminophen     Tablet .. 650 milliGRAM(s) Oral once    Anticoagulation:    OTHER:    IVF:      Vital Signs Last 24 Hrs  T(C): 36.4 (19 Jan 2022 23:59), Max: 36.4 (19 Jan 2022 23:59)  T(F): 97.5 (19 Jan 2022 23:59), Max: 97.5 (19 Jan 2022 23:59)  HR: 58 (19 Jan 2022 23:59) (58 - 58)  BP: 92/59 (19 Jan 2022 23:59) (92/59 - 92/59)  BP(mean): --  RR: 20 (19 Jan 2022 23:59) (20 - 20)  SpO2: 97% (19 Jan 2022 23:59) (97% - 97%)      PHYSICAL EXAM:  GENERAL: NAD, well-groomed, well-developed  HEAD: Atraumatic, normocephalic  [place] COMA SCORE: E-4 V-5 M-6 = 15       E: 4= opens eyes spontaneously 3= to voice 2= to noxious 1= no opening       V: 5= oriented 4= confused 3= inappropriate words 2= incomprehensible sounds 1= nonverbal 1T= intubated       M: 6= follows commands 5= localizes 4= withdraws 3= flexor posturing 2= extensor posturing 1= no movement  MENTAL STATUS: AAO x3; Awake; Opens eyes spontaneously; Appropriately conversant without aphasia following simple commands  CRANIAL NERVES: Visual acuity normal for age. EOMI without nystagmus. Face symmetric w/ normal eye closure and smile, tongue midline. Hearing grossly intact. Speech slurred (no teeth)  REFLEXES: + rectal tone. Clonus evaluation b/l attempted but patient unable to cooperate  MOTOR: strength 5/5 b/l upper and lower extremities; no drift   SENSATION: grossly intact to light touch in b/l UE. Diminished sensation below b/l knee. Brisker sensation above knee to sharp/dull discrimination. No sensation to dorsal aspect of L foot.   SKIN: Warm, dry      LABS:             14.7   11.65 )-----------( 577      ( 20 Jan 2022 03:55 )             41.5     01-20    132<L>  |  94<L>  |  33.9<H>  ----------------------------<  155<H>  4.6   |  21.0<L>  |  1.19    Ca    9.1      20 Jan 2022 03:55    TPro  6.8  /  Alb  3.8  /  TBili  0.6  /  DBili  x   /  AST  31  /  ALT  21  /  AlkPhos  82  01-20    PT/INR - ( 20 Jan 2022 03:56 )   PT: 16.4 sec;   INR: 1.44 ratio      PTT - ( 20 Jan 2022 03:56 )  PTT:32.9 sec      CULTURES:        RADIOLOGY & ADDITIONAL STUDIES:  MR Thoracic Spine No Cont (01.20.22 @ 05:06)   IMPRESSION:  1. No definitive fracture or dislocation in the thoracic spine.  2. Partially visualized acute to subacute severe compression fracture at   L1 vertebral body.    MR Lumbar Spine No Cont (01.20.22 @ 05:06)   IMPRESSION:  Acute to subacute severe compression fracture of L1 vertebral body with   large fracture fragment retropulsion causing severe canal stenosis with   crowding of the cauda equina. No other fracture. No other definitive area of canal   stenosis or cord compression.      
                                                    [facility]                                          [address], [address], [place] [address]                                               PHONE: [phone]    FAX: [phone] [address], [address], [place] [address]                                       PHONE: [phone]    FAX: [phone]  *******************************************************************************    Reason for Consult: Pre-operative cardiac risk stratification    HPI:  DARIEL STRATTON is a 76y Male with HTN, HLD, DM, CAD s/p remote stents, persistent AF on Eliquis, c/o numbness to b/l feet. States that he recently fell at home on Thanksgiving and dx with spinal  fx ~2 months ago on outpatient xray and given back brace that he wears "all the time." Reports today that he noticed that his bilateral feet had become numb and this caused him to have difficulty ambulating and he has fallen several times today. Incidentally COVID+, but seemingly asymptomatic from this standpoint.  Evaluated by Neurosurgery and found to have acute to subacute severe compression fracture of L1 vertebral body with large fracture fragment retropulsion causing severe canal stenosis with crowding of the cauda equina for which surgical intervention is being considered.  Denies chest pain, palpitations, dizziness, lightheadedness, and syncope.  Denies SOB, RHODES, orthopnea, PND, and edema.    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 1000 milliLiter(s) (50 mL/Hr) IV Continuous <Continuous>  dextrose 5%. 1000 milliLiter(s) (100 mL/Hr) IV Continuous <Continuous>  dextrose 50% Injectable 25 Gram(s) IV Push once  dextrose 50% Injectable 12.5 Gram(s) IV Push once  dextrose 50% Injectable 25 Gram(s) IV Push once  glucagon  Injectable 1 milliGRAM(s) IntraMuscular once  insulin lispro (ADMELOG) corrective regimen sliding scale   SubCutaneous every 4 hours  levothyroxine 50 MICROGram(s) Oral daily  losartan 100 milliGRAM(s) Oral daily  metoprolol succinate ER 25 milliGRAM(s) Oral daily    MEDICATIONS  (PRN):  acetaminophen     Tablet .. 650 milliGRAM(s) Oral every 6 hours PRN Temp greater or equal to 38C (100.4F), Mild Pain (1 - 3)  morphine  - Injectable 2 milliGRAM(s) IV Push every 4 hours PRN Severe Pain (7 - 10)  oxyCODONE    IR 5 milliGRAM(s) Oral every 4 hours PRN Moderate Pain (4 - 6)      Social History: no active tobacco / EtOH / IVDA    Family History: Family history of heart attack (Father)    Family history of stroke (Mother)    Family history of diabetes mellitus (Mother, Sibling)    Family history of cardiomegaly (Mother)    Family history of colon cancer (Sibling)    Family history of pancreatic cancer (Sibling)        ROS: As noted above.  All other systems were reviewed and are negative.    Vital Signs Last 24 Hrs  T(C): 36.3 (20 Jan 2022 16:10), Max: 36.9 (20 Jan 2022 06:51)  T(F): 97.4 (20 Jan 2022 16:10), Max: 98.4 (20 Jan 2022 06:51)  HR: 69 (20 Jan 2022 16:10) (58 - 86)  BP: 118/75 (20 Jan 2022 16:10) (92/59 - 126/74)  BP(mean): --  RR: 18 (20 Jan 2022 16:10) (18 - 20)  SpO2: 97% (20 Jan 2022 16:10) (97% - 100%)    I&O's Detail    20 Jan 2022 07:01  -  20 Jan 2022 16:58  --------------------------------------------------------  IN:  Total IN: 0 mL    OUT:    Indwelling Catheter - Urethral (mL): 2500 mL    Voided (mL): 100 mL  Total OUT: 2600 mL    Total NET: -2600 mL        I&O's Summary    20 Jan 2022 07:01  -  20 Jan 2022 16:58  --------------------------------------------------------  IN: 0 mL / OUT: 2600 mL / NET: -2600 mL            PHYSICAL EXAM:  General: Appears well developed, well nourished, no acute distress  HEENT: Head: normocephalic, atraumatic  Eyes: Pupils equal and reactive  Neck: Supple, no carotid bruit, no JVD, no HJR  CARDIOVASCULAR: irreg irreg, Normal S1 and S2, no murmur, rub, or gallop  LUNGS: Clear to auscultation bilaterally, no rales, rhonchi or wheeze  ABDOMEN: Soft, nontender, non-distended, positive bowel sounds, no mass or bruit  EXTREMITIES: No edema, distal pulses WNL  SKIN: Warm and dry with normal turgor  NEURO: Alert & oriented x 3, diminished sensation below knees, motor grossly intact  PSYCH: appropriate mood and affect    LABS:                        14.7   11.65 )-----------( 577      ( 20 Jan 2022 03:55 )             41.5     01-20    132<L>  |  94<L>  |  33.9<H>  ----------------------------<  155<H>  4.6   |  21.0<L>  |  1.19    Ca    9.1      20 Jan 2022 03:55    TPro  6.8  /  Alb  3.8  /  TBili  0.6  /  DBili  x   /  AST  31  /  ALT  21  /  AlkPhos  82  01-20        PT/INR - ( 20 Jan 2022 03:56 )   PT: 16.4 sec;   INR: 1.44 ratio         PTT - ( 20 Jan 2022 03:56 )  PTT:32.9 sec    RADIOLOGY & ADDITIONAL STUDIES:  < from: CT Lumbar Spine No Cont (01.20.22 @ 10:10) >  IMPRESSION:  THORACIC SPINE: No fracture or acute traumatic malalignment involving the   thoracic spine.  LUMBAR SPINE: 3 column fracture involving the L1 vertebral body with   severe compression deformity of the L1 vertebral body. There is   associated retropulsed bony fragment which contributes to high-grade   central canal stenosis as detailed on the corresponding MRI of the lumbar   spine.  Markedly dilated urinary bladder.  Extensive bilateral calcified pleural plaques.    < end of copied text >    ECG: AF 77bpm, nonspecific STT changes, low voltage in limb leads    ECHO:  11/2020 EF 55-60%, diastolic dysfunction, mild LVH, mild LAE, mild MT/TR    NUC STRESS TEST: 11/2019 negative for ischemia      Assessment and Plan:  In summary, DARIEL STRATTON is a 76y Male with HTN, HLD, DM, CAD s/p remote stents, persistent AF on Eliquis, c/o numbness to b/l feet. States that he recently fell at home on Thanksgiving and dx with spinal  fx ~2 months ago on outpatient xray and given back brace that he wears "all the time." Incidentally COVID+, but seemingly asymptomatic from this standpoint.  Evaluated by Neurosurgery and found to have acute to subacute severe compression fracture of L1 vertebral body with large fracture fragment retropulsion causing severe canal stenosis with  crowding of the cauda equina for which surgical intervention is being considered.    - No active chest pain, evidence of ischemia, decompensated CHF, significant valvular abnormality, or unstable arrhythmia and therefore no absolute cardiac contraindication to the planned urgent neurosurgical procedure   - History of CAD with prior stents.  ASA 81mg daily ideally needs to be continued daily without interruption to prevents stent thrombosis.  - Persistent AF with elevated CHADSVASc.  Maintained on Eliquis.  Last dose 1/19/22 in AM.  This is on hold ahead of procedure.  Restart when safe to do so post operatively  - Echo 11/2020 EF 55-60%, diastolic dysfunction, mild LVH, mild LAE, mild MT/TR  - NUC STRESS TEST: 11/2019 negative for ischemia  - Continue Lipitor 20, Toprol XL 25mg daily, Losartan 100; titrate prn.  Also on HCTZ 25mg daily at home  - Supportive management of COVID as per primary team  - We will follow as needed.    Thank you for allowing me to participate in the care of your patient.      Sincerely,    Nicolas Dia MD 
76yM was admitted on 01-20    Patient is a 76y old  Male who presents with a chief complaint of back pain (25 Jan 2022 13:09)    HPI:  Mr. Dumont is a 76 year old male with a past medical history of HTN, HLD, DM, hypothyroidism, asbestosis, CAD s/p stents x3, Atrial fibrillation on Eliquis, who presented with the complaint of numbness to b/l feet and difficulty walking.MRI T/L Spine- Severe compression fracture of L1 vertebral body with retropulsion.  He is now s/p T10-L4 fusion for L1 compression fracture.  Today he reports his strength is improving.  He was able to take a few steps with PT which is an improvement.        Imaging reviewed showed:  ACC: [id number] EXAM:  MR SPINE LUMBAR                          PROCEDURE DATE:  01/20/2022    INTERPRETATION:  [facility] PRELIMINARY REPORT    PROCEDURE INFORMATION:  Exam: MR Lumbar Spine Without Contrast.  Exam date and time: 1/20/2022 4:38 AM  Age: 76 years old  Clinical indication: Low back pain and lower extremity weakness.    TECHNIQUE:  Imaging protocol: Multiplanar magnetic resonance images of the lumbar   spine  without contrast.    COMPARISON:  No prior similar studies have been submitted for comparison.    FINDINGS:  Vertebrae: Grade 1 anterolisthesis of L5 over S1 with chronic bilateral   pars  fractures.  Acute to subacute severe compression fracture of L1 vertebral body  with large fracture fragment retropulsion causing severe canal stenosis   with crowding of the cauda equina and compression of the conus   medullaris. There is T2/STIR signal hyperintensity involving the   bilateral pedicles at L1 with the lamina, best seen on series 7 image 12.    Discs/Spinal canal/Neural foramina: moderate bilateral foraminal stenosis   at  L4-L5 and L5-S1.  Soft tissues: Unremarkable.    IMPRESSION:  Acute to subacute severe compression fracture at L1 with fracture   fragment of the posterior superior aspect of the vertebral body with   retropulsion into the spinal canal contributing to crowding of the cauda   equina nerve roots and compression of the conus medullaris.    T2/STIR signal hyperintensity involving the lamina and pedicles,   suspicious for fracture of the posterior elements. Facet articular   rupture cannot be entirely excluded.    REVIEW OF SYSTEMS  Constitutional - No fever, No weight loss, No fatigue  HEENT - No eye pain, No visual disturbances, No difficulty hearing, No tinnitus, No vertigo, No neck pain  Respiratory - No cough, No wheezing, No shortness of breath  Cardiovascular - No chest pain, No palpitations  Gastrointestinal - No abdominal pain, No nausea, No vomiting, No diarrhea, No constipation  Genitourinary - +Retention   Neurological - No headaches, No memory loss, + loss of strength, + numbness, No tremors  Skin - No itching, No rashes, No lesions   Endocrine - No temperature intolerance  Musculoskeletal - + joint pain, No joint swelling, No muscle pain  Psychiatric - No depression, No anxiety    VITALS  T(C): 36.7 (01-27-22 @ 05:02), Max: 37 (01-26-22 @ 11:03)  HR: 83 (01-27-22 @ 05:02) (75 - 83)  BP: 108/69 (01-27-22 @ 05:02) (106/78 - 108/69)  RR: 18 (01-27-22 @ 05:02) (18 - 18)  SpO2: 95% (01-27-22 @ 05:02) (95% - 99%)  Wt(kg): --    PAST MEDICAL & SURGICAL HISTORY  Diabetes mellitus    Hypertension    Hyperlipemia    Coronary artery disease    Asbestosis    Heart valve disease    Dyspnea on exertion    Urinary frequency    Abnormal chest CT    S/P mastectomy, left    S/P ORIF (open reduction internal fixation) fracture    S/P cholecystectomy    S/P inguinal hernia repair    S/P T&amp;A (status post tonsillectomy and adenoidectomy)    S/P angioplasty with stent    S/P excision of skin lesion, follow-up exam    S/P colonoscopy        SOCIAL HISTORY - as per documentation/history  Smoking - None  EtOH - None  Drugs - None    FUNCTIONAL HISTORY  Previously independent.  Lives with wife and son.  3 steps to enter home.        CURRENT FUNCTIONAL STATUS    Bed Mobility: Rolling/Turning:     · Level of [place]	moderate assist (50% patients effort)  · Physical Assist/Nonphysical Assist	1 person assist    Bed Mobility: Sit to Supine:     · Level of [place]	moderate assist (50% patients effort); via right logrolling  · Physical Assist/Nonphysical Assist	1 person assist; verbal cues    Bed Mobility: Supine to Sit:     · Level of [place]	moderate assist (50% patients effort); via right logrolling  · Physical Assist/Nonphysical Assist	1 person assist; verbal cues    Bed Mobility Analysis:     · Bed Mobility Limitations	decreased ability to use legs for bridging/pushing; impaired ability to control trunk for mobility  · Impairments Contributing to Impaired Bed Mobility	decreased strength; pain; impaired balance    Transfer: Bed to Chair:     Transfer Skill: Bed to Chair   · Level of [place]	unable to perform; safety concerns due to decreased strength and pain with movement    Transfer: Chair to Bed:     · Level of [place]	unable to perform; safety concerns due to decreased strength and pain with movement    Transfer: Sit to Stand:     · Level of [place]	moderate assist (50% patients effort)  · Physical Assist/Nonphysical Assist	2 person assist; verbal cues  · Assistive Device	rolling walker; TLSO    Transfer: Stand to Sit:     · Level of [place]	moderate assist (50% patients effort)  · Physical Assist/Nonphysical Assist	2 person assist; verbal cues  · Assistive Device	rolling walker; TLSO    Sit/Stand Transfer Safety Analysis:     · Transfer Safety Concerns Noted	decreased weight-shifting ability  · Impairments Contributing to Impaired Transfers	impaired balance; pain; decreased strength    Gait Skills:     · Level of [place]	moderate assist (50% patients effort)  · Physical Assist/Nonphysical Assist	2 person assist; verbal cues  · Assistive Device	rolling walker  · Gait Distance	5 feet; sidestepping at bedside  · Brace/Orthotics	TLSO      FAMILY HISTORY   Family history of heart attack (Father)    Family history of stroke (Mother)    Family history of diabetes mellitus (Mother, Sibling)    Family history of cardiomegaly (Mother)    Family history of colon cancer (Sibling)    Family history of pancreatic cancer (Sibling)        RECENT LABS - Reviewed  CBC Full  -  ( 25 Jan 2022 17:40 )  WBC Count : 11.67 K/uL  RBC Count : 3.29 M/uL  Hemoglobin : 10.5 g/dL  Hematocrit : 29.9 %  Platelet Count - Automated : 455 K/uL  Mean Cell Volume : 90.9 fl  Mean Cell Hemoglobin : 31.9 pg  Mean Cell Hemoglobin Concentration : 35.1 gm/dL  Auto Neutrophil # : x  Auto Lymphocyte # : x  Auto Monocyte # : x  Auto Eosinophil # : x  Auto Basophil # : x  Auto Neutrophil % : x  Auto Lymphocyte % : x  Auto Monocyte % : x  Auto Eosinophil % : x  Auto Basophil % : x    01-26    135  |  100  |  15.9  ----------------------------<  126<H>  4.1   |  22.0  |  0.47<L>    Ca    7.8<L>      26 Jan 2022 16:47  Phos  2.2     01-25  Mg     1.7     01-25          ALLERGIES  No Known Allergies      MEDICATIONS   acetaminophen     Tablet .. 650 milliGRAM(s) Oral every 6 hours PRN  atorvastatin 20 milliGRAM(s) Oral at bedtime  dextrose 40% Gel 15 Gram(s) Oral once  dextrose 5%. 1000 milliLiter(s) IV Continuous <Continuous>  dextrose 5%. 1000 milliLiter(s) IV Continuous <Continuous>  dextrose 50% Injectable 25 Gram(s) IV Push once  dextrose 50% Injectable 12.5 Gram(s) IV Push once  dextrose 50% Injectable 25 Gram(s) IV Push once  enoxaparin Injectable 40 milliGRAM(s) SubCutaneous daily  glucagon  Injectable 1 milliGRAM(s) IntraMuscular once  insulin lispro (ADMELOG) corrective regimen sliding scale   SubCutaneous Before meals and at bedtime  levothyroxine 50 MICROGram(s) Oral daily  midodrine 10 milliGRAM(s) Oral every 8 hours  nystatin Powder 1 Application(s) Topical two times a day  oxyCODONE    IR 5 milliGRAM(s) Oral every 4 hours PRN  polyethylene glycol 3350 17 Gram(s) Oral daily  senna 2 Tablet(s) Oral at bedtime  sodium chloride 1 Gram(s) Oral every 8 hours      ----------------------------------------------------------------------------------------  PHYSICAL EXAM  Constitutional - NAD, Comfortable  HEENT - NCAT, EOMI  Neck - Supple, No limited ROM  Chest - Breathing comfortably, No wheezing  Cardiovascular - No cyanosis   Abdomen - Soft   Extremities - No C/C/E, No calf tenderness   Neurologic Exam -                    Cognitive - Awake, Alert, AAO to self, place, date, year, situation     Communication - Fluent, No dysarthria     Cranial Nerves - CN 2-12 intact     Motor - 5/5 in b/l UE.  3/5 in b/l hip flexion, knee extension, 4/5 in b/l ankle dorsi-plantar flexion         Sensory - Intact to LT     Reflexes - No clonus      Coordination - FTN intact  Psychiatric - Mood stable, Affect WNL  ----------------------------------------------------------------------------------------  ASSESSMENT/PLAN  76yMale with functional deficits after compression fracture with retropulsion now s/p T10-L4 fusion.  T10-L4 fusion for L1 compression fracture - TLSO   Pain - Tylenol, oxycodone   Urinary retention-  Cotton catheter   DVT PPX - SCDs, Lovenox   Rehab - Continue PT, consult OT.  Recommend ACUTE inpatient rehabilitation for the functional deficits consisting of 3 hours of therapy/day & 24 hour RN/daily PMR physician for comorbid medical management. Patient will be able to tolerate 3 hours a day.    Will continue to follow. Functional progress will determine ongoing rehab dispo recommendations, which may change.    Continue bedside therapy as well as OOB throughout the day with mobilization by staff to maintain cardiopulmonary function and prevention of secondary complications related to debility. 
HPI:  76M w/ PMHX  HTN, HLD, DM, hypothyroidism, asbestosis, CAD s/p stents x3, Atrial fibrillation on Eliquis, presents to [facility] 1/20/22 c/o numbness to b/l feet. States that he recently fell at home on Thanksgiving and dx with spinal  fx ~2 months ago on outpatient xray and given back brace that he wears "all the time." Reports today that he noticed that his bilateral feet had become numb and this caused him to have difficulty ambulating and he has fallen several times today. States he was scheduled for an appt with a "back doctor today."  MRI T/L Spine- Severe compression fracture of L1 vertebral body w/ retropulsion.  c/o lower back discomfort, voiding urine but feeling of incomplete voiding. denies cp,sob,any weakness but has dull feeling of feet.    Denies saddle anesthesia or worsening of back pain. COVID+ since 01/13/22, pt states he completed 5 days of isolation as well her wife. Patient alert and oriented but confused w/ extensive medical questioning.   (20 Jan 2022 11:20)  Patient upgraded to NSICU today after being noted to have urinary retention. Cleared by cardiology for OR tomorrow with Dr. Gaitan. Plan for OR tomorrow for spine stabilization.       PAST MEDICAL & SURGICAL HISTORY:  Diabetes mellitus  Hypertension  Hyperlipemia  Coronary artery disease  Asbestosis  Heart valve disease - leaky heart valve  Dyspnea on exertion  Urinary frequency  Abnormal chest CT  S/P mastectomy, left - age 10 for benign mass  S/P ORIF (open reduction internal fixation) fracture  right elbow 1962, LILLIANA 3 months later  S/P cholecystectomy  S/P inguinal hernia repair  bilateral  S/P T&amp;A (status post tonsillectomy and adenoidectomy)  as child  S/P angioplasty with stent  2013 4 stents  S/P excision of skin lesion, follow-up exam  benign testicle  S/P colonoscopy  4 years ago, no polyp      FAMILY HISTORY:  Family history of heart attack (Father)  Family history of stroke (Mother)  Family history of diabetes mellitus (Mother, Sibling)  Family history of cardiomegaly (Mother)  Family history of colon cancer (Sibling)  Family history of pancreatic cancer (Sibling)        SOCIAL HISTORY:  Remote smoker, denies any alcohol/illicit drugs uses.    Allergies:  No Known Allergies      REVIEW OF SYSTEMS  Negative except as noted in HPI      HOME MEDICATIONS:  Home Medications:  atorvastatin 20 mg oral tablet: 1 tab(s) orally once a day (20 Jan 2022 10:45)  Eliquis 5 mg oral tablet: orally once a day (20 Jan 2022 10:45)  hydroCHLOROthiazide 25 mg oral tablet: 1 tab(s) orally once a day (20 Jan 2022 10:45)  irbesartan 300 mg oral tablet: 1 tab(s) orally once a day (at bedtime) (20 Jan 2022 10:45)  Jardiance 25 mg oral tablet: 1 tab(s) orally once a day (in the morning) (20 Jan 2022 10:45)  levothyroxine 50 mcg (0.05 mg) oral tablet: 1 tab(s) orally once a day (20 Jan 2022 10:45)  metFORMIN 500 mg oral tablet: orally 3 times a day (20 Jan 2022 10:45)  Metoprolol Succinate ER 25 mg oral tablet, extended release: 1 tab(s) orally once a day (20 Jan 2022 10:45)  Multi Hazel Bets and Fluoride and Iron: 1 cap(s)  once a day (20 Jan 2022 10:45)  Trulicity Pen 1.5 mg/0.5 mL subcutaneous solution:  (20 Jan 2022 10:45)      MEDICATIONS:  Antibiotics:    Neuro:  acetaminophen     Tablet .. 650 milliGRAM(s) Oral every 6 hours PRN  morphine  - Injectable 2 milliGRAM(s) IV Push every 4 hours PRN  oxyCODONE    IR 5 milliGRAM(s) Oral every 4 hours PRN    Anticoagulation:    OTHER:  atorvastatin 20 milliGRAM(s) Oral at bedtime  dextrose 40% Gel 15 Gram(s) Oral once  dextrose 50% Injectable 25 Gram(s) IV Push once  dextrose 50% Injectable 12.5 Gram(s) IV Push once  dextrose 50% Injectable 25 Gram(s) IV Push once  glucagon  Injectable 1 milliGRAM(s) IntraMuscular once  insulin lispro (ADMELOG) corrective regimen sliding scale   SubCutaneous every 4 hours  levothyroxine 50 MICROGram(s) Oral daily  losartan 100 milliGRAM(s) Oral daily  metoprolol succinate ER 25 milliGRAM(s) Oral daily    IVF:  dextrose 5%. 1000 milliLiter(s) IV Continuous <Continuous>  dextrose 5%. 1000 milliLiter(s) IV Continuous <Continuous>      Vital Signs Last 24 Hrs  T(C): 36.3 (20 Jan 2022 16:10), Max: 36.9 (20 Jan 2022 06:51)  T(F): 97.4 (20 Jan 2022 16:10), Max: 98.4 (20 Jan 2022 06:51)  HR: 69 (20 Jan 2022 16:10) (58 - 86)  BP: 118/75 (20 Jan 2022 16:10) (92/59 - 126/74)  RR: 18 (20 Jan 2022 16:10) (18 - 20)  SpO2: 97% (20 Jan 2022 16:10) (97% - 100%)      PHYSICAL EXAM:  GENERAL: NAD, well-groomed, well-developed  HEAD: Atraumatic, normocephalic  CAR[place]INA COMA SCORE: E-4 V-5 M-6 = 15  MENTAL STATUS: AAO x3; Awake; Opens eyes spontaneously; Appropriately conversant without aphasia; Following commands  CRANIAL NERVES: Visual acuity normal for age. EOMI without nystagmus. Face symmetric w/ normal eye closure and smile, tongue midline. Hearing grossly intact. Speech slurred (poor senior living)  REFLEXES: + rectal tone  MOTOR: Strength 5/5 b/l upper and lower extremities; no drift   SENSATION: Grossly intact to light touch in b/l UE. Diminished sensation below b/l knee. Brisker sensation above knee to sharp/dull discrimination. No sensation to dorsal aspect of L foot  SKIN: Warm, dry    LABS:                        14.7   11.65 )-----------( 577      ( 20 Jan 2022 03:55 )             41.5     01-20    132<L>  |  94<L>  |  33.9<H>  ----------------------------<  155<H>  4.6   |  21.0<L>  |  1.19    Ca    9.1      20 Jan 2022 03:55    TPro  6.8  /  Alb  3.8  /  TBili  0.6  /  DBili  x   /  AST  31  /  ALT  21  /  AlkPhos  82  01-20    PT/INR - ( 20 Jan 2022 03:56 )   PT: 16.4 sec;   INR: 1.44 ratio         PTT - ( 20 Jan 2022 03:56 )  PTT:32.9 sec      CULTURES:      RADIOLOGY & ADDITIONAL STUDIES:  CT Chest No Cont (01.20.22 @ 15:57):  IMPRESSION:  1.  Nondisplaced right fourth rib fracture. No pleural effusion or   pneumothorax  2.  Asbestos-related pleural disease  3.  Large hiatal hernia    CT Head No Cont (01.20.22 @ 15:57):  IMPRESSION:  No evidence of acute intracranial abnormality.  No evidence of hemorrhage.  Chronic changes as above.    CT Thoracic & Lumbar Spine No Cont (01.20.22 @ 10:10):  IMPRESSION:  THORACIC SPINE: No fracture or acute traumatic malalignment involving the   thoracic spine.  LUMBAR SPINE: 3 column fracture involving the L1 vertebral body with   severe compression deformity of the L1 vertebral body. There is   associated retropulsed bony fragment which contributes to high-grade   central canal stenosis as detailed on the corresponding MRI of the lumbar   spine.  Markedly dilated urinary bladder.  Extensive bilateral calcified pleural plaques.    MR Thoracic & Lumbar Spine No Cont (01.20.22 @ 05:06):  IMPRESSION:   Partially visualized acute to subacute severe compression fracture at L1  vertebral body for which detailed description will be provided in the MRI   of the lumbar spine.  No fracture is seen in the thoracic spine.  
PULMONARY CONSULT NOTE    DARIEL STRATTON  MRN  [id number]    Patient is a 76y old  Male who presents with a chief complaint of     BRIEF HOSPITAL COURSE:/ HPI    76y/omale with  h/o HTN   never smoker,  DM  hypothyroid asbestosis cad  s/p stent x 3 afib on eliquis   presented to ED with numbness feet -- recent fall thanksgiving fx x two months out pt  given back brace wears all the time  MRI  severe compression fracture L1   -covid + 1/13/2021  completed five days isolation  some confusion per  wife  no chest pain  no   sob no cough    pulse ox room air in ED  97%       REVIEW OF SYSTEMS     CONSTITUTIONAL   no fevers  no loss of appetite  no weight loss   HEENT  no sore throat   no ringing in ears  NECK   no pain   RESPIRATORY  see HPI   CARDIOVASCULAR  no chest  pain no palpitations   GASTROINTESTINAL no vomiting  no diarrhea    no   gerd   MUSCULOSKELETAL  no joint pains    no  back pain   SKIN   no rash   no itchiness   GENITOURINARY  no dysuria   HEME    no bleeding or bruising   ENDOCRINE     no   warmth   no  sweating   no  cold intolerance   NEUROLOGIC  no tremors  no seizures  no    weakness    PSYCHIATRIC   no mood disorder    no delirium       PAST MEDICAL & SURGICAL HISTORY:  Diabetes mellitus    Hypertension    Hyperlipemia    Coronary artery disease    Asbestosis    Heart valve disease  leaky heart valve    Dyspnea on exertion    Urinary frequency    Abnormal chest CT    S/P mastectomy, left  age 10 for benign mass    S/P ORIF (open reduction internal fixation) fracture  right elbow 1962, LILLIANA 3 months later    S/P cholecystectomy    S/P inguinal hernia repair  bilateral    S/P T&amp;A (status post tonsillectomy and adenoidectomy)  as child    S/P angioplasty with stent  2013 4 stents    S/P excision of skin lesion, follow-up exam  benign testicle    S/P colonoscopy  4 years ago, no polyp          Medications:    losartan 100 milliGRAM(s) Oral daily  metoprolol succinate ER 25 milliGRAM(s) Oral daily                atorvastatin 20 milliGRAM(s) Oral at bedtime  dextrose 40% Gel 15 Gram(s) Oral once  dextrose 50% Injectable 25 Gram(s) IV Push once  dextrose 50% Injectable 12.5 Gram(s) IV Push once  dextrose 50% Injectable 25 Gram(s) IV Push once  glucagon  Injectable 1 milliGRAM(s) IntraMuscular once  insulin lispro (ADMELOG) corrective regimen sliding scale   SubCutaneous three times a day before meals  insulin lispro Injectable (ADMELOG) 2 Unit(s) SubCutaneous three times a day before meals  levothyroxine 50 MICROGram(s) Oral daily    dextrose 5%. 1000 milliLiter(s) IV Continuous <Continuous>  dextrose 5%. 1000 milliLiter(s) IV Continuous <Continuous>                ICU Vital Signs Last 24 Hrs  T(C): 36.8 (20 Jan 2022 07:11), Max: 36.9 (20 Jan 2022 06:51)  T(F): 98.2 (20 Jan 2022 07:11), Max: 98.4 (20 Jan 2022 06:51)  HR: 86 (20 Jan 2022 07:11) (58 - 86)  BP: 108/76 (20 Jan 2022 07:11) (92/59 - 126/74)  BP(mean): --  ABP: --  ABP(mean): --  RR: 18 (20 Jan 2022 07:11) (18 - 20)  SpO2: 100% (20 Jan 2022 07:11) (97% - 100%)          I&O's Detail        LABS:                        14.7   11.65 )-----------( 577      ( 20 Jan 2022 03:55 )             41.5     01-20    132<L>  |  94<L>  |  33.9<H>  ----------------------------<  155<H>  4.6   |  21.0<L>  |  1.19    Ca    9.1      20 Jan 2022 03:55    TPro  6.8  /  Alb  3.8  /  TBili  0.6  /  DBili  x   /  AST  31  /  ALT  21  /  AlkPhos  82  01-20          CAPILLARY BLOOD GLUCOSE        PT/INR - ( 20 Jan 2022 03:56 )   PT: 16.4 sec;   INR: 1.44 ratio         PTT - ( 20 Jan 2022 03:56 )  PTT:32.9 sec    CULTURES:  Rapid RVP Result: Detected (01-20 @ 03:55)      Physical Examination:    General: No acute distress.   frail male  alert  and communicative     HEENT: Pupils equal, reactive to light.  Symmetric. no thrush        PULM:   bilateral air entry   decreased bases no w/r/r    NECK: Supple, no lymphadenopathy, trachea midline    CVS: Regular rate and rhythm, no murmurs, rubs, or gallops    ABD: Soft, nondistended, nontender, normoactive bowel sounds, no masses    EXT: No edema, nontender    SKIN: Warm and well perfused, no rashes noted.    NEURO: Alert, oriented, interactive, nonfocal    DEVICES:     RADIOLOGY: *IMPRESSION: Exuberant pleural calcifications again noted somewhat   increased from prior. No definitive acute lung disease.    --- End of Report ---            KELLEE GARZON MD; Attending Radiologist  This document has been electronically signed. Jan 14 2022 2:04PM    **    CRITICAL CARE TIME SPENT: ***

## 2022-01-27 NOTE — PROGRESS NOTE ADULT - SUBJECTIVE AND OBJECTIVE BOX
INTERVAL HPI/OVERNIGHT EVENTS:  76y Male extensive PMH including HTN, HLD, DM, hypothyroidism, asbestosis, CAD s/p PCI, afib on Eliquis, now presents to [facility] 1/20/22 with b/l LE weakness and difficulty ambulating since fall on Thanksgiving, urinary retention with dribbling x 2 weeks, and b/l LE sensory changes prompting ED visit, MRI found with 3 column injury of L1 vertebral body, now s/p T12-L1 laminectomies, T10-L4 posterior fusion 1/21/22 now POD#6. Patient seen earlier today, doing well, c/o some incisional pain    Vital Signs Last 24 Hrs  T(C): 36.7 (27 Jan 2022 11:13), Max: 36.7 (27 Jan 2022 05:02)  T(F): 98 (27 Jan 2022 11:13), Max: 98 (27 Jan 2022 05:02)  HR: 80 (27 Jan 2022 11:13) (80 - 83)  BP: 108/69 (27 Jan 2022 11:13) (108/69 - 108/69)  BP(mean): --  RR: 18 (27 Jan 2022 11:13) (18 - 18)  SpO2: 95% (27 Jan 2022 11:13) (95% - 95%)    PHYSICAL EXAM:  GENERAL: NAD, well-groomed  HEAD: Atraumatic, normocephalic  [place] COMA SCORE: E- 4 V- 5 M- 6=15  MENTAL STATUS: AAO x3; Appropriately conversant without aphasia; following commands  CRANIAL NERVES: PERRL. EOMI without nystagmus. Facial sensation intact V1-3 distribution b/l. Face symmetric w/ normal eye closure and smile, tongue midline  MOTOR: strength 5/5 b/l upper and lower extremities  SENSATION: grossly intact to light touch all extremities    LABS:                        10.9   10.86 )-----------( 463      ( 27 Jan 2022 13:40 )             32.2     01-27    135  |  97<L>  |  15.4  ----------------------------<  134<H>  3.3<L>   |  22.0  |  0.46<L>    Ca    7.8<L>      27 Jan 2022 13:40  Phos  3.0     01-27  Mg     1.5     01-27 01-26 @ 07:01  -  01-27 @ 07:00  --------------------------------------------------------  IN: 0 mL / OUT: 1450 mL / NET: -1450 mL    RADIOLOGY & ADDITIONAL TESTS:  CT Lumbar Spine No Cont (01.22.22 @ 16:48)  IMPRESSION:  STATUS POST T12-L1 LAMINECTOMY AND T10-L4 POSTERIOR FUSION TO RELIEVE AN   L1 BURST FRACTURE.  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   INTERVAL HPI/OVERNIGHT EVENTS:  76y Male extensive PMH including HTN, HLD, DM, hypothyroidism, asbestosis, CAD s/p PCI, afib on Eliquis, now presents to [facility] 1/20/22 with b/l LE weakness and difficulty ambulating since fall on Thanksgiving, urinary retention with dribbling x 2 weeks, and b/l LE sensory changes prompting ED visit, MRI found with 3 column injury of L1 vertebral body, now s/p T12-L1 laminectomies, T10-L4 posterior fusion 1/21/22 now POD#6. Patient seen earlier today, doing well, c/o some incisional pain    Vital Signs Last 24 Hrs  T(C): 36.7 (27 Jan 2022 11:13), Max: 36.7 (27 Jan 2022 05:02)  T(F): 98 (27 Jan 2022 11:13), Max: 98 (27 Jan 2022 05:02)  HR: 80 (27 Jan 2022 11:13) (80 - 83)  BP: 108/69 (27 Jan 2022 11:13) (108/69 - 108/69)  BP(mean): --  RR: 18 (27 Jan 2022 11:13) (18 - 18)  SpO2: 95% (27 Jan 2022 11:13) (95% - 95%)    PHYSICAL EXAM:  GENERAL: NAD, well-groomed  HEAD: Atraumatic, normocephalic  [place] COMA SCORE: E- 4 V- 5 M- 6=15  MENTAL STATUS: AAO x3; Appropriately conversant without aphasia; following commands  CRANIAL NERVES: PERRL. EOMI without nystagmus. Facial sensation intact V1-3 distribution b/l. Face symmetric w/ normal eye closure and smile, tongue midline  MOTOR: Limited by pain, but with encouragement, strength 5/5 b/l upper and lower extremities  SENSATION: grossly intact to light touch all extremities  SPINE: Incision c/d/i. New dressing placed     LABS:                        10.9   10.86 )-----------( 463      ( 27 Jan 2022 13:40 )             32.2     01-27    135  |  97<L>  |  15.4  ----------------------------<  134<H>  3.3<L>   |  22.0  |  0.46<L>    Ca    7.8<L>      27 Jan 2022 13:40  Phos  3.0     01-27  Mg     1.5     01-27 01-26 @ 07:01  -  01-27 @ 07:00  --------------------------------------------------------  IN: 0 mL / OUT: 1450 mL / NET: -1450 mL    RADIOLOGY & ADDITIONAL TESTS:  CT Lumbar Spine No Cont (01.22.22 @ 16:48)  IMPRESSION:  STATUS POST T12-L1 LAMINECTOMY AND T10-L4 POSTERIOR FUSION TO RELIEVE AN   L1 BURST FRACTURE.  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

## 2022-01-27 NOTE — PATIENT PROFILE ADULT - FALL HARM RISK - HARM RISK INTERVENTIONS

## 2022-01-27 NOTE — OCCUPATIONAL THERAPY INITIAL EVALUATION ADULT - PERTINENT HX OF CURRENT PROBLEM, REHAB EVAL
Pt presents to [facility] on 1/20/22 with c/o numbness to b/l feet.MRI T/L Spine- Severe compression fracture of L1 vertebral body w/ retropulsion. Pt with hx of fall on Thanksgiving

## 2022-01-27 NOTE — CONSULT NOTE ADULT - CONSULT REASON
L1 severe compression fx
pre op CV eval
L1 severe compression fx
S/P Fusion
pre-op clearance neursx  L1 fracture  covid

## 2022-01-27 NOTE — OCCUPATIONAL THERAPY INITIAL EVALUATION ADULT - RANGE OF MOTION EXAMINATION
Bilateral UE AROM all joints WFL except for bilateral shoulder flexion only assessed to 90 degrees within spinal precautions

## 2022-01-27 NOTE — DIETITIAN INITIAL EVALUATION ADULT. - ETIOLOGY
related to inability to meet sufficient protein-energy needs in setting of recent dx COVID PNA, poor dentition, s/p T1-L4 fusion

## 2022-01-27 NOTE — DIETITIAN NUTRITION RISK NOTIFICATION - ADDITIONAL COMMENTS/DIETITIAN RECOMMENDATIONS
1) Add Ensure Enlive TID   2) Add Ensure Pudding TID   3) Rx MVI and vit C 500mg daily   4) Encourage HBV protein sources  5) Monitor weights daily for trend/accuracy

## 2022-01-28 ENCOUNTER — TRANSCRIPTION ENCOUNTER (OUTPATIENT)
Age: 77
End: 2022-01-28

## 2022-01-28 VITALS
HEART RATE: 98 BPM | TEMPERATURE: 98 F | OXYGEN SATURATION: 98 % | RESPIRATION RATE: 17 BRPM | SYSTOLIC BLOOD PRESSURE: 112 MMHG | DIASTOLIC BLOOD PRESSURE: 68 MMHG

## 2022-01-28 LAB
ANION GAP SERPL CALC-SCNC: 11 MMOL/L — SIGNIFICANT CHANGE UP (ref 5–17)
BUN SERPL-MCNC: 14.6 MG/DL — SIGNIFICANT CHANGE UP (ref 8–20)
CALCIUM SERPL-MCNC: 7.9 MG/DL — LOW (ref 8.6–10.2)
CHLORIDE SERPL-SCNC: 96 MMOL/L — LOW (ref 98–107)
CO2 SERPL-SCNC: 26 MMOL/L — SIGNIFICANT CHANGE UP (ref 22–29)
CREAT SERPL-MCNC: 0.52 MG/DL — SIGNIFICANT CHANGE UP (ref 0.5–1.3)
GLUCOSE BLDC GLUCOMTR-MCNC: 172 MG/DL — HIGH (ref 70–99)
GLUCOSE BLDC GLUCOMTR-MCNC: 295 MG/DL — HIGH (ref 70–99)
GLUCOSE SERPL-MCNC: 154 MG/DL — HIGH (ref 70–99)
HCT VFR BLD CALC: 33.3 % — LOW (ref 39–50)
HGB BLD-MCNC: 11.2 G/DL — LOW (ref 13–17)
MAGNESIUM SERPL-MCNC: 1.8 MG/DL — SIGNIFICANT CHANGE UP (ref 1.6–2.6)
MCHC RBC-ENTMCNC: 31.4 PG — SIGNIFICANT CHANGE UP (ref 27–34)
MCHC RBC-ENTMCNC: 33.6 GM/DL — SIGNIFICANT CHANGE UP (ref 32–36)
MCV RBC AUTO: 93.3 FL — SIGNIFICANT CHANGE UP (ref 80–100)
PHOSPHATE SERPL-MCNC: 2.5 MG/DL — SIGNIFICANT CHANGE UP (ref 2.4–4.7)
PLATELET # BLD AUTO: 470 K/UL — HIGH (ref 150–400)
POTASSIUM SERPL-MCNC: 3.7 MMOL/L — SIGNIFICANT CHANGE UP (ref 3.5–5.3)
POTASSIUM SERPL-SCNC: 3.7 MMOL/L — SIGNIFICANT CHANGE UP (ref 3.5–5.3)
RBC # BLD: 3.57 M/UL — LOW (ref 4.2–5.8)
RBC # FLD: 14.3 % — SIGNIFICANT CHANGE UP (ref 10.3–14.5)
SODIUM SERPL-SCNC: 133 MMOL/L — LOW (ref 135–145)
WBC # BLD: 11.09 K/UL — HIGH (ref 3.8–10.5)
WBC # FLD AUTO: 11.09 K/UL — HIGH (ref 3.8–10.5)

## 2022-01-28 PROCEDURE — 99285 EMERGENCY DEPT VISIT HI MDM: CPT | Mod: 25

## 2022-01-28 PROCEDURE — 70450 CT HEAD/BRAIN W/O DYE: CPT

## 2022-01-28 PROCEDURE — 76000 FLUOROSCOPY <1 HR PHYS/QHP: CPT

## 2022-01-28 PROCEDURE — 86901 BLOOD TYPING SEROLOGIC RH(D): CPT

## 2022-01-28 PROCEDURE — 80074 ACUTE HEPATITIS PANEL: CPT

## 2022-01-28 PROCEDURE — 82947 ASSAY GLUCOSE BLOOD QUANT: CPT

## 2022-01-28 PROCEDURE — 82435 ASSAY OF BLOOD CHLORIDE: CPT

## 2022-01-28 PROCEDURE — 99238 HOSP IP/OBS DSCHRG MGMT 30/<: CPT

## 2022-01-28 PROCEDURE — 84484 ASSAY OF TROPONIN QUANT: CPT

## 2022-01-28 PROCEDURE — 80061 LIPID PANEL: CPT

## 2022-01-28 PROCEDURE — 86850 RBC ANTIBODY SCREEN: CPT

## 2022-01-28 PROCEDURE — C1889: CPT

## 2022-01-28 PROCEDURE — C8929: CPT

## 2022-01-28 PROCEDURE — 36415 COLL VENOUS BLD VENIPUNCTURE: CPT

## 2022-01-28 PROCEDURE — 99223 1ST HOSP IP/OBS HIGH 75: CPT

## 2022-01-28 PROCEDURE — 82330 ASSAY OF CALCIUM: CPT

## 2022-01-28 PROCEDURE — 97163 PT EVAL HIGH COMPLEX 45 MIN: CPT

## 2022-01-28 PROCEDURE — 0225U NFCT DS DNA&RNA 21 SARSCOV2: CPT

## 2022-01-28 PROCEDURE — 72148 MRI LUMBAR SPINE W/O DYE: CPT | Mod: MA

## 2022-01-28 PROCEDURE — 71250 CT THORAX DX C-: CPT

## 2022-01-28 PROCEDURE — 72131 CT LUMBAR SPINE W/O DYE: CPT | Mod: MA

## 2022-01-28 PROCEDURE — 86923 COMPATIBILITY TEST ELECTRIC: CPT

## 2022-01-28 PROCEDURE — 85018 HEMOGLOBIN: CPT

## 2022-01-28 PROCEDURE — 80048 BASIC METABOLIC PNL TOTAL CA: CPT

## 2022-01-28 PROCEDURE — 93005 ELECTROCARDIOGRAM TRACING: CPT

## 2022-01-28 PROCEDURE — 94760 N-INVAS EAR/PLS OXIMETRY 1: CPT

## 2022-01-28 PROCEDURE — 83880 ASSAY OF NATRIURETIC PEPTIDE: CPT

## 2022-01-28 PROCEDURE — 83036 HEMOGLOBIN GLYCOSYLATED A1C: CPT

## 2022-01-28 PROCEDURE — 86900 BLOOD TYPING SEROLOGIC ABO: CPT

## 2022-01-28 PROCEDURE — 71045 X-RAY EXAM CHEST 1 VIEW: CPT

## 2022-01-28 PROCEDURE — 82962 GLUCOSE BLOOD TEST: CPT

## 2022-01-28 PROCEDURE — 99024 POSTOP FOLLOW-UP VISIT: CPT

## 2022-01-28 PROCEDURE — 84100 ASSAY OF PHOSPHORUS: CPT

## 2022-01-28 PROCEDURE — 84295 ASSAY OF SERUM SODIUM: CPT

## 2022-01-28 PROCEDURE — 84132 ASSAY OF SERUM POTASSIUM: CPT

## 2022-01-28 PROCEDURE — 99233 SBSQ HOSP IP/OBS HIGH 50: CPT

## 2022-01-28 PROCEDURE — 85730 THROMBOPLASTIN TIME PARTIAL: CPT

## 2022-01-28 PROCEDURE — 84443 ASSAY THYROID STIM HORMONE: CPT

## 2022-01-28 PROCEDURE — 85027 COMPLETE CBC AUTOMATED: CPT

## 2022-01-28 PROCEDURE — 83735 ASSAY OF MAGNESIUM: CPT

## 2022-01-28 PROCEDURE — 72128 CT CHEST SPINE W/O DYE: CPT

## 2022-01-28 PROCEDURE — C1713: CPT

## 2022-01-28 PROCEDURE — 86703 HIV-1/HIV-2 1 RESULT ANTBDY: CPT

## 2022-01-28 PROCEDURE — 97167 OT EVAL HIGH COMPLEX 60 MIN: CPT

## 2022-01-28 PROCEDURE — 80053 COMPREHEN METABOLIC PANEL: CPT

## 2022-01-28 PROCEDURE — 85610 PROTHROMBIN TIME: CPT

## 2022-01-28 PROCEDURE — 96374 THER/PROPH/DIAG INJ IV PUSH: CPT

## 2022-01-28 PROCEDURE — 82803 BLOOD GASES ANY COMBINATION: CPT

## 2022-01-28 PROCEDURE — 85025 COMPLETE CBC W/AUTO DIFF WBC: CPT

## 2022-01-28 PROCEDURE — 85014 HEMATOCRIT: CPT

## 2022-01-28 PROCEDURE — C1769: CPT

## 2022-01-28 PROCEDURE — 72146 MRI CHEST SPINE W/O DYE: CPT | Mod: MA

## 2022-01-28 PROCEDURE — 83605 ASSAY OF LACTIC ACID: CPT

## 2022-01-28 RX ORDER — POLYETHYLENE GLYCOL 3350 17 G/17G
17 POWDER, FOR SOLUTION ORAL
Qty: 0 | Refills: 0 | DISCHARGE
Start: 2022-01-28

## 2022-01-28 RX ORDER — ACETAMINOPHEN 500 MG
2 TABLET ORAL
Qty: 0 | Refills: 0 | DISCHARGE
Start: 2022-01-28

## 2022-01-28 RX ORDER — IRBESARTAN 75 MG/1
1 TABLET ORAL
Qty: 0 | Refills: 0 | DISCHARGE

## 2022-01-28 RX ORDER — SODIUM CHLORIDE 9 MG/ML
1000 INJECTION, SOLUTION INTRAVENOUS
Qty: 0 | Refills: 0 | DISCHARGE
Start: 2022-01-28

## 2022-01-28 RX ORDER — NYSTATIN CREAM 100000 [USP'U]/G
1 CREAM TOPICAL
Qty: 0 | Refills: 0 | DISCHARGE
Start: 2022-01-28

## 2022-01-28 RX ORDER — METOPROLOL TARTRATE 50 MG
25 TABLET ORAL DAILY
Refills: 0 | Status: DISCONTINUED | OUTPATIENT
Start: 2022-01-28 | End: 2022-01-28

## 2022-01-28 RX ORDER — OXYCODONE HYDROCHLORIDE 5 MG/1
1 TABLET ORAL
Qty: 0 | Refills: 0 | DISCHARGE
Start: 2022-01-28

## 2022-01-28 RX ORDER — APIXABAN 2.5 MG/1
1 TABLET, FILM COATED ORAL
Qty: 0 | Refills: 0 | DISCHARGE

## 2022-01-28 RX ORDER — SENNA PLUS 8.6 MG/1
2 TABLET ORAL
Qty: 0 | Refills: 0 | DISCHARGE
Start: 2022-01-28

## 2022-01-28 RX ORDER — INSULIN LISPRO 100/ML
1 VIAL (ML) SUBCUTANEOUS
Qty: 0 | Refills: 0 | DISCHARGE
Start: 2022-01-28

## 2022-01-28 RX ORDER — MIDODRINE HYDROCHLORIDE 2.5 MG/1
1 TABLET ORAL
Qty: 0 | Refills: 0 | DISCHARGE
Start: 2022-01-28

## 2022-01-28 RX ORDER — SODIUM CHLORIDE 9 MG/ML
1 INJECTION INTRAMUSCULAR; INTRAVENOUS; SUBCUTANEOUS
Qty: 0 | Refills: 0 | DISCHARGE
Start: 2022-01-28

## 2022-01-28 RX ORDER — MULTIVIT-MIN/FERROUS GLUCONATE 9 MG/15 ML
1 LIQUID (ML) ORAL
Qty: 0 | Refills: 0 | DISCHARGE
Start: 2022-01-28

## 2022-01-28 RX ORDER — ASCORBIC ACID 60 MG
1 TABLET,CHEWABLE ORAL
Qty: 0 | Refills: 0 | DISCHARGE
Start: 2022-01-28

## 2022-01-28 RX ORDER — ENOXAPARIN SODIUM 100 MG/ML
40 INJECTION SUBCUTANEOUS
Qty: 0 | Refills: 0 | DISCHARGE
Start: 2022-01-28

## 2022-01-28 RX ADMIN — Medication 50 MICROGRAM(S): at 05:38

## 2022-01-28 RX ADMIN — SODIUM CHLORIDE 1 GRAM(S): 9 INJECTION INTRAMUSCULAR; INTRAVENOUS; SUBCUTANEOUS at 05:36

## 2022-01-28 RX ADMIN — Medication 3: at 12:08

## 2022-01-28 RX ADMIN — MIDODRINE HYDROCHLORIDE 5 MILLIGRAM(S): 2.5 TABLET ORAL at 15:37

## 2022-01-28 RX ADMIN — Medication 1: at 08:05

## 2022-01-28 RX ADMIN — ENOXAPARIN SODIUM 40 MILLIGRAM(S): 100 INJECTION SUBCUTANEOUS at 12:07

## 2022-01-28 RX ADMIN — NYSTATIN CREAM 1 APPLICATION(S): 100000 CREAM TOPICAL at 05:36

## 2022-01-28 RX ADMIN — Medication 650 MILLIGRAM(S): at 08:05

## 2022-01-28 RX ADMIN — MIDODRINE HYDROCHLORIDE 5 MILLIGRAM(S): 2.5 TABLET ORAL at 05:37

## 2022-01-28 RX ADMIN — Medication 500 MILLIGRAM(S): at 12:07

## 2022-01-28 RX ADMIN — POLYETHYLENE GLYCOL 3350 17 GRAM(S): 17 POWDER, FOR SOLUTION ORAL at 12:08

## 2022-01-28 RX ADMIN — OXYCODONE HYDROCHLORIDE 5 MILLIGRAM(S): 5 TABLET ORAL at 12:07

## 2022-01-28 RX ADMIN — Medication 25 MILLIGRAM(S): at 12:07

## 2022-01-28 RX ADMIN — Medication 1 TABLET(S): at 12:07

## 2022-01-28 NOTE — DISCHARGE NOTE NURSING/CASE MANAGEMENT/SOCIAL WORK - PATIENT PORTAL LINK FT
You can access the FollowMyHealth Patient Portal offered by [facility] by registering at the following website: [email or web address]. By joining [facility]’s FollowMyHealth portal, you will also be able to view your health information using other applications (apps) compatible with our system.

## 2022-01-28 NOTE — DISCHARGE NOTE NURSING/CASE MANAGEMENT/SOCIAL WORK - NSDCPEFALRISK_GEN_ALL_CORE
For information on Fall & Injury Prevention, visit: [email or web address] OR  [email or web address] OR  https://www.cdc.gov/steadi/patient.html

## 2022-01-28 NOTE — DISCHARGE NOTE PROVIDER - CARE PROVIDER_API CALL
Scooby Gaitan)  Neurosurgery  [address] [place] [address]  Phone: [phone]  Fax: [phone]  Follow Up Time: 2 weeks

## 2022-01-28 NOTE — PROGRESS NOTE ADULT - NUTRITIONAL ASSESSMENT
This patient has been assessed with a concern for Malnutrition and has been determined to have a diagnosis/diagnoses of Moderate protein-calorie malnutrition.    This patient is being managed with:   Diet Soft and Bite Sized-  Supplement Feeding Modality:  Oral  Ensure Enlive Cans or Servings Per Day:  1       Frequency:  Three Times a day  Ensure Pudding Cans or Servings Per Day:  1       Frequency:  Three Times a day  Entered: Jan 27 2022  3:46PM

## 2022-01-28 NOTE — DISCHARGE NOTE PROVIDER - NSDCFUADDINST_GEN_ALL_CORE_FT
ok to shower 5 days after surgery without dressing while avoiding direct water to the incision, ok to have water run over the incision, use mild soap/body wash while showering/ pat dry afterwards, assist as needed/ shower chair/ fall precaution   cover incision as needed w an island dressing or a large band-aid as needed to avoid irritation/ rubbing/friction from clothes.   no hot tubs /jacuzzi until cleared by Dr Gaitan / office NP at outpatient/ office visit.  call office if have wound discharge/swelling/redness/ worsening pain or fever or return to the hospital ED ok to shower 5 days after surgery without dressing while avoiding direct water to the incision, ok to have water run over the incision, use mild soap/body wash while showering/ pat dry afterwards, assist as needed/ shower chair/ fall precaution   cover incision as needed w an island dressing or a large band-aid as needed to avoid irritation/ rubbing/friction from clothes.   no hot tubs /jacuzzi until cleared by Dr Gaitan / office NP at outpatient/ office visit.  call office if have wound discharge/swelling/redness/ worsening pain or fever or return to the hospital ED  OOB w TLSO brace   ok to resume ASA 81 1/29/22 and Eliquis 2/4/22 from NSx standpoint

## 2022-01-28 NOTE — PROGRESS NOTE ADULT - SUBJECTIVE AND OBJECTIVE BOX
Patient known to hospitalist team from admission . Called by Neurosurgery PA to clear patient for discharge .   Patient seen and examined , AAOX3 , c/o lower back pain 5/10 but controlled with pain medications     CC : low back pain     HPI:  76M w/ PMHX  HTN, HLD, DM, hypothyroidism, asbestosis, CAD s/p stents x3, Atrial fibrillation on Eliquis, presenting today with c/o numbness to b/l feet. States that he recently fell at home on Thanksgiving and dx with spinal  fx ~2 months ago on outpatient xray and given back brace that he wears "all the time." Reports today that he noticed that his bilateral feet had become numb and this caused him to have difficulty ambulating and he has fallen several times today. States he was scheduled for an appt with a "back doctor today."  MRI T/L Spine- Severe compression fracture of L1 vertebral body w/ retropulsion.  c/o lower back discomfort, voiding urine but feeling of incomplete voiding. denies cp,sob,any weakness but has dull feeling of feet.    Denies saddle anesthesia or worsening of back pain. COVID+ since 01/13/22, pt states he completed 5 days of isolation as well her wife. Patient alert and oriented but confused w/ extensive medical questioning. States he heard back fractures heal on their own and has CHRONIC DISCOMFORT. Pt just voids urine but has some suprapubic discomfort on palpation.    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  S/P ORIF (open reduction internal fixation) fracture  right elbow 1962, LILLIANA 3 months later  S/P cholecystectomy  S/P inguinal hernia repair  bilateral  S/P T&amp;A (status post tonsillectomy and adenoidectomy)  as child  S/P angioplasty with stent  2013 4 stents  S/P excision of skin lesion, follow-up exam  benign testicle  S/P colonoscopy  4 years ago, no polyp      FAMILY HISTORY:  Family history of heart attack (Father)  Family history of stroke (Mother)  Family history of diabetes mellitus (Mother, Sibling)  Family history of cardiomegaly (Mother)  Family history of colon cancer (Sibling)  Family history of pancreatic cancer (Sibling)      social history:  Remote smoker,denies any alcohol/illicit drugs uses. 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 1000 milliLiter(s) (50 mL/Hr) IV Continuous <Continuous>  dextrose 5%. 1000 milliLiter(s) (100 mL/Hr) IV Continuous <Continuous>  dextrose 50% Injectable 25 Gram(s) IV Push once  dextrose 50% Injectable 12.5 Gram(s) IV Push once  dextrose 50% Injectable 25 Gram(s) IV Push once  enoxaparin Injectable 40 milliGRAM(s) SubCutaneous daily  glucagon  Injectable 1 milliGRAM(s) IntraMuscular once  insulin lispro (ADMELOG) corrective regimen sliding scale   SubCutaneous Before meals and at bedtime  levothyroxine 50 MICROGram(s) Oral daily  midodrine 5 milliGRAM(s) Oral every 8 hours  multivitamin/minerals 1 Tablet(s) Oral daily  nystatin Powder 1 Application(s) Topical two times a day  polyethylene glycol 3350 17 Gram(s) Oral daily  senna 2 Tablet(s) Oral at bedtime  sodium chloride 1 Gram(s) Oral every 12 hours    MEDICATIONS  (PRN):  acetaminophen     Tablet .. 650 milliGRAM(s) Oral every 6 hours PRN Temp greater or equal to 38C (100.4F), Mild Pain (1 - 3)  oxyCODONE    IR 5 milliGRAM(s) Oral every 4 hours PRN Moderate Pain (4 - 6)      LABS:                          11.2   11.09 )-----------( 470      ( 28 Jan 2022 11:22 )             33.3     01-27    135  |  97<L>  |  15.4  ----------------------------<  134<H>  3.3<L>   |  22.0  |  0.46<L>    Ca    7.8<L>      27 Jan 2022 13:40  Phos  3.0     01-27  Mg     1.5     01-27      RADIOLOGY & ADDITIONAL TESTS:  < from: MR Lumbar Spine No Cont (01.20.22 @ 05:06) >    ACC: [id number] EXAM:  MR SPINE LUMBAR                          PROCEDURE DATE:  01/20/2022          INTERPRETATION:  [facility] RADIOLOGIST PRELIMINARY REPORT    PROCEDURE INFORMATION:  Exam: MR Lumbar Spine Without Contrast.  Exam date and time: 1/20/2022 4:38 AM  Age: 76 years old  Clinical indication: Low back pain and lower extremity weakness  < from: CT Head No Cont (01.20.22 @ 15:57) >    ACC: [id number] EXAM:  CT BRAIN                          PROCEDURE DATE:  01/20/2022          INTERPRETATION:  Exam Date: 1/20/2022 3:57 PM    CT head without IV contrast    CLINICAL INFORMATION: fall -on eliquis    TECHNIQUE: Contiguous axial sections were obtained through the head.     Coronal and sagittal reformats were obtained.    < end of copied text >  < from: CT Head No Cont (01.20.22 @ 15:57) >  The ventricles and sulci are prominent, compatible with age-related   generalized cerebral volume loss.   There is no CT evidence for acute   cerebral cortical infarct. There is no evidence of hemorrhage. There is   periventricular and subcortical white matter hypoattenuation,  most   compatible with chronic microvascular ischemic changes.   No mass effect   is found in the brain. There is no midline shift or herniation pattern.      Complete opacification of the right maxillary sinus is present and tiny   retention cyst versus polyp along the medial wall of the left maxillary   sinus.    IMPRESSION:    No evidence of acute intracranial abnormality.  No evidence of hemorrhage.    Chronic changes as above.    < end of copied text >    < end of copied text >  < from: MR Lumbar Spine No Cont (01.20.22 @ 05:06) >      IMPRESSION:  Acute to subacute severe compression fracture at L1 with fracture   fragment of the posterior superior aspect of the vertebral body with   retropulsion into the spinal canal contributing to crowding of the cauda   equina nerve roots and compression of the conus medullaris.    T2/STIR signal hyperintensity involving the lamina and pedicles,   suspicious for fracture of the posterior elements. Facet articular   rupture cannot be entirely excluded.    Miriam DOLAN MD, the attending neuroradiologist reviewed the study and   agree with the above preliminary report provided by [facility].      < from: CT Thoracic Spine No Cont (01.22.22 @ 16:48) >    ACC: [id number] EXAM:  CT LUMBAR SPINE                        ACC: [id number] EXAM:  CT THORACIC SPINE                          PROCEDURE DATE:  01/22/2022          INTERPRETATION:  CT THORACIC SPINE, CT LUMBAR SPINE    CLINICAL INFORMATION: Postop    TECHNIQUE:  Noncontrast CT.  Axial acquisition. Sagittal and coronal reformations.    < end of copied text >  < from: CT Thoracic Spine No Cont (01.22.22 @ 16:48) >    IMPRESSION:  STATUS POST T12-L1 LAMINECTOMY AND T10-L4 POSTERIOR FUSION TO RELIEVE AN   L1 BURST FRACTURE.  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    < from: 12 Lead ECG (01.23.22 @ 12:18) >    Ventricular Rate 92 BPM    Atrial Rate 90 BPM    QRS Duration 98 ms    Q-T Interval 454 ms    QTC Calculation(Bazett) 561 ms    R Axis 39 degrees    T Axis 160 degrees    Diagnosis Line Atrial fibrillation  ST & T wave abnormality, consider lateralischemia  Prolonged QT  Abnormal ECG    < from: TTE Echo Complete w/ Contrast w/ Doppler (01.20.22 @ 18:57) >    EXAM:  ECHO TTE WITH CON COMP W DOPP      PROCEDURE DATE:  Jan 20 2022   .      INTERPRETATION:  TRANSTHORACIC ECHOCARDIOGRAM REPORT      < end of copied text >  < from: TTE Echo Complete w/ Contrast w/ Doppler (01.20.22 @ 18:57) >    Summary:   1. Very limited study.   2. Unable to accurately assess LVEF based on these views. LV function   appears hyperdynamic.   3. The mitral in-flow pattern reveals no discernable A-wave, therefore   no comment on diastolic function can be made.   4. Mild mitral annular calcification.   5. Thickening and calcification of the anterior and posterior mitral   valve leaflets.   6. Trace mitral valve regurgitation.   7. Aortic valve was not well visualized. Appears normally opening based   ongradient. No visualized aortic regurgitation.   8. Trivial tricuspid regurgitation is visualized. Adequate TR velocity   was not obtained to accurately assess RVSP.   9. No prior studies are available for comparison.    DONG Donovan Electronicallysigned on 1/20/2022 at 10:38:18 PM      < end of copied text >            REVIEW OF SYSTEMS:    As above , all other systems are reviewed and are negative .     Vital Signs Last 24 Hrs  T(C): 36.7 (28 Jan 2022 05:19), Max: 37.2 (27 Jan 2022 21:24)  T(F): 98 (28 Jan 2022 05:19), Max: 98.9 (27 Jan 2022 21:24)  HR: 85 (28 Jan 2022 05:19) (85 - 92)  BP: 113/72 (28 Jan 2022 05:19) (113/72 - 122/75)  BP(mean): --  RR: 18 (28 Jan 2022 05:19) (18 - 19)  SpO2: 94% (28 Jan 2022 05:19) (91% - 94%)  PHYSICAL EXAM:    GENERAL: NAD, well-groomed, well-developed  HEAD:  Atraumatic, Normocephalic  EYES: EOMI, PERRLA, conjunctiva and sclera clear  NECK: Supple, No JVD, Normal thyroid  NERVOUS SYSTEM:  Alert & Oriented X3, no focal deficit  CHEST/LUNG: CTA b/l ,  no  rales, rhonchi, wheezing, or rubs  HEART: irregularly irregular  No murmurs, rubs, or gallops  ABDOMEN: Soft, Nontender, Nondistended; Bowel sounds present  EXTREMITIES:  2+ Peripheral Pulses, No clubbing, cyanosis, or edema  LYMPH: No lymphadenopathy noted  SKIN: No rashes or lesions  Low back dressing + , clean and dry

## 2022-01-28 NOTE — DISCHARGE NOTE PROVIDER - NSDCCPTREATMENT_GEN_ALL_CORE_FT
PRINCIPAL PROCEDURE  Procedure: Laminectomy with fusion of thoracolumbar spine  Findings and Treatment: T12-L1 laminectomy and T10-L4 posterior fusion

## 2022-01-28 NOTE — DISCHARGE NOTE PROVIDER - NSDCCAREPROVSEEN_GEN_ALL_CORE_FT
Scooby Gaitan Scooby Gaitan Attg:    see above    patient seen and examined    agree with exam and plan as above

## 2022-01-28 NOTE — PROGRESS NOTE ADULT - SUBJECTIVE AND OBJECTIVE BOX
Patient feels well.  Reports the SCDs are heavy.     REVIEW OF SYSTEMS  Constitutional - No fever,  +fatigue  HEENT - No vertigo, No neck pain  Neurological - No headaches, +loss of strength, No numbness, No tremors  Musculoskeletal - +joint pain, No joint swelling, +muscle pain    FUNCTIONAL PROGRESS  1/27 OT  Bathing Training:     · Level of [place]	maximum assist (25% patients effort); to sponge bathe  · Physical Assist/Nonphysical Assist	1 person assist; verbal cues; set-up required    Upper Body Dressing Training:     · Level of [place]	maximum assist (25% patients effort); to don/doff gown and brace  · Physical Assist/Nonphysical Assist	1 person assist; verbal cues; set-up required; Pt required additional assist to maintain posture and    Lower Body Dressing Training:     · Level of [place]	dependent (less than 25% patients effort); to don/doff socks    Toilet Hygiene Training:     · Level of [place]	dependent (less than 25% patients effort); to assess; at time of eval pt +Cotton    Grooming Training:     · Level of [place]	minimum assist (75% patients effort); seated  · Physical Assist/Nonphysical Assist	1 person assist; verbal cues; set-up required    1/26 PT  Bed Mobility: Rolling/Turning:     · Level of [place]	moderate assist (50% patients effort)  · Physical Assist/Nonphysical Assist	1 person assist    Bed Mobility: Sit to Supine:     · Level of [place]	moderate assist (50% patients effort); via right logrolling  · Physical Assist/Nonphysical Assist	1 person assist; verbal cues    Bed Mobility: Supine to Sit:     · Level of [place]	moderate assist (50% patients effort); via right logrolling  · Physical Assist/Nonphysical Assist	1 person assist; verbal cues    Bed Mobility Analysis:     · Bed Mobility Limitations	decreased ability to use legs for bridging/pushing; impaired ability to control trunk for mobility  · Impairments Contributing to Impaired Bed Mobility	decreased strength; pain; impaired balance    Transfer: Bed to Chair:     Transfer Skill: Bed to Chair   · Level of [place]	unable to perform; safety concerns due to decreased strength and pain with movement    Transfer: Chair to Bed:     · Level of [place]	unable to perform; safety concerns due to decreased strength and pain with movement    Transfer: Sit to Stand:     · Level of [place]	moderate assist (50% patients effort)  · Physical Assist/Nonphysical Assist	2 person assist; verbal cues  · Assistive Device	rolling walker; TLSO    Transfer: Stand to Sit:     · Level of [place]	moderate assist (50% patients effort)  · Physical Assist/Nonphysical Assist	2 person assist; verbal cues  · Assistive Device	rolling walker; TLSO    Sit/Stand Transfer Safety Analysis:     · Transfer Safety Concerns Noted	decreased weight-shifting ability  · Impairments Contributing to Impaired Transfers	impaired balance; pain; decreased strength    Gait Skills:     · Level of [place]	moderate assist (50% patients effort)  · Physical Assist/Nonphysical Assist	2 person assist; verbal cues  · Assistive Device	rolling walker  · Gait Distance	5 feet; sidestepping at bedside  · Brace/Orthotics	TLSO    Gait Analysis:     · Gait Pattern Used	3-point gait  · Gait Deviations Noted	decreased trav; decreased weight-shifting ability; decreased step length; decreased stride length; flexed posture  · Impairments Contributing to Gait Deviations	impaired balance; decreased strength; pain    Stair Negotiation:     · Level of [place]	unable to perform; safety concerns due to pain, decreased strength and balance.        VITALS  T(C): 36.7 (01-28-22 @ 05:19), Max: 37.2 (01-27-22 @ 21:24)  HR: 85 (01-28-22 @ 05:19) (80 - 92)  BP: 113/72 (01-28-22 @ 05:19) (108/69 - 122/75)  RR: 18 (01-28-22 @ 05:19) (18 - 19)  SpO2: 94% (01-28-22 @ 05:19) (91% - 95%)  Wt(kg): --    MEDICATIONS   acetaminophen     Tablet .. 650 milliGRAM(s) every 6 hours PRN  ascorbic acid 500 milliGRAM(s) daily  atorvastatin 20 milliGRAM(s) at bedtime  dextrose 40% Gel 15 Gram(s) once  dextrose 5%. 1000 milliLiter(s) <Continuous>  dextrose 5%. 1000 milliLiter(s) <Continuous>  dextrose 50% Injectable 25 Gram(s) once  dextrose 50% Injectable 12.5 Gram(s) once  dextrose 50% Injectable 25 Gram(s) once  enoxaparin Injectable 40 milliGRAM(s) daily  glucagon  Injectable 1 milliGRAM(s) once  insulin lispro (ADMELOG) corrective regimen sliding scale   Before meals and at bedtime  levothyroxine 50 MICROGram(s) daily  midodrine 5 milliGRAM(s) every 8 hours  multivitamin/minerals 1 Tablet(s) daily  nystatin Powder 1 Application(s) two times a day  oxyCODONE    IR 5 milliGRAM(s) every 4 hours PRN  polyethylene glycol 3350 17 Gram(s) daily  senna 2 Tablet(s) at bedtime  sodium chloride 1 Gram(s) every 12 hours      RECENT LABS/IMAGING                          10.9   10.86 )-----------( 463      ( 27 Jan 2022 13:40 )             32.2     01-27    135  |  97<L>  |  15.4  ----------------------------<  134<H>  3.3<L>   |  22.0  |  0.46<L>    Ca    7.8<L>      27 Jan 2022 13:40  Phos  3.0     01-27  Mg     1.5     01-27                    MRI L SPINE 1/20 - Acute to subacute severe compression fracture at L1 with fracture   fragment of the posterior superior aspect of the vertebral body with   retropulsion into the spinal canal contributing to crowding of the cauda   equina nerve roots and compression of the conus medullaris.    T2/STIR signal hyperintensity involving the lamina and pedicles,   suspicious for fracture of the posterior elements. Facet articular   rupture cannot be entirely excluded.  ----------------------------------------------------------------------------------------  PHYSICAL EXAM  Constitutional - NAD, Comfortable  Extremities - No C/C/E, No calf tenderness   Neurologic Exam -                    Cognitive - AAOx self, PART of situation      Motor -                      LEFT    UE - C5 5/5, C6 5/5, C7 5/5, C8 5/5, T1 5/5                    RIGHT UE - C5 5/5, C6 5/5, C7 5/5, C8 5/5, T1 5/5                    LEFT    LE - L2 3/5, L3 3/5, L4 4/5, L5 4/5, S1 4/5                    RIGHT LE - L2 3/5, L3 3/5, L4 4/5, L5 4/5, S1 4/5      Sensory - Intact to LT  Psychiatric - Mood anxious  ----------------------------------------------------------------------------------------  ASSESSMENT/PLAN  76yMale with functional deficits after a fall sustaining an L1 fracture  L1 compression fracture s/p T10-L4 fusion - TLSO OOB  HypoTN - Midodrine  CAD - Lipitor   Pain - Tylenol, Oxycodone   Urinary Retention - Cotton catheter   DVT PPX - SCDs, Lovenox   Rehab - Continue to recommend ACUTE inpatient rehabilitation for the functional deficits consisting of 3 hours of therapy/day & 24 hour RN/daily PMR physician for comorbid medical management. Will continue to follow for ongoing rehab needs and recommendations. Patient will be able to tolerate 3 hours a day.    Continue bedside therapy as well as OOB throughout the day with mobilization throughout the day with staff to maintain cardiopulmonary function and prevention of secondary complications related to debility.     Discussed with rehab clinical team.     no

## 2022-01-28 NOTE — PROGRESS NOTE ADULT - PROVIDER SPECIALTY LIST ADULT
Hospitalist
NSICU
Neurosurgery
Rehab Medicine
Anesthesia
NSICU
Neurosurgery
Neurosurgery
Orthopedics
Neurosurgery
NSICU
NSICU

## 2022-01-28 NOTE — DISCHARGE NOTE PROVIDER - HOSPITAL COURSE
DARIEL STRATTON  [id number]    76y Male extensive PMH including HTN, HLD, DM, hypothyroidism, asbestosis, CAD s/p PCI, afib on Eliquis, now presents to [facility] 1/20/22 with b/l LE weakness and difficulty ambulating since fall on Thanksgiving, urinary retention with dribbling x 2 weeks, and b/l LE sensory changes prompting ED visit, MRI found with 3 column injury of L1 vertebral body,   1/21/22 s/p T12-L1 laminectomies, T10-L4 posterior fusion  POD#7. Patient seen earlier today, doing well, c/o some incisional pain, pain is otherwise well controlled   pt seen by NSx and deemed stable for d/c to [facility] today     Vital Signs Last 24 Hrs  T(C): 36.7 (28 Jan 2022 05:19), Max: 37.2 (27 Jan 2022 21:24)  T(F): 98 (28 Jan 2022 05:19), Max: 98.9 (27 Jan 2022 21:24)  HR: 85 (28 Jan 2022 05:19) (80 - 92)  BP: 113/72 (28 Jan 2022 05:19) (108/69 - 122/75)  BP(mean): --  RR: 18 (28 Jan 2022 05:19) (18 - 19)  SpO2: 94% (28 Jan 2022 05:19) (91% - 95%)  I&O's Summary                        10.9   10.86 )-----------( 463      ( 27 Jan 2022 13:40 )             32.2     01-27    135  |  97<L>  |  15.4  ----------------------------<  134<H>  3.3<L>   |  22.0  |  0.46<L>    Ca    7.8<L>      27 Jan 2022 13:40  Phos  3.0     01-27  Mg     1.5     01-27        MEDICATIONS  (STANDING):  ascorbic acid 500 milliGRAM(s) Oral daily  atorvastatin 20 milliGRAM(s) Oral at bedtime  dextrose 40% Gel 15 Gram(s) Oral once  dextrose 5%. 1000 milliLiter(s) (50 mL/Hr) IV Continuous <Continuous>  dextrose 5%. 1000 milliLiter(s) (100 mL/Hr) IV Continuous <Continuous>  dextrose 50% Injectable 25 Gram(s) IV Push once  dextrose 50% Injectable 12.5 Gram(s) IV Push once  dextrose 50% Injectable 25 Gram(s) IV Push once  enoxaparin Injectable 40 milliGRAM(s) SubCutaneous daily  glucagon  Injectable 1 milliGRAM(s) IntraMuscular once  insulin lispro (ADMELOG) corrective regimen sliding scale   SubCutaneous Before meals and at bedtime  levothyroxine 50 MICROGram(s) Oral daily  midodrine 5 milliGRAM(s) Oral every 8 hours  multivitamin/minerals 1 Tablet(s) Oral daily  nystatin Powder 1 Application(s) Topical two times a day  polyethylene glycol 3350 17 Gram(s) Oral daily  senna 2 Tablet(s) Oral at bedtime  sodium chloride 1 Gram(s) Oral every 12 hours    MEDICATIONS  (PRN):  acetaminophen     Tablet .. 650 milliGRAM(s) Oral every 6 hours PRN Temp greater or equal to 38C (100.4F), Mild Pain (1 - 3)  oxyCODONE    IR 5 milliGRAM(s) Oral every 4 hours PRN Moderate Pain (4 - 6)      Physical exam:  GENERAL: NAD, well-groomed  HEAD: Atraumatic, normocephalic  [place] COMA SCORE: E- 4 V- 5 M- 6=15  MENTAL STATUS: AAO x3; Appropriately conversant without aphasia; following commands  CRANIAL NERVES: PERRL. EOMI without nystagmus. Facial sensation intact V1-3 distribution b/l. Face symmetric w/ normal eye closure and smile, tongue midline  MOTOR: Limited by pain, but with encouragement, strength is grossly intact and 5/5 b/l upper and 4-3/5 lower extremities w repeated attempts   SENSATION: grossly intact to light touch all extremities  SPINE: Incision c/d/i. New dressing placed    DARIEL STRATTON  [id number]  76y Male extensive PMH including HTN, HLD, DM, hypothyroidism, asbestosis, CAD s/p PCI, afib on Eliquis, now presents to [facility] 1/20/22 with b/l LE weakness and difficulty ambulating since fall on Thanksgiving, urinary retention with dribbling x 2 weeks, and b/l LE sensory changes prompting ED visit, MRI found with 3 column injury of L1 vertebral body,   1/21/22 s/p T12-L1 laminectomies, T10-L4 posterior fusion  POD#7. Patient seen earlier today, doing well, c/o some incisional pain, pain is otherwise well controlled   pt seen by NSx and deemed stable for d/c to [place] today     Vital Signs Last 24 Hrs  T(C): 36.7 (28 Jan 2022 05:19), Max: 37.2 (27 Jan 2022 21:24)  T(F): 98 (28 Jan 2022 05:19), Max: 98.9 (27 Jan 2022 21:24)  HR: 85 (28 Jan 2022 05:19) (80 - 92)  BP: 113/72 (28 Jan 2022 05:19) (108/69 - 122/75)  BP(mean): --  RR: 18 (28 Jan 2022 05:19) (18 - 19)  SpO2: 94% (28 Jan 2022 05:19) (91% - 95%)  I&O's Summary              10.9   10.86 )-----------( 463      ( 27 Jan 2022 13:40 )             32.2     01-27    135  |  97<L>  |  15.4  ----------------------------<  134<H>  3.3<L>   |  22.0  |  0.46<L>    Ca    7.8<L>      27 Jan 2022 13:40  Phos  3.0     01-27  Mg     1.5     01-27    Physical exam:  GENERAL: NAD, well-groomed  HEAD: Atraumatic, normocephalic  [place] COMA SCORE: E- 4 V- 5 M- 6=15  MENTAL STATUS: AAO x3; Appropriately conversant without aphasia; following commands  CRANIAL NERVES: PERRL. EOMI without nystagmus. Facial sensation intact V1-3 distribution b/l. Face symmetric w/ normal eye closure and smile, tongue midline  MOTOR: Limited by pain, but with encouragement, strength is grossly intact and 5/5 b/l upper and 4-3/5 lower extremities w repeated attempts   SENSATION: grossly intact to light touch all extremities  SPINE: Incision c/d/i. New dressing placed       Medical/ hospitalist recommendations:  1. L1 fracture - s/p  1/21/22 s/p T12-L1 laminectomies, T10-L4 posterior fusion .   POD#7-   PT/OT/pain mgmt  DVT prophylaxis-   Incentive spirometry  Prophylaxis of opioid  induced constipation.   wound care as per NS     2. DM - 2 - A1C - 7.4 - continue ADA, ISSC , accu checks ,   may restart home hypoglycemics on discharge     3. Hx of HTN - episodes of hypotension - HCTZ/ Irbesartan on hold   on Midodrine 5 mg TID - taper down Midodrine and consider to restart Irbesartan / HCTZ once BP allows     4. Postop urinary retention - Cotton placed , consider TOV in 2-3 days     5. Chronic AfIb - restart Toprol ER 25 mg with parameters , restart Eliquis as per primary team     6. CAD / stens - restart ASA 81 mg daily if OK with primary team , continue BB/ statins     7. Leucocytosis - no S/S of infection , no fever - trend WBC / fever     8. Hypomagnesemia/ Hypokalemia - Labs pending .   May supplement before discharge if needed .     9. Mild Hyponatremia - on Na Tabs - continue x 1wk and d/c if Na level stable DARIEL STRATTON  [id number]  76y Male extensive PMH including HTN, HLD, DM, hypothyroidism, asbestosis, CAD s/p PCI, afib on Eliquis, now presents to [facility] 1/20/22 with b/l LE weakness and difficulty ambulating since fall on Thanksgiving, urinary retention with dribbling x 2 weeks, and b/l LE sensory changes prompting ED visit, MRI found with 3 column injury of L1 vertebral body,   1/21/22 s/p T12-L1 laminectomies, T10-L4 posterior fusion  POD#7. Patient seen earlier today, doing well, c/o some incisional pain, pain is otherwise well controlled   pt seen by NSx and deemed stable for d/c to [place] today   ok to resume ASA 81 1/29/22 and Eliquis 2/4/22 from NSx standpoint per Dr Gaitan     Vital Signs Last 24 Hrs  T(C): 36.7 (28 Jan 2022 05:19), Max: 37.2 (27 Jan 2022 21:24)  T(F): 98 (28 Jan 2022 05:19), Max: 98.9 (27 Jan 2022 21:24)  HR: 85 (28 Jan 2022 05:19) (80 - 92)  BP: 113/72 (28 Jan 2022 05:19) (108/69 - 122/75)  BP(mean): --  RR: 18 (28 Jan 2022 05:19) (18 - 19)  SpO2: 94% (28 Jan 2022 05:19) (91% - 95%)  I&O's Summary              10.9   10.86 )-----------( 463      ( 27 Jan 2022 13:40 )             32.2     01-27    135  |  97<L>  |  15.4  ----------------------------<  134<H>  3.3<L>   |  22.0  |  0.46<L>    Ca    7.8<L>      27 Jan 2022 13:40  Phos  3.0     01-27  Mg     1.5     01-27    Physical exam:  GENERAL: NAD, well-groomed  HEAD: Atraumatic, normocephalic  [place] COMA SCORE: E- 4 V- 5 M- 6=15  MENTAL STATUS: AAO x3; Appropriately conversant without aphasia; following commands  CRANIAL NERVES: PERRL. EOMI without nystagmus. Facial sensation intact V1-3 distribution b/l. Face symmetric w/ normal eye closure and smile, tongue midline  MOTOR: Limited by pain, but with encouragement, strength is grossly intact and 5/5 b/l upper and 4-3/5 lower extremities w repeated attempts   SENSATION: grossly intact to light touch all extremities  SPINE: Incision c/d/i. New dressing placed       Medical/ hospitalist recommendations:  1. L1 fracture - s/p  1/21/22 s/p T12-L1 laminectomies, T10-L4 posterior fusion .   POD#7-   PT/OT/pain mgmt  DVT prophylaxis-   Incentive spirometry  Prophylaxis of opioid  induced constipation.   wound care as per NS     2. DM - 2 - A1C - 7.4 - continue ADA, ISSC , accu checks ,   may restart home hypoglycemics on discharge     3. Hx of HTN - episodes of hypotension - HCTZ/ Irbesartan on hold   on Midodrine 5 mg TID - taper down Midodrine and consider to restart Irbesartan / HCTZ once BP allows     4. Postop urinary retention - Cotton placed , consider TOV in 2-3 days     5. Chronic AfIb - restart Toprol ER 25 mg with parameters , restart Eliquis as per primary team     6. CAD / stens - restart ASA 81 mg daily if OK with primary team , continue BB/ statins     7. Leucocytosis - no S/S of infection , no fever - trend WBC / fever     8. Hypomagnesemia/ Hypokalemia - Labs pending .   May supplement before discharge if needed .     9. Mild Hyponatremia - on Na Tabs - continue x 1wk and d/c if Na level stable

## 2022-01-28 NOTE — DISCHARGE NOTE PROVIDER - NSDCMRMEDTOKEN_GEN_ALL_CORE_FT
acetaminophen 325 mg oral tablet: 2 tab(s) orally every 6 hours, As needed, Temp greater or equal to 38C (100.4F), Mild Pain (1 - 3)  ascorbic acid 500 mg oral tablet: 1 tab(s) orally once a day  atorvastatin 20 mg oral tablet: 1 tab(s) orally once a day  Dextrose 5% in Water intravenous solution: 1000 milliliter(s) intravenous   Dextrose 5% in Water intravenous solution: 1000 milliliter(s) intravenous   Eliquis 5 mg oral tablet: 1 tab(s) orally 2 times a day  enoxaparin: 40 milligram(s) subcutaneous in the morning and at bedtime  hydroCHLOROthiazide 25 mg oral tablet: 1 tab(s) orally once a day  insulin lispro 100 units/mL injectable solution: ISS  irbesartan 300 mg oral tablet: 1 tab(s) orally once a day (at bedtime)  Jardiance 25 mg oral tablet: 1 tab(s) orally once a day (in the morning)  levothyroxine 50 mcg (0.05 mg) oral tablet: 1 tab(s) orally once a day  metFORMIN 500 mg oral tablet: orally 2 times a day  Metoprolol Succinate ER 25 mg oral tablet, extended release: 1 tab(s) orally once a day  midodrine 5 mg oral tablet: 1 tab(s) orally every 8 hours  Multiple Vitamins with Minerals oral tablet: 1 tab(s) orally once a day  nystatin 100,000 units/g topical powder: 1 application topically 2 times a day  oxyCODONE 5 mg oral tablet: 1 tab(s) orally every 4 hours, As needed, Moderate Pain (4 - 6)  polyethylene glycol 3350 oral powder for reconstitution: 17 gram(s) orally once a day  senna oral tablet: 2 tab(s) orally once a day (at bedtime)  sodium chloride 1 g oral tablet: 1 tab(s) orally every 12 hours   acetaminophen 325 mg oral tablet: 2 tab(s) orally every 6 hours, As needed, Temp greater or equal to 38C (100.4F), Mild Pain (1 - 3)  ascorbic acid 500 mg oral tablet: 1 tab(s) orally once a day  aspirin 81 mg oral delayed release tablet: 1 tab(s) orally once a day   atorvastatin 20 mg oral tablet: 1 tab(s) orally once a day  Eliquis 5 mg oral tablet: 1 tab(s) orally 2 times a day staring 1/29/22 from NSx standpoint   insulin lispro 100 units/mL injectable solution: ISS  Jardiance 25 mg oral tablet: 1 tab(s) orally once a day (in the morning)  levothyroxine 50 mcg (0.05 mg) oral tablet: 1 tab(s) orally once a day  metFORMIN 500 mg oral tablet: orally 2 times a day  Metoprolol Succinate ER 25 mg oral tablet, extended release: 1 tab(s) orally once a day  midodrine 5 mg oral tablet: 1 tab(s) orally every 8 hours  titrate off   Multiple Vitamins with Minerals oral tablet: 1 tab(s) orally once a day  nystatin 100,000 units/g topical powder: 1 application topically 2 times a day  oxyCODONE 5 mg oral tablet: 1 tab(s) orally every 4 hours, As needed, Moderate Pain (4 - 6)  polyethylene glycol 3350 oral powder for reconstitution: 17 gram(s) orally once a day  senna oral tablet: 2 tab(s) orally once a day (at bedtime)  sodium chloride 1 g oral tablet: 1 tab(s) orally every 12 hours   acetaminophen 325 mg oral tablet: 2 tab(s) orally every 6 hours, As needed, Temp greater or equal to 38C (100.4F), Mild Pain (1 - 3)  ascorbic acid 500 mg oral tablet: 1 tab(s) orally once a day  aspirin 81 mg oral delayed release tablet: 1 tab(s) orally once a day resume on 1/29/22  atorvastatin 20 mg oral tablet: 1 tab(s) orally once a day  Eliquis 5 mg oral tablet: 1 tab(s) orally 2 times a day   resume on 2/4/22 from NSx standpoint   insulin lispro 100 units/mL injectable solution: ISS  Jardiance 25 mg oral tablet: 1 tab(s) orally once a day (in the morning)  levothyroxine 50 mcg (0.05 mg) oral tablet: 1 tab(s) orally once a day  metFORMIN 500 mg oral tablet: orally 2 times a day  Metoprolol Succinate ER 25 mg oral tablet, extended release: 1 tab(s) orally once a day  midodrine 5 mg oral tablet: 1 tab(s) orally every 8 hours  titrate off   Multiple Vitamins with Minerals oral tablet: 1 tab(s) orally once a day  nystatin 100,000 units/g topical powder: 1 application topically 2 times a day  oxyCODONE 5 mg oral tablet: 1 tab(s) orally every 4 hours, As needed, Moderate Pain (4 - 6)  polyethylene glycol 3350 oral powder for reconstitution: 17 gram(s) orally once a day  senna oral tablet: 2 tab(s) orally once a day (at bedtime)  sodium chloride 1 g oral tablet: 1 tab(s) orally every 12 hours

## 2022-01-28 NOTE — DISCHARGE NOTE PROVIDER - NSDCFUADDAPPT_GEN_ALL_CORE_FT
Medical/ hospitalist recommendations:  1. L1 fracture - s/p  1/21/22 s/p T12-L1 laminectomies, T10-L4 posterior fusion .   POD#7-   PT/OT/pain mgmt  DVT prophylaxis-   Incentive spirometry  Prophylaxis of opioid  induced constipation.   wound care as per NS   2. DM - 2 - A1C - 7.4 - continue ADA, ISSC , accu checks ,   may restart home hypoglycemics on discharge   3. Hx of HTN - episodes of hypotension - HCTZ/ Irbesartan on hold   on Midodrine 5 mg TID - taper down Midodrine and consider to restart Irbesartan / HCTZ once BP allows   4. Postop urinary retention - Cotton placed , consider TOV in 2-3 days   5. Chronic AfIb - restart Toprol ER 25 mg with parameters , restart Eliquis as per primary team   6. CAD / stens - restart ASA 81 mg daily if OK with primary team , continue BB/ statins   7. Leucocytosis - no S/S of infection , no fever - trend WBC / fever   8. Hypomagnesemia/ Hypokalemia - Labs pending .   May supplement before discharge if needed .   9. Mild Hyponatremia - on Na Tabs - continue x 1wk and d/c if Na level stable

## 2022-01-28 NOTE — DISCHARGE NOTE PROVIDER - NSDCCPCAREPLAN_GEN_ALL_CORE_FT
PRINCIPAL DISCHARGE DIAGNOSIS  Diagnosis: Compression fracture of L1 vertebra  Assessment and Plan of Treatment:

## 2022-01-28 NOTE — PROGRESS NOTE ADULT - ASSESSMENT
76y Male extensive PMH including HTN, HLD, DM- 2 , hypothyroidism, asbestosis, CAD s/p PCI, afib on Eliquis,  presents to [facility] 1/20/22 with b/l LE weakness and difficulty ambulating since fall on Thanksgiving, urinary retention with dribbling x 2 weeks, and b/l LE sensory changes prompting ED visit, MRI found with 3 column injury of L1 vertebral body, 1/21/22 s/p T12-L1 laminectomies, T10-L4 posterior fusion .   POD#7. Medicine called to clear patient for discharge     1. L1 fracture - s/p  1/21/22 s/p T12-L1 laminectomies, T10-L4 posterior fusion .   POD#7-   PT/OT/pain mgmt  DVT prophylaxis-   Incentive spirometry  Prophylaxis of opioid  induced constipation.   wound care as per NS       2. DM - 2 - A1C - 7.4 - continue ADA, ISSC , accu checks ,   may restart home hypoglycemics on discharge     3. Hx of HTN - episodes of hypotension - HCTZ/ Irbesartan on hold   on Midodrine 5 mg TID - taper down Midodrine and consider to restart Irbesartan / HCTZ once BP   allows     4. Postop urinary retention - Cotton placed , consider TOV in 2-3 days     5. Chronic AfIb - restart Toprol ER 25 mg with parameters , restart Eliquis as per   primary team     6. CAD / stens - restart ASA 81 mg daily if OK with primary team ,   continue BB/ statins     7. Leucocytosis - no S/S of infection , no fever - trend WBC / fever     8. Hypomagnesemia/ Hypokalemia - Labs pending .   May supplement before discharge if needed .     9. Mild Hyponatremia - on Na Tabs - continue x 1wk and d/c if Na level stable      Dispo plan is Rehab   .

## 2022-01-29 RX ORDER — ASPIRIN/CALCIUM CARB/MAGNESIUM 324 MG
1 TABLET ORAL
Qty: 30 | Refills: 0
Start: 2022-01-29 | End: 2022-02-27

## 2022-01-29 RX ORDER — APIXABAN 2.5 MG/1
1 TABLET, FILM COATED ORAL
Qty: 0 | Refills: 0 | DISCHARGE
Start: 2022-01-29

## 2022-02-04 RX ORDER — APIXABAN 2.5 MG/1
1 TABLET, FILM COATED ORAL
Qty: 0 | Refills: 0 | DISCHARGE
Start: 2022-02-04

## 2022-02-07 ENCOUNTER — APPOINTMENT (OUTPATIENT)
Dept: NEUROSURGERY | Facility: CLINIC | Age: 77
End: 2022-02-07
Payer: MEDICARE

## 2022-02-07 PROCEDURE — 99024 POSTOP FOLLOW-UP VISIT: CPT

## 2022-02-07 NOTE — REVIEW OF SYSTEMS
[As Noted in HPI] : as noted in HPI [Leg Weakness] : leg weakness [Difficulty Walking] : difficulty walking [Inability to Walk] : inability to walk [Negative] : Heme/Lymph [Feeling Tired] : not feeling tired [Depression] : no depression [Numbness] : no numbness [Tingling] : no tingling [FreeTextEntry8] : urinary catheter in place.

## 2022-02-07 NOTE — PHYSICAL EXAM
[General Appearance - Alert] : alert [General Appearance - In No Acute Distress] : in no acute distress [Longitudinal] : longitudinal [Clean] : clean [Dry] : dry [Intact] : intact [Serous] : exhibiting serous drainage [Lower Portion] : lower portion [Tender] : tender [Normal Skin Turgor] : skin turgor was normal [Oriented To Time, Place, And Person] : oriented to person, place, and time [Person] : oriented to person [Place] : oriented to place [Time] : oriented to time [Short Term Intact] : short term memory intact [Fluency] : fluency intact [Comprehension] : comprehension intact [Cranial Nerves Optic (II)] : visual acuity intact bilaterally,  pupils equal round and reactive to light [Cranial Nerves Oculomotor (III)] : extraocular motion intact [Cranial Nerves Trigeminal (V)] : facial sensation intact symmetrically [Cranial Nerves Facial (VII)] : face symmetrical [Cranial Nerves Glossopharyngeal (IX)] : tongue and palate midline [Cranial Nerves Accessory (XI - Cranial And Spinal)] : head turning and shoulder shrug symmetric [Cranial Nerves Hypoglossal (XII)] : there was no tongue deviation with protrusion [Motor Tone] : muscle tone was normal in all four extremities [Involuntary Movements] : no involuntary movements were seen [4] : L3 quadriceps 4/5 [5] : S1 toe walking 5/5 [Sensation Tactile Decrease] : light touch was intact [Sensation Pain / Temperature Decrease] : pain and temperature was intact [Non-ambulatory] : Non-ambulatory [Erythema] : not erythematous [FreeTextEntry1] : thoracic incision  [Over the Past 2 Weeks, Have You Felt Down, Depressed, or Hopeless?] : 1.) Over the past 2 weeks, have you felt down, depressed, or hopeless? No [Over the Past 2 Weeks, Have You Felt Little Interest or Pleasure Doing Things?] : 2.) Over the past 2 weeks, have you felt little interest or pleasure doing things? No [FreeTextEntry8] : presents in a stretcher.

## 2022-02-07 NOTE — REASON FOR VISIT
[Spouse] : spouse [de-identified] : 1/21/22 s/p T12-L1 laminectomies, T10-L4 posterior fusion  [de-identified] : Hospital Course: \par Discharge Date	28-Jan-2022 \par Admission Date	20-Jan-2022 09:49 \par Reason for Admission	L1 VB unstable fracture \par Hospital Course	 \par DARIEL STRATTON \par [id number] \par 76y Male extensive PMH including HTN, HLD, DM, hypothyroidism, asbestosis, CAD \par s/p PCI, afib on Eliquis, now presents to [facility] 1/20/22 with b/l LE weakness and \par difficulty ambulating since fall on Thanksgiving, urinary retention with \par dribbling x 2 weeks, and b/l LE sensory changes prompting ED visit, MRI found \par with 3 column injury of L1 vertebral body, \par 1/21/22 s/p T12-L1 laminectomies, T10-L4 posterior fusion \par POD#7. Patient seen earlier today, doing well, c/o some incisional pain, pain \par is otherwise well controlled \par pt seen by NSx and deemed stable for d/c to [facility] today \par ok to resume ASA 81 1/29/22 and Eliquis 2/4/22 from NSx standpoint per  \palma Gaitan \par Patient presents for post op visit and removal of staples.  He presents in a stretcher accompanied by his wife.  He is wearing the TLSO brace which he states is uncomfortable.  He is currently at the [facility].  Patient endorses mild thoracic pain when lying on his back prolonged periods of time.  Pain intensity 5/10.  He endorses minimal lower extremity paresthesias.  He has a urinary catheter in place.  Per patient, there are working with him at the facility with physical therapy.  His incision is dry and intact.  The distal aspect of incision has scant serosanguineous drainage. It does have any any evidence of infection. Patient is kyphotic which exacerbates his back pain when wearing brace.  Denies any fever or chills.

## 2022-02-07 NOTE — ASSESSMENT
[FreeTextEntry1] : Mr. Dumont presents with above history.  Patient is doing well from post operative perspective. \par He is working on improving lower extremity strength.\par Plan:\par TLSO brace when OOB, brief periods without for wound check.  \par Maintain fall precautions.\par Dressing to the distal aspect to monitor drainage.\par Assess vital signs for any clinical changes.\par Turn and position to avoid skin breakdown.\par Follow up in one month \par Patient has been given an opportunity to ask and have their questions answered to their satisfaction.\par Patient knows to call the office if there are any new or worsening symptoms.\par \par

## 2022-02-11 ENCOUNTER — NON-APPOINTMENT (OUTPATIENT)
Age: 77
End: 2022-02-11

## 2022-02-24 ENCOUNTER — INPATIENT (INPATIENT)
Facility: HOSPITAL | Age: 77
LOS: 5 days | Discharge: HOME | DRG: 559 | End: 2022-03-02
Attending: NEUROLOGICAL SURGERY | Admitting: NEUROLOGICAL SURGERY
Payer: MEDICARE

## 2022-02-24 ENCOUNTER — APPOINTMENT (OUTPATIENT)
Dept: NEUROSURGERY | Facility: CLINIC | Age: 77
End: 2022-02-24
Payer: MEDICARE

## 2022-02-24 VITALS
HEART RATE: 93 BPM | SYSTOLIC BLOOD PRESSURE: 102 MMHG | DIASTOLIC BLOOD PRESSURE: 99 MMHG | HEIGHT: 75 IN | TEMPERATURE: 97.5 F

## 2022-02-24 VITALS
HEIGHT: 75 IN | OXYGEN SATURATION: 96 % | TEMPERATURE: 97 F | SYSTOLIC BLOOD PRESSURE: 119 MMHG | HEART RATE: 98 BPM | WEIGHT: 179.9 LBS | DIASTOLIC BLOOD PRESSURE: 76 MMHG | RESPIRATION RATE: 22 BRPM

## 2022-02-24 DIAGNOSIS — Z90.12 ACQUIRED ABSENCE OF LEFT BREAST AND NIPPLE: Chronic | ICD-10-CM

## 2022-02-24 DIAGNOSIS — Z98.89 OTHER SPECIFIED POSTPROCEDURAL STATES: Chronic | ICD-10-CM

## 2022-02-24 DIAGNOSIS — T81.89XA OTHER COMPLICATIONS OF PROCEDURES, NOT ELSEWHERE CLASSIFIED, INITIAL ENCOUNTER: ICD-10-CM

## 2022-02-24 DIAGNOSIS — Z90.49 ACQUIRED ABSENCE OF OTHER SPECIFIED PARTS OF DIGESTIVE TRACT: Chronic | ICD-10-CM

## 2022-02-24 DIAGNOSIS — Z09 ENCOUNTER FOR FOLLOW-UP EXAMINATION AFTER COMPLETED TREATMENT FOR CONDITIONS OTHER THAN MALIGNANT NEOPLASM: Chronic | ICD-10-CM

## 2022-02-24 LAB
ALBUMIN SERPL ELPH-MCNC: 2.8 G/DL — LOW (ref 3.3–5.2)
ALP SERPL-CCNC: 103 U/L — SIGNIFICANT CHANGE UP (ref 40–120)
ALT FLD-CCNC: 12 U/L — SIGNIFICANT CHANGE UP
ANION GAP SERPL CALC-SCNC: 14 MMOL/L — SIGNIFICANT CHANGE UP (ref 5–17)
ANION GAP SERPL CALC-SCNC: 9 MMOL/L — SIGNIFICANT CHANGE UP (ref 5–17)
APPEARANCE UR: ABNORMAL
AST SERPL-CCNC: 18 U/L — SIGNIFICANT CHANGE UP
BACTERIA # UR AUTO: ABNORMAL
BASOPHILS # BLD AUTO: 0.04 K/UL — SIGNIFICANT CHANGE UP (ref 0–0.2)
BASOPHILS # BLD AUTO: 0.05 K/UL — SIGNIFICANT CHANGE UP (ref 0–0.2)
BASOPHILS NFR BLD AUTO: 0.5 % — SIGNIFICANT CHANGE UP (ref 0–2)
BASOPHILS NFR BLD AUTO: 0.6 % — SIGNIFICANT CHANGE UP (ref 0–2)
BILIRUB SERPL-MCNC: 0.6 MG/DL — SIGNIFICANT CHANGE UP (ref 0.4–2)
BILIRUB UR-MCNC: ABNORMAL
BUN SERPL-MCNC: 11.9 MG/DL — SIGNIFICANT CHANGE UP (ref 8–20)
BUN SERPL-MCNC: 12.4 MG/DL — SIGNIFICANT CHANGE UP (ref 8–20)
CALCIUM SERPL-MCNC: 8.4 MG/DL — LOW (ref 8.6–10.2)
CALCIUM SERPL-MCNC: 8.4 MG/DL — LOW (ref 8.6–10.2)
CHLORIDE SERPL-SCNC: 97 MMOL/L — LOW (ref 98–107)
CHLORIDE SERPL-SCNC: 98 MMOL/L — SIGNIFICANT CHANGE UP (ref 98–107)
CO2 SERPL-SCNC: 24 MMOL/L — SIGNIFICANT CHANGE UP (ref 22–29)
CO2 SERPL-SCNC: 28 MMOL/L — SIGNIFICANT CHANGE UP (ref 22–29)
COLOR SPEC: ABNORMAL
CREAT SERPL-MCNC: 0.38 MG/DL — LOW (ref 0.5–1.3)
CREAT SERPL-MCNC: 0.41 MG/DL — LOW (ref 0.5–1.3)
CRP SERPL-MCNC: 11 MG/L — HIGH
DIFF PNL FLD: ABNORMAL
EOSINOPHIL # BLD AUTO: 0.13 K/UL — SIGNIFICANT CHANGE UP (ref 0–0.5)
EOSINOPHIL # BLD AUTO: 0.19 K/UL — SIGNIFICANT CHANGE UP (ref 0–0.5)
EOSINOPHIL NFR BLD AUTO: 1.7 % — SIGNIFICANT CHANGE UP (ref 0–6)
EOSINOPHIL NFR BLD AUTO: 2.3 % — SIGNIFICANT CHANGE UP (ref 0–6)
EPI CELLS # UR: SIGNIFICANT CHANGE UP
ERYTHROCYTE [SEDIMENTATION RATE] IN BLOOD: 39 MM/HR — HIGH (ref 0–20)
GLUCOSE BLDC GLUCOMTR-MCNC: 149 MG/DL — HIGH (ref 70–99)
GLUCOSE SERPL-MCNC: 137 MG/DL — HIGH (ref 70–99)
GLUCOSE SERPL-MCNC: 143 MG/DL — HIGH (ref 70–99)
GLUCOSE UR QL: NEGATIVE — SIGNIFICANT CHANGE UP
HCT VFR BLD CALC: 33.3 % — LOW (ref 39–50)
HCT VFR BLD CALC: 39.4 % — SIGNIFICANT CHANGE UP (ref 39–50)
HGB BLD-MCNC: 10.8 G/DL — LOW (ref 13–17)
HGB BLD-MCNC: 12.9 G/DL — LOW (ref 13–17)
IMM GRANULOCYTES NFR BLD AUTO: 1.2 % — SIGNIFICANT CHANGE UP (ref 0–1.5)
IMM GRANULOCYTES NFR BLD AUTO: 1.7 % — HIGH (ref 0–1.5)
KETONES UR-MCNC: ABNORMAL
LEUKOCYTE ESTERASE UR-ACNC: ABNORMAL
LYMPHOCYTES # BLD AUTO: 1.58 K/UL — SIGNIFICANT CHANGE UP (ref 1–3.3)
LYMPHOCYTES # BLD AUTO: 1.59 K/UL — SIGNIFICANT CHANGE UP (ref 1–3.3)
LYMPHOCYTES # BLD AUTO: 19.5 % — SIGNIFICANT CHANGE UP (ref 13–44)
LYMPHOCYTES # BLD AUTO: 20.4 % — SIGNIFICANT CHANGE UP (ref 13–44)
MAGNESIUM SERPL-MCNC: 1.8 MG/DL — SIGNIFICANT CHANGE UP (ref 1.6–2.6)
MAGNESIUM SERPL-MCNC: 1.8 MG/DL — SIGNIFICANT CHANGE UP (ref 1.6–2.6)
MCHC RBC-ENTMCNC: 30.3 PG — SIGNIFICANT CHANGE UP (ref 27–34)
MCHC RBC-ENTMCNC: 30.7 PG — SIGNIFICANT CHANGE UP (ref 27–34)
MCHC RBC-ENTMCNC: 32.4 GM/DL — SIGNIFICANT CHANGE UP (ref 32–36)
MCHC RBC-ENTMCNC: 32.7 GM/DL — SIGNIFICANT CHANGE UP (ref 32–36)
MCV RBC AUTO: 93.5 FL — SIGNIFICANT CHANGE UP (ref 80–100)
MCV RBC AUTO: 93.8 FL — SIGNIFICANT CHANGE UP (ref 80–100)
MONOCYTES # BLD AUTO: 0.76 K/UL — SIGNIFICANT CHANGE UP (ref 0–0.9)
MONOCYTES # BLD AUTO: 0.89 K/UL — SIGNIFICANT CHANGE UP (ref 0–0.9)
MONOCYTES NFR BLD AUTO: 11 % — SIGNIFICANT CHANGE UP (ref 2–14)
MONOCYTES NFR BLD AUTO: 9.7 % — SIGNIFICANT CHANGE UP (ref 2–14)
NEUTROPHILS # BLD AUTO: 5.14 K/UL — SIGNIFICANT CHANGE UP (ref 1.8–7.4)
NEUTROPHILS # BLD AUTO: 5.32 K/UL — SIGNIFICANT CHANGE UP (ref 1.8–7.4)
NEUTROPHILS NFR BLD AUTO: 65.5 % — SIGNIFICANT CHANGE UP (ref 43–77)
NEUTROPHILS NFR BLD AUTO: 65.9 % — SIGNIFICANT CHANGE UP (ref 43–77)
NITRITE UR-MCNC: POSITIVE
PH UR: 6.5 — SIGNIFICANT CHANGE UP (ref 5–8)
PHOSPHATE SERPL-MCNC: 2.9 MG/DL — SIGNIFICANT CHANGE UP (ref 2.4–4.7)
PLATELET # BLD AUTO: 507 K/UL — HIGH (ref 150–400)
PLATELET # BLD AUTO: 551 K/UL — HIGH (ref 150–400)
POTASSIUM SERPL-MCNC: 3.8 MMOL/L — SIGNIFICANT CHANGE UP (ref 3.5–5.3)
POTASSIUM SERPL-MCNC: 4.3 MMOL/L — SIGNIFICANT CHANGE UP (ref 3.5–5.3)
POTASSIUM SERPL-SCNC: 3.8 MMOL/L — SIGNIFICANT CHANGE UP (ref 3.5–5.3)
POTASSIUM SERPL-SCNC: 4.3 MMOL/L — SIGNIFICANT CHANGE UP (ref 3.5–5.3)
PROT SERPL-MCNC: 5.9 G/DL — LOW (ref 6.6–8.7)
PROT UR-MCNC: 30 MG/DL
RBC # BLD: 3.56 M/UL — LOW (ref 4.2–5.8)
RBC # BLD: 4.2 M/UL — SIGNIFICANT CHANGE UP (ref 4.2–5.8)
RBC # FLD: 15.2 % — HIGH (ref 10.3–14.5)
RBC # FLD: 15.4 % — HIGH (ref 10.3–14.5)
RBC CASTS # UR COMP ASSIST: >50 /HPF (ref 0–4)
SODIUM SERPL-SCNC: 135 MMOL/L — SIGNIFICANT CHANGE UP (ref 135–145)
SODIUM SERPL-SCNC: 135 MMOL/L — SIGNIFICANT CHANGE UP (ref 135–145)
SP GR SPEC: 1.02 — SIGNIFICANT CHANGE UP (ref 1.01–1.02)
UROBILINOGEN FLD QL: 4
WBC # BLD: 7.8 K/UL — SIGNIFICANT CHANGE UP (ref 3.8–10.5)
WBC # BLD: 8.12 K/UL — SIGNIFICANT CHANGE UP (ref 3.8–10.5)
WBC # FLD AUTO: 7.8 K/UL — SIGNIFICANT CHANGE UP (ref 3.8–10.5)
WBC # FLD AUTO: 8.12 K/UL — SIGNIFICANT CHANGE UP (ref 3.8–10.5)
WBC UR QL: ABNORMAL /HPF (ref 0–5)

## 2022-02-24 PROCEDURE — 72128 CT CHEST SPINE W/O DYE: CPT | Mod: 26,MA

## 2022-02-24 PROCEDURE — 72131 CT LUMBAR SPINE W/O DYE: CPT | Mod: 26,MA

## 2022-02-24 PROCEDURE — 99285 EMERGENCY DEPT VISIT HI MDM: CPT | Mod: CS,GC

## 2022-02-24 PROCEDURE — 99024 POSTOP FOLLOW-UP VISIT: CPT

## 2022-02-24 RX ORDER — INSULIN LISPRO 100/ML
VIAL (ML) SUBCUTANEOUS
Refills: 0 | Status: DISCONTINUED | OUTPATIENT
Start: 2022-02-24 | End: 2022-03-02

## 2022-02-24 RX ORDER — SENNA PLUS 8.6 MG/1
2 TABLET ORAL AT BEDTIME
Refills: 0 | Status: DISCONTINUED | OUTPATIENT
Start: 2022-02-24 | End: 2022-03-02

## 2022-02-24 RX ORDER — GLUCAGON INJECTION, SOLUTION 0.5 MG/.1ML
1 INJECTION, SOLUTION SUBCUTANEOUS ONCE
Refills: 0 | Status: DISCONTINUED | OUTPATIENT
Start: 2022-02-24 | End: 2022-03-02

## 2022-02-24 RX ORDER — APIXABAN 2.5 MG/1
5 TABLET, FILM COATED ORAL
Refills: 0 | Status: DISCONTINUED | OUTPATIENT
Start: 2022-02-24 | End: 2022-03-02

## 2022-02-24 RX ORDER — MULTIVIT-MIN/FERROUS GLUCONATE 9 MG/15 ML
1 LIQUID (ML) ORAL DAILY
Refills: 0 | Status: DISCONTINUED | OUTPATIENT
Start: 2022-02-24 | End: 2022-03-02

## 2022-02-24 RX ORDER — DEXTROSE 50 % IN WATER 50 %
15 SYRINGE (ML) INTRAVENOUS ONCE
Refills: 0 | Status: DISCONTINUED | OUTPATIENT
Start: 2022-02-24 | End: 2022-03-02

## 2022-02-24 RX ORDER — ATORVASTATIN CALCIUM 80 MG/1
20 TABLET, FILM COATED ORAL AT BEDTIME
Refills: 0 | Status: DISCONTINUED | OUTPATIENT
Start: 2022-02-24 | End: 2022-03-02

## 2022-02-24 RX ORDER — DEXTROSE 50 % IN WATER 50 %
25 SYRINGE (ML) INTRAVENOUS ONCE
Refills: 0 | Status: DISCONTINUED | OUTPATIENT
Start: 2022-02-24 | End: 2022-03-02

## 2022-02-24 RX ORDER — SODIUM CHLORIDE 9 MG/ML
1000 INJECTION, SOLUTION INTRAVENOUS
Refills: 0 | Status: DISCONTINUED | OUTPATIENT
Start: 2022-02-24 | End: 2022-03-02

## 2022-02-24 RX ORDER — DEXTROSE 50 % IN WATER 50 %
12.5 SYRINGE (ML) INTRAVENOUS ONCE
Refills: 0 | Status: DISCONTINUED | OUTPATIENT
Start: 2022-02-24 | End: 2022-03-02

## 2022-02-24 RX ORDER — ASPIRIN/CALCIUM CARB/MAGNESIUM 324 MG
81 TABLET ORAL DAILY
Refills: 0 | Status: DISCONTINUED | OUTPATIENT
Start: 2022-02-24 | End: 2022-03-02

## 2022-02-24 RX ORDER — POLYETHYLENE GLYCOL 3350 17 G/17G
17 POWDER, FOR SOLUTION ORAL DAILY
Refills: 0 | Status: DISCONTINUED | OUTPATIENT
Start: 2022-02-24 | End: 2022-03-02

## 2022-02-24 RX ORDER — LEVOTHYROXINE SODIUM 125 MCG
50 TABLET ORAL DAILY
Refills: 0 | Status: DISCONTINUED | OUTPATIENT
Start: 2022-02-24 | End: 2022-03-02

## 2022-02-24 RX ORDER — ACETAMINOPHEN 500 MG
650 TABLET ORAL EVERY 6 HOURS
Refills: 0 | Status: DISCONTINUED | OUTPATIENT
Start: 2022-02-24 | End: 2022-03-02

## 2022-02-24 RX ORDER — METOPROLOL TARTRATE 50 MG
25 TABLET ORAL DAILY
Refills: 0 | Status: DISCONTINUED | OUTPATIENT
Start: 2022-02-24 | End: 2022-02-28

## 2022-02-24 RX ADMIN — Medication 650 MILLIGRAM(S): at 04:18

## 2022-02-24 RX ADMIN — Medication 50 MICROGRAM(S): at 21:12

## 2022-02-24 RX ADMIN — Medication 1 TABLET(S): at 21:12

## 2022-02-24 RX ADMIN — APIXABAN 5 MILLIGRAM(S): 2.5 TABLET, FILM COATED ORAL at 21:11

## 2022-02-24 RX ADMIN — SENNA PLUS 2 TABLET(S): 8.6 TABLET ORAL at 21:12

## 2022-02-24 RX ADMIN — Medication 650 MILLIGRAM(S): at 21:13

## 2022-02-24 RX ADMIN — Medication 25 MILLIGRAM(S): at 21:12

## 2022-02-24 RX ADMIN — ATORVASTATIN CALCIUM 20 MILLIGRAM(S): 80 TABLET, FILM COATED ORAL at 21:11

## 2022-02-24 RX ADMIN — Medication 81 MILLIGRAM(S): at 21:11

## 2022-02-24 NOTE — H&P ADULT - NSHPPHYSICALEXAM_GEN_ALL_CORE
PHYSICAL EXAM:  GENERAL: NAD, well-groomed  HEAD:  Atraumatic, normocephalic  WOUND: Spine incision erythematous with bulging at superior pole, no signs of pus/drainage   EYES: Conjunctiva and sclera clear; corneal reflex intact  ENMT: No tonsillar erythema, exudates, or enlargement  NECK: Supple, no JVD, normal thyroid  [place] COMA SCORE: E-4 V-4 M-6 = 14  MENTAL STATUS: AAO x3; Awake; Opens eyes spontaneously; Appropriately conversant without aphasia; following simple commands  CRANIAL NERVES: PERRL. EOMI without nystagmus. Face symmetric w/ normal eye closure and smile, tongue midline.   MOTOR: strength 5/5 b/l upper extremities, RLE 5/5, LLE HF 3/5 KE 4+/5 DF/PF 5/5  SENSATION: grossly intact to light touch all extremities  SKIN: Warm, dry; no rashes or lesions

## 2022-02-24 NOTE — ED PROVIDER NOTE - PROGRESS NOTE DETAILS
neurosurgery consulted for CT results JK - patient stable, VSS, resting comfortably. Admitted to neurosurgery for CT findings: new fluid collection surround pedicle screws at T10-T12 concerning for infection. Patient ready and agreeable to admission.

## 2022-02-24 NOTE — REVIEW OF SYSTEMS
[Feeling Tired] : not feeling tired [Depression] : no depression [As Noted in HPI] : as noted in HPI [Leg Weakness] : leg weakness [Numbness] : no numbness [Tingling] : no tingling [Difficulty Walking] : difficulty walking [Inability to Walk] : inability to walk [Negative] : Heme/Lymph [FreeTextEntry8] : urinary catheter in place.

## 2022-02-24 NOTE — ED PROVIDER NOTE - CLINICAL SUMMARY MEDICAL DECISION MAKING FREE TEXT BOX
75 yo male with recent back surgery presenting with possible hardware complication. Will obtain CT Thoracic/Lumbar spine, monitor and reassess.

## 2022-02-24 NOTE — H&P ADULT - HISTORY OF PRESENT ILLNESS
Patient is a 76y old  Male who presents with a chief complaint of hardware complication.    HPI: Patient is a 77 y/o M extensive PMH, known to the neurosurgery service, presenting to [facility] ED s/p spinal surgery 1/21/22. Patient underwent T12-L1 laminectomy & T10-L4 fusion after MRI revealed a 3 column injury of L1 VB. Patient was discharged on 1/28/22 to acute rehab. Patient went for a follow up visit with Dr. Gaitan in his office this afternoon and was recommended to come to the ED for a CT T/L spine to R/O hardware complication after concerns for erythema/bulging in area of incision. Patient denies any new symptoms. Denies fever/chills, incisional pain, back pain, new weakness, numbness/tingling, N/V/D, new bowel/bladder dysfunction.   Patient is a 76y old  Male who presents with a chief complaint of hardware complication.    HPI: Patient is a 75 y/o M extensive PMH, known to the neurosurgery service, presenting to [facility] ED s/p spinal surgery 1/21/22. Patient underwent T12-L1 laminectomy & T10-L4 fusion after MRI revealed a 3 column injury of L1 VB. Patient was discharged on 1/28/22 to acute rehab. Patient has been ambulating with a rolling walker at rehab. Patient went for a follow up visit with Dr. Gaitan in his office this afternoon and was recommended to come to the ED for a CT T/L spine to R/O hardware complication after concerns for erythema/bulging in area of incision. Patient denies any new symptoms. Denies fever/chills, incisional pain, back pain, new weakness, numbness/tingling, N/V/D, new bowel/bladder dysfunction.

## 2022-02-24 NOTE — REASON FOR VISIT
[de-identified] : 1/21/22 s/p T12-L1 laminectomies, T10-L4 posterior fusion  [de-identified] : 01/21/2022 [de-identified] : Hospital Course: \par Discharge Date	28-Jan-2022 \par Admission Date	20-Jan-2022 09:49 \par Reason for Admission	L1 VB unstable fracture \par Hospital Course	 \par DARIEL STRATTON \par [id number] \par 76y Male extensive PMH including HTN, HLD, DM, hypothyroidism, asbestosis, CAD \par s/p PCI, afib on Eliquis, now presents to [facility] 1/20/22 with b/l LE weakness and \par difficulty ambulating since fall on Thanksgiving, urinary retention with \par dribbling x 2 weeks, and b/l LE sensory changes prompting ED visit, MRI found \par with 3 column injury of L1 vertebral body, \par 1/21/22 s/p T12-L1 laminectomies, T10-L4 posterior fusion \par POD#7. Patient seen earlier today, doing well, c/o some incisional pain, pain \par is otherwise well controlled \par pt seen by NSx and deemed stable for d/c to AR today \par ok to resume ASA 81 1/29/22 and Eliquis 2/4/22 from NSx standpoint per  \palma Gaitan \par Patient presents for post op visit and removal of staples.  He presents in a stretcher accompanied by his wife.  He is wearing the TLSO brace which he states is uncomfortable.  He is currently at the [facility].  Patient endorses mild thoracic pain when lying on his back prolonged periods of time.  Pain intensity 5/10.  He endorses minimal lower extremity paresthesias.  He has a urinary catheter in place.  Per patient, there are working with him at the facility with physical therapy.  His incision is dry and intact.  The distal aspect of incision has scant serosanguineous drainage. It does have any any evidence of infection. Patient is kyphotic which exacerbates his back pain when wearing brace.  Denies any fever or chills.

## 2022-02-24 NOTE — ED ADULT TRIAGE NOTE - CHIEF COMPLAINT QUOTE
lumbar fusion performed by MD Arnie herrera 3 months prior. Presented to Neuro Surg office for follow up. hardware noted to be close to pushing through skin, area reddened. patient offering not complaints. ambulating w/o difficulty

## 2022-02-24 NOTE — ED PROVIDER NOTE - ATTENDING CONTRIBUTION TO CARE
77 yo male hx of HTN, HLD, DM, Hypothyroidism, Asbestosis, CAD s/p stents x 3, AFIB on Eliquis presents with possible hardware complications stemming from his recent surgery back in January 2022. Pt denies any complaints states that he was at Dr. Gaitan office for follow up and he saw the incision sites and was concerned about hardware displacement. no neuro deficits

## 2022-02-24 NOTE — ASSESSMENT
[FreeTextEntry1] : Mr. Dumont presents with above history.  \par Patient will be transported to [facility] for expetided CT thoracic spine to assess for hardware malfunction and wound dehiscence. \par Spoke with Wes DERAS at the the [facility] about the plan  \par Maintain fall precautions.\par \par Patient has been given an opportunity to ask and have their questions answered to their satisfaction.\par Patient knows to call the office if there are any new or worsening symptoms.\par \par

## 2022-02-24 NOTE — ED PROVIDER NOTE - OBJECTIVE STATEMENT
75 yo male hx of HTN, HLD, DM, Hypothyroidism, Asbestosis, CAD s/p stents x 3, AFIB on Eliquis presents with possible hardware complications stemming from his recent surgery back in January 2022. Patient presented to the ED on 1/19/22 complaining if b/l numbness in his feet. He had been diagnosed w a spinal fracture two months prior after falling on Thanksgiving in November. Patient was initially treated conservatively with a back brace, however, with these worsening symptoms the decision was made to operate on the patient. MRI performed in January 2022 revealed L1 compression fracture with retropulsion. He underwent T12-L1 Laminectomy, T10-L4 posterior fusion by Dr. Gaitan on 1/22/22. Patient was at followup appointment with Dr. Gaitan today where he was told there was concern for some of the surgical hardware to be pushing through the skin. Patient himself denies worsening of his symptoms. He does have indwelling velasco and reports reddening of his urine over the past few days. Denies new weakness/numbness/tingling in any of his extremities. Denies saddle anesthesia, other paresthesias. Denies fever/chills, cough, sore throat, sob, abd pain, n/v/d, urinary complaints.

## 2022-02-24 NOTE — H&P ADULT - ASSESSMENT
77 y/o M extensive PMH, known to the neurosurgery service, presenting to [facility] ED s/p spinal surgery 1/21/22. Patient underwent T12-L1 laminectomy & T10-L4 fusion after MRI revealed a 3 column injury of L1 VB. Patient went for a follow up visit with Dr. Gaitan in his office this afternoon and was recommended to come to the ED for a CT T/L spine to R/O hardware complication after concerns for erythema/bulging in area of incision. CT T/L spine reveals a new lucency around pedicle screws of T10-T12 with mild migration of pedicle screw at T10, fluid collection surrounding spinous processes of L2-L4, cannot R/O infection.    Plan:  -D/w Dr. Gaitan  -CT T/L spine reviewed   -Q4h neuro checks  -CBC w/ diff, ESR/CRP, BMP, Mg/Phos STAT  -Please obtain wedge to avoid constant pressure on incision to promote optimal wound healing  -Pain control PRN; avoid oversedation   --160  -Consistent carb renal diet  -Resume home meds  -Eliquis 5 BID for AFIB management   -ASA 81 for CAD/stents  -MISS  -Bedrest with logroll permitted   -Admit to neurosurgery floor with telemetry  -Further plan to follow in AM

## 2022-02-24 NOTE — H&P ADULT - NSHPLABSRESULTS_GEN_ALL_CORE
Vital Signs Last 24 Hrs  T(C): 36.3 (2022 13:26), Max: 36.3 (2022 13:26)  T(F): 97.4 (2022 13:26), Max: 97.4 (2022 13:26)  HR: 98 (2022 13:26) (98 - 98)  BP: 119/76 (2022 13:26) (119/76 - 119/76)  BP(mean): --  RR: 22 (2022 13:26) (22 - 22)  SpO2: 96% (2022 13:26) (96% - 96%)    LABS:                        10.8   8.12  )-----------( 507      ( 2022 17:31 )             33.3     02    135  |  98  |  12.4  ----------------------------<  137<H>  3.8   |  28.0  |  0.41<L>    Ca    8.4<L>      2022 17:31  Mg     1.8         TPro  5.9<L>  /  Alb  2.8<L>  /  TBili  0.6  /  DBili  x   /  AST  18  /  ALT  12  /  AlkPhos  103  02-      Urinalysis Basic - ( 2022 19:05 )    Color: Brown / Appearance: very cloudy / S.020 / pH: x  Gluc: x / Ketone: Small  / Bili: Small / Urobili: 4   Blood: x / Protein: 30 mg/dL / Nitrite: Positive   Leuk Esterase: Small / RBC: >50 /HPF / WBC 6-10 /HPF   Sq Epi: x / Non Sq Epi: Occasional / Bacteria: Few      RADIOLOGY & ADDITIONAL STUDIES:  < from: CT Lumbar Spine No Cont (22 @ 18:11) >    IMPRESSION:  *  STATUS POST T12-L1 LAMINECTOMY AND T10-L4 POSTERIOR FUSION  *  NEW LUCENCY SURROUNDING THE PEDICLE SCREWS AT T10-T12. MILD ASSOCIATED   MIGRATION OF THE PEDICLE SCREWS AT T10. FINDINGS ARE COMPATIBLE WITH   LOOSENING. INFECTION CANNOT BE EXCLUDED.  *  NEW FLUID COLLECTION ABOUT THE SPINOUS PROCESSES OF L2-L4.  *  INCREASING EDEMA WITHIN THE DORSAL SOFT TISSUES.  *  ENLARGING BILATERAL PLEURAL EFFUSIONS WHICH MAY BE PARTLY LOCULATED.    --- End of Report ---    NICOLE CERDA MD; Attending Radiologist  This document has been electronically signed. 2022  6:47PM    < end of copied text >

## 2022-02-24 NOTE — ED PROVIDER NOTE - PHYSICAL EXAMINATION
Gen: Well appearing in NAD  Head: NC/AT  Neck: trachea midline  Resp:  No distress  : Cotton catheter in place, reddened urine seen in catheter bag.   Ext: no deformities  Back: Thoracic/Lumbar spine areas show minimal surrounding redness at surgical incision site.   Neuro:  A&O appears non focal  Skin:  Warm and dry as visualized  Psych:  Normal affect and mood

## 2022-02-25 LAB
CULTURE RESULTS: NO GROWTH — SIGNIFICANT CHANGE UP
GLUCOSE BLDC GLUCOMTR-MCNC: 115 MG/DL — HIGH (ref 70–99)
GLUCOSE BLDC GLUCOMTR-MCNC: 132 MG/DL — HIGH (ref 70–99)
GLUCOSE BLDC GLUCOMTR-MCNC: 135 MG/DL — HIGH (ref 70–99)
GLUCOSE BLDC GLUCOMTR-MCNC: 168 MG/DL — HIGH (ref 70–99)
SARS-COV-2 RNA SPEC QL NAA+PROBE: DETECTED
SPECIMEN SOURCE: SIGNIFICANT CHANGE UP

## 2022-02-25 PROCEDURE — 71045 X-RAY EXAM CHEST 1 VIEW: CPT | Mod: 26

## 2022-02-25 RX ADMIN — APIXABAN 5 MILLIGRAM(S): 2.5 TABLET, FILM COATED ORAL at 18:13

## 2022-02-25 RX ADMIN — ATORVASTATIN CALCIUM 20 MILLIGRAM(S): 80 TABLET, FILM COATED ORAL at 22:44

## 2022-02-25 RX ADMIN — Medication 1 TABLET(S): at 14:21

## 2022-02-25 RX ADMIN — APIXABAN 5 MILLIGRAM(S): 2.5 TABLET, FILM COATED ORAL at 06:13

## 2022-02-25 RX ADMIN — Medication 81 MILLIGRAM(S): at 14:23

## 2022-02-25 RX ADMIN — SENNA PLUS 2 TABLET(S): 8.6 TABLET ORAL at 22:44

## 2022-02-25 NOTE — PROGRESS NOTE ADULT - ASSESSMENT
ASSESSMENT  76M pmh HTN, HLD, DM, Hypothyroidism, Asbestosis, CAD s/p PCI, Afib on Eliquis, s/p recent admissison for T12-L1 laminectomy, T10-L4 posterior fusion on 1/21/22, readmitted from outpatient follow up revealing surgical site associated erythema/bulging with concern for hardware complication. CT T/L spine revealed new lucency around pedicle screws of T10-12 with mild migration of pedicle screw at T10, fluid collection surrounding spinous processes of L2-L4, without any clinical signs of infection at this time.       PLAN  - case d/w team  - no acute neurosurgical intervention indicated at this time  - con't neuro checks q4  - MRI T/L spine  - wedge in bed to avoid direct incisional pressure  - Plastics to see patient tomorrow to assess wound  - pain control as needed, avoid oversedation  - CXR ordered  - NPO until official speech & swallow  - monitor WBC, fever curve  - -160  - Eliquis 5 BID for known A fib  - ASA 81 for known CAD/stents  - MISS  - Bedrest with logroll as needed ASSESSMENT  76M pmh HTN, HLD, DM, Hypothyroidism, Asbestosis, CAD s/p PCI, Afib on Eliquis, s/p recent admissison for T12-L1 laminectomy, T10-L4 posterior fusion on 1/21/22, readmitted from outpatient follow up revealing surgical site associated erythema/bulging with concern for hardware complication. CT T/L spine revealed new lucency around pedicle screws of T10-12 with mild migration of pedicle screw at T10, fluid collection surrounding spinous processes of L2-L4, without any clinical signs of infection at this time.       PLAN  - case d/w team  - no acute neurosurgical intervention indicated at this time  - con't neuro checks q4  - MRI T/L spine  - wedge in bed to avoid direct incisional pressure  - Plastics to see patient tomorrow to assess wound  - pain control as needed, avoid oversedation  - CXR ordered  - NPO until official speech & swallow  - monitor WBC, fever curve  - -160  - Eliquis 5 BID for known A fib  - SCD for DVT ppx  - ASA 81 for known CAD/stents  - MISS  - Bedrest with logroll as needed

## 2022-02-25 NOTE — SWALLOW BEDSIDE ASSESSMENT ADULT - SWALLOW EVAL: DIAGNOSIS
Oral dysphagia suspected with intake of thin fluids, otherwise WFL. Pharyngeal stage of swallow clinically unremarkable for puree, regular solids & mildly thick fluids. Pharyngeal dysphagia suspected for thin fluids: cough post swallow

## 2022-02-25 NOTE — PATIENT PROFILE ADULT - FALL HARM RISK - RISK INTERVENTIONS
Assistance OOB with selected safe patient handling equipment/Assistance with ambulation/Communicate Fall Risk and Risk Factors to all staff, patient, and family/Discuss with provider need for PT consult/Monitor gait and stability/Reinforce activity limits and safety measures with patient and family/Visual Cue: Yellow wristband/Bed in lowest position, wheels locked, appropriate side rails in place/Call bell, personal items and telephone in reach/Instruct patient to call for assistance before getting out of bed or chair/Non-slip footwear when patient is out of bed/[place] to call system/Physically safe environment - no spills, clutter or unnecessary equipment/Purposeful Proactive Rounding/Room/bathroom lighting operational, light cord in reach

## 2022-02-25 NOTE — SWALLOW BEDSIDE ASSESSMENT ADULT - COMMENTS
As per MD note: "76M pmh HTN, HLD, DM, Hypothyroidism, Asbestosis, CAD s/p PCI, Afib on Eliquis, s/p recent admissison for T12-L1 laminectomy, T10-L4 posterior fusion on 1/21/22, readmitted from outpatient follow up revealing surgical site associated erythema/bulging with concern for hardware complication. CT T/L spine revealed new lucency around pedicle screws of T10-12 with mild migration of pedicle screw at T10, fluid collection surrounding spinous processes of L2-L4, without any clinical signs of infection at this time."

## 2022-02-25 NOTE — PROGRESS NOTE ADULT - SUBJECTIVE AND OBJECTIVE BOX
HPI:  Patient is a 76y old  Male who presents with a chief complaint of hardware complication.    HPI: Patient is a 77 y/o M extensive PMH, known to the neurosurgery service, presenting to [facility] ED s/p spinal surgery 22. Patient underwent T12-L1 laminectomy & T10-L4 fusion after MRI revealed a 3 column injury of L1 VB. Patient was discharged on 22 to acute rehab. Patient has been ambulating with a rolling walker at rehab. Patient went for a follow up visit with Dr. Gaitan in his office this afternoon and was recommended to come to the ED for a CT T/L spine to R/O hardware complication after concerns for erythema/bulging in area of incision. Patient denies any new symptoms. Denies fever/chills, incisional pain, back pain, new weakness, numbness/tingling, N/V/D, new bowel/bladder dysfunction.   (2022 20:22)      INTERVAL HPI/OVERNIGHT EVENTS:  76y Male seen lying in bed choking on oatmeal. Made NPO for time being, S&S ordered, as well as chest xray to assess for aspiration. Patient otherwise complaining of b/l anterior thigh pain starting at time of examination. Denies numbness, tingling, back pain, HA, SOB, N/V, chest pain.       Vital Signs Last 24 Hrs  T(C): 36.4 (2022 08:11), Max: 36.6 (2022 23:34)  T(F): 97.5 (2022 08:11), Max: 97.8 (2022 23:34)  HR: 84 (2022 08:11) (71 - 98)  BP: 127/87 (2022 08:11) (95/61 - 145/88)  BP(mean): --  RR: 19 (2022 05:23) (19 - 22)  SpO2: 96% (2022 05:23) (96% - 96%)      PHYSICAL EXAM:  GENERAL: NAD, well-groomed, well-developed  HEAD:  Atraumatic, normocephalic  WOUND: erythema/ bony protrusions along spinal surgical site, small wound dehiscence at lower pole   CAR[place] COMA SCORE: E4 V5 M6 =15       E: 4= opens eyes spontaneously 3= to voice 2= to noxious 1= no opening       V: 5= oriented 4= confused 3= inappropriate words 2= incomprehensible sounds 1= nonverbal 1T= intubated       M: 6= follows commands 5= localizes 4= withdraws 3= flexor posturing 2= extensor posturing 1= no movement  MENTAL STATUS: AAO x3; Awake. Opens eyes spontaneously. Appropriately conversant without aphasia, following simple commands.  CRANIAL NERVES: Visual acuity normal for age, visual fields full to confrontation, PERRL. EOMI without nystagmus. Facial sensation intact V1-3 distribution b/l. Face symmetric w/ normal eye closure and smile, tongue midline. Hearing grossly intact. Speech clear.   MOTOR: RUE/LUE 5/5, RLE LLE HF 3/5, KE 4+/5, DF/PF 5/5, LLE 4+/5  SENSATION: grossly intact to light touch all extremities      LABS:                        12.9   7.80  )-----------( 551      ( 2022 21:45 )             39.4     -    135  |  97<L>  |  11.9  ----------------------------<  143<H>  4.3   |  24.0  |  0.38<L>    Ca    8.4<L>      2022 21:45  Phos  2.9       Mg     1.8         TPro  5.9<L>  /  Alb  2.8<L>  /  TBili  0.6  /  DBili  x   /  AST  18  /  ALT  12  /  AlkPhos  103  -      Urinalysis Basic - ( 2022 19:05 )    Color: Brown / Appearance: very cloudy / S.020 / pH: x  Gluc: x / Ketone: Small  / Bili: Small / Urobili: 4   Blood: x / Protein: 30 mg/dL / Nitrite: Positive   Leuk Esterase: Small / RBC: >50 /HPF / WBC 6-10 /HPF   Sq Epi: x / Non Sq Epi: Occasional / Bacteria: Few          RADIOLOGY & ADDITIONAL TESTS:  < from: CT Lumbar Spine No Cont (22 @ 18:11) >  IMPRESSION:  *  STATUS POST T12-L1 LAMINECTOMY AND T10-L4 POSTERIOR FUSION  *  NEW LUCENCY SURROUNDING THE PEDICLE SCREWS AT T10-T12. MILD ASSOCIATED   MIGRATION OF THE PEDICLE SCREWS AT T10. FINDINGS ARE COMPATIBLE WITH   LOOSENING. INFECTION CANNOT BE EXCLUDED.  *  NEW FLUID COLLECTION ABOUT THE SPINOUS PROCESSES OF L2-L4.  *  INCREASING EDEMA WITHIN THE DORSAL SOFT TISSUES.  *  ENLARGING BILATERAL PLEURAL EFFUSIONS WHICH MAY BE PARTLY LOCULATED.      < end of copied text >    < from: CT Lumbar Spine No Cont (22 @ 16:48) >    IMPRESSION:  STATUS POST T12-L1 LAMINECTOMY AND T10-L4 POSTERIOR FUSION TO RELIEVE AN   L1 BURST FRACTURE.  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    < end of copied text >          CAPRINI SCORE [CLOT]:  Patient has an estimated Caprini score of greater than 5.  However, the patient's unique clinical situation will be addressed in an individual manner to determine appropriate anticoagulation treatment, if any.

## 2022-02-26 LAB
ANION GAP SERPL CALC-SCNC: 12 MMOL/L — SIGNIFICANT CHANGE UP (ref 5–17)
APPEARANCE UR: ABNORMAL
BACTERIA # UR AUTO: ABNORMAL
BILIRUB UR-MCNC: NEGATIVE — SIGNIFICANT CHANGE UP
BUN SERPL-MCNC: 10.1 MG/DL — SIGNIFICANT CHANGE UP (ref 8–20)
CALCIUM SERPL-MCNC: 8 MG/DL — LOW (ref 8.6–10.2)
CHLORIDE SERPL-SCNC: 101 MMOL/L — SIGNIFICANT CHANGE UP (ref 98–107)
CO2 SERPL-SCNC: 25 MMOL/L — SIGNIFICANT CHANGE UP (ref 22–29)
COLOR SPEC: YELLOW — SIGNIFICANT CHANGE UP
CREAT SERPL-MCNC: 0.37 MG/DL — LOW (ref 0.5–1.3)
DIFF PNL FLD: ABNORMAL
EPI CELLS # UR: SIGNIFICANT CHANGE UP
GLUCOSE BLDC GLUCOMTR-MCNC: 152 MG/DL — HIGH (ref 70–99)
GLUCOSE BLDC GLUCOMTR-MCNC: 171 MG/DL — HIGH (ref 70–99)
GLUCOSE BLDC GLUCOMTR-MCNC: 183 MG/DL — HIGH (ref 70–99)
GLUCOSE BLDC GLUCOMTR-MCNC: 194 MG/DL — HIGH (ref 70–99)
GLUCOSE SERPL-MCNC: 139 MG/DL — HIGH (ref 70–99)
GLUCOSE UR QL: NEGATIVE MG/DL — SIGNIFICANT CHANGE UP
HCT VFR BLD CALC: 30.9 % — LOW (ref 39–50)
HGB BLD-MCNC: 10 G/DL — LOW (ref 13–17)
KETONES UR-MCNC: NEGATIVE — SIGNIFICANT CHANGE UP
LEUKOCYTE ESTERASE UR-ACNC: ABNORMAL
MAGNESIUM SERPL-MCNC: 1.8 MG/DL — SIGNIFICANT CHANGE UP (ref 1.6–2.6)
MCHC RBC-ENTMCNC: 30.1 PG — SIGNIFICANT CHANGE UP (ref 27–34)
MCHC RBC-ENTMCNC: 32.4 GM/DL — SIGNIFICANT CHANGE UP (ref 32–36)
MCV RBC AUTO: 93.1 FL — SIGNIFICANT CHANGE UP (ref 80–100)
NITRITE UR-MCNC: NEGATIVE — SIGNIFICANT CHANGE UP
PH UR: 8 — SIGNIFICANT CHANGE UP (ref 5–8)
PHOSPHATE SERPL-MCNC: 2.8 MG/DL — SIGNIFICANT CHANGE UP (ref 2.4–4.7)
PLATELET # BLD AUTO: 515 K/UL — HIGH (ref 150–400)
POTASSIUM SERPL-MCNC: 3.5 MMOL/L — SIGNIFICANT CHANGE UP (ref 3.5–5.3)
POTASSIUM SERPL-SCNC: 3.5 MMOL/L — SIGNIFICANT CHANGE UP (ref 3.5–5.3)
PROT UR-MCNC: NEGATIVE — SIGNIFICANT CHANGE UP
RBC # BLD: 3.32 M/UL — LOW (ref 4.2–5.8)
RBC # FLD: 15.1 % — HIGH (ref 10.3–14.5)
RBC CASTS # UR COMP ASSIST: >50 /HPF (ref 0–4)
SODIUM SERPL-SCNC: 138 MMOL/L — SIGNIFICANT CHANGE UP (ref 135–145)
SP GR SPEC: 1.01 — SIGNIFICANT CHANGE UP (ref 1.01–1.02)
UROBILINOGEN FLD QL: 12 MG/DL
WBC # BLD: 6.55 K/UL — SIGNIFICANT CHANGE UP (ref 3.8–10.5)
WBC # FLD AUTO: 6.55 K/UL — SIGNIFICANT CHANGE UP (ref 3.8–10.5)
WBC UR QL: SIGNIFICANT CHANGE UP /HPF (ref 0–5)

## 2022-02-26 PROCEDURE — 72157 MRI CHEST SPINE W/O & W/DYE: CPT | Mod: 26

## 2022-02-26 PROCEDURE — 93970 EXTREMITY STUDY: CPT | Mod: 26

## 2022-02-26 PROCEDURE — 72158 MRI LUMBAR SPINE W/O & W/DYE: CPT | Mod: 26

## 2022-02-26 PROCEDURE — 71045 X-RAY EXAM CHEST 1 VIEW: CPT | Mod: 26

## 2022-02-26 RX ORDER — LIDOCAINE HCL 20 MG/ML
5 VIAL (ML) INJECTION ONCE
Refills: 0 | Status: COMPLETED | OUTPATIENT
Start: 2022-02-26 | End: 2022-02-26

## 2022-02-26 RX ADMIN — Medication 25 MILLIGRAM(S): at 05:13

## 2022-02-26 RX ADMIN — APIXABAN 5 MILLIGRAM(S): 2.5 TABLET, FILM COATED ORAL at 05:13

## 2022-02-26 RX ADMIN — Medication 1 TABLET(S): at 11:24

## 2022-02-26 RX ADMIN — Medication 2: at 18:00

## 2022-02-26 RX ADMIN — Medication 2: at 09:34

## 2022-02-26 RX ADMIN — Medication 50 MICROGRAM(S): at 05:13

## 2022-02-26 RX ADMIN — ATORVASTATIN CALCIUM 20 MILLIGRAM(S): 80 TABLET, FILM COATED ORAL at 21:27

## 2022-02-26 RX ADMIN — Medication 2: at 13:17

## 2022-02-26 RX ADMIN — Medication 5 MILLILITER(S): at 18:00

## 2022-02-26 RX ADMIN — Medication 81 MILLIGRAM(S): at 11:24

## 2022-02-26 RX ADMIN — SENNA PLUS 2 TABLET(S): 8.6 TABLET ORAL at 21:28

## 2022-02-26 RX ADMIN — APIXABAN 5 MILLIGRAM(S): 2.5 TABLET, FILM COATED ORAL at 17:57

## 2022-02-26 NOTE — PROGRESS NOTE ADULT - SUBJECTIVE AND OBJECTIVE BOX
HPI: Patient is a 77 y/o M extensive PMH, known to the neurosurgery service, presenting to [facility] ED s/p spinal surgery 22. Patient underwent T12-L1 laminectomy & T10-L4 fusion after MRI revealed a 3 column injury of L1 VB. Patient was discharged on 22 to acute rehab. Patient has been ambulating with a rolling walker at rehab. Patient went for a follow up visit with Dr. Gaitan in his office this afternoon and was recommended to come to the ED for a CT T/L spine to R/O hardware complication after concerns for erythema/bulging in area of incision. Patient denies any new symptoms. Denies fever/chills, incisional pain, back pain, new weakness, numbness/tingling, N/V/D, new bowel/bladder dysfunction.   (2022 20:22)      INTERVAL OVERNIGHT EVENTS:   76y Male seen lying comfortably in bed. Awaiting MRI's & Plastic eval.     Vital Signs Last 24 Hrs  T(C): 37.1 (2022 08:22), Max: 37.1 (2022 08:22)  T(F): 98.8 (2022 08:22), Max: 98.8 (2022 08:22)  HR: 96 (2022 08:22) (84 - 113)  BP: 114/74 (2022 08:22) (114/74 - 139/76)  BP(mean): --  RR: 18 (2022 04:37) (18 - 20)  SpO2: 93% (2022 08:22) (93% - 96%)    PHYSICAL EXAM:  GENERAL: Well-groomed, well-developed male  HEAD:  Atraumatic, normocephalic  WOUND: An area of granulation tissue seen at the inferior aspect of incision   MENTAL STATUS:  Awake, alert, oriented to place, situation & name.  Appropriately conversant without aphasia, sounds garbled at times secondary to dentures, following simple commands. Face symmetrical, EOMI.   Motor: FORD x4 5/5 in bilateral uppers, anti-gravity in lowers, no drift, not completely cooperative with exam . Bilateral lower extremity +2 edema  SENSATION: grossly intact to light touch all extremities      LABS:                        10.0   6.55  )-----------( 515      ( 2022 08:00 )             30.9         138  |  101  |  10.1  ----------------------------<  139<H>  3.5   |  25.0  |  0.37<L>    Ca    8.0<L>      2022 08:00  Phos  2.8       Mg     1.8         TPro  5.9<L>  /  Alb  2.8<L>  /  TBili  0.6  /  DBili  x   /  AST  18  /  ALT  12  /  AlkPhos  103        Urinalysis Basic - ( 2022 19:05 )    Color: Brown / Appearance: very cloudy / S.020 / pH: x  Gluc: x / Ketone: Small  / Bili: Small / Urobili: 4   Blood: x / Protein: 30 mg/dL / Nitrite: Positive   Leuk Esterase: Small / RBC: >50 /HPF / WBC 6-10 /HPF   Sq Epi: x / Non Sq Epi: Occasional / Bacteria: Few         @ 07:01  -   @ 07:00  --------------------------------------------------------  IN: 0 mL / OUT: 925 mL / NET: -925 mL        RADIOLOGY & ADDITIONAL TESTS:     CT Thoracic & Lumbar Spine No Cont (22 @ 18:11)     Patient is status post a T12-L1 laminectomy as well as posterior fusion.   This was performed to relieve a burst fracture of the L1 vertebra which  had severe loss of height as well as significant retropulsion.    Pedicle screws are seen bilaterally at T10, T11, T12, L2, L3, and L4   which attach to posterior rods associated horizontal bar at the L1 level.   Previously seen surgical drain at the laminectomy site has been removed.   Bone graft material is present along the posterior elements from T10   through T12 as well as L2 through L4.    There is new lucency surrounding the pedicle screws at T10-T12. This is   compatible with loosening. Infection cannot be excluded.  At T10 there is medial migration of the right-sided screw and lateral   migration of the left-sided pedicle screw. Lucency surrounding the   right-sided pedicle screw involves/extends into the spinal canal.  Lucency surrounding the right greater than left pedicle screws at T11   also involve the lateral margins of the spinal canal also   involves/extends into the spinal canal.    Previously seen soft tissue air has resolved. There is a new fluid   collection about the spinous processes of L2 through L4. This measures   8.9 cm craniocaudad by approximately 4.9 cm in width by 3.6 cm anterior   to posterior. There is increasing edema within the dorsal soft tissues.   Previously seen dorsal surgical skin staples have been removed    Calcified pleural plaques are seen bilaterally. There are enlarging   pleural effusions which may be partly loculated. There is a large hiatus   hernia which appears to contain the stomach as well as a loop of colon    An exaggerated thoracic kyphosis again noted. There is no thoracic   subluxation. Mild chronic loss of height of the T7 and T8 vertebral   bodies are seen. Spondylosis of the thoracic spine is present with   bridging osteophytes.    Again noted is chronic bilateral spondylolysis of L5 with grade 1   spondylolisthesis of 7 mm. There is narrowing of the L4-5 and L5-S1 disc   spaces with vacuum disc phenomenon and endplate degenerative changes.      IMPRESSION:  *  STATUS POST T12-L1 LAMINECTOMY AND T10-L4 POSTERIOR FUSION  *  NEW LUCENCY SURROUNDING THE PEDICLE SCREWS AT T10-T12. MILD ASSOCIATED   MIGRATION OF THE PEDICLE SCREWS AT T10. FINDINGS ARE COMPATIBLE WITH   LOOSENING. INFECTION CANNOT BE EXCLUDED.  *  NEW FLUID COLLECTION ABOUT THE SPINOUS PROCESSES OF L2-L4.  *  INCREASING EDEMA WITHIN THE DORSAL SOFT TISSUES.  *  ENLARGING BILATERAL PLEURAL EFFUSIONS WHICH MAY BE PARTLY LOCULATED.    CAPRINI SCORE [CLOT]:  Patient has an estimated Caprini score of greater than 5.  However, the patient's unique clinical situation will be addressed in an individual manner to determine appropriate anticoagulation treatment, if any.     HPI: Patient is a 77 y/o M extensive PMH, known to the neurosurgery service, presenting to [facility] ED s/p spinal surgery 22. Patient underwent T12-L1 laminectomy & T10-L4 fusion after MRI revealed a 3 column injury of L1 VB. Patient was discharged on 22 to acute rehab. Patient has been ambulating with a rolling walker at rehab. Patient went for a follow up visit with Dr. Gaitan in his office this afternoon and was recommended to come to the ED for a CT T/L spine to R/O hardware complication after concerns for erythema/bulging in area of incision. Patient denies any new symptoms. Denies fever/chills, incisional pain, back pain, new weakness, numbness/tingling, N/V/D, new bowel/bladder dysfunction.   (2022 20:22)      INTERVAL OVERNIGHT EVENTS:   76y Male seen lying comfortably in bed. Awaiting MRI's & Plastic eval. Pt was fitted for an Aspen TLSO which is not with him, his wife has it in her car. He was not 100% compliant in wearing it , his wife states it hurt him.     Vital Signs Last 24 Hrs  T(C): 37.1 (2022 08:22), Max: 37.1 (2022 08:22)  T(F): 98.8 (2022 08:22), Max: 98.8 (2022 08:22)  HR: 96 (2022 08:22) (84 - 113)  BP: 114/74 (2022 08:22) (114/74 - 139/76)  BP(mean): --  RR: 18 (2022 04:37) (18 - 20)  SpO2: 93% (2022 08:22) (93% - 96%)    PHYSICAL EXAM:  GENERAL: Well-groomed, well-developed male  HEAD:  Atraumatic, normocephalic  WOUND: An area of granulation tissue seen at the inferior aspect of incision   MENTAL STATUS:  Awake, alert, oriented to place, situation & name.  Appropriately conversant without aphasia, sounds garbled at times secondary to dentures, following simple commands. Face symmetrical, EOMI.   Motor: FORD x4 5/5 in bilateral uppers, anti-gravity in lowers, no drift, not completely cooperative with exam . Bilateral lower extremity +2 edema  SENSATION: grossly intact to light touch all extremities      LABS:                        10.0   6.55  )-----------( 515      ( 2022 08:00 )             30.9     02-    138  |  101  |  10.1  ----------------------------<  139<H>  3.5   |  25.0  |  0.37<L>    Ca    8.0<L>      2022 08:00  Phos  2.8       Mg     1.8         TPro  5.9<L>  /  Alb  2.8<L>  /  TBili  0.6  /  DBili  x   /  AST  18  /  ALT  12  /  AlkPhos  103        Urinalysis Basic - ( 2022 19:05 )    Color: Brown / Appearance: very cloudy / S.020 / pH: x  Gluc: x / Ketone: Small  / Bili: Small / Urobili: 4   Blood: x / Protein: 30 mg/dL / Nitrite: Positive   Leuk Esterase: Small / RBC: >50 /HPF / WBC 6-10 /HPF   Sq Epi: x / Non Sq Epi: Occasional / Bacteria: Few         @ 07:01  -   @ 07:00  --------------------------------------------------------  IN: 0 mL / OUT: 925 mL / NET: -925 mL        RADIOLOGY & ADDITIONAL TESTS:     CT Thoracic & Lumbar Spine No Cont (22 @ 18:11)     Patient is status post a T12-L1 laminectomy as well as posterior fusion.   This was performed to relieve a burst fracture of the L1 vertebra which  had severe loss of height as well as significant retropulsion.    Pedicle screws are seen bilaterally at T10, T11, T12, L2, L3, and L4   which attach to posterior rods associated horizontal bar at the L1 level.   Previously seen surgical drain at the laminectomy site has been removed.   Bone graft material is present along the posterior elements from T10   through T12 as well as L2 through L4.    There is new lucency surrounding the pedicle screws at T10-T12. This is   compatible with loosening. Infection cannot be excluded.  At T10 there is medial migration of the right-sided screw and lateral   migration of the left-sided pedicle screw. Lucency surrounding the   right-sided pedicle screw involves/extends into the spinal canal.  Lucency surrounding the right greater than left pedicle screws at T11   also involve the lateral margins of the spinal canal also   involves/extends into the spinal canal.    Previously seen soft tissue air has resolved. There is a new fluid   collection about the spinous processes of L2 through L4. This measures   8.9 cm craniocaudad by approximately 4.9 cm in width by 3.6 cm anterior   to posterior. There is increasing edema within the dorsal soft tissues.   Previously seen dorsal surgical skin staples have been removed    Calcified pleural plaques are seen bilaterally. There are enlarging   pleural effusions which may be partly loculated. There is a large hiatus   hernia which appears to contain the stomach as well as a loop of colon    An exaggerated thoracic kyphosis again noted. There is no thoracic   subluxation. Mild chronic loss of height of the T7 and T8 vertebral   bodies are seen. Spondylosis of the thoracic spine is present with   bridging osteophytes.    Again noted is chronic bilateral spondylolysis of L5 with grade 1   spondylolisthesis of 7 mm. There is narrowing of the L4-5 and L5-S1 disc   spaces with vacuum disc phenomenon and endplate degenerative changes.      IMPRESSION:  *  STATUS POST T12-L1 LAMINECTOMY AND T10-L4 POSTERIOR FUSION  *  NEW LUCENCY SURROUNDING THE PEDICLE SCREWS AT T10-T12. MILD ASSOCIATED   MIGRATION OF THE PEDICLE SCREWS AT T10. FINDINGS ARE COMPATIBLE WITH   LOOSENING. INFECTION CANNOT BE EXCLUDED.  *  NEW FLUID COLLECTION ABOUT THE SPINOUS PROCESSES OF L2-L4.  *  INCREASING EDEMA WITHIN THE DORSAL SOFT TISSUES.  *  ENLARGING BILATERAL PLEURAL EFFUSIONS WHICH MAY BE PARTLY LOCULATED.    CAPRINI SCORE [CLOT]:  Patient has an estimated Caprini score of greater than 5.  However, the patient's unique clinical situation will be addressed in an individual manner to determine appropriate anticoagulation treatment, if any.

## 2022-02-26 NOTE — PROGRESS NOTE ADULT - ASSESSMENT
76M pmh HTN, HLD, DM, Hypothyroidism, Asbestosis, CAD s/p PCI, Afib on Eliquis, s/p recent admissison for T12-L1 laminectomy, T10-L4 posterior fusion on 1/21/22, readmitted from outpatient follow up revealing surgical site associated erythema/bulging with concern for hardware complication. CT T/L spine revealed new lucency around pedicle screws of T10-12 with mild migration of pedicle screw at T10, fluid collection surrounding spinous processes of L2-L4, without any clinical signs of infection at this time.       1. No acute neurosurgical intervention at this time. Pt is adamantly refusing surgical course if needed  2. Neuro checks q4  3. MRI T/L spine w/wo mat  4. Wedge in bed to avoid direct incisional pressure  5. Plastics to see patient today to assess wound, Dr Schumacher called  6. Pain control as needed, avoid oversedation   7. Monitor WBC, fever curve  8. -160  9. Eliquis 5 BID for known A fib  10. SCD for DVT ppx  11. Bedrest with logroll as needed  12. Will order bilateral lower extremity dopplers for lower extremity swelling.

## 2022-02-27 LAB
GLUCOSE BLDC GLUCOMTR-MCNC: 135 MG/DL — HIGH (ref 70–99)
GLUCOSE BLDC GLUCOMTR-MCNC: 154 MG/DL — HIGH (ref 70–99)
GLUCOSE BLDC GLUCOMTR-MCNC: 173 MG/DL — HIGH (ref 70–99)
GLUCOSE BLDC GLUCOMTR-MCNC: 187 MG/DL — HIGH (ref 70–99)

## 2022-02-27 RX ORDER — DOXAZOSIN MESYLATE 4 MG
4 TABLET ORAL AT BEDTIME
Refills: 0 | Status: DISCONTINUED | OUTPATIENT
Start: 2022-02-27 | End: 2022-03-02

## 2022-02-27 RX ORDER — DOXAZOSIN MESYLATE 4 MG
4 TABLET ORAL AT BEDTIME
Refills: 0 | Status: DISCONTINUED | OUTPATIENT
Start: 2022-02-27 | End: 2022-02-27

## 2022-02-27 RX ADMIN — ATORVASTATIN CALCIUM 20 MILLIGRAM(S): 80 TABLET, FILM COATED ORAL at 22:38

## 2022-02-27 RX ADMIN — Medication 25 MILLIGRAM(S): at 05:28

## 2022-02-27 RX ADMIN — Medication 1 TABLET(S): at 11:44

## 2022-02-27 RX ADMIN — APIXABAN 5 MILLIGRAM(S): 2.5 TABLET, FILM COATED ORAL at 18:03

## 2022-02-27 RX ADMIN — SENNA PLUS 2 TABLET(S): 8.6 TABLET ORAL at 22:37

## 2022-02-27 RX ADMIN — Medication 50 MICROGRAM(S): at 05:28

## 2022-02-27 RX ADMIN — APIXABAN 5 MILLIGRAM(S): 2.5 TABLET, FILM COATED ORAL at 05:28

## 2022-02-27 RX ADMIN — Medication 81 MILLIGRAM(S): at 11:43

## 2022-02-27 RX ADMIN — Medication 2: at 11:44

## 2022-02-27 RX ADMIN — Medication 2: at 09:08

## 2022-02-27 NOTE — PROGRESS NOTE ADULT - ASSESSMENT
ASSESSMENT        PLAN ASSESSMENT  76M pmh HTN, HLD, DM, Hypothyroidism, Asbestosis, CAD s/p PCI, Afib on Eliquis, s/p recent admissison for T12-L1 laminectomy, T10-L4 posterior fusion on 1/21/22, readmitted from outpatient follow up revealing surgical site associated erythema/bulging with concern for hardware complication. CT T/L spine revealed new lucency around pedicle screws of T10-12 with mild migration of pedicle screw at T10, fluid collection surrounding spinous processes of L2-L4. MRI technically difficult study.  Patient still expressing desire to leave and not undergo surgery by any means despite associated risk.      PLAN  - case d/w team  - no acute neurosurgical intervention indicated at this time, will continue discuss for further surgical planning in AM  - con't neuro checks q4  - bedrest, logroll permitted as necessary  - TLSO brace, wife has not brought it in  - wedge when in bed to avoid direct compression of wound  - con't Eliquis, Metoprolol for known Afib  - con't ASA  - Lipitor 20 at bedtime for hyperlipidemia  - Tylenol for pain  -Cardura 4 at bedtime  - Cotton replaced yesterday, 2/26 -- TOV in 2 days, if failure needs urology consult  -Rocephin 1g x7 days for positive UA  - SCD for DVT ppx  - MISS  - con't Levothyroxine 50  - Regular diet with supervision for aspiration risk  - MVI  - Senna, Miralax ASSESSMENT  76M pmh HTN, HLD, DM, Hypothyroidism, Asbestosis, CAD s/p PCI, Afib on Eliquis, s/p recent admissison for T12-L1 laminectomy, T10-L4 posterior fusion on 1/21/22, readmitted from outpatient follow up revealing surgical site associated erythema/bulging with concern for hardware complication. CT T/L spine revealed new lucency around pedicle screws of T10-12 with mild migration of pedicle screw at T10, fluid collection surrounding spinous processes of L2-L4. MRI technically difficult study.  Patient still expressing desire to leave and not undergo surgery by any means despite associated risk.      PLAN  - case d/w team  - no acute neurosurgical intervention indicated at this time, will continue discuss for further surgical planning in AM  - con't neuro checks q4  - bedrest, logroll permitted as necessary  - TLSO brace, wife has not brought it in  - wedge when in bed to avoid direct compression of wound  - con't Eliquis, Metoprolol for known Afib  - con't ASA  - Lipitor 20 at bedtime for hyperlipidemia  - Tylenol for pain  -Cardura 4 at bedtime  - Cotton replaced yesterday, 2/26 -- TOV in 2 days, if failure needs urology consult  - SCD for DVT ppx  - MISS  - con't Levothyroxine 50  - Regular diet with supervision for aspiration risk  - MVI  - Senna, Miralax

## 2022-02-27 NOTE — PROGRESS NOTE ADULT - SUBJECTIVE AND OBJECTIVE BOX
HPI: Patient is a 76y old  Male who presents with a chief complaint of hardware complication.    HPI: Patient is a 75 y/o M extensive PMH, known to the neurosurgery service, presenting to [facility] ED s/p spinal surgery 22. Patient underwent T12-L1 laminectomy & T10-L4 fusion after MRI revealed a 3 column injury of L1 VB. Patient was discharged on 22 to acute rehab. Patient has been ambulating with a rolling walker at rehab. Patient went for a follow up visit with Dr. Gaitan in his office this afternoon and was recommended to come to the ED for a CT T/L spine to R/O hardware complication after concerns for erythema/bulging in area of incision. Patient denies any new symptoms. Denies fever/chills, incisional pain, back pain, new weakness, numbness/tingling, N/V/D, new bowel/bladder dysfunction.   (2022 20:22)      INTERVAL HPI/OVERNIGHT EVENTS:  76y Male s/p __ seen lying comfortably in bed. Tolerating diet. Passing gas/BM. Voiding. Cotton in with __ clear urine. Denies headache, weakness, numbness, n/v/d, fevers, chills, chest pain, SOB.       Vital Signs Last 24 Hrs  T(C): 37.2 (2022 09:13), Max: 37.2 (2022 09:13)  T(F): 99 (2022 09:13), Max: 99 (2022 09:13)  HR: 92 (2022 09:13) (78 - 107)  BP: 114/73 (2022 09:13) (100/64 - 128/80)  BP(mean): --  RR: 18 (2022 06:01) (18 - 18)  SpO2: 90% (2022 09:13) (89% - 95%)      PHYSICAL EXAM:  GENERAL: NAD, well-groomed, well-developed  HEAD:  Atraumatic, normocephalic  DRAINS:   WOUND: Dressing clean dry intact  [place] COMA SCORE: E- V- M- =       E: 4= opens eyes spontaneously 3= to voice 2= to noxious 1= no opening       V: 5= oriented 4= confused 3= inappropriate words 2= incomprehensible sounds 1= nonverbal 1T= intubated       M: 6= follows commands 5= localizes 4= withdraws 3= flexor posturing 2= extensor posturing 1= no movement  MENTAL STATUS: AAO x3; Awake/Comatose; Opens eyes spontaneously/to voice/to light touch/to noxious stimuli; Appropriately conversant without aphasia/Nonverbal; following simple commands/mimicking/not following commands  CRANIAL NERVES: Visual acuity normal for age, visual fields full to confrontation, PERRL. EOMI without nystagmus. Facial sensation intact V1-3 distribution b/l. Face symmetric w/ normal eye closure and smile, tongue midline. Hearing grossly intact. Speech clear. Head turning and shoulder shrug intact.   REFLEXES: PERRL. Corneals intact b/l. Gag intact. Cough intact. Oculocephalic reflex intact (Doll's eye). Negative Franco's b/l. Negative clonus b/l  MOTOR: strength 5/5 b/l upper and lower extremities  Uppers     Delt (C5/6)     Bicep (C5/6)     Wrist Extend (C6)     Tricep (C7)     HG (C8/T1)  R                     5/5                 5/5                         5/5                           5/5                   5/5  L                      5/5                 5/5                         5/5                           5/5                   5/5  Lowers      HF(L1/L2)     KE (L3)     DF (L4)     EHL (L5)     PF (S1)      R                     5/5              5/5           5/5           5/5            5/5  L                     5/5               5/5          5/5            5/5            5/5  SENSATION: grossly intact to light touch all extremities  COORDINATION: Gait intact; rapid alternating movements intact; heel to shin intact; no upper extremity dysmetria  CHEST/LUNG: Clear to auscultation bilaterally; no rales, rhonchi, wheezing, or rubs  HEART: +S1/+S2; Regular rate and rhythm; no murmurs, rubs, or gallops  ABDOMEN: Soft, nontender, nondistended; bowel sounds present all four quadrants  EXTREMITIES:  2+ peripheral pulses, no clubbing, cyanosis, or edema  SKIN: Warm, dry; no rashes or lesions      LABS:                        10.0   6.55  )-----------( 515      ( 2022 08:00 )             30.9     02-26    138  |  101  |  10.1  ----------------------------<  139<H>  3.5   |  25.0  |  0.37<L>    Ca    8.0<L>      2022 08:00  Phos  2.8       Mg     1.8             Urinalysis Basic - ( 2022 18:46 )    Color: Yellow / Appearance: very cloudy / S.015 / pH: x  Gluc: x / Ketone: Negative  / Bili: Negative / Urobili: 12 mg/dL   Blood: x / Protein: Negative / Nitrite: Negative   Leuk Esterase: Trace / RBC: >50 /HPF / WBC 3-5 /HPF   Sq Epi: x / Non Sq Epi: Occasional / Bacteria: Few          RADIOLOGY & ADDITIONAL TESTS:          CAPRINI SCORE [CLOT]:  Patient has an estimated Caprini score of greater than 5.  However, the patient's unique clinical situation will be addressed in an individual manner to determine appropriate anticoagulation treatment, if any. HPI: Patient is a 76y old  Male who presents with a chief complaint of hardware complication.    HPI: Patient is a 77 y/o M extensive PMH, known to the neurosurgery service, presenting to [facility] ED s/p spinal surgery 22. Patient underwent T12-L1 laminectomy & T10-L4 fusion after MRI revealed a 3 column injury of L1 VB. Patient was discharged on 22 to acute rehab. Patient has been ambulating with a rolling walker at rehab. Patient went for a follow up visit with Dr. Gaitan in his office this afternoon and was recommended to come to the ED for a CT T/L spine to R/O hardware complication after concerns for erythema/bulging in area of incision. Patient denies any new symptoms. Denies fever/chills, incisional pain, back pain, new weakness, numbness/tingling, N/V/D, new bowel/bladder dysfunction.   (2022 20:22)      INTERVAL HPI/OVERNIGHT EVENTS:  76y Male seen lying in bed with wife at bedside, expressing desire to leave and die in nursing home, not amendable to any surgical intervention regardless of associated risk. States he rather "be in a wheelchair" and "lay on the floor" than have to continue to stay in this hospital. MRI done yesterday, motion degraded, poor study.   Plastics saw patient at bedside yesterday & encouraged surgery, patient continued to decline intervention.   Cotton removed yesterday, repeat UA dirty, abx started today. Cardura also to start tonight to attempt TOV eventually.  Bcx sent initially on admission, NGTD,  LED ordered yesterday for LE edema, negative for DVT.        Vital Signs Last 24 Hrs  T(C): 37.2 (2022 09:13), Max: 37.2 (2022 09:13)  T(F): 99 (2022 09:13), Max: 99 (2022 09:13)  HR: 92 (2022 09:13) (78 - 107)  BP: 114/73 (2022 09:13) (100/64 - 128/80)  BP(mean): --  RR: 18 (2022 06:01) (18 - 18)  SpO2: 90% (2022 09:13) (89% - 95%)      PHYSICAL EXAM:  GENERAL: NAD, well-groomed, well-developed  HEAD:  Atraumatic, normocephalic  WOUND: granulation tissue formation at inferior aspect of surgical site  CAR[place] COMA SCORE: E4 V5 M6 =15       E: 4= opens eyes spontaneously 3= to voice 2= to noxious 1= no opening       V: 5= oriented 4= confused 3= inappropriate words 2= incomprehensible sounds 1= nonverbal 1T= intubated       M: 6= follows commands 5= localizes 4= withdraws 3= flexor posturing 2= extensor posturing 1= no movement  MENTAL STATUS: AAO x3; Awake. Opens eyes spontaneously. Appropriately conversant without aphasia, following simple commands.  CRANIAL NERVES: Visual acuity normal for age, visual fields full to confrontation, PERRL. EOMI without nystagmus. Facial sensation intact V1-3 distribution b/l. Face symmetric w/ normal eye closure and smile, tongue midline. Speech dysarthric secondary to dentures.  MOTOR: strength 5/5 b/l upper extremities, LE AG without drift, poor cooperation for assessment of strength against resistance in b/l LE  SENSATION: grossly intact to light touch all extremities        LABS:                        10.0   6.55  )-----------( 515      ( 2022 08:00 )             30.9     02-    138  |  101  |  10.1  ----------------------------<  139<H>  3.5   |  25.0  |  0.37<L>    Ca    8.0<L>      2022 08:00  Phos  2.8       Mg     1.8             Urinalysis Basic - ( 2022 18:46 )    Color: Yellow / Appearance: very cloudy / S.015 / pH: x  Gluc: x / Ketone: Negative  / Bili: Negative / Urobili: 12 mg/dL   Blood: x / Protein: Negative / Nitrite: Negative   Leuk Esterase: Trace / RBC: >50 /HPF / WBC 3-5 /HPF   Sq Epi: x / Non Sq Epi: Occasional / Bacteria: Few          RADIOLOGY & ADDITIONAL TESTS:  < from: MR Lumbar Spine w/wo IV Cont (22 @ 15:46) >  *  EXAMS ARE MARKEDLY DEGRADED BY MOTION.  *  PATIENT IS KNOWN TO BE STATUS POST A T12-L1 LAMINECTOMY AND T10-L4   POSTERIOR STABILIZATION/FUSION. THIS WAS PERFORMED TO STABILIZE A T12   BURST FRACTURE.  *  LUCENCIES SURROUNDING THE T10-T12 PEDICLE SCREWS WERE BETTER   DEMONSTRATED ON CT.  *  MIGRATION OF THE PEDICLE SCREWS AT T10 IS PARTLY SEEN WITH THE LEFT   MIGRATING LATERALLY IN THE RIGHT MEDIALLY. REGION OF INCREASED T2 SIGNAL,   POSSIBLY FLUID IS NOTED MEDIAL TO THE RIGHT T10 PEDICLE SCREW WITHIN THE   VERTEBRAL BODY AND PEDICLE WHICH MAY INVOLVE THE RIGHT SIDE OF THE SPINAL   CANAL. THORACIC CORD APPEARS DEVIATED TOWARDS THE LEFT.  *  SMALL FLUID COLLECTION DORSAL AND LATERAL TO THE LEFT-SIDED SPINAL JACKY   AT THE T10 LEVEL.  *  DORSAL FLUID COLLECTION FROM THE T12-L4 LEVEL AT THE MIDLINE.    < end of copied text >    < from: CT Lumbar Spine No Cont (22 @ 18:11) >  IMPRESSION:  *  STATUS POST T12-L1 LAMINECTOMY AND T10-L4 POSTERIOR FUSION  *  NEW LUCENCY SURROUNDING THE PEDICLE SCREWS AT T10-T12. MILD ASSOCIATED   MIGRATION OF THE PEDICLE SCREWS AT T10. FINDINGS ARE COMPATIBLE WITH   LOOSENING. INFECTION CANNOT BE EXCLUDED.  *  NEW FLUID COLLECTION ABOUT THE SPINOUS PROCESSES OF L2-L4.  *  INCREASING EDEMA WITHIN THE DORSAL SOFT TISSUES.  *  ENLARGING BILATERAL PLEURAL EFFUSIONS WHICH MAY BE PARTLY LOCULATED.      < end of copied text >          CAPRINI SCORE [CLOT]:  Patient has an estimated Caprini score of greater than 5.  However, the patient's unique clinical situation will be addressed in an individual manner to determine appropriate anticoagulation treatment, if any. HPI: Patient is a 76y old  Male who presents with a chief complaint of hardware complication.    HPI: Patient is a 77 y/o M extensive PMH, known to the neurosurgery service, presenting to [facility] ED s/p spinal surgery 22. Patient underwent T12-L1 laminectomy & T10-L4 fusion after MRI revealed a 3 column injury of L1 VB. Patient was discharged on 22 to acute rehab. Patient has been ambulating with a rolling walker at rehab. Patient went for a follow up visit with Dr. Gaitan in his office this afternoon and was recommended to come to the ED for a CT T/L spine to R/O hardware complication after concerns for erythema/bulging in area of incision. Patient denies any new symptoms. Denies fever/chills, incisional pain, back pain, new weakness, numbness/tingling, N/V/D, new bowel/bladder dysfunction.   (2022 20:22)      INTERVAL HPI/OVERNIGHT EVENTS:  76y Male seen lying in bed with wife at bedside, expressing desire to leave and die in nursing home, not amendable to any surgical intervention regardless of associated risk. States he rather "be in a wheelchair" and "lay on the floor" than have to continue to stay in this hospital. MRI done yesterday, motion degraded, poor study.   Plastics saw patient at bedside yesterday & encouraged surgery, patient continued to decline intervention.   Velasco replaced yesterday. Urine culture from  NGTD, no role for ABX at this time. Dirty UA previously drawn from velasco from nursing home. Cardura to start tonight to facilitate TOV eventually.  Bcx sent initially on admission, NGTD,  LED ordered yesterday for LE edema, negative for DVT.        Vital Signs Last 24 Hrs  T(C): 37.2 (2022 09:13), Max: 37.2 (2022 09:13)  T(F): 99 (2022 09:13), Max: 99 (2022 09:13)  HR: 92 (2022 09:13) (78 - 107)  BP: 114/73 (2022 09:13) (100/64 - 128/80)  BP(mean): --  RR: 18 (2022 06:01) (18 - 18)  SpO2: 90% (2022 09:13) (89% - 95%)      PHYSICAL EXAM:  GENERAL: NAD, well-groomed, well-developed  HEAD:  Atraumatic, normocephalic  WOUND: granulation tissue formation at inferior aspect of surgical site  [place] COMA SCORE: E4 V5 M6 =15       E: 4= opens eyes spontaneously 3= to voice 2= to noxious 1= no opening       V: 5= oriented 4= confused 3= inappropriate words 2= incomprehensible sounds 1= nonverbal 1T= intubated       M: 6= follows commands 5= localizes 4= withdraws 3= flexor posturing 2= extensor posturing 1= no movement  MENTAL STATUS: AAO x3; Awake. Opens eyes spontaneously. Appropriately conversant without aphasia, following simple commands.  CRANIAL NERVES: Visual acuity normal for age, visual fields full to confrontation, PERRL. EOMI without nystagmus. Facial sensation intact V1-3 distribution b/l. Face symmetric w/ normal eye closure and smile, tongue midline. Speech dysarthric secondary to dentures.  MOTOR: strength 5/5 b/l upper extremities, LE AG without drift, poor cooperation for assessment of strength against resistance in b/l LE  SENSATION: grossly intact to light touch all extremities        LABS:                        10.0   6.55  )-----------( 515      ( 2022 08:00 )             30.9     02-26    138  |  101  |  10.1  ----------------------------<  139<H>  3.5   |  25.0  |  0.37<L>    Ca    8.0<L>      2022 08:00  Phos  2.8     02-  Mg     1.8     -        Urinalysis Basic - ( 2022 18:46 )    Color: Yellow / Appearance: very cloudy / S.015 / pH: x  Gluc: x / Ketone: Negative  / Bili: Negative / Urobili: 12 mg/dL   Blood: x / Protein: Negative / Nitrite: Negative   Leuk Esterase: Trace / RBC: >50 /HPF / WBC 3-5 /HPF   Sq Epi: x / Non Sq Epi: Occasional / Bacteria: Few          RADIOLOGY & ADDITIONAL TESTS:  < from: MR Lumbar Spine w/wo IV Cont (22 @ 15:46) >  *  EXAMS ARE MARKEDLY DEGRADED BY MOTION.  *  PATIENT IS KNOWN TO BE STATUS POST A T12-L1 LAMINECTOMY AND T10-L4   POSTERIOR STABILIZATION/FUSION. THIS WAS PERFORMED TO STABILIZE A T12   BURST FRACTURE.  *  LUCENCIES SURROUNDING THE T10-T12 PEDICLE SCREWS WERE BETTER   DEMONSTRATED ON CT.  *  MIGRATION OF THE PEDICLE SCREWS AT T10 IS PARTLY SEEN WITH THE LEFT   MIGRATING LATERALLY IN THE RIGHT MEDIALLY. REGION OF INCREASED T2 SIGNAL,   POSSIBLY FLUID IS NOTED MEDIAL TO THE RIGHT T10 PEDICLE SCREW WITHIN THE   VERTEBRAL BODY AND PEDICLE WHICH MAY INVOLVE THE RIGHT SIDE OF THE SPINAL   CANAL. THORACIC CORD APPEARS DEVIATED TOWARDS THE LEFT.  *  SMALL FLUID COLLECTION DORSAL AND LATERAL TO THE LEFT-SIDED SPINAL JACKY   AT THE T10 LEVEL.  *  DORSAL FLUID COLLECTION FROM THE T12-L4 LEVEL AT THE MIDLINE.    < end of copied text >    < from: CT Lumbar Spine No Cont (22 @ 18:11) >  IMPRESSION:  *  STATUS POST T12-L1 LAMINECTOMY AND T10-L4 POSTERIOR FUSION  *  NEW LUCENCY SURROUNDING THE PEDICLE SCREWS AT T10-T12. MILD ASSOCIATED   MIGRATION OF THE PEDICLE SCREWS AT T10. FINDINGS ARE COMPATIBLE WITH   LOOSENING. INFECTION CANNOT BE EXCLUDED.  *  NEW FLUID COLLECTION ABOUT THE SPINOUS PROCESSES OF L2-L4.  *  INCREASING EDEMA WITHIN THE DORSAL SOFT TISSUES.  *  ENLARGING BILATERAL PLEURAL EFFUSIONS WHICH MAY BE PARTLY LOCULATED.      < end of copied text >          CAPRINI SCORE [CLOT]:  Patient has an estimated Caprini score of greater than 5.  However, the patient's unique clinical situation will be addressed in an individual manner to determine appropriate anticoagulation treatment, if any.

## 2022-02-28 LAB
ANION GAP SERPL CALC-SCNC: 11 MMOL/L — SIGNIFICANT CHANGE UP (ref 5–17)
BUN SERPL-MCNC: 10.2 MG/DL — SIGNIFICANT CHANGE UP (ref 8–20)
CALCIUM SERPL-MCNC: 8 MG/DL — LOW (ref 8.6–10.2)
CHLORIDE SERPL-SCNC: 102 MMOL/L — SIGNIFICANT CHANGE UP (ref 98–107)
CO2 SERPL-SCNC: 25 MMOL/L — SIGNIFICANT CHANGE UP (ref 22–29)
CREAT SERPL-MCNC: 0.35 MG/DL — LOW (ref 0.5–1.3)
CULTURE RESULTS: NO GROWTH — SIGNIFICANT CHANGE UP
GLUCOSE BLDC GLUCOMTR-MCNC: 134 MG/DL — HIGH (ref 70–99)
GLUCOSE BLDC GLUCOMTR-MCNC: 135 MG/DL — HIGH (ref 70–99)
GLUCOSE BLDC GLUCOMTR-MCNC: 163 MG/DL — HIGH (ref 70–99)
GLUCOSE BLDC GLUCOMTR-MCNC: 243 MG/DL — HIGH (ref 70–99)
GLUCOSE SERPL-MCNC: 140 MG/DL — HIGH (ref 70–99)
HCT VFR BLD CALC: 31.3 % — LOW (ref 39–50)
HGB BLD-MCNC: 9.9 G/DL — LOW (ref 13–17)
MAGNESIUM SERPL-MCNC: 1.8 MG/DL — SIGNIFICANT CHANGE UP (ref 1.6–2.6)
MCHC RBC-ENTMCNC: 29.6 PG — SIGNIFICANT CHANGE UP (ref 27–34)
MCHC RBC-ENTMCNC: 31.6 GM/DL — LOW (ref 32–36)
MCV RBC AUTO: 93.7 FL — SIGNIFICANT CHANGE UP (ref 80–100)
PHOSPHATE SERPL-MCNC: 2.9 MG/DL — SIGNIFICANT CHANGE UP (ref 2.4–4.7)
PLATELET # BLD AUTO: 474 K/UL — HIGH (ref 150–400)
POTASSIUM SERPL-MCNC: 3.2 MMOL/L — LOW (ref 3.5–5.3)
POTASSIUM SERPL-SCNC: 3.2 MMOL/L — LOW (ref 3.5–5.3)
RBC # BLD: 3.34 M/UL — LOW (ref 4.2–5.8)
RBC # FLD: 15.2 % — HIGH (ref 10.3–14.5)
SODIUM SERPL-SCNC: 137 MMOL/L — SIGNIFICANT CHANGE UP (ref 135–145)
SPECIMEN SOURCE: SIGNIFICANT CHANGE UP
WBC # BLD: 5.48 K/UL — SIGNIFICANT CHANGE UP (ref 3.8–10.5)
WBC # FLD AUTO: 5.48 K/UL — SIGNIFICANT CHANGE UP (ref 3.8–10.5)

## 2022-02-28 PROCEDURE — 99223 1ST HOSP IP/OBS HIGH 75: CPT

## 2022-02-28 RX ORDER — POTASSIUM CHLORIDE 20 MEQ
40 PACKET (EA) ORAL EVERY 4 HOURS
Refills: 0 | Status: COMPLETED | OUTPATIENT
Start: 2022-02-28 | End: 2022-02-28

## 2022-02-28 RX ORDER — METOPROLOL TARTRATE 50 MG
12.5 TABLET ORAL EVERY 12 HOURS
Refills: 0 | Status: DISCONTINUED | OUTPATIENT
Start: 2022-03-01 | End: 2022-03-02

## 2022-02-28 RX ADMIN — Medication 40 MILLIEQUIVALENT(S): at 14:34

## 2022-02-28 RX ADMIN — Medication 4 MILLIGRAM(S): at 21:27

## 2022-02-28 RX ADMIN — SENNA PLUS 2 TABLET(S): 8.6 TABLET ORAL at 21:27

## 2022-02-28 RX ADMIN — Medication 40 MILLIEQUIVALENT(S): at 11:38

## 2022-02-28 RX ADMIN — APIXABAN 5 MILLIGRAM(S): 2.5 TABLET, FILM COATED ORAL at 05:21

## 2022-02-28 RX ADMIN — ATORVASTATIN CALCIUM 20 MILLIGRAM(S): 80 TABLET, FILM COATED ORAL at 21:27

## 2022-02-28 RX ADMIN — Medication 1 TABLET(S): at 11:37

## 2022-02-28 RX ADMIN — Medication 2: at 08:20

## 2022-02-28 RX ADMIN — POLYETHYLENE GLYCOL 3350 17 GRAM(S): 17 POWDER, FOR SOLUTION ORAL at 11:42

## 2022-02-28 RX ADMIN — Medication 25 MILLIGRAM(S): at 05:21

## 2022-02-28 RX ADMIN — APIXABAN 5 MILLIGRAM(S): 2.5 TABLET, FILM COATED ORAL at 17:08

## 2022-02-28 RX ADMIN — Medication 81 MILLIGRAM(S): at 11:37

## 2022-02-28 RX ADMIN — Medication 50 MICROGRAM(S): at 05:21

## 2022-02-28 NOTE — BH CONSULTATION LIAISON ASSESSMENT NOTE - NSBHCHARTREVIEWVS_PSY_A_CORE FT
Vital Signs Last 24 Hrs  T(C): 36.8 (28 Feb 2022 07:54), Max: 37.1 (27 Feb 2022 16:25)  T(F): 98.2 (28 Feb 2022 07:54), Max: 98.8 (27 Feb 2022 16:25)  HR: 92 (28 Feb 2022 07:54) (78 - 97)  BP: 117/72 (28 Feb 2022 07:54) (93/54 - 119/73)  BP(mean): --  RR: 16 (28 Feb 2022 04:18) (16 - 18)  SpO2: 94% (28 Feb 2022 07:54) (90% - 94%)

## 2022-02-28 NOTE — PROGRESS NOTE ADULT - SUBJECTIVE AND OBJECTIVE BOX
INTERVAL HPI/OVERNIGHT EVENTS:  76y Male extensive PMH including HTN, HLD, DM, hypothyroidism, asbestosis, CAD s/p PCI, afib on Eliquis, well known to our service after found with L1 burst fracture s/p T12-L1 laminectomy and T10-L4 fusion 22, seen by Dr. Gaitan outpatient found with tenting of skin at the lower thoracic region concerning for displaced hardware and sent into ED for inpatient neurosurgical evaluation. Patient seen earlier this AM, asking if he could have regular water and when he is able to go home. He is adamant that he does not want surgical intervention    Vital Signs Last 24 Hrs  T(C): 36.8 (2022 07:54), Max: 37.1 (2022 16:25)  T(F): 98.2 (2022 07:54), Max: 98.8 (2022 16:25)  HR: 92 (2022 07:54) (78 - 97)  BP: 117/72 (2022 07:54) (93/54 - 119/73)  BP(mean): --  RR: 16 (2022 04:18) (16 - 18)  SpO2: 94% (2022 07:54) (90% - 94%)    PHYSICAL EXAM:  GENERAL: NAD  HEAD:  Atraumatic, normocephalic  [place] COMA SCORE: E- 4 V- 5 M- 6=15  MENTAL STATUS: AAO x3; Appropriately conversant without aphasia; following commands  CRANIAL NERVES: PERRL. EOMI without nystagmus. Facial sensation intact V1-3 distribution b/l. Face symmetric w/ normal eye closure and smile, tongue midline. Hearing grossly intact. Speech clear  MOTOR: strength 5/5 b/l upper and lower extremities  SENSATION: grossly intact to light touch all extremities  SPINE: ~ 1 cm inferior wound dehiscence with some granulation tissue; no active drainage noted coming from dehiscence. Superior wound tenting without skin disruption with minimal erythema  CHEST/LUNG: Nonlabored breathing  ABDOMEN: Soft, nontender, nondistended  EXTREMITIES: Non edematous; no pain to palpation    LABS:                        9.9    5.48  )-----------( 474      ( 2022 06:44 )             31.3     02-28    137  |  102  |  10.2  ----------------------------<  140<H>  3.2<L>   |  25.0  |  0.35<L>    Ca    8.0<L>      2022 06:44  Phos  2.9       Mg     1.8         Urinalysis Basic - ( 2022 18:46 )    Color: Yellow / Appearance: very cloudy / S.015 / pH: x  Gluc: x / Ketone: Negative  / Bili: Negative / Urobili: 12 mg/dL   Blood: x / Protein: Negative / Nitrite: Negative   Leuk Esterase: Trace / RBC: >50 /HPF / WBC 3-5 /HPF   Sq Epi: x / Non Sq Epi: Occasional / Bacteria: Few     @ 07:01  -   @ 07:00  --------------------------------------------------------  IN: 0 mL / OUT: 1190 mL / NET: -1190 mL    RADIOLOGY & ADDITIONAL TESTS:  US Duplex Venous Lower Ext Complete, Bilateral (22 @ 17:15)  IMPRESSION:  No evidence of deep venous thrombosis in either lower extremity.    MR Lumbar Spine w/wo IV Cont (22 @ 15:46)  IMPRESSION:  *  EXAMS ARE MARKEDLY DEGRADED BY MOTION.  *  PATIENT IS KNOWN TO BE STATUS POST A T12-L1 LAMINECTOMY AND T10-L4   POSTERIOR STABILIZATION/FUSION. THIS WAS PERFORMED TO STABILIZE A T12   BURST FRACTURE.  *  LUCENCIES SURROUNDING THE T10-T12 PEDICLE SCREWS WERE BETTER   DEMONSTRATED ON CT.  *  MIGRATION OF THE PEDICLE SCREWS AT T10 IS PARTLY SEEN WITH THE LEFT   MIGRATING LATERALLY IN THE RIGHT MEDIALLY. REGION OF INCREASED T2 SIGNAL,   POSSIBLY FLUID IS NOTED MEDIAL TO THE RIGHT T10 PEDICLE SCREW WITHIN THE   VERTEBRAL BODY AND PEDICLE WHICH MAY INVOLVE THE RIGHT SIDE OF THE SPINAL   CANAL. THORACIC CORD APPEARS DEVIATED TOWARDS THE LEFT.  *  SMALL FLUID COLLECTION DORSAL AND LATERAL TO THE LEFT-SIDED SPINAL JACKY   AT THE T10 LEVEL.  *  DORSAL FLUID COLLECTION FROM THE T12-L4 LEVEL AT THE MIDLINE.    CT Lumbar Spine No Cont (22 @ 18:11)  IMPRESSION:  *  STATUS POST T12-L1 LAMINECTOMY AND T10-L4 POSTERIOR FUSION  *  NEW LUCENCY SURROUNDING THE PEDICLE SCREWS AT T10-T12. MILD ASSOCIATED   MIGRATION OF THE PEDICLE SCREWS AT T10. FINDINGS ARE COMPATIBLE WITH   LOOSENING. INFECTION CANNOT BE EXCLUDED.  *  NEW FLUID COLLECTION ABOUT THE SPINOUS PROCESSES OF L2-L4.  *  INCREASING EDEMA WITHIN THE DORSAL SOFT TISSUES.  *  ENLARGING BILATERAL PLEURAL EFFUSIONS WHICH MAY BE PARTLY LOCULATED.

## 2022-02-28 NOTE — BH CONSULTATION LIAISON ASSESSMENT NOTE - SUMMARY
Based upon my evaluation, in a general sense  pt does have understanding of his condition ,  appreciates the current situation and its consequences and possibility that condition can worsen (ie like screws may come out, infection or spinal instability) ,  is able to rationally manipulate information to make decisions as evidenced by and does express a clear choice.   The patient's decision making capacity is not clouded by depression, hamida, psychosis or cognitive impairments.  Pt denies any suicidal ideation/intent or plan.   There is also no current emergency medical condition.   Can  comment that in patient in a general sense has capacity to make medical decisions, however at there is time there was no specific course of action recommended.  If patients clinical condition or complexity of decision changes this may need to be reassessed as capacity is time and question specific.  Would involve pt's wife in decision making and reconsult psychiatry as needed.

## 2022-02-28 NOTE — BH CONSULTATION LIAISON ASSESSMENT NOTE - HPI (INCLUDE ILLNESS QUALITY, SEVERITY, DURATION, TIMING, CONTEXT, MODIFYING FACTORS, ASSOCIATED SIGNS AND SYMPTOMS)
Patient is a 77 y/o male w/ no known psychiatric history, with  PMH including HTN, HLD, DM, hypothyroidism, asbestosis, CAD s/p PCI, afib on Eliquis, well known to  Neurosurgery service r found with L1 burst fracture s/p T12-L1 laminectomy and T10-L4 fusion 1/21/22, seen by Dr. Gaitan outpatient found with tenting of skin at the lower thoracic region concerning for displaced hardware and sent into ED for inpatient neurosurgical evaluation. Behavioral health consult was requested to determine if patient has capacity to make decisions regarding possible surgery.        Writer  and Neurosurgical PA spoke with patient.   Neurosurgery team to discuss with wife and patient goals of care for possible surgical intervention versus conservative management. No definitive decision made at this current time w/ regards to plan.  Patient reports that at this time he feels physically fine.  He wants to return to nursing home. He understands the potential for a screw to pop out of place and possibility for infection or instability of spine.  He reports that he wants to return to nursing home where he was getting excellent care.  He reports discussing with wife.  He reports he can return to hospital if condition worsens.  He states "If there is any problems, I can come back".   He reports that he wants to be active and see his grand daughter graduate.  He states he has things to do. He denies any S/H I/I/P. No psychotic sx's were elicited. He was able to state that month, say of the week and  year and reported that he was in a [facility]. No manic sx's were elicited.  He has not been aggressive or agitated.   He has been compliant with medications

## 2022-02-28 NOTE — PROGRESS NOTE ADULT - ASSESSMENT
76y Male extensive PMH including HTN, HLD, DM, hypothyroidism, asbestosis, CAD s/p PCI, afib on Eliquis, well known to our service after found with L1 burst fracture s/p T12-L1 laminectomy and T10-L4 fusion 1/21/22, seen by Dr. Gaitan outpatient found with tenting of skin at the lower thoracic region concerning for displaced hardware and sent into ED for inpatient neurosurgical evaluation.  - Exam stable, no focal neurologic deficits    PLAN:  - D/w Dr. Gaitan  - Q4 neuro checks  - Dr. Gaitan to discuss with wife today goals of care for possible surgical intervention versus conservative management. Further plan pending discussion  - Plastic surgery (Dr. Schumacher) evaluated for wound care, reached out, will follow up recommendations if any regarding wound  - Continue home ASA 81 for CAD; on Metoprolol 25 ER-- will change to tartrate 12.5 BID starting 3/1 in acute inpatient setting to avoid hypotension with parameters in place; potassium repleted, goal >4 with cardiac history. continue home lipitor 20 QHS  - Tolerating CCB diet with mildly thick liquids. Senna/miralax for bowel regimen  - Speech to re-eval to see if liquids can be advanced  - Cotton in place. Urine culture no growth. Cardura to help with urination if able to administer within parameters  - Continue home synthroid 50 mcg QD  - Sliding scale before meals  - Continue Eliquis 5 BID, adequate for DVT ppx  - SCDs for DVT ppx  76y Male extensive PMH including HTN, HLD, DM, hypothyroidism, asbestosis, CAD s/p PCI, afib on Eliquis, well known to our service after found with L1 burst fracture s/p T12-L1 laminectomy and T10-L4 fusion 1/21/22, seen by Dr. Gaitan outpatient found with tenting of skin at the lower thoracic region concerning for displaced hardware and sent into ED for inpatient neurosurgical evaluation.  - Exam stable, no focal neurologic deficits    PLAN:  - D/w Dr. Gaitan  - Q4 neuro checks  - Dr. Gaitan to discuss with wife today goals of care for possible surgical intervention versus conservative management. Further plan pending discussion  - Plastic surgery (Dr. Schumacher) evaluated for wound care-- no specific wound recommendations as of yet, plan to keep open wound areas covered  - Continue home ASA 81 for CAD; on Metoprolol 25 ER-- will change to tartrate 12.5 BID starting 3/1 in acute inpatient setting to avoid hypotension with parameters in place; potassium repleted, goal >4 with cardiac history. continue home lipitor 20 QHS  - Tolerating CCB diet with mildly thick liquids. Senna/miralax for bowel regimen  - Speech to re-eval to see if liquids can be advanced  - Cotton in place. Urine culture no growth. Cardura to help with urination if able to administer within parameters  - Continue home synthroid 50 mcg QD  - Sliding scale before meals  - Continue Eliquis 5 BID, adequate for DVT ppx  - SCDs for DVT ppx  76y Male extensive PMH including HTN, HLD, DM, hypothyroidism, asbestosis, CAD s/p PCI, afib on Eliquis, well known to our service after found with L1 burst fracture s/p T12-L1 laminectomy and T10-L4 fusion 1/21/22, seen by Dr. Gaitan outpatient found with tenting of skin at the lower thoracic region concerning for displaced hardware and sent into ED for inpatient neurosurgical evaluation.  - Exam stable, no focal neurologic deficits    PLAN:  - D/w Dr. Gaitan  - Q4 neuro checks  - Dr. Gaitan to discuss with wife today goals of care for possible surgical intervention versus conservative management. Further plan pending discussion  - Will obtain behavioral health consult to determine if patient has capacity to make his own healthcare decisions  - Plastic surgery (Dr. Schumacher) evaluated for wound care-- no specific wound recommendations as of yet, plan to keep open wound areas covered  - Continue home ASA 81 for CAD; on Metoprolol 25 ER-- will change to tartrate 12.5 BID starting 3/1 in acute inpatient setting to avoid hypotension with parameters in place; potassium repleted, goal >4 with cardiac history. continue home lipitor 20 QHS  - Tolerating CCB diet with mildly thick liquids. Senna/miralax for bowel regimen  - Speech to re-eval to see if liquids can be advanced  - Cotton in place. Urine culture no growth. Cardura to help with urination if able to administer within parameters  - Continue home synthroid 50 mcg QD  - Sliding scale before meals  - Continue Eliquis 5 BID, adequate for DVT ppx  - SCDs for DVT ppx   - Bedrest for now until plan confirmed

## 2022-02-28 NOTE — BH CONSULTATION LIAISON ASSESSMENT NOTE - CURRENT MEDICATION
MEDICATIONS  (STANDING):  apixaban 5 milliGRAM(s) Oral two times a day  aspirin enteric coated 81 milliGRAM(s) Oral daily  atorvastatin 20 milliGRAM(s) Oral at bedtime  dextrose 40% Gel 15 Gram(s) Oral once  dextrose 5%. 1000 milliLiter(s) (50 mL/Hr) IV Continuous <Continuous>  dextrose 5%. 1000 milliLiter(s) (100 mL/Hr) IV Continuous <Continuous>  dextrose 50% Injectable 25 Gram(s) IV Push once  dextrose 50% Injectable 12.5 Gram(s) IV Push once  dextrose 50% Injectable 25 Gram(s) IV Push once  doxazosin 4 milliGRAM(s) Oral at bedtime  glucagon  Injectable 1 milliGRAM(s) IntraMuscular once  insulin lispro (ADMELOG) corrective regimen sliding scale   SubCutaneous three times a day before meals  levothyroxine 50 MICROGram(s) Oral daily  multivitamin/minerals 1 Tablet(s) Oral daily  polyethylene glycol 3350 17 Gram(s) Oral daily  potassium chloride    Tablet ER 40 milliEquivalent(s) Oral every 4 hours  senna 2 Tablet(s) Oral at bedtime    MEDICATIONS  (PRN):  acetaminophen     Tablet .. 650 milliGRAM(s) Oral every 6 hours PRN Temp greater or equal to 38C (100.4F), Mild Pain (1 - 3)

## 2022-02-28 NOTE — CHART NOTE - NSCHARTNOTEFT_GEN_A_CORE
chart note -neurosurgery  76ym presented  with hx of thoracic lumbar fusion of T10-T12, L2-L4.  Pt and patient wife spoken to, the patient was informed that the T10 screw was noted to have some migration and therefore, the integrity of the skin was being observed.  Pt was informed that as a result of skin thinning there was a concern for a potential wound developing. At this time the patient and the wife are requesting for the patient to be transferred by to the nursing home. Care Coordination was spoken today earlier about the potential transfer and the patient is in agreement and will transfer as soon as the bed is avail.

## 2022-03-01 ENCOUNTER — TRANSCRIPTION ENCOUNTER (OUTPATIENT)
Age: 77
End: 2022-03-01

## 2022-03-01 LAB
ANION GAP SERPL CALC-SCNC: 14 MMOL/L — SIGNIFICANT CHANGE UP (ref 5–17)
BUN SERPL-MCNC: 11 MG/DL — SIGNIFICANT CHANGE UP (ref 8–20)
CALCIUM SERPL-MCNC: 7.9 MG/DL — LOW (ref 8.6–10.2)
CHLORIDE SERPL-SCNC: 103 MMOL/L — SIGNIFICANT CHANGE UP (ref 98–107)
CO2 SERPL-SCNC: 22 MMOL/L — SIGNIFICANT CHANGE UP (ref 22–29)
CREAT SERPL-MCNC: 0.25 MG/DL — LOW (ref 0.5–1.3)
EGFR: 129 ML/MIN/1.73M2 — SIGNIFICANT CHANGE UP
GLUCOSE BLDC GLUCOMTR-MCNC: 158 MG/DL — HIGH (ref 70–99)
GLUCOSE BLDC GLUCOMTR-MCNC: 160 MG/DL — HIGH (ref 70–99)
GLUCOSE BLDC GLUCOMTR-MCNC: 199 MG/DL — HIGH (ref 70–99)
GLUCOSE BLDC GLUCOMTR-MCNC: 215 MG/DL — HIGH (ref 70–99)
GLUCOSE SERPL-MCNC: 154 MG/DL — HIGH (ref 70–99)
HCT VFR BLD CALC: 30.9 % — LOW (ref 39–50)
HGB BLD-MCNC: 10 G/DL — LOW (ref 13–17)
MAGNESIUM SERPL-MCNC: 1.9 MG/DL — SIGNIFICANT CHANGE UP (ref 1.6–2.6)
MCHC RBC-ENTMCNC: 30.1 PG — SIGNIFICANT CHANGE UP (ref 27–34)
MCHC RBC-ENTMCNC: 32.4 GM/DL — SIGNIFICANT CHANGE UP (ref 32–36)
MCV RBC AUTO: 93.1 FL — SIGNIFICANT CHANGE UP (ref 80–100)
PHOSPHATE SERPL-MCNC: 2.7 MG/DL — SIGNIFICANT CHANGE UP (ref 2.4–4.7)
PLATELET # BLD AUTO: 461 K/UL — HIGH (ref 150–400)
POTASSIUM SERPL-MCNC: 3.8 MMOL/L — SIGNIFICANT CHANGE UP (ref 3.5–5.3)
POTASSIUM SERPL-SCNC: 3.8 MMOL/L — SIGNIFICANT CHANGE UP (ref 3.5–5.3)
RBC # BLD: 3.32 M/UL — LOW (ref 4.2–5.8)
RBC # FLD: 15 % — HIGH (ref 10.3–14.5)
SARS-COV-2 RNA SPEC QL NAA+PROBE: SIGNIFICANT CHANGE UP
SODIUM SERPL-SCNC: 138 MMOL/L — SIGNIFICANT CHANGE UP (ref 135–145)
WBC # BLD: 4.48 K/UL — SIGNIFICANT CHANGE UP (ref 3.8–10.5)
WBC # FLD AUTO: 4.48 K/UL — SIGNIFICANT CHANGE UP (ref 3.8–10.5)

## 2022-03-01 PROCEDURE — 99221 1ST HOSP IP/OBS SF/LOW 40: CPT

## 2022-03-01 PROCEDURE — 74230 X-RAY XM SWLNG FUNCJ C+: CPT | Mod: 26

## 2022-03-01 RX ADMIN — Medication 50 MICROGRAM(S): at 05:41

## 2022-03-01 RX ADMIN — Medication 2: at 12:46

## 2022-03-01 RX ADMIN — Medication 2: at 18:03

## 2022-03-01 RX ADMIN — ATORVASTATIN CALCIUM 20 MILLIGRAM(S): 80 TABLET, FILM COATED ORAL at 22:27

## 2022-03-01 RX ADMIN — Medication 1 TABLET(S): at 12:40

## 2022-03-01 RX ADMIN — Medication 650 MILLIGRAM(S): at 22:28

## 2022-03-01 RX ADMIN — Medication 81 MILLIGRAM(S): at 12:41

## 2022-03-01 RX ADMIN — Medication 4: at 08:32

## 2022-03-01 RX ADMIN — APIXABAN 5 MILLIGRAM(S): 2.5 TABLET, FILM COATED ORAL at 05:41

## 2022-03-01 RX ADMIN — APIXABAN 5 MILLIGRAM(S): 2.5 TABLET, FILM COATED ORAL at 18:03

## 2022-03-01 RX ADMIN — Medication 12.5 MILLIGRAM(S): at 05:40

## 2022-03-01 NOTE — DISCHARGE NOTE PROVIDER - DETAILS OF MALNUTRITION DIAGNOSIS/DIAGNOSES
This patient has been assessed with a concern for Malnutrition and was treated during this hospitalization for the following Nutrition diagnosis/diagnoses:     -  03/01/2022: Severe protein-calorie malnutrition

## 2022-03-01 NOTE — DISCHARGE NOTE PROVIDER - NSDCCAREPROVSEEN_GEN_ALL_CORE_FT
NSGY Attg:    see above    patient seen and examined    patient/wife declining consideration of surgical intervention at this time  plan for d/c to rehab with outpatient f/u in 2 weeks

## 2022-03-01 NOTE — SWALLOW VFSS/MBS ASSESSMENT ADULT - SLP PERTINENT HISTORY OF CURRENT PROBLEM
Pt seen bedside this admission with RX for regular solids & mildly thick fluids. Last seen 2/28 with RX for current diet & MBS

## 2022-03-01 NOTE — DISCHARGE NOTE PROVIDER - HOSPITAL COURSE
Patient is a 75 y/o M extensive PMH, known to the neurosurgery service, presenting to [facility] ED s/p spinal surgery 1/21/22. Patient underwent T12-L1 laminectomy & T10-L4 fusion after MRI revealed a 3 column injury of L1 VB. Patient was discharged on 1/28/22 to acute rehab. Patient has been ambulating with a rolling walker at rehab. Patient went for a follow up visit with Dr. Gaitan in his office this afternoon and was recommended to come to the ED for a CT T/L spine to R/O hardware complication after concerns for erythema/bulging in area of incision. Patient denies any new symptoms. Denies fever/chills, incisional pain, back pain, new weakness, numbness/tingling, N/V/D, new bowel/bladder dysfunction. He has had a velasco since last admission, unable to void. New velasco was placed on 2/26/22. Urology consult called

## 2022-03-01 NOTE — SWALLOW VFSS/MBS ASSESSMENT ADULT - DIAGNOSTIC IMPRESSIONS
Mild to moderate oral dysphagia for assessed PO, with reduced lingual strength & coordination. Pharyngeal stage WFL for puree. Mild to moderate pharyngeal dysphagia for regular solids & mildly thick fluids via large cup sips with reduced tongue base retraction, reduced laryngeal elevation & airway closure, +penetration during the swallow without clearance with initial presentation only (large cup sip). Stasis reduced with successive swallow. Mild pharyngeal dysphagia for small cup sips of thin fluids with reduced tongue base retraction with resultant mild stasis, which reduced with successive swallow. One episode of penetration with complete & spontaneous retrieval was observed. It should be noted that pt with cough during study, which was NOT directly related to PO in airway.

## 2022-03-01 NOTE — CONSULT NOTE ADULT - SUBJECTIVE AND OBJECTIVE BOX
HPI:   Patient is a 76y old  Male who presents with a chief complaint of hardware complication.    HPI: Patient is a 75 y/o M extensive PMH, known to the neurosurgery service, presenting to [facility] ED s/p spinal surgery 1/21/22. Patient underwent T12-L1 laminectomy & T10-L4 fusion after MRI revealed a 3 column injury of L1 VB. Patient was discharged on 1/28/22 to acute rehab. Patient has been ambulating with a rolling walker at rehab. Patient went for a follow up visit with Dr. Gaitan in his office this afternoon and was recommended to come to the ED for a CT T/L spine to R/O hardware complication after concerns for erythema/bulging in area of incision. Patient denies any new symptoms. Denies fever/chills, incisional pain, back pain, new weakness, numbness/tingling, N/V/D, new bowel/bladder dysfunction.   (24 Feb 2022 20:22)    Urology: called to see pt for urinary retention. Seen with Dr. Lozano.  Pt had urinary retention and failed TOV's last admission.  Pt also had failed TOV at rehab center.  Pt resting comfortably at this time, velasco in place, draining concentrated appearing urine.  Pt did not see any urologist after discharge from hospital in January.     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 650 milliGRAM(s) Oral every 6 hours PRN  apixaban 5 milliGRAM(s) Oral two times a day  aspirin enteric coated 81 milliGRAM(s) Oral daily  atorvastatin 20 milliGRAM(s) Oral at bedtime  dextrose 40% Gel 15 Gram(s) Oral once  dextrose 5%. 1000 milliLiter(s) IV Continuous <Continuous>  dextrose 5%. 1000 milliLiter(s) IV Continuous <Continuous>  dextrose 50% Injectable 25 Gram(s) IV Push once  dextrose 50% Injectable 12.5 Gram(s) IV Push once  dextrose 50% Injectable 25 Gram(s) IV Push once  doxazosin 4 milliGRAM(s) Oral at bedtime  glucagon  Injectable 1 milliGRAM(s) IntraMuscular once  insulin lispro (ADMELOG) corrective regimen sliding scale   SubCutaneous three times a day before meals  levothyroxine 50 MICROGram(s) Oral daily  metoprolol tartrate 12.5 milliGRAM(s) Oral every 12 hours  multivitamin/minerals 1 Tablet(s) Oral daily  polyethylene glycol 3350 17 Gram(s) Oral daily  senna 2 Tablet(s) Oral at bedtime      No Known Allergies      T(C): 36.6 (03-01-22 @ 07:37), Max: 36.8 (02-28-22 @ 20:53)  HR: 98 (03-01-22 @ 07:37) (89 - 99)  BP: 94/58 (03-01-22 @ 07:37) (94/58 - 121/74)  RR: 18 (03-01-22 @ 04:30) (18 - 20)  SpO2: 91% (03-01-22 @ 07:37) (91% - 93%)      02-28-22 @ 07:01  -  03-01-22 @ 07:00  --------------------------------------------------------  IN: 0 mL / OUT: 1100 mL / NET: -1100 mL                              10.0   4.48  )-----------( 461      ( 01 Mar 2022 06:49 )             30.9       03-01    138  |  103  |  11.0  ----------------------------<  154<H>  3.8   |  22.0  |  0.25<L>    Ca    7.9<L>      01 Mar 2022 06:49  Phos  2.7     03-01  Mg     1.9     03-01                Radiology:

## 2022-03-01 NOTE — PROGRESS NOTE ADULT - ASSESSMENT
76y Male extensive PMH including HTN, HLD, DM, hypothyroidism, asbestosis, CAD s/p PCI, afib on Eliquis, well known to our service after found with L1 burst fracture s/p T12-L1 laminectomy and T10-L4 fusion 1/21/22, seen by Dr. Gaitan outpatient found with tenting of skin at the lower thoracic region concerning for displaced hardware and sent into ED for inpatient neurosurgical evaluation.  - Exam stable, no focal neurologic deficits    PLAN:  - Seen with Dr. Gaitan  - Q4 neuro checks  - Dr. Gaitan discussed  with wife regarding goals of care for possible surgical intervention versus conservative management. The pt & his wife both wish for conservative management  - Behavioral health consult appreciated, he is found to be competent & able to make medical decisions.   - Plastic surgery (Dr. Schumacher) evaluated for wound care-- no specific wound recommendations,  plan to keep open wound areas covered  - Continue home ASA 81 for CAD; on Metoprolol 25 ER-- will change to tartrate 12.5 BID starting 3/1 in acute inpatient setting to avoid hypotension with parameters in place; potassium repleted,  goal >4 with cardiac history. continue home lipitor 20 QHS  - Tolerating CCB diet with mildly thick liquids. Senna/miralax for bowel regimen. Modified barium this morning.   - Speech to re-eval to see if liquids can be advanced  - Velasco in place. Urine culture no growth. Cardura to help with urination if able to administer within parameters. Urology consult today for ongoing velasco  - Continue home synthroid 50 mcg QD  - Sliding scale before meals  - Continue Eliquis 5 BID, adequate for DVT ppx  - SCDs for DVT ppx   - Dispo planning to return to nursing home facility  76y Male extensive PMH including HTN, HLD, DM, hypothyroidism, asbestosis, CAD s/p PCI, afib on Eliquis, well known to our service after found with L1 burst fracture s/p T12-L1 laminectomy and T10-L4 fusion 1/21/22, seen by Dr. Gaitan outpatient found with tenting of skin at the lower thoracic region concerning for displaced hardware and sent into ED for inpatient neurosurgical evaluation.  - Exam stable, no focal neurologic deficits    PLAN:  - Seen with Dr. Gaitan  - Q4 neuro checks  - Dr. Gaitan discussed  with wife regarding goals of care for possible surgical intervention versus conservative management. The pt & his wife both wish for conservative management  - Behavioral health consult appreciated, he is found to be competent & able to make medical decisions.   - Plastic surgery (Dr. Schumacher) evaluated for wound care-- no specific wound recommendations,  plan to keep open wound areas covered  - Continue home ASA 81 for CAD; on Metoprolol 25 ER-- will change to tartrate 12.5 BID starting 3/1 in acute inpatient setting to avoid hypotension with parameters in place; potassium repleted,  goal >4 with cardiac history. continue home lipitor 20 QHS  - Tolerating CCB diet with mildly thick liquids. Senna/miralax for bowel regimen. Modified barium this morning.   - Speech to re-eval to see if liquids can be advanced  - Velasco in place. Urine culture no growth. Cardura to help with urination if able to administer within parameters (2/27) Urology consult today for ongoing velasco. Velasco changed on Saturday 2/26  - Continue home synthroid 50 mcg QD  - Sliding scale before meals  - Continue Eliquis 5 BID, adequate for DVT ppx  - SCDs for DVT ppx   - Dispo planning to return to nursing home facility

## 2022-03-01 NOTE — DISCHARGE NOTE PROVIDER - CARE PROVIDER_API CALL
Scooby Gaitan)  Neurosurgery  [address] [place] [address]  Phone: [phone]  Fax: [phone]  Established Patient  Follow Up Time: 2 weeks   Scooby Gaitan)  Neurosurgery  [address] [place] [id number]  Phone: [phone]  Fax: [phone]  Established Patient  Follow Up Time: 2 weeks    Sammy Lozano)  [place] [address]  Phone: [phone]  Fax: [phone]  Follow Up Time: 1 month

## 2022-03-01 NOTE — SWALLOW VFSS/MBS ASSESSMENT ADULT - COMMENTS
As per MD note: "76y Male extensive PMH including HTN, HLD, DM, hypothyroidism, asbestosis, CAD s/p PCI, afib on Eliquis, well known to our service after found with L1 burst fracture s/p T12-L1 laminectomy and T10-L4 fusion 1/21/22, seen by Dr. Gaitan outpatient found with tenting of skin at the lower thoracic region concerning for displaced hardware and sent into ED for inpatient neurosurgical evaluation."

## 2022-03-01 NOTE — DISCHARGE NOTE PROVIDER - NSDCCPCAREPLAN_GEN_ALL_CORE_FT
PRINCIPAL DISCHARGE DIAGNOSIS  Diagnosis: Lumbar surgical wound fluid collection  Assessment and Plan of Treatment:

## 2022-03-01 NOTE — PROGRESS NOTE ADULT - REASON FOR ADMISSION
Hardware complication

## 2022-03-01 NOTE — DISCHARGE NOTE PROVIDER - NSDCMRMEDTOKEN_GEN_ALL_CORE_FT
acetaminophen 325 mg oral tablet: 2 tab(s) orally every 6 hours, As needed, Temp greater or equal to 38C (100.4F), Mild Pain (1 - 3)  ascorbic acid 500 mg oral tablet: 1 tab(s) orally once a day  aspirin 81 mg oral delayed release tablet: 1 tab(s) orally once a day resume on 1/29/22  atorvastatin 20 mg oral tablet: 1 tab(s) orally once a day  Eliquis 5 mg oral tablet: 1 tab(s) orally 2 times a day   resume on 2/4/22 from NSx standpoint   insulin lispro 100 units/mL injectable solution: ISS  Jardiance 25 mg oral tablet: 1 tab(s) orally once a day (in the morning)  levothyroxine 50 mcg (0.05 mg) oral tablet: 1 tab(s) orally once a day  metFORMIN 500 mg oral tablet: orally 2 times a day  Metoprolol Succinate ER 25 mg oral tablet, extended release: 1 tab(s) orally once a day  midodrine 5 mg oral tablet: 1 tab(s) orally every 8 hours  titrate off   Multiple Vitamins with Minerals oral tablet: 1 tab(s) orally once a day  nystatin 100,000 units/g topical powder: 1 application topically 2 times a day  oxyCODONE 5 mg oral tablet: 1 tab(s) orally every 4 hours, As needed, Moderate Pain (4 - 6)  polyethylene glycol 3350 oral powder for reconstitution: 17 gram(s) orally once a day  senna oral tablet: 2 tab(s) orally once a day (at bedtime)  sodium chloride 1 g oral tablet: 1 tab(s) orally every 12 hours   ascorbic acid 500 mg oral tablet: 1 tab(s) orally once a day  aspirin 81 mg oral delayed release tablet: 1 tab(s) orally once a day resume on 1/29/22  atorvastatin 20 mg oral tablet: 1 tab(s) orally once a day  doxazosin 4 mg oral tablet: 1 tab(s) orally once a day (at bedtime)  Eliquis 5 mg oral tablet: 1 tab(s) orally 2 times a day   resume on 2/4/22 from NSx standpoint   insulin lispro 100 units/mL injectable solution: ISS  Jardiance 25 mg oral tablet: 1 tab(s) orally once a day (in the morning)  levothyroxine 50 mcg (0.05 mg) oral tablet: 1 tab(s) orally once a day  metFORMIN 500 mg oral tablet: orally 2 times a day  Metoprolol Succinate ER 25 mg oral tablet, extended release: 1 tab(s) orally once a day  midodrine 5 mg oral tablet: 1 tab(s) orally every 8 hours  titrate off   Multiple Vitamins oral tablet: 1 tab(s) orally once a day  Multiple Vitamins with Minerals oral tablet: 1 tab(s) orally once a day  nystatin 100,000 units/g topical powder: 1 application topically 2 times a day  oxyCODONE 5 mg oral tablet: 1 tab(s) orally every 4 hours, As needed, Moderate Pain (4 - 6)  polyethylene glycol 3350 oral powder for reconstitution: 17 gram(s) orally once a day  senna oral tablet: 2 tab(s) orally once a day (at bedtime)  sodium chloride 1 g oral tablet: 1 tab(s) orally every 12 hours

## 2022-03-01 NOTE — DIETITIAN INITIAL EVALUATION ADULT. - PERTINENT MEDS FT
MEDICATIONS  (STANDING):  apixaban 5 milliGRAM(s) Oral two times a day  aspirin enteric coated 81 milliGRAM(s) Oral daily  atorvastatin 20 milliGRAM(s) Oral at bedtime  dextrose 40% Gel 15 Gram(s) Oral once  dextrose 5%. 1000 milliLiter(s) (50 mL/Hr) IV Continuous <Continuous>  dextrose 5%. 1000 milliLiter(s) (100 mL/Hr) IV Continuous <Continuous>  dextrose 50% Injectable 25 Gram(s) IV Push once  dextrose 50% Injectable 12.5 Gram(s) IV Push once  dextrose 50% Injectable 25 Gram(s) IV Push once  doxazosin 4 milliGRAM(s) Oral at bedtime  glucagon  Injectable 1 milliGRAM(s) IntraMuscular once  insulin lispro (ADMELOG) corrective regimen sliding scale   SubCutaneous three times a day before meals  levothyroxine 50 MICROGram(s) Oral daily  metoprolol tartrate 12.5 milliGRAM(s) Oral every 12 hours  multivitamin/minerals 1 Tablet(s) Oral daily  polyethylene glycol 3350 17 Gram(s) Oral daily  senna 2 Tablet(s) Oral at bedtime    MEDICATIONS  (PRN):  acetaminophen     Tablet .. 650 milliGRAM(s) Oral every 6 hours PRN Temp greater or equal to 38C (100.4F), Mild Pain (1 - 3)

## 2022-03-01 NOTE — PROGRESS NOTE ADULT - SUBJECTIVE AND OBJECTIVE BOX
HPI: Patient is a 75 y/o M extensive PMH, known to the neurosurgery service, presenting to [facility] ED s/p spinal surgery 1/21/22. Patient underwent T12-L1 laminectomy & T10-L4 fusion after MRI revealed a 3 column injury of L1 VB. Patient was discharged on 1/28/22 to acute rehab. Patient has been ambulating with a rolling walker at rehab. Patient went for a follow up visit with Dr. Gaitan in his office this afternoon and was recommended to come to the ED for a CT T/L spine to R/O hardware complication after concerns for erythema/bulging in area of incision. Patient denies any new symptoms. Denies fever/chills, incisional pain, back pain, new weakness, numbness/tingling, N/V/D, new bowel/bladder dysfunction.   (24 Feb 2022 20:22)      INTERVAL OVERNIGHT EVENTS: 3/1  76y Male seen lying  in bed. Cotton in draining urine. Modified Barium scheduled for today.      Vital Signs Last 24 Hrs  T(C): 36.6 (01 Mar 2022 07:37), Max: 36.8 (28 Feb 2022 20:53)  T(F): 97.8 (01 Mar 2022 07:37), Max: 98.3 (28 Feb 2022 20:53)  HR: 98 (01 Mar 2022 07:37) (89 - 99)  BP: 94/58 (01 Mar 2022 07:37) (94/58 - 121/74)  BP(mean): --  RR: 18 (01 Mar 2022 04:30) (18 - 20)  SpO2: 91% (01 Mar 2022 07:37) (91% - 93%)    PHYSICAL EXAM:  GENERAL: Well-groomed, well-developed  HEAD:  Atraumatic, normocephalic  WOUND: Inferior aspect of wound with yellowish granulation tissue, no drainage  MENTAL STATUS: AAO x3, Awake. Appropriately conversant without aphasia, following simple commands. EOMI, face symmetrical.   MOTOR: strength 5/5 b/l upper and lower extremities  SENSATION: grossly intact to light touch all extremities      LABS:                        10.0   4.48  )-----------( 461      ( 01 Mar 2022 06:49 )             30.9     03-01    138  |  103  |  11.0  ----------------------------<  154<H>  3.8   |  22.0  |  0.25<L>    Ca    7.9<L>      01 Mar 2022 06:49  Phos  2.7     03-01  Mg     1.9     03-01 02-28 @ 07:01  -  03-01 @ 07:00  --------------------------------------------------------  IN: 0 mL / OUT: 1100 mL / NET: -1100 mL        RADIOLOGY & ADDITIONAL TESTS:    US Duplex Venous Lower Ext Complete, Bilateral (02.26.22 @ 17:15)     RIGHT:  Normal compressibility of the RIGHT common femoral, femoral and popliteal   veins.  Doppler examination shows normal spontaneous and phasic flow.  No RIGHT calf vein thrombosis is detected.    LEFT:  Normal compressibility of the LEFT common femoral, femoral and popliteal   veins.  Doppler examination shows normal spontaneous and phasic flow.  No LEFT calf vein thrombosis is detected.    IMPRESSION:  No evidence of deep venous thrombosis in either lower extremity.     MR Lumbar Spine w/wo IV Cont (02.26.22 @ 15:46)     Patient is known to be status post a T12-L1 laminectomy and posterior   fusion. This was performed to relieve a burst fracture of the L1   vertebral body.    Hardware placed included pedicle screws at T10, T11, T12, L2, L3, and L4   which attached to posterior rods. Bone graft material was placed along   the posterior elements from T10 through T12 as well as L2-L4.    Lucencies surrounding the T10-T12 pedicle screws was better demonstrated   on CT.    Migration of the pedicle screws at T10 is partly seen with the left   migrating laterally and the right medially. A region of increased T2   signal, possibly fluid is noted medial to the right T10 pedicle screw   within the vertebral body and pedicle. (Series 7 images 26 and 27. This   may involve the right side of the spinal canal. The thoracic cord appears   deviated towards the left without gross abnormal cord signal.    There is a small fluid collection dorsal and lateral to the left-sided   spinal jacky at the T10 level. This measures approximately 1.3 cm (series 7   images 26-29)    There is a dorsal fluid collection from the T12-L4 levels at the midline   which surrounds the spinous processes of L2-L4. This appears   multiseptated and measures 10.9 cm craniocaudad by 3.2 cm anterior to   posterior by 7.2 cm in width.    Exaggeration of the normal thoracic kyphosis is seen. Chronic   spondylolysis of L5 is again noted with grade 1 spondylolisthesis. Edema   is present within the dorsal soft tissues. There appears to be loculated   pleural effusions bilaterally.    IMPRESSION:  *  EXAMS ARE MARKEDLY DEGRADED BY MOTION.  *  PATIENT IS KNOWN TO BE STATUS POST A T12-L1 LAMINECTOMY AND T10-L4   POSTERIOR STABILIZATION/FUSION. THIS WAS PERFORMED TO STABILIZE A T12   BURST FRACTURE.  *  LUCENCIES SURROUNDING THE T10-T12 PEDICLE SCREWS WERE BETTER   DEMONSTRATED ON CT.  *  MIGRATION OF THE PEDICLE SCREWS AT T10 IS PARTLY SEEN WITH THE LEFT   MIGRATING LATERALLY IN THE RIGHT MEDIALLY. REGION OF INCREASED T2 SIGNAL,   POSSIBLY FLUID IS NOTED MEDIAL TO THE RIGHT T10 PEDICLE SCREW WITHIN THE   VERTEBRAL BODY AND PEDICLE WHICH MAY INVOLVE THE RIGHT SIDE OF THE SPINAL   CANAL. THORACIC CORD APPEARS DEVIATED TOWARDS THE LEFT.  *  SMALL FLUID COLLECTION DORSAL AND LATERAL TO THE LEFT-SIDED SPINAL JACKY   AT THE T10 LEVEL.  *  DORSAL FLUID COLLECTION FROM THE T12-L4 LEVEL AT THE MIDLINE.        CAPRINI SCORE [CLOT]:  Patient has an estimated Caprini score of greater than 5.  However, the patient's unique clinical situation will be addressed in an individual manner to determine appropriate anticoagulation treatment, if any.     HPI: Patient is a 77 y/o M extensive PMH, known to the neurosurgery service, presenting to [facility] ED s/p spinal surgery 1/21/22. Patient underwent T12-L1 laminectomy & T10-L4 fusion after MRI revealed a 3 column injury of L1 VB. Patient was discharged on 1/28/22 to acute rehab. Patient has been ambulating with a rolling walker at rehab. Patient went for a follow up visit with Dr. Gaitan in his office this afternoon and was recommended to come to the ED for a CT T/L spine to R/O hardware complication after concerns for erythema/bulging in area of incision. Patient denies any new symptoms. Denies fever/chills, incisional pain, back pain, new weakness, numbness/tingling, N/V/D, new bowel/bladder dysfunction. He has had a velasco since last admission   (24 Feb 2022 20:22)      INTERVAL OVERNIGHT EVENTS: 3/1  76y Male seen lying  in bed. Velasco in draining urine. Modified Barium scheduled for today.      Vital Signs Last 24 Hrs  T(C): 36.6 (01 Mar 2022 07:37), Max: 36.8 (28 Feb 2022 20:53)  T(F): 97.8 (01 Mar 2022 07:37), Max: 98.3 (28 Feb 2022 20:53)  HR: 98 (01 Mar 2022 07:37) (89 - 99)  BP: 94/58 (01 Mar 2022 07:37) (94/58 - 121/74)  BP(mean): --  RR: 18 (01 Mar 2022 04:30) (18 - 20)  SpO2: 91% (01 Mar 2022 07:37) (91% - 93%)    PHYSICAL EXAM:  GENERAL: Well-groomed, well-developed  HEAD:  Atraumatic, normocephalic  WOUND: Inferior aspect of wound with yellowish granulation tissue, no drainage  MENTAL STATUS: AAO x3, Awake. Appropriately conversant without aphasia, following simple commands. EOMI, face symmetrical.   MOTOR: strength 5/5 b/l upper and lower extremities  SENSATION: grossly intact to light touch all extremities      LABS:                        10.0   4.48  )-----------( 461      ( 01 Mar 2022 06:49 )             30.9     03-01    138  |  103  |  11.0  ----------------------------<  154<H>  3.8   |  22.0  |  0.25<L>    Ca    7.9<L>      01 Mar 2022 06:49  Phos  2.7     03-01  Mg     1.9     03-01 02-28 @ 07:01  -  03-01 @ 07:00  --------------------------------------------------------  IN: 0 mL / OUT: 1100 mL / NET: -1100 mL        RADIOLOGY & ADDITIONAL TESTS:    US Duplex Venous Lower Ext Complete, Bilateral (02.26.22 @ 17:15)     RIGHT:  Normal compressibility of the RIGHT common femoral, femoral and popliteal   veins.  Doppler examination shows normal spontaneous and phasic flow.  No RIGHT calf vein thrombosis is detected.    LEFT:  Normal compressibility of the LEFT common femoral, femoral and popliteal   veins.  Doppler examination shows normal spontaneous and phasic flow.  No LEFT calf vein thrombosis is detected.    IMPRESSION:  No evidence of deep venous thrombosis in either lower extremity.     MR Lumbar Spine w/wo IV Cont (02.26.22 @ 15:46)     Patient is known to be status post a T12-L1 laminectomy and posterior   fusion. This was performed to relieve a burst fracture of the L1   vertebral body.    Hardware placed included pedicle screws at T10, T11, T12, L2, L3, and L4   which attached to posterior rods. Bone graft material was placed along   the posterior elements from T10 through T12 as well as L2-L4.    Lucencies surrounding the T10-T12 pedicle screws was better demonstrated   on CT.    Migration of the pedicle screws at T10 is partly seen with the left   migrating laterally and the right medially. A region of increased T2   signal, possibly fluid is noted medial to the right T10 pedicle screw   within the vertebral body and pedicle. (Series 7 images 26 and 27. This   may involve the right side of the spinal canal. The thoracic cord appears   deviated towards the left without gross abnormal cord signal.    There is a small fluid collection dorsal and lateral to the left-sided   spinal jacky at the T10 level. This measures approximately 1.3 cm (series 7   images 26-29)    There is a dorsal fluid collection from the T12-L4 levels at the midline   which surrounds the spinous processes of L2-L4. This appears   multiseptated and measures 10.9 cm craniocaudad by 3.2 cm anterior to   posterior by 7.2 cm in width.    Exaggeration of the normal thoracic kyphosis is seen. Chronic   spondylolysis of L5 is again noted with grade 1 spondylolisthesis. Edema   is present within the dorsal soft tissues. There appears to be loculated   pleural effusions bilaterally.    IMPRESSION:  *  EXAMS ARE MARKEDLY DEGRADED BY MOTION.  *  PATIENT IS KNOWN TO BE STATUS POST A T12-L1 LAMINECTOMY AND T10-L4   POSTERIOR STABILIZATION/FUSION. THIS WAS PERFORMED TO STABILIZE A T12   BURST FRACTURE.  *  LUCENCIES SURROUNDING THE T10-T12 PEDICLE SCREWS WERE BETTER   DEMONSTRATED ON CT.  *  MIGRATION OF THE PEDICLE SCREWS AT T10 IS PARTLY SEEN WITH THE LEFT   MIGRATING LATERALLY IN THE RIGHT MEDIALLY. REGION OF INCREASED T2 SIGNAL,   POSSIBLY FLUID IS NOTED MEDIAL TO THE RIGHT T10 PEDICLE SCREW WITHIN THE   VERTEBRAL BODY AND PEDICLE WHICH MAY INVOLVE THE RIGHT SIDE OF THE SPINAL   CANAL. THORACIC CORD APPEARS DEVIATED TOWARDS THE LEFT.  *  SMALL FLUID COLLECTION DORSAL AND LATERAL TO THE LEFT-SIDED SPINAL JACKY   AT THE T10 LEVEL.  *  DORSAL FLUID COLLECTION FROM THE T12-L4 LEVEL AT THE MIDLINE.        CAPRINI SCORE [CLOT]:  Patient has an estimated Caprini score of greater than 5.  However, the patient's unique clinical situation will be addressed in an individual manner to determine appropriate anticoagulation treatment, if any.

## 2022-03-01 NOTE — DISCHARGE NOTE PROVIDER - PROVIDER TOKENS
PROVIDER:[TOKEN:[[id number]],FOLLOWUP:[2 weeks],ESTABLISHEDPATIENT:[T]] PROVIDER:[TOKEN:[[id number]],FOLLOWUP:[2 weeks],ESTABLISHEDPATIENT:[T]],PROVIDER:[TOKEN:[[id number]],FOLLOWUP:[1 month]]

## 2022-03-01 NOTE — SWALLOW VFSS/MBS ASSESSMENT ADULT - SLP GENERAL OBSERVATIONS
Pt received & seen seated upright via stretcher in radiology, awake/alert. decreased hearing acuity, 0/10 pain

## 2022-03-01 NOTE — DIETITIAN INITIAL EVALUATION ADULT. - OTHER INFO
76y Male extensive PMH including HTN, HLD, DM, hypothyroidism, asbestosis, CAD s/p PCI, afib on Eliquis, well known to our service after found with L1 burst fracture s/p T12-L1 laminectomy and T10-L4 fusion 1/21/22, seen by Dr. Gaitan outpatient found with tenting of skin at the lower thoracic region concerning for displaced hardware and sent into ED for inpatient neurosurgical evaluation. Aware of plan for d/c to [facility].

## 2022-03-01 NOTE — PROGRESS NOTE ADULT - ATTENDING COMMENTS
NSGY Attg:    see above    patient seen and examined    agree with exam as above    agree with plan as above  no surgical intervention/dc back to rehab per wishes of patient and wife  will follow as outpatient  local wound care per plastics recs
NSGY Attg:    see above    patient seen and examined    agree with exam as above    CT reviewed -- evidence of hardware failure at superior aspect of construct/thoracic levels    agree with plan as above  MRI TLS spine w and wo contrast pending  plastics eval pending  blood cultures pending    I have explained the imaging findings to the patient and his wife.  I explained the high risk for wound breakdown/infection given the pressure on the overlying skin.  The patient is not amenable to consideration of additional surgical intervention at this time.  He is agreeable to undergoing the MRI and plastics consultation.
NSGY Attg:    see above    patient seen and examined    agree with exam as above    MRI reviewed    agree with plan as above  plastic surgery recs appreciated -- d/w Dr. Schumacher  psych input appreciated  patient/wife declining consideration of surgical intervention and understand risk of progressive hardware failure/skin breakdown/infection

## 2022-03-01 NOTE — SWALLOW VFSS/MBS ASSESSMENT ADULT - PHARYNGEAL PHASE COMMENTS
cough noted x2: no PO observed in airway stasis reduced with successive swallows cough post trials: no PO observed in airway

## 2022-03-01 NOTE — DISCHARGE NOTE PROVIDER - CARE PROVIDERS DIRECT ADDRESSES
[email or web address]

## 2022-03-01 NOTE — DISCHARGE NOTE PROVIDER - NSDCFUADDINST_GEN_ALL_CORE_FT
Ok to cover wound with  Allevyn.  Ok to cover wound with  Allevyn.   Maintain velasco and change every 4 wks. Follow up with Dr Lozano in 4 wks  Ok to cover wound with  Allevyn.   Maintain velasco and change every 4 wks. Follow up with Dr Lozano in 4 wks  Wear brace when upright.

## 2022-03-01 NOTE — SWALLOW VFSS/MBS ASSESSMENT ADULT - RECOMMENDED FEEDING/EATING TECHNIQUES
allow for swallow between intakes/maintain upright posture during/after eating for 30 mins/no straws/oral hygiene/position upright (90 degrees)/provide rest periods between swallows/small sips/bites

## 2022-03-01 NOTE — SWALLOW VFSS/MBS ASSESSMENT ADULT - ROSENBEK'S PENETRATION ASPIRATION SCALE
(1) no aspiration, contrast does not enter airway 1/5 cup sips only; 1: no penetration/aspiration viewed with additional cup sips (4/5)/(2) contrast enters airway, remains above the vocal cords, no residue remains (penetration) initial cup sip only (large sip)/(5) contrast contacts vocal cords, visible residue remains (penetration) 3/7 cup sips only; 1: no penetration/aspiration viewed with additional cup sips (4/7)/(2) contrast enters airway, remains above the vocal cords, no residue remains (penetration) initial cup sip only (large sip); penetration/aspiration scale score: 2: penetration with complete retrieval in 1/4 trials; 1: no penetration/aspiration noted with 2/4 trials/(5) contrast contacts vocal cords, visible residue remains (penetration)

## 2022-03-01 NOTE — SWALLOW VFSS/MBS ASSESSMENT ADULT - ORAL PHASE
Uncontrolled bolus / spillover in cricket-pharynx/Uncontrolled bolus / spillover in hypopharynx Uncontrolled bolus / spillover in cricket-pharynx Delayed oral transit time/Reduced anterior - posterior transport

## 2022-03-01 NOTE — DIETITIAN INITIAL EVALUATION ADULT. - PERTINENT LABORATORY DATA
03-01 Na138 mmol/L Glu 154 mg/dL<H> K+ 3.8 mmol/L Cr  0.25 mg/dL<L> BUN 11.0 mg/dL Phos 2.7 mg/dL Alb n/a   PAB n/a

## 2022-03-01 NOTE — CONSULT NOTE ADULT - ASSESSMENT
77 yo male s/p recent laminectomy and fusions, urinary retention, multiple failed TOV's  - recommend cont velasco catheter until more mobile/ambulatory  - pt may be seen in the office with Dr. Lozano in 3-4 weeks for possible TOV  - velasco should be exchanged q4 weeks  - discussed plan with pt and wife at bedside, questions answered

## 2022-03-02 ENCOUNTER — APPOINTMENT (OUTPATIENT)
Dept: HEMATOLOGY ONCOLOGY | Facility: CLINIC | Age: 77
End: 2022-03-02

## 2022-03-02 ENCOUNTER — TRANSCRIPTION ENCOUNTER (OUTPATIENT)
Age: 77
End: 2022-03-02

## 2022-03-02 VITALS
DIASTOLIC BLOOD PRESSURE: 68 MMHG | OXYGEN SATURATION: 94 % | SYSTOLIC BLOOD PRESSURE: 114 MMHG | TEMPERATURE: 98 F | HEART RATE: 96 BPM | RESPIRATION RATE: 18 BRPM

## 2022-03-02 LAB
CULTURE RESULTS: SIGNIFICANT CHANGE UP
CULTURE RESULTS: SIGNIFICANT CHANGE UP
GLUCOSE BLDC GLUCOMTR-MCNC: 166 MG/DL — HIGH (ref 70–99)
GLUCOSE BLDC GLUCOMTR-MCNC: 186 MG/DL — HIGH (ref 70–99)
GLUCOSE BLDC GLUCOMTR-MCNC: 186 MG/DL — HIGH (ref 70–99)
SPECIMEN SOURCE: SIGNIFICANT CHANGE UP
SPECIMEN SOURCE: SIGNIFICANT CHANGE UP

## 2022-03-02 PROCEDURE — 84100 ASSAY OF PHOSPHORUS: CPT

## 2022-03-02 PROCEDURE — 72157 MRI CHEST SPINE W/O & W/DYE: CPT

## 2022-03-02 PROCEDURE — 85025 COMPLETE CBC W/AUTO DIFF WBC: CPT

## 2022-03-02 PROCEDURE — 82550 ASSAY OF CK (CPK): CPT

## 2022-03-02 PROCEDURE — 92611 MOTION FLUOROSCOPY/SWALLOW: CPT

## 2022-03-02 PROCEDURE — 36415 COLL VENOUS BLD VENIPUNCTURE: CPT

## 2022-03-02 PROCEDURE — 80048 BASIC METABOLIC PNL TOTAL CA: CPT

## 2022-03-02 PROCEDURE — 72158 MRI LUMBAR SPINE W/O & W/DYE: CPT

## 2022-03-02 PROCEDURE — 74230 X-RAY XM SWLNG FUNCJ C+: CPT

## 2022-03-02 PROCEDURE — 86140 C-REACTIVE PROTEIN: CPT

## 2022-03-02 PROCEDURE — 80053 COMPREHEN METABOLIC PANEL: CPT

## 2022-03-02 PROCEDURE — 72131 CT LUMBAR SPINE W/O DYE: CPT | Mod: MA

## 2022-03-02 PROCEDURE — 87040 BLOOD CULTURE FOR BACTERIA: CPT

## 2022-03-02 PROCEDURE — 92610 EVALUATE SWALLOWING FUNCTION: CPT

## 2022-03-02 PROCEDURE — 92526 ORAL FUNCTION THERAPY: CPT

## 2022-03-02 PROCEDURE — 97163 PT EVAL HIGH COMPLEX 45 MIN: CPT

## 2022-03-02 PROCEDURE — 83735 ASSAY OF MAGNESIUM: CPT

## 2022-03-02 PROCEDURE — 85652 RBC SED RATE AUTOMATED: CPT

## 2022-03-02 PROCEDURE — 82962 GLUCOSE BLOOD TEST: CPT

## 2022-03-02 PROCEDURE — 87086 URINE CULTURE/COLONY COUNT: CPT

## 2022-03-02 PROCEDURE — 71045 X-RAY EXAM CHEST 1 VIEW: CPT

## 2022-03-02 PROCEDURE — U0005: CPT

## 2022-03-02 PROCEDURE — 93970 EXTREMITY STUDY: CPT

## 2022-03-02 PROCEDURE — 85027 COMPLETE CBC AUTOMATED: CPT

## 2022-03-02 PROCEDURE — U0003: CPT

## 2022-03-02 PROCEDURE — 72128 CT CHEST SPINE W/O DYE: CPT | Mod: MA

## 2022-03-02 PROCEDURE — 81001 URINALYSIS AUTO W/SCOPE: CPT

## 2022-03-02 PROCEDURE — 99285 EMERGENCY DEPT VISIT HI MDM: CPT

## 2022-03-02 RX ORDER — DOXAZOSIN MESYLATE 4 MG
1 TABLET ORAL
Qty: 0 | Refills: 0 | DISCHARGE
Start: 2022-03-02

## 2022-03-02 RX ADMIN — Medication 1 TABLET(S): at 11:03

## 2022-03-02 RX ADMIN — Medication 2: at 11:57

## 2022-03-02 RX ADMIN — Medication 2: at 17:33

## 2022-03-02 RX ADMIN — Medication 50 MICROGRAM(S): at 05:52

## 2022-03-02 RX ADMIN — Medication 81 MILLIGRAM(S): at 11:03

## 2022-03-02 RX ADMIN — Medication 12.5 MILLIGRAM(S): at 17:32

## 2022-03-02 RX ADMIN — Medication 12.5 MILLIGRAM(S): at 05:52

## 2022-03-02 RX ADMIN — APIXABAN 5 MILLIGRAM(S): 2.5 TABLET, FILM COATED ORAL at 17:33

## 2022-03-02 RX ADMIN — Medication 2: at 08:16

## 2022-03-02 RX ADMIN — APIXABAN 5 MILLIGRAM(S): 2.5 TABLET, FILM COATED ORAL at 05:54

## 2022-03-02 NOTE — DISCHARGE NOTE NURSING/CASE MANAGEMENT/SOCIAL WORK - NSDCPEFALRISK_GEN_ALL_CORE
For information on Fall & Injury Prevention, visit: [email or web address] OR  [email or web address] OR  https://www.cdc.gov/steadi/patient.html

## 2022-03-02 NOTE — PHYSICAL THERAPY INITIAL EVALUATION ADULT - ADDITIONAL COMMENTS
pt states he lives with his wife and son in a 1 story house with 3 steps to enter (+2 rails). pt has a cane, RW, shower chair, raised toilet seat. amb with devices at times. son not working and assisted with ADLs. was at subacute rehab prior to arrival

## 2022-03-02 NOTE — DISCHARGE NOTE NURSING/CASE MANAGEMENT/SOCIAL WORK - PATIENT PORTAL LINK FT
You can access the FollowMyHealth Patient Portal offered by [facility] by registering at the following website: [email or web address]. By joining [employer]’s FollowMyHealth portal, you will also be able to view your health information using other applications (apps) compatible with our system.

## 2022-03-02 NOTE — PHYSICAL THERAPY INITIAL EVALUATION ADULT - PERTINENT HX OF CURRENT PROBLEM, REHAB EVAL
pt presents to [facility] due to concern of found was found with tenting of skin at the lower thoracic region concerning for displaced hardware and sent into ED for inpatient neurosurgical evaluation. history of: L1 burst fracture s/p T12-L1 laminectomy and T10-L4 fusion 1/21/22,

## 2022-03-17 ENCOUNTER — INPATIENT (INPATIENT)
Facility: HOSPITAL | Age: 77
LOS: 12 days | Discharge: NURSING, REHAB OR LONG-TERM CARE | DRG: 495 | End: 2022-03-30
Attending: NEUROLOGICAL SURGERY | Admitting: NEUROLOGICAL SURGERY
Payer: MEDICARE

## 2022-03-17 VITALS
WEIGHT: 158.07 LBS | TEMPERATURE: 97 F | RESPIRATION RATE: 18 BRPM | HEART RATE: 75 BPM | SYSTOLIC BLOOD PRESSURE: 105 MMHG | DIASTOLIC BLOOD PRESSURE: 73 MMHG | OXYGEN SATURATION: 95 % | HEIGHT: 75 IN

## 2022-03-17 DIAGNOSIS — T81.9XXA UNSPECIFIED COMPLICATION OF PROCEDURE, INITIAL ENCOUNTER: ICD-10-CM

## 2022-03-17 DIAGNOSIS — Z98.89 OTHER SPECIFIED POSTPROCEDURAL STATES: Chronic | ICD-10-CM

## 2022-03-17 DIAGNOSIS — Z09 ENCOUNTER FOR FOLLOW-UP EXAMINATION AFTER COMPLETED TREATMENT FOR CONDITIONS OTHER THAN MALIGNANT NEOPLASM: Chronic | ICD-10-CM

## 2022-03-17 DIAGNOSIS — Z90.49 ACQUIRED ABSENCE OF OTHER SPECIFIED PARTS OF DIGESTIVE TRACT: Chronic | ICD-10-CM

## 2022-03-17 DIAGNOSIS — Z90.12 ACQUIRED ABSENCE OF LEFT BREAST AND NIPPLE: Chronic | ICD-10-CM

## 2022-03-17 LAB
ALBUMIN SERPL ELPH-MCNC: 3.4 G/DL — SIGNIFICANT CHANGE UP (ref 3.3–5.2)
ALP SERPL-CCNC: 91 U/L — SIGNIFICANT CHANGE UP (ref 40–120)
ALT FLD-CCNC: 18 U/L — SIGNIFICANT CHANGE UP
ANION GAP SERPL CALC-SCNC: 12 MMOL/L — SIGNIFICANT CHANGE UP (ref 5–17)
ANISOCYTOSIS BLD QL: SLIGHT — SIGNIFICANT CHANGE UP
APTT BLD: 35.5 SEC — SIGNIFICANT CHANGE UP (ref 27.5–35.5)
AST SERPL-CCNC: 22 U/L — SIGNIFICANT CHANGE UP
BASOPHILS # BLD AUTO: 0.18 K/UL — SIGNIFICANT CHANGE UP (ref 0–0.2)
BASOPHILS NFR BLD AUTO: 2.6 % — HIGH (ref 0–2)
BILIRUB SERPL-MCNC: 0.5 MG/DL — SIGNIFICANT CHANGE UP (ref 0.4–2)
BUN SERPL-MCNC: 11.9 MG/DL — SIGNIFICANT CHANGE UP (ref 8–20)
CALCIUM SERPL-MCNC: 8.7 MG/DL — SIGNIFICANT CHANGE UP (ref 8.6–10.2)
CHLORIDE SERPL-SCNC: 102 MMOL/L — SIGNIFICANT CHANGE UP (ref 98–107)
CO2 SERPL-SCNC: 25 MMOL/L — SIGNIFICANT CHANGE UP (ref 22–29)
CREAT SERPL-MCNC: 0.53 MG/DL — SIGNIFICANT CHANGE UP (ref 0.5–1.3)
EGFR: 103 ML/MIN/1.73M2 — SIGNIFICANT CHANGE UP
ELLIPTOCYTES BLD QL SMEAR: SLIGHT — SIGNIFICANT CHANGE UP
EOSINOPHIL # BLD AUTO: 0.06 K/UL — SIGNIFICANT CHANGE UP (ref 0–0.5)
EOSINOPHIL NFR BLD AUTO: 0.9 % — SIGNIFICANT CHANGE UP (ref 0–6)
GIANT PLATELETS BLD QL SMEAR: PRESENT — SIGNIFICANT CHANGE UP
GLUCOSE SERPL-MCNC: 106 MG/DL — HIGH (ref 70–99)
HCT VFR BLD CALC: 36.8 % — LOW (ref 39–50)
HGB BLD-MCNC: 11.6 G/DL — LOW (ref 13–17)
INR BLD: 1.37 RATIO — HIGH (ref 0.88–1.16)
LYMPHOCYTES # BLD AUTO: 1.55 K/UL — SIGNIFICANT CHANGE UP (ref 1–3.3)
LYMPHOCYTES # BLD AUTO: 22.8 % — SIGNIFICANT CHANGE UP (ref 13–44)
MACROCYTES BLD QL: SLIGHT — SIGNIFICANT CHANGE UP
MANUAL SMEAR VERIFICATION: SIGNIFICANT CHANGE UP
MCHC RBC-ENTMCNC: 30 PG — SIGNIFICANT CHANGE UP (ref 27–34)
MCHC RBC-ENTMCNC: 31.5 GM/DL — LOW (ref 32–36)
MCV RBC AUTO: 95.1 FL — SIGNIFICANT CHANGE UP (ref 80–100)
MONOCYTES # BLD AUTO: 0.71 K/UL — SIGNIFICANT CHANGE UP (ref 0–0.9)
MONOCYTES NFR BLD AUTO: 10.5 % — SIGNIFICANT CHANGE UP (ref 2–14)
NEUTROPHILS # BLD AUTO: 4.28 K/UL — SIGNIFICANT CHANGE UP (ref 1.8–7.4)
NEUTROPHILS NFR BLD AUTO: 63.2 % — SIGNIFICANT CHANGE UP (ref 43–77)
OVALOCYTES BLD QL SMEAR: SLIGHT — SIGNIFICANT CHANGE UP
PLAT MORPH BLD: NORMAL — SIGNIFICANT CHANGE UP
PLATELET # BLD AUTO: 614 K/UL — HIGH (ref 150–400)
POIKILOCYTOSIS BLD QL AUTO: SIGNIFICANT CHANGE UP
POLYCHROMASIA BLD QL SMEAR: SLIGHT — SIGNIFICANT CHANGE UP
POTASSIUM SERPL-MCNC: 4.1 MMOL/L — SIGNIFICANT CHANGE UP (ref 3.5–5.3)
POTASSIUM SERPL-SCNC: 4.1 MMOL/L — SIGNIFICANT CHANGE UP (ref 3.5–5.3)
PROT SERPL-MCNC: 6.9 G/DL — SIGNIFICANT CHANGE UP (ref 6.6–8.7)
PROTHROM AB SERPL-ACNC: 15.9 SEC — HIGH (ref 10.5–13.4)
RBC # BLD: 3.87 M/UL — LOW (ref 4.2–5.8)
RBC # FLD: 15.5 % — HIGH (ref 10.3–14.5)
RBC BLD AUTO: ABNORMAL
SCHISTOCYTES BLD QL AUTO: SIGNIFICANT CHANGE UP
SODIUM SERPL-SCNC: 139 MMOL/L — SIGNIFICANT CHANGE UP (ref 135–145)
WBC # BLD: 6.78 K/UL — SIGNIFICANT CHANGE UP (ref 3.8–10.5)
WBC # FLD AUTO: 6.78 K/UL — SIGNIFICANT CHANGE UP (ref 3.8–10.5)

## 2022-03-17 PROCEDURE — 99285 EMERGENCY DEPT VISIT HI MDM: CPT

## 2022-03-17 PROCEDURE — 71045 X-RAY EXAM CHEST 1 VIEW: CPT | Mod: 26

## 2022-03-17 PROCEDURE — 93010 ELECTROCARDIOGRAM REPORT: CPT

## 2022-03-17 PROCEDURE — 72131 CT LUMBAR SPINE W/O DYE: CPT | Mod: 26

## 2022-03-17 PROCEDURE — 72128 CT CHEST SPINE W/O DYE: CPT | Mod: 26

## 2022-03-17 RX ORDER — ATORVASTATIN CALCIUM 80 MG/1
20 TABLET, FILM COATED ORAL AT BEDTIME
Refills: 0 | Status: DISCONTINUED | OUTPATIENT
Start: 2022-03-17 | End: 2022-03-23

## 2022-03-17 RX ORDER — DEXTROSE 50 % IN WATER 50 %
12.5 SYRINGE (ML) INTRAVENOUS ONCE
Refills: 0 | Status: DISCONTINUED | OUTPATIENT
Start: 2022-03-17 | End: 2022-03-23

## 2022-03-17 RX ORDER — ACETAMINOPHEN 500 MG
650 TABLET ORAL EVERY 6 HOURS
Refills: 0 | Status: DISCONTINUED | OUTPATIENT
Start: 2022-03-17 | End: 2022-03-23

## 2022-03-17 RX ORDER — INFLUENZA VIRUS VACCINE 15; 15; 15; 15 UG/.5ML; UG/.5ML; UG/.5ML; UG/.5ML
0.7 SUSPENSION INTRAMUSCULAR ONCE
Refills: 0 | Status: DISCONTINUED | OUTPATIENT
Start: 2022-03-17 | End: 2022-03-30

## 2022-03-17 RX ORDER — METOPROLOL TARTRATE 50 MG
12.5 TABLET ORAL EVERY 12 HOURS
Refills: 0 | Status: DISCONTINUED | OUTPATIENT
Start: 2022-03-17 | End: 2022-03-23

## 2022-03-17 RX ORDER — SODIUM CHLORIDE 9 MG/ML
1000 INJECTION, SOLUTION INTRAVENOUS
Refills: 0 | Status: DISCONTINUED | OUTPATIENT
Start: 2022-03-17 | End: 2022-03-23

## 2022-03-17 RX ORDER — ASCORBIC ACID 60 MG
500 TABLET,CHEWABLE ORAL DAILY
Refills: 0 | Status: DISCONTINUED | OUTPATIENT
Start: 2022-03-17 | End: 2022-03-23

## 2022-03-17 RX ORDER — POLYETHYLENE GLYCOL 3350 17 G/17G
17 POWDER, FOR SOLUTION ORAL DAILY
Refills: 0 | Status: DISCONTINUED | OUTPATIENT
Start: 2022-03-17 | End: 2022-03-23

## 2022-03-17 RX ORDER — DOXAZOSIN MESYLATE 4 MG
4 TABLET ORAL AT BEDTIME
Refills: 0 | Status: DISCONTINUED | OUTPATIENT
Start: 2022-03-17 | End: 2022-03-23

## 2022-03-17 RX ORDER — DEXTROSE 50 % IN WATER 50 %
15 SYRINGE (ML) INTRAVENOUS ONCE
Refills: 0 | Status: DISCONTINUED | OUTPATIENT
Start: 2022-03-17 | End: 2022-03-23

## 2022-03-17 RX ORDER — GLUCAGON INJECTION, SOLUTION 0.5 MG/.1ML
1 INJECTION, SOLUTION SUBCUTANEOUS ONCE
Refills: 0 | Status: DISCONTINUED | OUTPATIENT
Start: 2022-03-17 | End: 2022-03-23

## 2022-03-17 RX ORDER — LEVOTHYROXINE SODIUM 125 MCG
50 TABLET ORAL DAILY
Refills: 0 | Status: DISCONTINUED | OUTPATIENT
Start: 2022-03-17 | End: 2022-03-22

## 2022-03-17 RX ORDER — SENNA PLUS 8.6 MG/1
2 TABLET ORAL AT BEDTIME
Refills: 0 | Status: DISCONTINUED | OUTPATIENT
Start: 2022-03-17 | End: 2022-03-23

## 2022-03-17 RX ORDER — DEXTROSE 50 % IN WATER 50 %
25 SYRINGE (ML) INTRAVENOUS ONCE
Refills: 0 | Status: DISCONTINUED | OUTPATIENT
Start: 2022-03-17 | End: 2022-03-23

## 2022-03-17 RX ORDER — INSULIN LISPRO 100/ML
VIAL (ML) SUBCUTANEOUS
Refills: 0 | Status: DISCONTINUED | OUTPATIENT
Start: 2022-03-17 | End: 2022-03-23

## 2022-03-17 RX ADMIN — ATORVASTATIN CALCIUM 20 MILLIGRAM(S): 80 TABLET, FILM COATED ORAL at 22:45

## 2022-03-17 RX ADMIN — SENNA PLUS 2 TABLET(S): 8.6 TABLET ORAL at 22:44

## 2022-03-17 RX ADMIN — Medication 4 MILLIGRAM(S): at 22:44

## 2022-03-17 NOTE — H&P ADULT - HISTORY OF PRESENT ILLNESS
76y Male extensive PMH including HTN, HLD, DM, hypothyroidism, asbestosis, CAD s/p PCI, afib on Eliquis, well known to our service after found with L1 burst fracture s/p T12-L1 laminectomy and T10-L4 fusion 1/21/22, recent admission late February for tenting of skin concerning for displaced hardware and small inferior wound dehiscence, now sent in by nursing home with now superior wound dehiscence at previous are of skin tenting and persistent inferior wound dehiscence. Patient and wife at bedside report they were sent in by nursing home to "rule out infection in the bone." Denies fevers, chills, weakness, paresthesias, chest pain, palpitations, SOB.

## 2022-03-17 NOTE — ED ADULT TRIAGE NOTE - CHIEF COMPLAINT QUOTE
As per pt and EMS, pt was sent by [facility] staff for wound check of old surgical site in T spine.  Pt denies any pain/fevers.

## 2022-03-17 NOTE — CONSULT NOTE ADULT - SUBJECTIVE AND OBJECTIVE BOX
HISTORY OF PRESENT ILLNESS:   76y Male extensive PMH including HTN, HLD, DM, hypothyroidism, asbestosis, CAD s/p PCI, afib on Eliquis, well known to our service after found with L1 burst fracture s/p T12-L1 laminectomy and T10-L4 fusion 1/21/22, recent admission late February for tenting of skin concerning for displaced hardware and small inferior wound dehiscence, now sent in by nursing home with now superior wound dehiscence at previous are of skin tenting and persistent inferior wound dehiscence. Patient and wife at bedside report they were sent in by nursing home to "rule out infection in the bone." Denies fevers, chills, weakness, paresthesias, chest pain, palpitations, SOB.    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  S/P ORIF (open reduction internal fixation) fracture  right elbow 1962, LILLIANA 3 months later  S/P cholecystectomy  S/P inguinal hernia repair  bilateral  S/P T&amp;A (status post tonsillectomy and adenoidectomy)  as child  S/P angioplasty with stent  2013 4 stents  S/P excision of skin lesion, follow-up exam  benign testicle  S/P colonoscopy  4 years ago, no polyp    FAMILY HISTORY:  Family history of heart attack (Father)  Family history of stroke (Mother)  Family history of diabetes mellitus (Mother, Sibling)  Family history of cardiomegaly (Mother)  Family history of colon cancer (Sibling)  Family history of pancreatic cancer (Sibling)    Allergies  No Known Allergies    REVIEW OF SYSTEMS  Negative except as noted in HPI  CONSTITUTIONAL: No fever, weight loss, or fatigue  EYES: No eye pain, visual disturbances, or discharge  ENMT:  No difficulty hearing, tinnitus, vertigo; No sinus or throat pain  NECK: No pain or stiffness  BREASTS: No pain, masses, or nipple discharge  RESPIRATORY: No cough, wheezing, chills or hemoptysis; No shortness of breath  CARDIOVASCULAR: No chest pain, palpitations, dizziness, or leg swelling  GASTROINTESTINAL: No abdominal or epigastric pain. No nausea, vomiting, or hematemesis; No diarrhea or constipation. No melena or hematochezia.  GENITOURINARY: No dysuria, frequency, hematuria, or incontinence  NEUROLOGICAL: No headaches, memory loss, loss of strength, numbness, or tremors  SKIN: No itching, burning, rashes, or lesions   LYMPH NODES: No enlarged glands  ENDOCRINE: No heat or cold intolerance; No hair loss  MUSCULOSKELETAL: No joint pain or swelling; No muscle, back, or extremity pain  PSYCHIATRIC: No depression, anxiety, mood swings, or difficulty sleeping  HEME/LYMPH: No easy bruising, or bleeding gums  ALLERY AND IMMUNOLOGIC: No hives or eczema    HOME MEDICATIONS:  Home Medications:  ascorbic acid 500 mg oral tablet: 1 tab(s) orally once a day (28 Jan 2022 10:34)  atorvastatin 20 mg oral tablet: 1 tab(s) orally once a day (24 Jan 2022 13:33)  doxazosin 4 mg oral tablet: 1 tab(s) orally once a day (at bedtime) (02 Mar 2022 18:59)  Eliquis 5 mg oral tablet: 1 tab(s) orally 2 times a day   resume on 2/4/22 from NSx standpoint  (28 Jan 2022 12:40)  insulin lispro 100 units/mL injectable solution: ISS (28 Jan 2022 10:34)  Jardiance 25 mg oral tablet: 1 tab(s) orally once a day (in the morning) (24 Jan 2022 13:33)  levothyroxine 50 mcg (0.05 mg) oral tablet: 1 tab(s) orally once a day (24 Jan 2022 13:33)  metFORMIN 500 mg oral tablet: orally 2 times a day (24 Jan 2022 13:33)  Metoprolol Succinate ER 25 mg oral tablet, extended release: 1 tab(s) orally once a day (24 Jan 2022 13:33)  midodrine 5 mg oral tablet: 1 tab(s) orally every 8 hours  titrate off  (28 Jan 2022 12:07)  Multiple Vitamins oral tablet: 1 tab(s) orally once a day (02 Mar 2022 18:59)  Multiple Vitamins with Minerals oral tablet: 1 tab(s) orally once a day (28 Jan 2022 10:34)  nystatin 100,000 units/g topical powder: 1 application topically 2 times a day (28 Jan 2022 10:34)  oxyCODONE 5 mg oral tablet: 1 tab(s) orally every 4 hours, As needed, Moderate Pain (4 - 6) (28 Jan 2022 10:34)  polyethylene glycol 3350 oral powder for reconstitution: 17 gram(s) orally once a day (28 Jan 2022 10:34)  senna oral tablet: 2 tab(s) orally once a day (at bedtime) (28 Jan 2022 10:34)  sodium chloride 1 g oral tablet: 1 tab(s) orally every 12 hours (28 Jan 2022 10:34)    MEDICATIONS:  Antibiotics:    Neuro:    Anticoagulation:    OTHER:    IVF:    Vital Signs Last 24 Hrs  T(C): 36.3 (17 Mar 2022 12:13), Max: 36.3 (17 Mar 2022 12:13)  T(F): 97.4 (17 Mar 2022 12:13), Max: 97.4 (17 Mar 2022 12:13)  HR: 75 (17 Mar 2022 12:13) (75 - 75)  BP: 105/73 (17 Mar 2022 12:13) (105/73 - 105/73)  BP(mean): --  RR: 18 (17 Mar 2022 12:13) (18 - 18)  SpO2: 95% (17 Mar 2022 12:13) (95% - 95%)    PHYSICAL EXAM:  GENERAL: NAD  HEAD:  Atraumatic  [place] COMA SCORE: E- 4 V- 5 M- 6=15  MENTAL STATUS: AAO x3; Appropriately conversant without aphasia; following commands  CRANIAL NERVES: PERRL. EOMI without nystagmus. Facial sensation intact V1-3 distribution b/l. Face symmetric w/ normal eye closure and smile, tongue midline. Hearing grossly intact. Speech clear  MOTOR: strength 5/5 b/l upper extremities; with support b/l LE 5/5  SENSATION: grossly intact to light touch all extremities  SPINE: superior wound dehiscence ~ 7 cm superior wound dehiscence with adjacent erythema along skin edges without active drainage or palpable adjacent wound collection; ~ 2 cm inferior wound dehiscence without active drainage or palpable adjacent wound collection  CHEST/LUNG: Nonlabored breathing  ABDOMEN: Soft, nontender, nondistended  EXTREMITIES: Non edematous; no pain to palpation    LABS:                        11.6   6.78  )-----------( 614      ( 17 Mar 2022 15:30 )             36.8     03-17    139  |  102  |  11.9  ----------------------------<  106<H>  4.1   |  25.0  |  0.53    Ca    8.7      17 Mar 2022 15:30    TPro  6.9  /  Alb  3.4  /  TBili  0.5  /  DBili  x   /  AST  22  /  ALT  18  /  AlkPhos  91  03-17    PT/INR - ( 17 Mar 2022 15:30 )   PT: 15.9 sec;   INR: 1.37 ratio      PTT - ( 17 Mar 2022 15:30 )  PTT:35.5 sec    RADIOLOGY & ADDITIONAL STUDIES:  Pending

## 2022-03-17 NOTE — ED PROVIDER NOTE - OBJECTIVE STATEMENT
76 year old male with PMH DM, CAD, HTN, HLD, and T12-L1 laminectomy with T10-L4 fusion on 1/21/22 with Dr. Gaitan, discharged on 1/28, presented back on 2/24 with erythema and bulging to the site, found to have migration of the T10 screw, pt opted for conservative mgt over repeat surgery, presents with worsening bulging and erythema to the surgical site. Sx have been persistent and constant for several weeks, no fevers, chills, numbness, tingling, weakness.

## 2022-03-17 NOTE — PATIENT PROFILE ADULT - FALL HARM RISK - RISK INTERVENTIONS

## 2022-03-17 NOTE — CONSULT NOTE ADULT - ASSESSMENT
76y Male extensive PMH including HTN, HLD, DM, hypothyroidism, asbestosis, CAD s/p PCI, afib on Eliquis, well known to our service after found with L1 burst fracture s/p T12-L1 laminectomy and T10-L4 fusion 1/21/22, recent admission late February for tenting of skin concerning for displaced hardware and small inferior wound dehiscence, now sent in by nursing home with now superior wound dehiscence at previous are of skin tenting and persistent inferior wound dehiscence.   - Neuro exam stable  - New superior wound dehiscence without active drainage, cleaned with iodine and sterile dressing placed    PLAN:  - D/w Dr. Gaitan  - Recommend CT T/L spine  - Recommend MR T/L spine  -    NOTE INCOMPLETE

## 2022-03-17 NOTE — H&P ADULT - NSHPLABSRESULTS_GEN_ALL_CORE
11.6   6.78  )-----------( 614      ( 17 Mar 2022 15:30 )             36.8     03-17    139  |  102  |  11.9  ----------------------------<  106<H>  4.1   |  25.0  |  0.53    Ca    8.7      17 Mar 2022 15:30    TPro  6.9  /  Alb  3.4  /  TBili  0.5  /  DBili  x   /  AST  22  /  ALT  18  /  AlkPhos  91  03-17    PT/INR - ( 17 Mar 2022 15:30 )   PT: 15.9 sec;   INR: 1.37 ratio      PTT - ( 17 Mar 2022 15:30 )  PTT:35.5 sec

## 2022-03-17 NOTE — H&P ADULT - NSHPPHYSICALEXAM_GEN_ALL_CORE
GENERAL: NAD  HEAD:  Atraumatic  CAR[place] COMA SCORE: E- 4 V- 5 M- 6=15  MENTAL STATUS: AAO x3; Appropriately conversant without aphasia; following commands  CRANIAL NERVES: PERRL. EOMI without nystagmus. Facial sensation intact V1-3 distribution b/l. Face symmetric w/ normal eye closure and smile, tongue midline. Hearing grossly intact. Speech clear  MOTOR: strength 5/5 b/l upper extremities; with support b/l LE 5/5  SENSATION: grossly intact to light touch all extremities  SPINE: superior wound dehiscence ~ 7 cm superior wound dehiscence with adjacent erythema along skin edges without active drainage or palpable adjacent wound collection; ~ 2 cm inferior wound dehiscence without active drainage or palpable adjacent wound collection  CHEST/LUNG: Nonlabored breathing  ABDOMEN: Soft, nontender, nondistended  EXTREMITIES: Non edematous; no pain to palpation

## 2022-03-17 NOTE — H&P ADULT - NSHPADDITIONALINFOADULT_GEN_ALL_CORE
NSGY Attg:    see above    patient seen and examined by PA staff    agree with exam and plan as above  imaging pending  case d/w Dr. Gautam Schumacher

## 2022-03-17 NOTE — H&P ADULT - ASSESSMENT
76y Male extensive PMH including HTN, HLD, DM, hypothyroidism, asbestosis, CAD s/p PCI, afib on Eliquis, well known to our service after found with L1 burst fracture s/p T12-L1 laminectomy and T10-L4 fusion 1/21/22, recent admission late February for tenting of skin concerning for displaced hardware and small inferior wound dehiscence, now sent in by nursing home with now superior wound dehiscence at previous are of skin tenting and persistent inferior wound dehiscence.   - Neuro exam stable  - New superior wound dehiscence without active drainage, cleaned with iodine and sterile dressing placed  - ESR 44, CRP WNL; no leukocytosis    PLAN:  - D/w Dr. Gaitan  - Extensive conversation had with patient and wife-- educated on the fact that without surgical intervention for wound repair, wound dehiscence would likely not improve. Patient and wife express that they are amenable to surgical intervention  - CT T/L spine pending  - MR T/L spine with and without contrast pending  - Plastic surgery to evaluate- Dr. Gaines  - Will consult medicine for medical optimization for likely surgical intervention next week  - Pain control PRN  - Continue home meds except Eliquis-- levothyroxine, metoprolol  - Hold Eliquis/ACT/ASA for now  - Lovenox for DVT ppx, hold day prior to surgery  - TLSO brace when OOB- wife to bring  - moderate sliding scale

## 2022-03-17 NOTE — ED PROVIDER NOTE - MUSCULOSKELETAL, MLM
Lower thoracic region with erythema, induration, and bulging, range of motion is not limited, no muscle or joint tenderness

## 2022-03-18 LAB
ANION GAP SERPL CALC-SCNC: 11 MMOL/L — SIGNIFICANT CHANGE UP (ref 5–17)
BLD GP AB SCN SERPL QL: SIGNIFICANT CHANGE UP
BUN SERPL-MCNC: 13.2 MG/DL — SIGNIFICANT CHANGE UP (ref 8–20)
CALCIUM SERPL-MCNC: 8 MG/DL — LOW (ref 8.6–10.2)
CHLORIDE SERPL-SCNC: 103 MMOL/L — SIGNIFICANT CHANGE UP (ref 98–107)
CO2 SERPL-SCNC: 23 MMOL/L — SIGNIFICANT CHANGE UP (ref 22–29)
CREAT SERPL-MCNC: 0.48 MG/DL — LOW (ref 0.5–1.3)
EGFR: 106 ML/MIN/1.73M2 — SIGNIFICANT CHANGE UP
GLUCOSE BLDC GLUCOMTR-MCNC: 113 MG/DL — HIGH (ref 70–99)
GLUCOSE BLDC GLUCOMTR-MCNC: 134 MG/DL — HIGH (ref 70–99)
GLUCOSE BLDC GLUCOMTR-MCNC: 158 MG/DL — HIGH (ref 70–99)
GLUCOSE BLDC GLUCOMTR-MCNC: 162 MG/DL — HIGH (ref 70–99)
GLUCOSE SERPL-MCNC: 121 MG/DL — HIGH (ref 70–99)
HCT VFR BLD CALC: 30.1 % — LOW (ref 39–50)
HGB BLD-MCNC: 9.4 G/DL — LOW (ref 13–17)
MAGNESIUM SERPL-MCNC: 2.1 MG/DL — SIGNIFICANT CHANGE UP (ref 1.6–2.6)
MCHC RBC-ENTMCNC: 29.6 PG — SIGNIFICANT CHANGE UP (ref 27–34)
MCHC RBC-ENTMCNC: 31.2 GM/DL — LOW (ref 32–36)
MCV RBC AUTO: 94.7 FL — SIGNIFICANT CHANGE UP (ref 80–100)
PHOSPHATE SERPL-MCNC: 4 MG/DL — SIGNIFICANT CHANGE UP (ref 2.4–4.7)
PLATELET # BLD AUTO: 490 K/UL — HIGH (ref 150–400)
POTASSIUM SERPL-MCNC: 3.5 MMOL/L — SIGNIFICANT CHANGE UP (ref 3.5–5.3)
POTASSIUM SERPL-SCNC: 3.5 MMOL/L — SIGNIFICANT CHANGE UP (ref 3.5–5.3)
RBC # BLD: 3.18 M/UL — LOW (ref 4.2–5.8)
RBC # FLD: 15.3 % — HIGH (ref 10.3–14.5)
SARS-COV-2 RNA SPEC QL NAA+PROBE: SIGNIFICANT CHANGE UP
SODIUM SERPL-SCNC: 137 MMOL/L — SIGNIFICANT CHANGE UP (ref 135–145)
WBC # BLD: 5.88 K/UL — SIGNIFICANT CHANGE UP (ref 3.8–10.5)
WBC # FLD AUTO: 5.88 K/UL — SIGNIFICANT CHANGE UP (ref 3.8–10.5)

## 2022-03-18 PROCEDURE — 99222 1ST HOSP IP/OBS MODERATE 55: CPT

## 2022-03-18 RX ORDER — DIAZEPAM 5 MG
2 TABLET ORAL ONCE
Refills: 0 | Status: DISCONTINUED | OUTPATIENT
Start: 2022-03-18 | End: 2022-03-18

## 2022-03-18 RX ORDER — ENOXAPARIN SODIUM 100 MG/ML
40 INJECTION SUBCUTANEOUS
Refills: 0 | Status: DISCONTINUED | OUTPATIENT
Start: 2022-03-18 | End: 2022-03-22

## 2022-03-18 RX ADMIN — Medication 12.5 MILLIGRAM(S): at 16:54

## 2022-03-18 RX ADMIN — Medication 2: at 16:54

## 2022-03-18 RX ADMIN — SENNA PLUS 2 TABLET(S): 8.6 TABLET ORAL at 23:16

## 2022-03-18 RX ADMIN — Medication 1 TABLET(S): at 11:47

## 2022-03-18 RX ADMIN — ENOXAPARIN SODIUM 40 MILLIGRAM(S): 100 INJECTION SUBCUTANEOUS at 16:54

## 2022-03-18 RX ADMIN — Medication 50 MICROGRAM(S): at 05:40

## 2022-03-18 RX ADMIN — Medication 4 MILLIGRAM(S): at 23:17

## 2022-03-18 RX ADMIN — ATORVASTATIN CALCIUM 20 MILLIGRAM(S): 80 TABLET, FILM COATED ORAL at 23:17

## 2022-03-18 RX ADMIN — Medication 650 MILLIGRAM(S): at 05:40

## 2022-03-18 RX ADMIN — POLYETHYLENE GLYCOL 3350 17 GRAM(S): 17 POWDER, FOR SOLUTION ORAL at 11:47

## 2022-03-18 RX ADMIN — Medication 12.5 MILLIGRAM(S): at 05:40

## 2022-03-18 NOTE — CONSULT NOTE ADULT - ASSESSMENT
75 y/o Male with Hx of HTN, HLD, DM, hypothyroidism, asbestosis, CAD s/p PCI, afib on Eliquis, well known to our service after found with L1 burst fracture s/p T12-L1 laminectomy and T10-L4 fusion 1/21/22, recent admission late February for tenting of skin concerning for displaced hardware and small inferior wound dehiscence, now sent in by nursing home with now superior wound dehiscence at previous area of skin tenting and persistent inferior wound dehiscence, as per patient he has denies any bleeding, has no fever, chills, chest pain, denies worsening pain, denies urinary or bowel incontinence.    Plan:     Wound Dehiscence of the previous back surgery:     Plan as per primary team  Pain meds  bowel meds   monitor labs  Patient denies Hx of dysnea or chest pain, has no Hx of CKD, CHF or stroke, no personal or family hx of easy bleeding, patient is at intermediate risk for perioperative cardiovascular complication and is optimized from the medicine point of view for planned procedure, TTE reviewed, repeat EKG needed to be done before procedure.        2. DM - 2 - A1C - 7.4 - continue ADA, ISSC , accu checks ,   may restart home hypoglycemics on discharge     3. Hx of HTN - episodes of hypotension - HCTZ/ Irbesartan on hold   on Midodrine 5 mg TID - taper down Midodrine and consider to restart Irbesartan / HCTZ once BP   allows     4. chronic Urinary retention: has Cotton, following with urology, will continue with doxazosin 4 mg once a day (at bedtime)       5. Chronic AfIb: will continue with Toprol ER 25 mg with parameters , will hold Eliquis for planned procedure      6. CAD / stens - On ASA 81 mg daily, continue BB/ statins.         7. Hx of hypotension:  was on midodrine 5 mg every 8 hours, will start if patient develops  hypotensive.       8. HLD: will continue with atorvastatin 20 mg once a day    Hx of DM: will hold Jardiance, metFORMIN, FS monitoring and coverage insulin.    Hypothyroidism:  levothyroxine 50 mcg once a day.     DVT prophylaxis: SCDs.     Anemia: looks like chronic, will monitor cbc, type and screen and keep 2 units ready for surgery.     Medicine team is going to follow along.   · 	 77 y/o Male with Hx of HTN, HLD, DM, hypothyroidism, asbestosis, CAD s/p PCI, afib on Eliquis, well known to our service after found with L1 burst fracture s/p T12-L1 laminectomy and T10-L4 fusion 1/21/22, recent admission late February for tenting of skin concerning for displaced hardware and small inferior wound dehiscence, now sent in by nursing home with now superior wound dehiscence at previous area of skin tenting and persistent inferior wound dehiscence, as per patient he has denies any bleeding, has no fever, chills, chest pain, denies worsening pain, denies urinary or bowel incontinence.    Plan:     Wound Dehiscence of the previous back surgery:     Plan as per primary team  Pain meds  bowel meds   monitor labs  Patient denies Hx of dysnea or chest pain, has no Hx of CKD, CHF or stroke, no personal or family hx of easy bleeding, patient is at intermediate risk for perioperative cardiovascular complication and is optimized from the medicine point of view for planned procedure, TTE reviewed, repeat EKG needed to be done before procedure, patient was recently cleared by Cardiology.        2. DM - 2 - A1C - 7.4 - continue ADA, ISSC , accu checks ,   may restart home hypoglycemics on discharge     3. Hx of HTN - episodes of hypotension - HCTZ/ Irbesartan on hold   on Midodrine 5 mg TID - taper down Midodrine and consider to restart Irbesartan / HCTZ once BP   allows     4. chronic Urinary retention: has Cotton, following with urology, will continue with doxazosin 4 mg once a day (at bedtime)       5. Chronic AfIb: will continue with Toprol ER 25 mg with parameters , will hold Eliquis for planned procedure      6. CAD / stens - On ASA 81 mg daily, continue BB/ statins.         7. Hx of hypotension:  was on midodrine 5 mg every 8 hours, will start if patient develops  hypotensive.       8. HLD: will continue with atorvastatin 20 mg once a day    Hx of DM: will hold Jardiance, metFORMIN, FS monitoring and coverage insulin.    Hypothyroidism:  levothyroxine 50 mcg once a day.     DVT prophylaxis: SCDs.     Anemia: looks like chronic, will monitor cbc, type and screen and keep 2 units ready for surgery.     Medicine team is going to follow along.   ·

## 2022-03-18 NOTE — PROGRESS NOTE ADULT - SUBJECTIVE AND OBJECTIVE BOX
HPI:  HPI:  76y Male extensive PMH including HTN, HLD, DM, hypothyroidism, asbestosis, CAD s/p PCI, afib on Eliquis, well known to our service after found with L1 burst fracture s/p T12-L1 laminectomy and T10-L4 fusion 1/21/22, recent admission late February for tenting of skin concerning for displaced hardware and small inferior wound dehiscence, now sent in by nursing home with now superior wound dehiscence at previous are of skin tenting and persistent inferior wound dehiscence. Patient and wife at bedside report they were sent in by nursing home to "rule out infection in the bone." Denies fevers, chills, weakness, paresthesias, chest pain, palpitations, SOB.   (17 Mar 2022 19:24)        INTERVAL HPI/OVERNIGHT EVENTS:  76y Male s/p __ seen lying comfortably in bed. Tolerating diet. Passing gas/BM. Voiding. Cotton in with __ clear urine. Denies headache, weakness, numbness, n/v/d, fevers, chills, chest pain, SOB.       Vital Signs Last 24 Hrs  T(C): 36.5 (18 Mar 2022 07:25), Max: 36.6 (17 Mar 2022 18:02)  T(F): 97.7 (18 Mar 2022 07:25), Max: 97.9 (17 Mar 2022 18:02)  HR: 62 (18 Mar 2022 07:25) (62 - 90)  BP: 101/64 (18 Mar 2022 07:25) (101/64 - 142/88)  BP(mean): --  RR: 18 (18 Mar 2022 07:25) (18 - 20)  SpO2: 94% (18 Mar 2022 07:25) (92% - 96%)      PHYSICAL EXAM:  GENERAL: NAD, well-groomed  HEAD:  Atraumatic, normocephalic  MENTAL STATUS: AAO x3; Awake; Opens eyes spontaneously; Appropriately conversant without aphasia; following simple commands  CRANIAL NERVES: MIK; EOMI; no facial asymmetry; facial sensation grossly intact to light touch b/l;  tongue midline; palate rises symmetrically  Spine: +inferior wound dehiscence w/o drainage  MOTOR: strength 5/5 B/L upper and lower extremities; sensation grossly intact all extremities  CHEST/LUNG: +breath sounds bilaterally; no rales, rhonchi, wheezing, appreciated  HEART: +S1/+S2; Regular rate and rhythm; no murmurs, rubs, or gallops  ABDOMEN: Soft, nontender, nondistended; bowel sounds present all four quadrants  EXTREMITIES:  2+ peripheral pulses, no clubbing, cyanosis, or edema  SKIN: Warm, dry; no rashes or lesions      LABS:                        9.4    5.88  )-----------( 490      ( 18 Mar 2022 03:09 )             30.1     03-18    137  |  103  |  13.2  ----------------------------<  121<H>  3.5   |  23.0  |  0.48<L>    Ca    8.0<L>      18 Mar 2022 03:09  Phos  4.0     03-18  Mg     2.1     03-18    TPro  6.9  /  Alb  3.4  /  TBili  0.5  /  DBili  x   /  AST  22  /  ALT  18  /  AlkPhos  91  03-17    PT/INR - ( 17 Mar 2022 15:30 )   PT: 15.9 sec;   INR: 1.37 ratio         PTT - ( 17 Mar 2022 15:30 )  PTT:35.5 sec      RADIOLOGY & ADDITIONAL TESTS:    ACC: [id number] EXAM:  CT LUMBAR SPINE                        ACC: [id number] EXAM:  CT THORACIC SPINE                        PROCEDURE DATE:  02/24/2022    IMPRESSION:  *  STATUS POST T12-L1 LAMINECTOMY AND T10-L4 POSTERIOR FUSION  *  NEW LUCENCY SURROUNDING THE PEDICLE SCREWS AT T10-T12. MILD ASSOCIATED   MIGRATION OF THE PEDICLE SCREWS AT T10. FINDINGS ARE COMPATIBLE WITH   LOOSENING. INFECTION CANNOT BE EXCLUDED.  *  NEW FLUID COLLECTION ABOUT THE SPINOUS PROCESSES OF L2-L4.  *  INCREASING EDEMA WITHIN THE DORSAL SOFT TISSUES.  *  ENLARGING BILATERAL PLEURAL EFFUSIONS WHICH MAY BE PARTLY LOCULATED.     HPI:  76y Male extensive PMH including HTN, HLD, DM, hypothyroidism, asbestosis, CAD s/p PCI, afib on Eliquis, well known to our service after found with L1 burst fracture s/p T12-L1 laminectomy and T10-L4 fusion 1/21/22, recent admission late February for tenting of skin concerning for displaced hardware and small inferior wound dehiscence, now sent in by nursing home with now superior wound dehiscence at previous are of skin tenting and persistent inferior wound dehiscence. Patient and wife at bedside report they were sent in by nursing home to "rule out infection in the bone." Denies fevers, chills, weakness, paresthesias, chest pain, palpitations, SOB.   (17 Mar 2022 19:24)      INTERVAL HPI/OVERNIGHT EVENTS:  Pt seen and evaluated this morning. Pt states that he was able to stand this morning. Pt denies H/A, N/V, dizziness.       Vital Signs Last 24 Hrs  T(C): 36.5 (18 Mar 2022 07:25), Max: 36.6 (17 Mar 2022 18:02)  T(F): 97.7 (18 Mar 2022 07:25), Max: 97.9 (17 Mar 2022 18:02)  HR: 62 (18 Mar 2022 07:25) (62 - 90)  BP: 101/64 (18 Mar 2022 07:25) (101/64 - 142/88)  BP(mean): --  RR: 18 (18 Mar 2022 07:25) (18 - 20)  SpO2: 94% (18 Mar 2022 07:25) (92% - 96%)      PHYSICAL EXAM:  GENERAL: NAD, well-groomed  HEAD:  Atraumatic, normocephalic  MENTAL STATUS: AAO x3; Awake; Opens eyes spontaneously; Appropriately conversant without aphasia; following simple commands  CRANIAL NERVES: MIK; EOMI; no facial asymmetry; facial sensation grossly intact to light touch b/l;  tongue midline; palate rises symmetrically  Spine: +inferior wound dehiscence w/o drainage  MOTOR: strength 5/5 B/L upper and lower extremities; sensation grossly intact all extremities  CHEST/LUNG: +breath sounds bilaterally; no rales, rhonchi, wheezing, appreciated  HEART: +S1/+S2; Regular rate and rhythm; no murmurs, rubs, or gallops  ABDOMEN: Soft, nontender, nondistended; bowel sounds present all four quadrants  EXTREMITIES:  2+ peripheral pulses, no clubbing, cyanosis, or edema  SKIN: Warm, dry; no rashes or lesions      LABS:                        9.4    5.88  )-----------( 490      ( 18 Mar 2022 03:09 )             30.1     03-18    137  |  103  |  13.2  ----------------------------<  121<H>  3.5   |  23.0  |  0.48<L>    Ca    8.0<L>      18 Mar 2022 03:09  Phos  4.0     03-18  Mg     2.1     03-18    TPro  6.9  /  Alb  3.4  /  TBili  0.5  /  DBili  x   /  AST  22  /  ALT  18  /  AlkPhos  91  03-17    PT/INR - ( 17 Mar 2022 15:30 )   PT: 15.9 sec;   INR: 1.37 ratio         PTT - ( 17 Mar 2022 15:30 )  PTT:35.5 sec      RADIOLOGY & ADDITIONAL TESTS:    ACC: [id number] EXAM:  CT LUMBAR SPINE                        ACC: [id number] EXAM:  CT THORACIC SPINE                        PROCEDURE DATE:  02/24/2022    IMPRESSION:  *  STATUS POST T12-L1 LAMINECTOMY AND T10-L4 POSTERIOR FUSION  *  NEW LUCENCY SURROUNDING THE PEDICLE SCREWS AT T10-T12. MILD ASSOCIATED   MIGRATION OF THE PEDICLE SCREWS AT T10. FINDINGS ARE COMPATIBLE WITH   LOOSENING. INFECTION CANNOT BE EXCLUDED.  *  NEW FLUID COLLECTION ABOUT THE SPINOUS PROCESSES OF L2-L4.  *  INCREASING EDEMA WITHIN THE DORSAL SOFT TISSUES.  *  ENLARGING BILATERAL PLEURAL EFFUSIONS WHICH MAY BE PARTLY LOCULATED.

## 2022-03-18 NOTE — PROGRESS NOTE ADULT - ASSESSMENT
-pending MRI T/L spine w/ and w/o contrast 77 Y/O male known to our service w/ hx of L1 burst fracture, had a CT T/L spine, found w/ new superior wound dehiscence without active drainage.     -Case discussed w/ Dr. Gaitan   -pain control if needed, avoid over sedation  -pending MRI T/L spine w/ and w/o contrast  - Plastic surgery to evaluate- Dr. Gaines  - Will consult medicine for medical optimization for likely surgical intervention next week  - Pain control PRN  - Continue home meds except Eliquis-- levothyroxine, metoprolol  - Hold Eliquis/ACT/ASA for now  - Lovenox for DVT ppx, hold day prior to surgery  - TLSO brace when OOB  -continue to follow

## 2022-03-18 NOTE — CONSULT NOTE ADULT - SUBJECTIVE AND OBJECTIVE BOX
75 y/o Male with Hx of HTN, HLD, DM, hypothyroidism, asbestosis, CAD s/p PCI, afib on Eliquis, well known to our service after found with L1 burst fracture s/p T12-L1 laminectomy and T10-L4 fusion 1/21/22, recent admission late February for tenting of skin concerning for displaced hardware and small inferior wound dehiscence, now sent in by nursing home with now superior wound dehiscence at previous area of skin tenting and persistent inferior wound dehiscence, as per patient he has denies any bleeding, has no fever, chills, chest pain, denies worsening pain, denies urinary or bowel incontinence.       Allergies:  	No Known Allergies:     Home Medications:   * Patient Currently Takes Medications as of 02-Mar-2022 18:59 documented in Structured Notes  · 	Multiple Vitamins oral tablet: 1 tab(s) orally once a day  · 	doxazosin 4 mg oral tablet: 1 tab(s) orally once a day (at bedtime)  · 	Eliquis 5 mg oral tablet: 1 tab(s) orally 2 times a day   	resume on 2/4/22 from NSx standpoint   · 	aspirin 81 mg oral delayed release tablet: 1 tab(s) orally once a day resume on 1/29/22  · 	ascorbic acid 500 mg oral tablet: 1 tab(s) orally once a day  · 	Multiple Vitamins with Minerals oral tablet: 1 tab(s) orally once a day  · 	midodrine 5 mg oral tablet: 1 tab(s) orally every 8 hours  	titrate off   · 	sodium chloride 1 g oral tablet: 1 tab(s) orally every 12 hours  · 	senna oral tablet: 2 tab(s) orally once a day (at bedtime)  · 	polyethylene glycol 3350 oral powder for reconstitution: 17 gram(s) orally once a day  · 	nystatin 100,000 units/g topical powder: 1 application topically 2 times a day  · 	insulin lispro 100 units/mL injectable solution: ISS  · 	oxyCODONE 5 mg oral tablet: 1 tab(s) orally every 4 hours, As needed, Moderate Pain (4 - 6)  · 	atorvastatin 20 mg oral tablet: 1 tab(s) orally once a day  · 	Jardiance 25 mg oral tablet: 1 tab(s) orally once a day (in the morning)  · 	levothyroxine 50 mcg (0.05 mg) oral tablet: 1 tab(s) orally once a day  · 	Metoprolol Succinate ER 25 mg oral tablet, extended release: 1 tab(s) orally once a day  · 	metFORMIN 500 mg oral tablet: orally 2 times a day        PAST MEDICAL HISTORY:  Abnormal chest CT     Asbestosis     Coronary artery disease     Diabetes mellitus     Dyspnea on exertion     Heart valve disease leaky heart valve    Hyperlipemia     Hypertension     Urinary frequency.     PAST SURGICAL HISTORY:  S/P angioplasty with stent 2013 4 stents    S/P cholecystectomy     S/P colonoscopy 4 years ago, no polyp    S/P excision of skin lesion, follow-up exam benign testicle    S/P inguinal hernia repair bilateral    S/P mastectomy, left age 10 for benign mass    S/P ORIF (open reduction internal fixation) fracture right elbow 1962, LILLIANA 3 months later    S/P T&A (status post tonsillectomy and adenoidectomy) as child.     FAMILY HISTORY: could not remember family hx of medical issues       Social Hx: not active smoker, drinker or using any drugs      REVIEW OF SYSTEMS:    CONSTITUTIONAL: No fever, fatigue  RESPIRATORY: No cough, No shortness of breath  CARDIOVASCULAR: No chest pain, palpitations  GASTROINTESTINAL: No abdominal, No nausea, vomiting  NEUROLOGICAL: No headaches,  loss of strength.  MISCELLANEOUS: lower back pain is controlled with pain meds        Vital Signs Last 24 Hrs  T(C): 36.7 (18 Mar 2022 20:16), Max: 36.8 (18 Mar 2022 17:16)  T(F): 98.1 (18 Mar 2022 20:16), Max: 98.2 (18 Mar 2022 17:16)  HR: 61 (18 Mar 2022 20:16) (61 - 77)  BP: 115/70 (18 Mar 2022 20:16) (93/69 - 115/70)  RR: 17 (18 Mar 2022 20:16) (17 - 18)  SpO2: 96% (18 Mar 2022 20:16) (90% - 96%)    PHYSICAL EXAM:    GENERAL: Elderly male looking comfortable   HEENT: PERRL, +EOMI  NECK: soft, Supple, No JVD,   CHEST/LUNG: Clear to auscultate bilaterally; No wheezing  HEART: S1S2+, Regular rate and rhythm; No murmurs  ABDOMEN: Soft, Nontender, Nondistended; Bowel sounds present  EXTREMITIES:  1+ Peripheral Pulses, No edema  SKIN: No rashes or lesions  NEURO: AAOX3, no focal deficits, no motor r sensory loss  PSYCH: normal mood  back: lower back with wound at the previous incision site, some soaking       LABS:                        9.4    5.88  )-----------( 490      ( 18 Mar 2022 03:09 )             30.1     03-18    137  |  103  |  13.2  ----------------------------<  121<H>  3.5   |  23.0  |  0.48<L>    Ca    8.0<L>      18 Mar 2022 03:09  Phos  4.0     03-18  Mg     2.1     03-18    TPro  6.9  /  Alb  3.4  /  TBili  0.5  /  DBili  x   /  AST  22  /  ALT  18  /  AlkPhos  91  03-17    PT/INR - ( 17 Mar 2022 15:30 )   PT: 15.9 sec;   INR: 1.37 ratio         PTT - ( 17 Mar 2022 15:30 )  PTT:35.5 sec        I&O's Summary      MEDICATIONS  (STANDING):  ascorbic acid 500 milliGRAM(s) Oral daily  atorvastatin 20 milliGRAM(s) Oral at bedtime  dextrose 40% Gel 15 Gram(s) Oral once  dextrose 5%. 1000 milliLiter(s) (50 mL/Hr) IV Continuous <Continuous>  dextrose 5%. 1000 milliLiter(s) (100 mL/Hr) IV Continuous <Continuous>  dextrose 50% Injectable 25 Gram(s) IV Push once  dextrose 50% Injectable 12.5 Gram(s) IV Push once  dextrose 50% Injectable 25 Gram(s) IV Push once  diazepam    Tablet 2 milliGRAM(s) Oral once  doxazosin 4 milliGRAM(s) Oral at bedtime  enoxaparin Injectable 40 milliGRAM(s) SubCutaneous <User Schedule>  glucagon  Injectable 1 milliGRAM(s) IntraMuscular once  influenza  Vaccine (HIGH DOSE) 0.7 milliLiter(s) IntraMuscular once  insulin lispro (ADMELOG) corrective regimen sliding scale   SubCutaneous three times a day before meals  levothyroxine 50 MICROGram(s) Oral daily  metoprolol tartrate 12.5 milliGRAM(s) Oral every 12 hours  multivitamin 1 Tablet(s) Oral daily  polyethylene glycol 3350 17 Gram(s) Oral daily  senna 2 Tablet(s) Oral at bedtime    MEDICATIONS  (PRN):  acetaminophen     Tablet .. 650 milliGRAM(s) Oral every 6 hours PRN Temp greater or equal to 38C (100.4F), Mild Pain (1 - 3)

## 2022-03-19 LAB
GLUCOSE BLDC GLUCOMTR-MCNC: 130 MG/DL — HIGH (ref 70–99)
GLUCOSE BLDC GLUCOMTR-MCNC: 159 MG/DL — HIGH (ref 70–99)
GLUCOSE BLDC GLUCOMTR-MCNC: 166 MG/DL — HIGH (ref 70–99)
GLUCOSE BLDC GLUCOMTR-MCNC: 198 MG/DL — HIGH (ref 70–99)

## 2022-03-19 PROCEDURE — 99233 SBSQ HOSP IP/OBS HIGH 50: CPT

## 2022-03-19 RX ADMIN — ATORVASTATIN CALCIUM 20 MILLIGRAM(S): 80 TABLET, FILM COATED ORAL at 21:06

## 2022-03-19 RX ADMIN — Medication 2: at 17:23

## 2022-03-19 RX ADMIN — ENOXAPARIN SODIUM 40 MILLIGRAM(S): 100 INJECTION SUBCUTANEOUS at 17:03

## 2022-03-19 RX ADMIN — Medication 4 MILLIGRAM(S): at 21:05

## 2022-03-19 RX ADMIN — Medication 12.5 MILLIGRAM(S): at 05:44

## 2022-03-19 RX ADMIN — Medication 500 MILLIGRAM(S): at 17:03

## 2022-03-19 RX ADMIN — Medication 50 MICROGRAM(S): at 05:44

## 2022-03-19 RX ADMIN — SENNA PLUS 2 TABLET(S): 8.6 TABLET ORAL at 21:05

## 2022-03-19 RX ADMIN — Medication 1 TABLET(S): at 17:03

## 2022-03-19 RX ADMIN — Medication 2: at 12:12

## 2022-03-19 NOTE — PROGRESS NOTE ADULT - SUBJECTIVE AND OBJECTIVE BOX
Patient seen and examined . Denies pain , sob , cp , no n/v , voiding without difficulty ,   flat affect , states lost weight for last few months . Has good appetite , feels down due to current situation .     CC : wound dehiscence , weight lost , feeling down       MEDICATIONS  (STANDING):  ascorbic acid 500 milliGRAM(s) Oral daily  atorvastatin 20 milliGRAM(s) Oral at bedtime  dextrose 40% Gel 15 Gram(s) Oral once  dextrose 5%. 1000 milliLiter(s) (50 mL/Hr) IV Continuous <Continuous>  dextrose 5%. 1000 milliLiter(s) (100 mL/Hr) IV Continuous <Continuous>  dextrose 50% Injectable 25 Gram(s) IV Push once  dextrose 50% Injectable 12.5 Gram(s) IV Push once  dextrose 50% Injectable 25 Gram(s) IV Push once  doxazosin 4 milliGRAM(s) Oral at bedtime  enoxaparin Injectable 40 milliGRAM(s) SubCutaneous <User Schedule>  glucagon  Injectable 1 milliGRAM(s) IntraMuscular once  influenza  Vaccine (HIGH DOSE) 0.7 milliLiter(s) IntraMuscular once  insulin lispro (ADMELOG) corrective regimen sliding scale   SubCutaneous three times a day before meals  levothyroxine 50 MICROGram(s) Oral daily  metoprolol tartrate 12.5 milliGRAM(s) Oral every 12 hours  multivitamin 1 Tablet(s) Oral daily  polyethylene glycol 3350 17 Gram(s) Oral daily  senna 2 Tablet(s) Oral at bedtime    MEDICATIONS  (PRN):  acetaminophen     Tablet .. 650 milliGRAM(s) Oral every 6 hours PRN Temp greater or equal to 38C (100.4F), Mild Pain (1 - 3)      LABS:                          9.4    5.88  )-----------( 490      ( 18 Mar 2022 03:09 )             30.1     03-18    137  |  103  |  13.2  ----------------------------<  121<H>  3.5   |  23.0  |  0.48<L>    Ca    8.0<L>      18 Mar 2022 03:09  Phos  4.0     03-18  Mg     2.1     03-18    TPro  6.9  /  Alb  3.4  /  TBili  0.5  /  DBili  x   /  AST  22  /  ALT  18  /  AlkPhos  91  03-17    PT/INR - ( 17 Mar 2022 15:30 )   PT: 15.9 sec;   INR: 1.37 ratio         PTT - ( 17 Mar 2022 15:30 )  PTT:35.5 sec      RADIOLOGY & ADDITIONAL TESTS:    < from: CT Thoracic Spine No Cont (03.17.22 @ 19:06) >    ACC: [id number] EXAM:  CT THORACIC SPINE                        ACC: [id number] EXAM:  CT LUMBAR SPINE                          PROCEDURE DATE:  03/17/2022      < end of copied text >  < from: 12 Lead ECG (03.17.22 @ 22:28) >    Ventricular Rate 76 BPM    Atrial Rate 375 BPM    QRS Duration 96 ms    Q-T Interval 414 ms    QTC Calculation(Bazett) 465 ms    R Axis 36 degrees    T Axis 31 degrees    Diagnosis Line Atrial fibrillation    Confirmed by SERGEI RAMIREZ ([id number]) on 3/19/2022 11:56:12 AM    < end of copied text >  < from: CT Thoracic Spine No Cont (03.17.22 @ 19:06) >  IMPRESSION: Marked compression deformity L1 with posterior fusion T10-L4.   Compared with 1/22/2022 there is new lucency surrounding the   transpedicular screws T10, T11 and T12. This is either consistent with   loosening or infection. The bilateral pleural effusions have increased in   size.    --- End of Report ---    < end of copied text >  < from: CT Lumbar Spine No Cont (03.17.22 @ 19:06) >    ACC: [id number] EXAM:  CT THORACIC SPINE                        ACC: [id number] EXAM:  CT LUMBAR SPINE                          PROCEDURE DATE:  03/17/2022      < end of copied text >  < from: CT Lumbar Spine No Cont (03.17.22 @ 19:06) >    IMPRESSION: Marked compression deformity L1 with posterior fusion T10-L4.   Compared with 1/22/2022 there is new lucency surrounding the   transpedicular screws T10, T11 and T12. This is either consistent with   loosening or infection. The bilateral pleural effusions have increased in   size.    --- End of Report ---    < end of copied text >        REVIEW OF SYSTEMS:        Vital Signs Last 24 Hrs  T(C): 36.4 (19 Mar 2022 07:28), Max: 36.8 (18 Mar 2022 17:16)  T(F): 97.5 (19 Mar 2022 07:28), Max: 98.2 (18 Mar 2022 17:16)  HR: 69 (19 Mar 2022 07:28) (61 - 77)  BP: 122/79 (19 Mar 2022 07:28) (102/70 - 123/73)  BP(mean): --  RR: 18 (19 Mar 2022 07:28) (17 - 18)  SpO2: 98% (19 Mar 2022 07:28) (92% - 98%)    PHYSICAL EXAM:    GENERAL: NAD, well-groomed, well-developed  HEAD:  Atraumatic, Normocephalic  EYES: EOMI, PERRLA, conjunctiva and sclera clear  NECK: Supple, No JVD, Normal thyroid  NERVOUS SYSTEM:  Alert & Oriented X3, no focal deficit  CHEST/LUNG: CTA b/l ,  no  rales, rhonchi, wheezing, or rubs  HEART: Regular rate and rhythm; No murmurs, rubs, or gallops  ABDOMEN: Soft, Nontender, Nondistended; Bowel sounds present  EXTREMITIES:  2+ Peripheral Pulses, No clubbing, cyanosis, or edema  LYMPH: No lymphadenopathy noted  SKIN: No rashes or lesions

## 2022-03-19 NOTE — PROGRESS NOTE ADULT - ASSESSMENT
75 Y/O male known to our service w/ hx of L1 burst fracture, had a CT T/L spine, found w/ new superior wound dehiscence without active drainage and hardware failure due to poor bone quality.   - completed MRI T/L spine w/ and w/o contrast - official reports pending   - pre-op for Wednesday w Dr Gaitan/ hardware removal    - pt seen w/ Dr. Gaitan   - pain control if needed, avoid over sedation  - Plastic surgery/ Dr. Gaines consulted for complex wound closure   - pt has been medically and cardiologically optimized for OR   - Continue home meds except Eliquis/ holding for OR -- levothyroxine, metoprolol  - Hold Eliquis/ACT/ASA for now  - Lovenox for DVT ppx, hold day prior to surgery  - TLSO brace when OOB & OOB w assist/ PT eval  clam-shell brace ordered from orthotist, will fit and deliver on Tuesday or Wednesday   - cont w daily dressing changes & well as when/if soiled

## 2022-03-19 NOTE — PROGRESS NOTE ADULT - SUBJECTIVE AND OBJECTIVE BOX
NEUROSURGERY PROGRESS NOTE:    HPI: 76y Male extensive PMH including HTN, HLD, DM, hypothyroidism, asbestosis, CAD s/p PCI, afib on Eliquis, well known to our service after found with L1 burst fracture s/p T12-L1 laminectomy and T10-L4 fusion 1/21/22, recent admission late February for tenting of skin concerning for displaced hardware and small inferior wound dehiscence, now sent in by nursing home with now superior wound dehiscence at previous are of skin tenting and persistent inferior wound dehiscence. Patient and wife at bedside report they were sent in by nursing home to "rule out infection in the bone." Denies fevers, chills, weakness, paresthesias, chest pain, palpitations, SOB.  (17 Mar 2022 19:24)      INTERVAL HPI/OVERNIGHT EVENTS:  pt seen and states is at baseline, denied any new or worsening sensorimotor changes, wishes he was able to walk more and use the bathroom and not the commode  -headache   - Nausea /- Vomiting  admits to Spine pain/ at baseline w/o worsening   + diet  + velasco cath to gravity   + venodynes b/l when in bed       MEDICATIONS  (STANDING):  ascorbic acid 500 milliGRAM(s) Oral daily  atorvastatin 20 milliGRAM(s) Oral at bedtime  dextrose 40% Gel 15 Gram(s) Oral once  dextrose 5%. 1000 milliLiter(s) (50 mL/Hr) IV Continuous <Continuous>  dextrose 5%. 1000 milliLiter(s) (100 mL/Hr) IV Continuous <Continuous>  dextrose 50% Injectable 25 Gram(s) IV Push once  dextrose 50% Injectable 12.5 Gram(s) IV Push once  dextrose 50% Injectable 25 Gram(s) IV Push once  doxazosin 4 milliGRAM(s) Oral at bedtime  enoxaparin Injectable 40 milliGRAM(s) SubCutaneous <User Schedule>  glucagon  Injectable 1 milliGRAM(s) IntraMuscular once  influenza  Vaccine (HIGH DOSE) 0.7 milliLiter(s) IntraMuscular once  insulin lispro (ADMELOG) corrective regimen sliding scale   SubCutaneous three times a day before meals  levothyroxine 50 MICROGram(s) Oral daily  metoprolol tartrate 12.5 milliGRAM(s) Oral every 12 hours  multivitamin 1 Tablet(s) Oral daily  polyethylene glycol 3350 17 Gram(s) Oral daily  senna 2 Tablet(s) Oral at bedtime    MEDICATIONS  (PRN):  acetaminophen     Tablet .. 650 milliGRAM(s) Oral every 6 hours PRN Temp greater or equal to 38C (100.4F), Mild Pain (1 - 3)    Allergies    No Known Allergies    Intolerances      Vital Signs Last 24 Hrs  T(C): 36.3 (19 Mar 2022 15:42), Max: 36.7 (18 Mar 2022 20:16)  T(F): 97.4 (19 Mar 2022 15:42), Max: 98.1 (18 Mar 2022 20:16)  HR: 88 (19 Mar 2022 15:42) (61 - 88)  BP: 108/72 (19 Mar 2022 15:42) (108/72 - 123/73)  BP(mean): --  RR: 20 (19 Mar 2022 15:42) (17 - 20)  SpO2: 97% (19 Mar 2022 15:42) (92% - 98%)        PHYSICAL EXAM:  GENERAL: NAD, well-groomed, thin male   HEAD:  Atraumatic, normocephalic  MENTAL STATUS: AAO x3; Awake; Opens eyes spontaneously; Appropriately conversant without aphasia; following simple commands  CRANIAL NERVES: MIK; EOMI; no facial asymmetry; facial sensation grossly intact to light touch b/l;  tongue midline; palate rises symmetrically  Spine: +inferior midline/incisional wound dehiscence ~ 1cm vertically w/o drainage or bleeding, cleansed w chloraprep and air-dried & covered w sterile island dressing   MOTOR: strength 5/5 B/L upper and lower extremities w encouragement; sensation grossly intact all extremities  EXTREMITIES:  2+ peripheral pulses, no clubbing, cyanosis, or edema  SKIN: Warm, dry; no rashes or lesions, wound as above       LABS:                        9.4    5.88  )-----------( 490      ( 18 Mar 2022 03:09 )             30.1     03-18    137  |  103  |  13.2  ----------------------------<  121<H>  3.5   |  23.0  |  0.48<L>    Ca    8.0<L>      18 Mar 2022 03:09  Phos  4.0     03-18  Mg     2.1     03-18    Culture - Blood (03.17.22 @ 15:30)    Specimen Source: .Blood Blood-Peripheral    Culture Results:   No growth at 48 hours        RADIOLOGY & ADDITIONAL TESTS:  no new NSx images available for review     < from: CT Lumbar Spine No Cont (03.17.22 @ 19:06) >  IMPRESSION: Marked compression deformity L1 with posterior fusion T10-L4.   Compared with 1/22/2022 there is new lucency surrounding the transpedicular screws T10, T11 and T12. This is either consistent with loosening or infection. The bilateral pleural effusions have increased in size.  < end of copied text >    I spent a total time of 45 mins with the patient at bedside of which more than 50% of time was spent on counseling/coordination of care/ dressing cnahe/ ordering/arranging for PT w activity orders w brace on when OOB

## 2022-03-19 NOTE — PROGRESS NOTE ADULT - ASSESSMENT
77 y/o Male with Hx of HTN, HLD, DM, hypothyroidism, asbestosis, CAD s/p PCI, afib on Eliquis, well known to our service after found with L1 burst fracture s/p T12-L1 laminectomy and T10-L4 fusion 1/21/22, recent admission late February for tenting of skin concerning for displaced hardware and small inferior wound dehiscence, now sent in by nursing home with now superior wound dehiscence at previous area of skin tenting and persistent inferior wound dehiscence, as per patient he has denies any bleeding, has no fever, chills, chest pain, denies worsening pain, denies urinary or bowel incontinence.    Plan:     Wound Dehiscence of the previous back surgery / laminectomy due to  compression fracture of L1.   CT : Compared with 1/22/2022 there is new lucency surrounding the   transpedicular screws T10, T11 and T12. This is either consistent with   loosening or infection.  planned for surgery - further treatment as per primary team .   No leucocytosis , no fever , sed rate elevated - 44 -   consider ID - send OR culture          2. DM - 2 - A1C - 7.4 - continue ADA, ISSC , accu checks ,   may restart home hypoglycemics on discharge     3. Hx of HTN - episodes of hypotension on last admission - HCTZ/ Irbesartan on hold   was on  Midodrine 5 mg TID - BP well controlled at present will monitor     4. chronic Urinary retention: has Lula, following with urology,   will continue with doxazosin 4 mg once a day (at bedtime)       5. Chronic AfIb: will continue with Toprol ER 25 mg with parameters ,rate controlled    will hold Eliquis for planned procedure      6. CAD / stents - On ASA 81 mg daily-   hold for planned procedure , continue BB/ statins.    - Echo 11/2020 EF 55-60%, diastolic dysfunction, mild LVH, mild LAE, mild MT/TR  - NUC STRESS TEST: 11/2019 negative for ischemia  - was seen 1/20/22 by cardiology and cleared for procedure          7. Hx of hypotension:  was on midodrine 5 mg every 8 hours, will start if patient develops  hypotensive.       8. HLD: will continue with atorvastatin 20 mg once a day    Hx of DM: will hold Jardiance, metFORMIN, FS monitoring and coverage insulin.    Hypothyroidism:  levothyroxine 50 mcg once a day.     DVT prophylaxis: SCDs.     Anemia: looks like chronic, will monitor cbc, type and screen and keep 2 units ready for surgery.     Weight lost with good appetite , no melena - had colonoscopy 4 yrs ago - nl ,   no cough , flat affect , feels down - depressed - consider small dose of anti depressant ( eg   Sertraline 25 mg daily )     Labs / imagines / resent cardiology clearance / EKG - reviewed ,   patient denies anginal symptoms - no   absolute contraindications for planned surgery , patient is medically optimized .   Will not follow daily , please call if needed .

## 2022-03-20 LAB
GLUCOSE BLDC GLUCOMTR-MCNC: 121 MG/DL — HIGH (ref 70–99)
GLUCOSE BLDC GLUCOMTR-MCNC: 125 MG/DL — HIGH (ref 70–99)
GLUCOSE BLDC GLUCOMTR-MCNC: 152 MG/DL — HIGH (ref 70–99)

## 2022-03-20 PROCEDURE — 72158 MRI LUMBAR SPINE W/O & W/DYE: CPT | Mod: 26

## 2022-03-20 PROCEDURE — 72157 MRI CHEST SPINE W/O & W/DYE: CPT | Mod: 26

## 2022-03-20 PROCEDURE — 93970 EXTREMITY STUDY: CPT | Mod: 26

## 2022-03-20 RX ORDER — DIAZEPAM 5 MG
5 TABLET ORAL ONCE
Refills: 0 | Status: DISCONTINUED | OUTPATIENT
Start: 2022-03-20 | End: 2022-03-20

## 2022-03-20 RX ADMIN — Medication 500 MILLIGRAM(S): at 11:34

## 2022-03-20 RX ADMIN — Medication 5 MILLIGRAM(S): at 15:04

## 2022-03-20 RX ADMIN — ENOXAPARIN SODIUM 40 MILLIGRAM(S): 100 INJECTION SUBCUTANEOUS at 17:55

## 2022-03-20 RX ADMIN — Medication 1 TABLET(S): at 11:34

## 2022-03-20 RX ADMIN — Medication 2 MILLIGRAM(S): at 17:27

## 2022-03-20 RX ADMIN — Medication 4 MILLIGRAM(S): at 22:05

## 2022-03-20 RX ADMIN — Medication 2: at 13:03

## 2022-03-20 RX ADMIN — Medication 12.5 MILLIGRAM(S): at 22:05

## 2022-03-20 RX ADMIN — ATORVASTATIN CALCIUM 20 MILLIGRAM(S): 80 TABLET, FILM COATED ORAL at 22:05

## 2022-03-20 RX ADMIN — Medication 12.5 MILLIGRAM(S): at 05:45

## 2022-03-20 RX ADMIN — SENNA PLUS 2 TABLET(S): 8.6 TABLET ORAL at 22:05

## 2022-03-20 RX ADMIN — Medication 50 MICROGRAM(S): at 05:45

## 2022-03-20 NOTE — PROGRESS NOTE ADULT - ASSESSMENT
76y Male extensive PMH including HTN, HLD, DM, hypothyroidism, asbestosis, CAD s/p PCI, afib on Eliquis, well known to our service after found with L1 burst fracture s/p T12-L1 laminectomy and T10-L4 fusion 1/21/22, recent admission late February for tenting of skin concerning for displaced hardware and small inferior wound dehiscence, now sent in by nursing home with now superior wound dehiscence at previous are of skin tenting and persistent inferior wound dehiscence.   - CT T/L spine with new lucency surrounding trasnpedicular screws T10, T11 and T12  - Areas of wound dehiscence remain clean-- cleaned today with iodine and new sterile dressings placed    PLAN:  - D/w Dr. Gaitan  - Q4 neuro checks  - Pre-op for OR Wednesday for removal of hardware and complex wound closure by plastics  - MR T/L spine with and without contrast pending-- d/w MRI tech; Valium 5 mg 30 minutes prior to MRI; likely to be completed this afternoon  - Brace when OOB  - Pain control PRN  - Medicine consult appreciated-- recommend sending OR culture, [id number] on hold with chronic anemia; no absolute contraindication for planned surgery  - Continue home medications- Lipitor 20, Cardura 4, Synthroid 50, Metoprolol 12.5 Q12  - Senna/miralax to prevent constipation; Tolerating CCB diet  - Cotton with sediment -- d/w RN, to flush vs replace  - MISS  - Lovenox 40 for DVT ppx, will need to hold Tuesday 3/22  - LED pending for baseline to rule out DVT

## 2022-03-20 NOTE — PROGRESS NOTE ADULT - ATTENDING COMMENTS
NSGY Attg:    see above    patient seen and examined     agree with exam and plan as above    pre-op for Weds
NSGY Attg:    see above    patient seen and examined    agree with exam and plan as above  pre-op for OR Weds if patient/wife amenable (removal of hardware, complex wound closure by plastics)
NSGY Attg:    see above    patient seen and examined    agree with exam and plan as above

## 2022-03-20 NOTE — PROGRESS NOTE ADULT - SUBJECTIVE AND OBJECTIVE BOX
INTERVAL HPI/OVERNIGHT EVENTS:  76y Male extensive PMH including HTN, HLD, DM, hypothyroidism, asbestosis, CAD s/p PCI, afib on Eliquis, well known to our service after found with L1 burst fracture s/p T12-L1 laminectomy and T10-L4 fusion 1/21/22, recent admission late February for tenting of skin concerning for displaced hardware and small inferior wound dehiscence, now sent in by nursing home with now superior wound dehiscence at previous are of skin tenting and persistent inferior wound dehiscence. Patient seen earlier this AM, no complaints    Vital Signs Last 24 Hrs  T(C): 36.6 (20 Mar 2022 07:19), Max: 36.8 (20 Mar 2022 04:58)  T(F): 97.8 (20 Mar 2022 07:19), Max: 98.2 (20 Mar 2022 04:58)  HR: 69 (20 Mar 2022 07:19) (69 - 95)  BP: 104/60 (20 Mar 2022 07:19) (100/60 - 121/77)  BP(mean): --  RR: 18 (20 Mar 2022 07:19) (18 - 20)  SpO2: 95% (20 Mar 2022 07:19) (94% - 97%)    PHYSICAL EXAM:  GENERAL: NAD  HEAD:  Atraumatic  [place] COMA SCORE: E- 4 V- 5 M- 6=15  MENTAL STATUS: AAO x3; Appropriately conversant without aphasia; following commands  CRANIAL NERVES: PERRL. EOMI without nystagmus. Facial sensation intact V1-3 distribution b/l. Face symmetric w/ normal eye closure and smile, tongue midline. Hearing grossly intact. Speech clear  MOTOR: strength 5/5 b/l upper extremities; with encouragement b/l LE 5/5  SENSATION: grossly intact to light touch all extremities  SPINE: superior wound dehiscence ~ 7 cm with adjacent erythema along skin edges without active drainage or palpable adjacent wound collection; ~ 2 cm inferior wound dehiscence without active drainage or palpable adjacent wound collection. Cleaned with iodine and new sterile dressing applied  CHEST/LUNG: Nonlabored breathing  ABDOMEN: Soft, nontender, nondistended  EXTREMITIES: Non edematous; no pain to palpation    LABS:    03-19 @ 07:01  -  03-20 @ 07:00  --------------------------------------------------------  IN: 0 mL / OUT: 1850 mL / NET: -1850 mL    03-20 @ 07:01  -  03-20 @ 14:43  --------------------------------------------------------  IN: 0 mL / OUT: 750 mL / NET: -750 mL    RADIOLOGY & ADDITIONAL TESTS:  CT Lumbar Spine No Cont (03.17.22 @ 19:06)  IMPRESSION: Marked compression deformity L1 with posterior fusion T10-L4.   Compared with 1/22/2022 there is new lucency surrounding the   transpedicular screws T10, T11 and T12. This is either consistent with   loosening or infection. The bilateral pleural effusions have increased in   size.

## 2022-03-21 LAB
ANION GAP SERPL CALC-SCNC: 13 MMOL/L — SIGNIFICANT CHANGE UP (ref 5–17)
APTT BLD: 32.2 SEC — SIGNIFICANT CHANGE UP (ref 27.5–35.5)
BLD GP AB SCN SERPL QL: SIGNIFICANT CHANGE UP
BUN SERPL-MCNC: 9.7 MG/DL — SIGNIFICANT CHANGE UP (ref 8–20)
CALCIUM SERPL-MCNC: 8.7 MG/DL — SIGNIFICANT CHANGE UP (ref 8.6–10.2)
CHLORIDE SERPL-SCNC: 102 MMOL/L — SIGNIFICANT CHANGE UP (ref 98–107)
CO2 SERPL-SCNC: 24 MMOL/L — SIGNIFICANT CHANGE UP (ref 22–29)
CREAT SERPL-MCNC: 0.44 MG/DL — LOW (ref 0.5–1.3)
EGFR: 109 ML/MIN/1.73M2 — SIGNIFICANT CHANGE UP
GLUCOSE BLDC GLUCOMTR-MCNC: 136 MG/DL — HIGH (ref 70–99)
GLUCOSE BLDC GLUCOMTR-MCNC: 167 MG/DL — HIGH (ref 70–99)
GLUCOSE BLDC GLUCOMTR-MCNC: 193 MG/DL — HIGH (ref 70–99)
GLUCOSE BLDC GLUCOMTR-MCNC: 220 MG/DL — HIGH (ref 70–99)
GLUCOSE SERPL-MCNC: 122 MG/DL — HIGH (ref 70–99)
HCT VFR BLD CALC: 38.4 % — LOW (ref 39–50)
HGB BLD-MCNC: 11.9 G/DL — LOW (ref 13–17)
INR BLD: 1.11 RATIO — SIGNIFICANT CHANGE UP (ref 0.88–1.16)
MAGNESIUM SERPL-MCNC: 2.2 MG/DL — SIGNIFICANT CHANGE UP (ref 1.8–2.6)
MCHC RBC-ENTMCNC: 29.6 PG — SIGNIFICANT CHANGE UP (ref 27–34)
MCHC RBC-ENTMCNC: 31 GM/DL — LOW (ref 32–36)
MCV RBC AUTO: 95.5 FL — SIGNIFICANT CHANGE UP (ref 80–100)
PHOSPHATE SERPL-MCNC: 3.5 MG/DL — SIGNIFICANT CHANGE UP (ref 2.4–4.7)
PLATELET # BLD AUTO: 503 K/UL — HIGH (ref 150–400)
POTASSIUM SERPL-MCNC: 4.3 MMOL/L — SIGNIFICANT CHANGE UP (ref 3.5–5.3)
POTASSIUM SERPL-SCNC: 4.3 MMOL/L — SIGNIFICANT CHANGE UP (ref 3.5–5.3)
PROTHROM AB SERPL-ACNC: 12.9 SEC — SIGNIFICANT CHANGE UP (ref 10.5–13.4)
RBC # BLD: 4.02 M/UL — LOW (ref 4.2–5.8)
RBC # FLD: 15.4 % — HIGH (ref 10.3–14.5)
SODIUM SERPL-SCNC: 139 MMOL/L — SIGNIFICANT CHANGE UP (ref 135–145)
WBC # BLD: 6.21 K/UL — SIGNIFICANT CHANGE UP (ref 3.8–10.5)
WBC # FLD AUTO: 6.21 K/UL — SIGNIFICANT CHANGE UP (ref 3.8–10.5)

## 2022-03-21 RX ORDER — INSULIN LISPRO 100/ML
2 VIAL (ML) SUBCUTANEOUS ONCE
Refills: 0 | Status: COMPLETED | OUTPATIENT
Start: 2022-03-21 | End: 2022-03-22

## 2022-03-21 RX ADMIN — Medication 4 MILLIGRAM(S): at 21:46

## 2022-03-21 RX ADMIN — Medication 50 MICROGRAM(S): at 05:51

## 2022-03-21 RX ADMIN — ENOXAPARIN SODIUM 40 MILLIGRAM(S): 100 INJECTION SUBCUTANEOUS at 16:28

## 2022-03-21 RX ADMIN — Medication 500 MILLIGRAM(S): at 07:45

## 2022-03-21 RX ADMIN — Medication 12.5 MILLIGRAM(S): at 05:50

## 2022-03-21 RX ADMIN — Medication 2: at 11:19

## 2022-03-21 RX ADMIN — POLYETHYLENE GLYCOL 3350 17 GRAM(S): 17 POWDER, FOR SOLUTION ORAL at 07:46

## 2022-03-21 RX ADMIN — ATORVASTATIN CALCIUM 20 MILLIGRAM(S): 80 TABLET, FILM COATED ORAL at 21:46

## 2022-03-21 RX ADMIN — Medication 2: at 16:28

## 2022-03-21 RX ADMIN — Medication 1 TABLET(S): at 07:46

## 2022-03-21 NOTE — PROGRESS NOTE ADULT - ASSESSMENT
76M with wound dehiscence     Plan   - q4 neuro checks   - pre op for Wednesday OR   - brace when OOB  - Tylenol for pain - well controlled  - Continue atorvastatin, metoprolol   - Diet, NPO Tuesday after NM. luly Martinez   - Cardura, velasco for retention   - Lovenox for DVT ppx, hold Tuesday night   - Full set labs for tomorrow am

## 2022-03-21 NOTE — PROGRESS NOTE ADULT - SUBJECTIVE AND OBJECTIVE BOX
Pt seen on last admission. Pt with spinal hardware failure and wound dehiscence.  Plan for re-operation with Dr. Gaitan later this week.  Discussed R/B/A of closure with the wife, (HCP) including bleeding, infection, hematoma, seroma, wound breakdown, and need for further surgery.  All functional questions deferred to Neurosurgery  All questions answered and consent obtained.

## 2022-03-21 NOTE — PROGRESS NOTE ADULT - SUBJECTIVE AND OBJECTIVE BOX
Jt was awake ,alert , and very cooperative with being measured for a custom clamshell TLSO. I expect to fit him with the brace tomorrow.     [facility]

## 2022-03-21 NOTE — PROGRESS NOTE ADULT - SUBJECTIVE AND OBJECTIVE BOX
HPI: 76y Male extensive PMH including HTN, HLD, DM, hypothyroidism, asbestosis, CAD s/p PCI, afib on Eliquis, well known to our service after found with L1 burst fracture s/p T12-L1 laminectomy and T10-L4 fusion 1/21/22, recent admission late February for tenting of skin concerning for displaced hardware and small inferior wound dehiscence, now sent in by nursing home with now superior wound dehiscence at previous are of skin tenting and persistent inferior wound dehiscence. Patient seen earlier this AM, no complaints    Interval History: patient stable, pending OR Wednesday 3/23 for hardware removal and wash out.     Vital Signs Last 24 Hrs  T(C): 36.4 (21 Mar 2022 08:35), Max: 36.9 (21 Mar 2022 00:00)  T(F): 97.5 (21 Mar 2022 08:35), Max: 98.4 (21 Mar 2022 00:00)  HR: 76 (21 Mar 2022 08:35) (67 - 89)  BP: 113/68 (21 Mar 2022 08:35) (113/68 - 134/88)  BP(mean): --  RR: 18 (21 Mar 2022 05:15) (16 - 18)  SpO2: 96% (21 Mar 2022 08:35) (93% - 97%)    PHYSICAL EXAM:  GENERAL: NAD  HEAD:  Atraumatic  [place] COMA SCORE: E- 4 V- 5 M- 6=15  MENTAL STATUS: AAO x3; Appropriately conversant without aphasia; following commands  CRANIAL NERVES: PERRL. EOMI without nystagmus. Facial sensation intact V1-3 distribution b/l. Face symmetric w/ normal eye closure and smile, tongue midline. Hearing grossly intact. Speech clear  MOTOR: strength 5/5 b/l upper extremities; with encouragement b/l LE 5/5  SENSATION: grossly intact to light touch all extremities  SPINE: dressing clean / dry / intact

## 2022-03-22 ENCOUNTER — TRANSCRIPTION ENCOUNTER (OUTPATIENT)
Age: 77
End: 2022-03-22

## 2022-03-22 LAB
ANION GAP SERPL CALC-SCNC: 11 MMOL/L — SIGNIFICANT CHANGE UP (ref 5–17)
BUN SERPL-MCNC: 13.2 MG/DL — SIGNIFICANT CHANGE UP (ref 8–20)
CALCIUM SERPL-MCNC: 8.4 MG/DL — LOW (ref 8.6–10.2)
CHLORIDE SERPL-SCNC: 102 MMOL/L — SIGNIFICANT CHANGE UP (ref 98–107)
CO2 SERPL-SCNC: 25 MMOL/L — SIGNIFICANT CHANGE UP (ref 22–29)
CREAT SERPL-MCNC: 0.5 MG/DL — SIGNIFICANT CHANGE UP (ref 0.5–1.3)
CULTURE RESULTS: SIGNIFICANT CHANGE UP
CULTURE RESULTS: SIGNIFICANT CHANGE UP
EGFR: 104 ML/MIN/1.73M2 — SIGNIFICANT CHANGE UP
GLUCOSE BLDC GLUCOMTR-MCNC: 136 MG/DL — HIGH (ref 70–99)
GLUCOSE BLDC GLUCOMTR-MCNC: 170 MG/DL — HIGH (ref 70–99)
GLUCOSE BLDC GLUCOMTR-MCNC: 191 MG/DL — HIGH (ref 70–99)
GLUCOSE BLDC GLUCOMTR-MCNC: 200 MG/DL — HIGH (ref 70–99)
GLUCOSE BLDC GLUCOMTR-MCNC: 203 MG/DL — HIGH (ref 70–99)
GLUCOSE SERPL-MCNC: 97 MG/DL — SIGNIFICANT CHANGE UP (ref 70–99)
HCT VFR BLD CALC: 32.8 % — LOW (ref 39–50)
HGB BLD-MCNC: 10.4 G/DL — LOW (ref 13–17)
INR BLD: 1.14 RATIO — SIGNIFICANT CHANGE UP (ref 0.88–1.16)
MAGNESIUM SERPL-MCNC: 2 MG/DL — SIGNIFICANT CHANGE UP (ref 1.6–2.6)
MCHC RBC-ENTMCNC: 30 PG — SIGNIFICANT CHANGE UP (ref 27–34)
MCHC RBC-ENTMCNC: 31.7 GM/DL — LOW (ref 32–36)
MCV RBC AUTO: 94.5 FL — SIGNIFICANT CHANGE UP (ref 80–100)
PHOSPHATE SERPL-MCNC: 3.5 MG/DL — SIGNIFICANT CHANGE UP (ref 2.4–4.7)
PLATELET # BLD AUTO: 466 K/UL — HIGH (ref 150–400)
POTASSIUM SERPL-MCNC: 3.8 MMOL/L — SIGNIFICANT CHANGE UP (ref 3.5–5.3)
POTASSIUM SERPL-SCNC: 3.8 MMOL/L — SIGNIFICANT CHANGE UP (ref 3.5–5.3)
PROTHROM AB SERPL-ACNC: 13.3 SEC — SIGNIFICANT CHANGE UP (ref 10.5–13.4)
RBC # BLD: 3.47 M/UL — LOW (ref 4.2–5.8)
RBC # FLD: 15.3 % — HIGH (ref 10.3–14.5)
SARS-COV-2 RNA SPEC QL NAA+PROBE: SIGNIFICANT CHANGE UP
SODIUM SERPL-SCNC: 138 MMOL/L — SIGNIFICANT CHANGE UP (ref 135–145)
SPECIMEN SOURCE: SIGNIFICANT CHANGE UP
SPECIMEN SOURCE: SIGNIFICANT CHANGE UP
WBC # BLD: 6.24 K/UL — SIGNIFICANT CHANGE UP (ref 3.8–10.5)
WBC # FLD AUTO: 6.24 K/UL — SIGNIFICANT CHANGE UP (ref 3.8–10.5)

## 2022-03-22 RX ORDER — CEFAZOLIN SODIUM 1 G
2000 VIAL (EA) INJECTION ONCE
Refills: 0 | Status: COMPLETED | OUTPATIENT
Start: 2022-03-23 | End: 2022-03-23

## 2022-03-22 RX ORDER — LEVOTHYROXINE SODIUM 125 MCG
33.5 TABLET ORAL AT BEDTIME
Refills: 0 | Status: DISCONTINUED | OUTPATIENT
Start: 2022-03-23 | End: 2022-03-23

## 2022-03-22 RX ADMIN — Medication 1 TABLET(S): at 12:50

## 2022-03-22 RX ADMIN — Medication 4 MILLIGRAM(S): at 21:36

## 2022-03-22 RX ADMIN — Medication 2: at 12:51

## 2022-03-22 RX ADMIN — Medication 2 UNIT(S): at 00:30

## 2022-03-22 RX ADMIN — Medication 50 MICROGRAM(S): at 05:32

## 2022-03-22 RX ADMIN — Medication 4: at 17:57

## 2022-03-22 RX ADMIN — ATORVASTATIN CALCIUM 20 MILLIGRAM(S): 80 TABLET, FILM COATED ORAL at 21:36

## 2022-03-22 RX ADMIN — Medication 12.5 MILLIGRAM(S): at 05:32

## 2022-03-22 RX ADMIN — Medication 500 MILLIGRAM(S): at 12:50

## 2022-03-22 NOTE — PROGRESS NOTE ADULT - SUBJECTIVE AND OBJECTIVE BOX
Jt was awake and cooperative as I fit him with the custom TLSO clamshell. Additional trimming and padding will be finished at the office, and the brace should be returned for delivery later this afternoon.  [facility]  [phone]

## 2022-03-22 NOTE — PRE-ANESTHESIA EVALUATION ADULT - NSANTHADDINFOFT_GEN_ALL_CORE
Chart reviewed, no further workup necessary for surgery on 3/23/22.  Cardiology note in [place] from January appreciated.

## 2022-03-22 NOTE — PROGRESS NOTE ADULT - SUBJECTIVE AND OBJECTIVE BOX
Jt was awake in bed and his wife Josefina was present as I refit him with the custom clamshell TLSO. I used the logroll method for donning the brace. He was able to sit up with assist of one and stood briefly using a walker. Brace was labeled and placed on dresser left of bed. Will follow as needed.    [facility]

## 2022-03-22 NOTE — PROGRESS NOTE ADULT - SUBJECTIVE AND OBJECTIVE BOX
HPI:  76 year old Male extensive PMH including HTN, HLD, DM, hypothyroidism, asbestosis, CAD s/p PCI, afib on Eliquis, well known to our service after found with L1 burst fracture s/p T12-L1 laminectomy and T10-L4 fusion 1/21/22, recent admission late February for tenting of skin concerning for displaced hardware and small inferior wound dehiscence, now sent in by nursing home with now superior wound dehiscence at previous are of skin tenting and persistent inferior wound dehiscence. Patient and wife at bedside report they were sent in by nursing home to "rule out infection in the bone." Denies fevers, chills, weakness, paresthesias, chest pain, palpitations, SOB.  (17 Mar 2022 19:24)    INTERVAL HPI/OVERNIGHT EVENTS:  Patient seen and examined this morning w/ neurosurgical team.     Vital Signs Last 24 Hrs  T(C): 35.9 (22 Mar 2022 08:49), Max: 36.8 (21 Mar 2022 16:26)  T(F): 96.6 (22 Mar 2022 08:49), Max: 98.3 (21 Mar 2022 16:26)  HR: 74 (22 Mar 2022 08:49) (74 - 82)  BP: 110/71 (22 Mar 2022 08:49) (90/51 - 110/71)  BP(mean): --  RR: 18 (22 Mar 2022 05:30) (18 - 18)  SpO2: 93% (22 Mar 2022 08:49) (92% - 98%)    PHYSICAL EXAM:  GENERAL: NAD, well-groomed, well-developed  HEAD:  Atraumatic, normocephalic  DRAINS:   WOUND: Dressing clean dry intact  [place] COMA SCORE: E- V- M- =       E: 4= opens eyes spontaneously 3= to voice 2= to noxious 1= no opening       V: 5= oriented 4= confused 3= inappropriate words 2= incomprehensible sounds 1= nonverbal 1T= intubated       M: 6= follows commands 5= localizes 4= withdraws 3= flexor posturing 2= extensor posturing 1= no movement  MENTAL STATUS: AAO x3; Awake/Comatose; Opens eyes spontaneously/to voice/to light touch/to noxious stimuli; Appropriately conversant without aphasia/Nonverbal; following simple commands/mimicking/not following commands  CRANIAL NERVES: Visual acuity normal for age, visual fields full to confrontation, PERRL. EOMI without nystagmus. Facial sensation intact V1-3 distribution b/l. Face symmetric w/ normal eye closure and smile, tongue midline. Hearing grossly intact. Speech clear. Head turning and shoulder shrug intact.   REFLEXES: PERRL. Corneals intact b/l. Gag intact. Cough intact. Oculocephalic reflex intact (Doll's eye). Negative Franco's b/l. Negative clonus b/l  MOTOR: strength 5/5 b/l upper and lower extremities  Uppers     Delt (C5/6)     Bicep (C5/6)     Wrist Extend (C6)     Tricep (C7)     HG (C8/T1)  R                     5/5                 5/5                         5/5                           5/5                   5/5  L                      5/5                 5/5                         5/5                           5/5                   5/5  Lowers      HF(L1/L2)     KE (L3)     DF (L4)     EHL (L5)     PF (S1)      R                     5/5              5/5           5/5           5/5            5/5  L                     5/5               5/5          5/5            5/5            5/5  SENSATION: grossly intact to light touch all extremities  COORDINATION: Gait intact; rapid alternating movements intact; heel to shin intact; no upper extremity dysmetria  CHEST/LUNG: Clear to auscultation bilaterally; no rales, rhonchi, wheezing, or rubs  HEART: +S1/+S2; Regular rate and rhythm; no murmurs, rubs, or gallops  ABDOMEN: Soft, nontender, nondistended; bowel sounds present all four quadrants  EXTREMITIES:  2+ peripheral pulses, no clubbing, cyanosis, or edema  SKIN: Warm, dry; no rashes or lesions    LABS:             10.4   6.24  )-----------( 466      ( 22 Mar 2022 06:32 )             32.8     03-22    138  |  102  |  13.2  ----------------------------<  97  3.8   |  25.0  |  0.50    Ca    8.4<L>      22 Mar 2022 06:32  Phos  3.5     03-22  Mg     2.0     03-22    PT/INR - ( 22 Mar 2022 06:32 )   PT: 13.3 sec;   INR: 1.14 ratio       PTT - ( 21 Mar 2022 07:17 )  PTT:32.2 sec    03-21 @ 07:01  -  03-22 @ 07:00  --------------------------------------------------------  IN: 0 mL / OUT: 500 mL / NET: -500 mL        RADIOLOGY & ADDITIONAL TESTS:  MR Thoracic Spine w/wo IV Cont (03.20.22 @ 17:09)   IMPRESSION:  1. Significant streak artifact from surgical hardware in lower thoracic   spine with nonvisualization of the canal space and the cord at the   surgical levels. There appears to be mild edema posterior to the surgical   hardware seen on STIR images which is nonspecific. Infectious pathology   is in the differential diagnosis. No significant enhancement is seen   after contrast administration. Evaluation of osseous structures and soft   tissues surrounding surgical hardware however is very limited and   nondiagnostic secondary to significant streak  artifact.  2. Limited visualization of moderate bilateral pleural effusions with   parenchymal disease in bilateral lungs likely   consolidation/atelectasis/infiltrates. Please refer to the recent   thoracic spine CT for further details.   HPI:  76 year old Male extensive PMH including HTN, HLD, DM, hypothyroidism, asbestosis, CAD s/p PCI, afib on Eliquis, well known to our service after found with L1 burst fracture s/p T12-L1 laminectomy and T10-L4 fusion 1/21/22, recent admission late February for tenting of skin concerning for displaced hardware and small inferior wound dehiscence, now sent in by nursing home with now superior wound dehiscence at previous are of skin tenting and persistent inferior wound dehiscence. Patient and wife at bedside report they were sent in by nursing home to "rule out infection in the bone." Denies fevers, chills, weakness, paresthesias, chest pain, palpitations, SOB.  (17 Mar 2022 19:24)    INTERVAL HPI/OVERNIGHT EVENTS:  Patient seen and examined this morning w/ neurosurgical team. Patient scheduled for hardware removal in AM w/ Dr. Gaitan; discussed risks/benefits/alternatives and patient agreeing. Wife bedside     Vital Signs Last 24 Hrs  T(C): 35.9 (22 Mar 2022 08:49), Max: 36.8 (21 Mar 2022 16:26)  T(F): 96.6 (22 Mar 2022 08:49), Max: 98.3 (21 Mar 2022 16:26)  HR: 74 (22 Mar 2022 08:49) (74 - 82)  BP: 110/71 (22 Mar 2022 08:49) (90/51 - 110/71)  BP(mean): --  RR: 18 (22 Mar 2022 05:30) (18 - 18)  SpO2: 93% (22 Mar 2022 08:49) (92% - 98%)    PHYSICAL EXAM:  GENERAL: NAD   HEAD: Atraumatic, normocephalic  [place] COMA SCORE: E- 4 V- 5 M- 6=15  MENTAL STATUS: AAO x3; Appropriately conversant without aphasia; following commands  CRANIAL NERVES: PERRL. EOMI without nystagmus. Facial sensation intact V1-3 distribution b/l. Face symmetric w/ normal eye closure and smile, tongue midline. Hearing grossly intact. Speech clear  MOTOR: strength 5/5 b/l upper extremities; with copious amount of encouragement and redirection, b/l LE 5/5  SENSATION: grossly intact to light touch all extremities  SPINE: Erythema noted near incision. dressing c/d/i      LABS:             10.4   6.24  )-----------( 466      ( 22 Mar 2022 06:32 )             32.8     03-22    138  |  102  |  13.2  ----------------------------<  97  3.8   |  25.0  |  0.50    Ca    8.4<L>      22 Mar 2022 06:32  Phos  3.5     03-22  Mg     2.0     03-22    PT/INR - ( 22 Mar 2022 06:32 )   PT: 13.3 sec;   INR: 1.14 ratio       PTT - ( 21 Mar 2022 07:17 )  PTT:32.2 sec    03-21 @ 07:01  -  03-22 @ 07:00  --------------------------------------------------------  IN: 0 mL / OUT: 500 mL / NET: -500 mL        RADIOLOGY & ADDITIONAL TESTS:  MR Thoracic Spine w/wo IV Cont (03.20.22 @ 17:09)   IMPRESSION:  1. Significant streak artifact from surgical hardware in lower thoracic   spine with nonvisualization of the canal space and the cord at the   surgical levels. There appears to be mild edema posterior to the surgical   hardware seen on STIR images which is nonspecific. Infectious pathology   is in the differential diagnosis. No significant enhancement is seen   after contrast administration. Evaluation of osseous structures and soft   tissues surrounding surgical hardware however is very limited and   nondiagnostic secondary to significant streak  artifact.  2. Limited visualization of moderate bilateral pleural effusions with   parenchymal disease in bilateral lungs likely   consolidation/atelectasis/infiltrates. Please refer to the recent   thoracic spine CT for further details.

## 2022-03-22 NOTE — PROGRESS NOTE ADULT - ASSESSMENT
76M w/ extensive PMH including HTN, HLD, DM, hypothyroidism, asbestosis, CAD s/p PCI, afib on Eliquis, well known to our service after found with L1 burst fracture s/p T12-L1 laminectomy and T10-L4 fusion 1/21/22, recent admission late February for tenting of skin concerning for displaced hardware and small inferior wound dehiscence, now sent in by nursing home with now superior wound dehiscence at previous are of skin tenting and persistent inferior wound dehiscence.   Pre op for OR in AM for hardware removal w/ Arnie Logan       Plan  - Q4 neuro checks  - Pain control PRN; Tylenol 650q6. Avoid oversedation   - Normotensive: Lopressor 12.5 BID  - Lipitor 20 qhs  - Synthroid 50 PO daily changed to IV 33.5   - Cardura 4 qhs for retention  - Multivitamin, Senna, Miralax, Vitamin C  - ISS  - b/l SCDs; Lovenox d/c 2/2 surgery in AM  - NPO after midnight  - CBC, BMP, Mg, Phos, Ca, COVID swab, T&S, coags   - 2U PRBC on hold for OR   - Ancef 2g on hold fo OR  - Medically optimized for surgery on 3/19/22 by Hospitalist team  - Discussed risks/benefits/alternatives of surgical intervention w/ patient and agreeable to procedure.  - D/w Dr. Gaitan

## 2022-03-23 ENCOUNTER — RESULT REVIEW (OUTPATIENT)
Age: 77
End: 2022-03-23

## 2022-03-23 ENCOUNTER — TRANSCRIPTION ENCOUNTER (OUTPATIENT)
Age: 77
End: 2022-03-23

## 2022-03-23 ENCOUNTER — APPOINTMENT (OUTPATIENT)
Dept: NEUROSURGERY | Facility: HOSPITAL | Age: 77
End: 2022-03-23

## 2022-03-23 LAB
ANION GAP SERPL CALC-SCNC: 12 MMOL/L — SIGNIFICANT CHANGE UP (ref 5–17)
APTT BLD: 30.8 SEC — SIGNIFICANT CHANGE UP (ref 27.5–35.5)
BLD GP AB SCN SERPL QL: SIGNIFICANT CHANGE UP
BUN SERPL-MCNC: 11.6 MG/DL — SIGNIFICANT CHANGE UP (ref 8–20)
CA-I BLD-SCNC: 1.14 MMOL/L — LOW (ref 1.15–1.33)
CALCIUM SERPL-MCNC: 8.6 MG/DL — SIGNIFICANT CHANGE UP (ref 8.6–10.2)
CHLORIDE SERPL-SCNC: 103 MMOL/L — SIGNIFICANT CHANGE UP (ref 98–107)
CO2 SERPL-SCNC: 24 MMOL/L — SIGNIFICANT CHANGE UP (ref 22–29)
CREAT SERPL-MCNC: 0.46 MG/DL — LOW (ref 0.5–1.3)
EGFR: 107 ML/MIN/1.73M2 — SIGNIFICANT CHANGE UP
GLUCOSE BLDC GLUCOMTR-MCNC: 135 MG/DL — HIGH (ref 70–99)
GLUCOSE BLDC GLUCOMTR-MCNC: 145 MG/DL — HIGH (ref 70–99)
GLUCOSE SERPL-MCNC: 134 MG/DL — HIGH (ref 70–99)
HCT VFR BLD CALC: 33.4 % — LOW (ref 39–50)
HGB BLD-MCNC: 10.5 G/DL — LOW (ref 13–17)
INR BLD: 1.12 RATIO — SIGNIFICANT CHANGE UP (ref 0.88–1.16)
MAGNESIUM SERPL-MCNC: 2.1 MG/DL — SIGNIFICANT CHANGE UP (ref 1.6–2.6)
MCHC RBC-ENTMCNC: 29.6 PG — SIGNIFICANT CHANGE UP (ref 27–34)
MCHC RBC-ENTMCNC: 31.4 GM/DL — LOW (ref 32–36)
MCV RBC AUTO: 94.1 FL — SIGNIFICANT CHANGE UP (ref 80–100)
PHOSPHATE SERPL-MCNC: 3.3 MG/DL — SIGNIFICANT CHANGE UP (ref 2.4–4.7)
PLATELET # BLD AUTO: 470 K/UL — HIGH (ref 150–400)
POTASSIUM SERPL-MCNC: 3.9 MMOL/L — SIGNIFICANT CHANGE UP (ref 3.5–5.3)
POTASSIUM SERPL-SCNC: 3.9 MMOL/L — SIGNIFICANT CHANGE UP (ref 3.5–5.3)
PROTHROM AB SERPL-ACNC: 13 SEC — SIGNIFICANT CHANGE UP (ref 10.5–13.4)
RBC # BLD: 3.55 M/UL — LOW (ref 4.2–5.8)
RBC # FLD: 15.4 % — HIGH (ref 10.3–14.5)
SODIUM SERPL-SCNC: 139 MMOL/L — SIGNIFICANT CHANGE UP (ref 135–145)
WBC # BLD: 5.71 K/UL — SIGNIFICANT CHANGE UP (ref 3.8–10.5)
WBC # FLD AUTO: 5.71 K/UL — SIGNIFICANT CHANGE UP (ref 3.8–10.5)

## 2022-03-23 PROCEDURE — 88300 SURGICAL PATH GROSS: CPT | Mod: 26

## 2022-03-23 PROCEDURE — 22852 REMOVE SPINE FIXATION DEVICE: CPT | Mod: 78

## 2022-03-23 DEVICE — SPONGE GELFOAM SZ 100 UNCOMPRESSED
Type: IMPLANTABLE DEVICE | Status: NON-FUNCTIONAL
Removed: 2022-03-23

## 2022-03-23 DEVICE — SEALANT FLOSEAL FAST PREP HEMOSTATIC MATRIX 10ML
Type: IMPLANTABLE DEVICE | Status: NON-FUNCTIONAL
Removed: 2022-03-23

## 2022-03-23 RX ORDER — ATORVASTATIN CALCIUM 80 MG/1
20 TABLET, FILM COATED ORAL AT BEDTIME
Refills: 0 | Status: DISCONTINUED | OUTPATIENT
Start: 2022-03-23 | End: 2022-03-30

## 2022-03-23 RX ORDER — CEFAZOLIN SODIUM 1 G
1000 VIAL (EA) INJECTION EVERY 8 HOURS
Refills: 0 | Status: COMPLETED | OUTPATIENT
Start: 2022-03-23 | End: 2022-03-26

## 2022-03-23 RX ORDER — OXYCODONE HYDROCHLORIDE 5 MG/1
5 TABLET ORAL EVERY 4 HOURS
Refills: 0 | Status: DISCONTINUED | OUTPATIENT
Start: 2022-03-23 | End: 2022-03-24

## 2022-03-23 RX ORDER — HYDROMORPHONE HYDROCHLORIDE 2 MG/ML
0.5 INJECTION INTRAMUSCULAR; INTRAVENOUS; SUBCUTANEOUS
Refills: 0 | Status: DISCONTINUED | OUTPATIENT
Start: 2022-03-23 | End: 2022-03-23

## 2022-03-23 RX ORDER — OXYCODONE HYDROCHLORIDE 5 MG/1
10 TABLET ORAL EVERY 4 HOURS
Refills: 0 | Status: DISCONTINUED | OUTPATIENT
Start: 2022-03-23 | End: 2022-03-25

## 2022-03-23 RX ORDER — POLYETHYLENE GLYCOL 3350 17 G/17G
17 POWDER, FOR SOLUTION ORAL DAILY
Refills: 0 | Status: DISCONTINUED | OUTPATIENT
Start: 2022-03-23 | End: 2022-03-30

## 2022-03-23 RX ORDER — ACETAMINOPHEN 500 MG
650 TABLET ORAL EVERY 6 HOURS
Refills: 0 | Status: DISCONTINUED | OUTPATIENT
Start: 2022-03-23 | End: 2022-03-30

## 2022-03-23 RX ORDER — METOPROLOL TARTRATE 50 MG
12.5 TABLET ORAL EVERY 12 HOURS
Refills: 0 | Status: DISCONTINUED | OUTPATIENT
Start: 2022-03-23 | End: 2022-03-30

## 2022-03-23 RX ORDER — ASCORBIC ACID 60 MG
500 TABLET,CHEWABLE ORAL DAILY
Refills: 0 | Status: DISCONTINUED | OUTPATIENT
Start: 2022-03-23 | End: 2022-03-30

## 2022-03-23 RX ORDER — LEVOTHYROXINE SODIUM 125 MCG
50 TABLET ORAL DAILY
Refills: 0 | Status: DISCONTINUED | OUTPATIENT
Start: 2022-03-23 | End: 2022-03-30

## 2022-03-23 RX ORDER — SODIUM CHLORIDE 9 MG/ML
1000 INJECTION INTRAMUSCULAR; INTRAVENOUS; SUBCUTANEOUS
Refills: 0 | Status: DISCONTINUED | OUTPATIENT
Start: 2022-03-23 | End: 2022-03-24

## 2022-03-23 RX ORDER — SENNA PLUS 8.6 MG/1
2 TABLET ORAL AT BEDTIME
Refills: 0 | Status: DISCONTINUED | OUTPATIENT
Start: 2022-03-23 | End: 2022-03-30

## 2022-03-23 RX ORDER — DOXAZOSIN MESYLATE 4 MG
4 TABLET ORAL AT BEDTIME
Refills: 0 | Status: DISCONTINUED | OUTPATIENT
Start: 2022-03-23 | End: 2022-03-30

## 2022-03-23 RX ORDER — METHOCARBAMOL 500 MG/1
750 TABLET, FILM COATED ORAL EVERY 8 HOURS
Refills: 0 | Status: DISCONTINUED | OUTPATIENT
Start: 2022-03-23 | End: 2022-03-30

## 2022-03-23 RX ADMIN — SENNA PLUS 2 TABLET(S): 8.6 TABLET ORAL at 22:00

## 2022-03-23 RX ADMIN — SODIUM CHLORIDE 50 MILLILITER(S): 9 INJECTION INTRAMUSCULAR; INTRAVENOUS; SUBCUTANEOUS at 21:59

## 2022-03-23 RX ADMIN — ATORVASTATIN CALCIUM 20 MILLIGRAM(S): 80 TABLET, FILM COATED ORAL at 22:00

## 2022-03-23 RX ADMIN — Medication 100 MILLIGRAM(S): at 22:00

## 2022-03-23 RX ADMIN — Medication 100 MILLIGRAM(S): at 05:29

## 2022-03-23 RX ADMIN — Medication 4 MILLIGRAM(S): at 22:00

## 2022-03-23 NOTE — BRIEF OPERATIVE NOTE - NSICDXBRIEFPOSTOP_GEN_ALL_CORE_FT
POST-OP DIAGNOSIS:  Loosening of hardware in spine 23-Mar-2022 19:18:39  Scooby Gaitan  
POST-OP DIAGNOSIS:  Loosening of hardware in spine 23-Mar-2022 19:18:39  Scooby Gaitan

## 2022-03-23 NOTE — PROGRESS NOTE ADULT - ASSESSMENT
75 Y/O male known to our service w/ hx of L1 burst fracture, had a CT T/L spine, found w/ new superior wound dehiscence without active drainage.   -pt consented and marked     -Case discussed w/ Dr. Gaitan   -pain control if needed, avoid over sedation  - Pain control PRN  - 2U PRBC on hold for OR   - Ancef 2g on hold fo OR  - Medically optimized for surgery on 3/19/22 by Hospitalist team  - Discussed risks/benefits/alternatives of surgical intervention w/ patient and agreeable to procedure.

## 2022-03-23 NOTE — BRIEF OPERATIVE NOTE - NSICDXBRIEFPREOP_GEN_ALL_CORE_FT
PRE-OP DIAGNOSIS:  Loosening of hardware in spine 23-Mar-2022 19:18:17  Scooby Gaitan  Loosening of hardware in spine 23-Mar-2022 19:18:27  Scooby Gaitan  
PRE-OP DIAGNOSIS:  Loosening of hardware in spine 23-Mar-2022 19:18:17  Scooby Gaitan

## 2022-03-23 NOTE — BRIEF OPERATIVE NOTE - COMMENTS
neuromonitoring at baseline throughout the procedure; no CSF leak; complex wound closure with muscle flap by plastics
neuromonitoring at baseline throughout the procedure; no CSF leak; complex wound closure with muscle flap by plastics

## 2022-03-23 NOTE — PROGRESS NOTE ADULT - SUBJECTIVE AND OBJECTIVE BOX
INTERVAL HPI/OVERNIGHT EVENTS:  75 Y/O male w/ Hx of HTN, HLD, DM, hypothyroidism, asbestosis, CAD s/p PCI, afib on Eliquis, well known to our service after found with L1 burst fracture s/p T12-L1 laminectomy and T10-L4 fusion 1/21/22, recent admission late February for tenting of skin concerning for displaced hardware and small inferior wound dehiscence, now sent in by nursing home with now superior wound dehiscence at previous are of skin tenting and persistent inferior wound dehiscence. Pt seen this morning while in bed. Pt planned for OR today for hardware removal w/ Dr. Gaitan.        Vital Signs Last 24 Hrs  T(C): 36.9 (23 Mar 2022 07:34), Max: 36.9 (23 Mar 2022 07:34)  T(F): 98.5 (23 Mar 2022 07:34), Max: 98.5 (23 Mar 2022 07:34)  HR: 82 (23 Mar 2022 07:34) (82 - 90)  BP: 103/64 (23 Mar 2022 07:34) (90/56 - 110/72)  BP(mean): --  RR: 18 (23 Mar 2022 04:55) (18 - 20)  SpO2: 93% (23 Mar 2022 04:55) (93% - 93%)      PHYSICAL EXAM:  GENERAL: NAD, well-groomed  HEAD:  Atraumatic, normocephalic  MENTAL STATUS: AAO x3; Awake; Opens eyes spontaneously; Appropriately conversant without aphasia; following simple commands  CRANIAL NERVES: MIK; EOMI; no facial asymmetry; facial sensation grossly intact to light touch b/l;  tongue midline; palate rises symmetrically  Spine: +inferior wound dehiscence w/o drainage  MOTOR: strength 5/5 B/L upper and lower extremities; sensation grossly intact all extremities  CHEST/LUNG: +breath sounds bilaterally; no rales, rhonchi, wheezing, appreciated  HEART: +S1/+S2; Regular rate and rhythm; no murmurs, rubs, or gallops  ABDOMEN: Soft, nontender, nondistended; bowel sounds present all four quadrants  EXTREMITIES:  2+ peripheral pulses, no clubbing, cyanosis, or edema  SKIN: Warm, dry; no rashes or lesions      LABS:                        10.5   5.71  )-----------( 470      ( 23 Mar 2022 06:11 )             33.4     03-23    139  |  103  |  11.6  ----------------------------<  134<H>  3.9   |  24.0  |  0.46<L>    Ca    8.6      23 Mar 2022 06:11  Phos  3.3     03-23  Mg     2.1     03-23      PT/INR - ( 23 Mar 2022 06:11 )   PT: 13.0 sec;   INR: 1.12 ratio         PTT - ( 23 Mar 2022 06:11 )  PTT:30.8 sec      03-22 @ 07:01 - 03-23 @ 07:00  --------------------------------------------------------  IN: 0 mL / OUT: 800 mL / NET: -800 mL    03-23 @ 07:01 - 03-23 @ 12:12  --------------------------------------------------------  IN: 0 mL / OUT: 550 mL / NET: -550 mL      RADIOLOGY & ADDITIONAL TESTS:    ACC: [id number] EXAM:  CT LUMBAR SPINE                        ACC: [id number] EXAM:  CT THORACIC SPINE                        PROCEDURE DATE:  02/24/2022    IMPRESSION:  *  STATUS POST T12-L1 LAMINECTOMY AND T10-L4 POSTERIOR FUSION  *  NEW LUCENCY SURROUNDING THE PEDICLE SCREWS AT T10-T12. MILD ASSOCIATED   MIGRATION OF THE PEDICLE SCREWS AT T10. FINDINGS ARE COMPATIBLE WITH   LOOSENING. INFECTION CANNOT BE EXCLUDED.  *  NEW FLUID COLLECTION ABOUT THE SPINOUS PROCESSES OF L2-L4.  *  INCREASING EDEMA WITHIN THE DORSAL SOFT TISSUES.  *  ENLARGING BILATERAL PLEURAL EFFUSIONS WHICH MAY BE PARTLY LOCULATED.

## 2022-03-23 NOTE — BRIEF OPERATIVE NOTE - NSICDXBRIEFPROCEDURE_GEN_ALL_CORE_FT
PROCEDURES:  Muscle flap, trunk 23-Mar-2022 18:44:46  Gautam Schumacher  Complex repair of trunk, 5.1cm to 7.5cm 23-Mar-2022 18:44:55  Gautam Schumacher  Closure, surgical wound, spinal region, secondary 23-Mar-2022 18:45:09  Gautam Schumacher  
PROCEDURES:  Muscle flap, trunk 23-Mar-2022 18:44:46  Gautam Schumacher  Complex repair of trunk, 5.1cm to 7.5cm 23-Mar-2022 18:44:55  Gautam Schumacher  Closure, surgical wound, spinal region, secondary 23-Mar-2022 18:45:09  Gautam Schumacher  Complex removal, hardware, spine 23-Mar-2022 19:17:44  Scooby Gaitan  
PROCEDURES:  Complex removal, hardware, spine 23-Mar-2022 19:17:44  Scooby Gaitan

## 2022-03-24 ENCOUNTER — APPOINTMENT (OUTPATIENT)
Dept: NEUROSURGERY | Facility: CLINIC | Age: 77
End: 2022-03-24

## 2022-03-24 LAB
ANION GAP SERPL CALC-SCNC: 13 MMOL/L — SIGNIFICANT CHANGE UP (ref 5–17)
BUN SERPL-MCNC: 13 MG/DL — SIGNIFICANT CHANGE UP (ref 8–20)
CALCIUM SERPL-MCNC: 8.7 MG/DL — SIGNIFICANT CHANGE UP (ref 8.6–10.2)
CHLORIDE SERPL-SCNC: 101 MMOL/L — SIGNIFICANT CHANGE UP (ref 98–107)
CO2 SERPL-SCNC: 24 MMOL/L — SIGNIFICANT CHANGE UP (ref 22–29)
CREAT SERPL-MCNC: 0.39 MG/DL — LOW (ref 0.5–1.3)
EGFR: 113 ML/MIN/1.73M2 — SIGNIFICANT CHANGE UP
GLUCOSE BLDC GLUCOMTR-MCNC: 225 MG/DL — HIGH (ref 70–99)
GLUCOSE BLDC GLUCOMTR-MCNC: 230 MG/DL — HIGH (ref 70–99)
GLUCOSE BLDC GLUCOMTR-MCNC: 244 MG/DL — HIGH (ref 70–99)
GLUCOSE SERPL-MCNC: 195 MG/DL — HIGH (ref 70–99)
HCT VFR BLD CALC: 35.6 % — LOW (ref 39–50)
HGB BLD-MCNC: 11.2 G/DL — LOW (ref 13–17)
MAGNESIUM SERPL-MCNC: 1.8 MG/DL — SIGNIFICANT CHANGE UP (ref 1.8–2.6)
MCHC RBC-ENTMCNC: 30.1 PG — SIGNIFICANT CHANGE UP (ref 27–34)
MCHC RBC-ENTMCNC: 31.5 GM/DL — LOW (ref 32–36)
MCV RBC AUTO: 95.7 FL — SIGNIFICANT CHANGE UP (ref 80–100)
PHOSPHATE SERPL-MCNC: 4.2 MG/DL — SIGNIFICANT CHANGE UP (ref 2.4–4.7)
PLATELET # BLD AUTO: 492 K/UL — HIGH (ref 150–400)
POTASSIUM SERPL-MCNC: 4.4 MMOL/L — SIGNIFICANT CHANGE UP (ref 3.5–5.3)
POTASSIUM SERPL-SCNC: 4.4 MMOL/L — SIGNIFICANT CHANGE UP (ref 3.5–5.3)
RBC # BLD: 3.72 M/UL — LOW (ref 4.2–5.8)
RBC # FLD: 14.9 % — HIGH (ref 10.3–14.5)
SODIUM SERPL-SCNC: 138 MMOL/L — SIGNIFICANT CHANGE UP (ref 135–145)
WBC # BLD: 7.19 K/UL — SIGNIFICANT CHANGE UP (ref 3.8–10.5)
WBC # FLD AUTO: 7.19 K/UL — SIGNIFICANT CHANGE UP (ref 3.8–10.5)

## 2022-03-24 RX ORDER — SODIUM CHLORIDE 9 MG/ML
1000 INJECTION, SOLUTION INTRAVENOUS
Refills: 0 | Status: DISCONTINUED | OUTPATIENT
Start: 2022-03-24 | End: 2022-03-30

## 2022-03-24 RX ORDER — DEXTROSE 50 % IN WATER 50 %
25 SYRINGE (ML) INTRAVENOUS ONCE
Refills: 0 | Status: DISCONTINUED | OUTPATIENT
Start: 2022-03-24 | End: 2022-03-30

## 2022-03-24 RX ORDER — GLUCAGON INJECTION, SOLUTION 0.5 MG/.1ML
1 INJECTION, SOLUTION SUBCUTANEOUS ONCE
Refills: 0 | Status: DISCONTINUED | OUTPATIENT
Start: 2022-03-24 | End: 2022-03-30

## 2022-03-24 RX ORDER — DEXTROSE 50 % IN WATER 50 %
12.5 SYRINGE (ML) INTRAVENOUS ONCE
Refills: 0 | Status: DISCONTINUED | OUTPATIENT
Start: 2022-03-24 | End: 2022-03-30

## 2022-03-24 RX ORDER — DEXTROSE 50 % IN WATER 50 %
15 SYRINGE (ML) INTRAVENOUS ONCE
Refills: 0 | Status: DISCONTINUED | OUTPATIENT
Start: 2022-03-24 | End: 2022-03-30

## 2022-03-24 RX ORDER — INSULIN LISPRO 100/ML
VIAL (ML) SUBCUTANEOUS
Refills: 0 | Status: DISCONTINUED | OUTPATIENT
Start: 2022-03-24 | End: 2022-03-27

## 2022-03-24 RX ADMIN — Medication 1 TABLET(S): at 13:41

## 2022-03-24 RX ADMIN — Medication 4: at 17:10

## 2022-03-24 RX ADMIN — ATORVASTATIN CALCIUM 20 MILLIGRAM(S): 80 TABLET, FILM COATED ORAL at 21:02

## 2022-03-24 RX ADMIN — Medication 4 MILLIGRAM(S): at 21:30

## 2022-03-24 RX ADMIN — Medication 650 MILLIGRAM(S): at 17:10

## 2022-03-24 RX ADMIN — Medication 100 MILLIGRAM(S): at 21:03

## 2022-03-24 RX ADMIN — METHOCARBAMOL 750 MILLIGRAM(S): 500 TABLET, FILM COATED ORAL at 21:02

## 2022-03-24 RX ADMIN — Medication 50 MICROGRAM(S): at 05:52

## 2022-03-24 RX ADMIN — OXYCODONE HYDROCHLORIDE 5 MILLIGRAM(S): 5 TABLET ORAL at 03:30

## 2022-03-24 RX ADMIN — Medication 650 MILLIGRAM(S): at 18:00

## 2022-03-24 RX ADMIN — Medication 4: at 12:03

## 2022-03-24 RX ADMIN — Medication 12.5 MILLIGRAM(S): at 05:52

## 2022-03-24 RX ADMIN — Medication 500 MILLIGRAM(S): at 13:41

## 2022-03-24 RX ADMIN — Medication 100 MILLIGRAM(S): at 05:52

## 2022-03-24 RX ADMIN — METHOCARBAMOL 750 MILLIGRAM(S): 500 TABLET, FILM COATED ORAL at 05:52

## 2022-03-24 RX ADMIN — Medication 100 MILLIGRAM(S): at 13:41

## 2022-03-24 RX ADMIN — SENNA PLUS 2 TABLET(S): 8.6 TABLET ORAL at 21:02

## 2022-03-24 NOTE — PHYSICAL THERAPY INITIAL EVALUATION ADULT - PERTINENT HX OF CURRENT PROBLEM, REHAB EVAL
Pt has extensive PMH including HTN, HLD, DM, hypothyroidism, asbestosis, CAD s/p PCI, afib on Eliquis; Pt is well known to [facility] after found with L1 burst fracture s/p T12-L1 laminectomy and T10-L4 fusion 1/21/22, presenting w/ hardware failure w/ loosening, skin tenting and persistent inferior wound dehiscence w/ new superior wound dehiscence; now s/p Removal of thoracolumbar instrumentation w/ complex muscle flap plastics closure (by Dr Scooby Gaitan & Dr. José Schumacher)

## 2022-03-24 NOTE — PROGRESS NOTE ADULT - ASSESSMENT
76y Male extensive PMH including HTN, HLD, DM, hypothyroidism, asbestosis, CAD s/p PCI, afib on Eliquis, well known to our service after found with L1 burst fracture s/p T12-L1 laminectomy and T10-L4 fusion 1/21/22, recent admission late February for tenting of skin concerning for displaced hardware and small inferior wound dehiscence, sent in 3/17/22 by nursing home with now superior wound dehiscence at previous are of skin tenting and persistent inferior wound dehiscence, now s/p removal of thoracolumbar instrumentation and complex wound closure with Dr. Gaitan/Dr. Schumacher POD#1  - Doing well postop    PLAN:  - D/w Dr. Gaitan  - Q4 neuro checks  - Clamshell brace when OOB  - Continue deep MERCEDES (no line on "left side) and superficial rahul drains (with blue line, on "right side") for now, plan to d/c deep drain when < 50 cc/24 hours, plan to d/c superficial drain when < 30 cc/24 hours  - Pain control PRN: APAP/Oxy/Robaxin PRN  - Continue Lipitor 20 QHS; continue metoprolol 12.5 Q12  - CCB diet; VitC/MVI  - Senna/miralax to prevent constipation  - Chronic velasco; continue cardura QHS  - Ancef 1g Q8 x 3 days  - Continue synthroid  - MISS AC  - SCDs for DVT ppx; ACT held for now as fresh postop  - PT/OT  - Dispo back to nursing home when drains d/lenin/medically stable 76y Male extensive PMH including HTN, HLD, DM, hypothyroidism, asbestosis, CAD s/p PCI, afib on Eliquis, well known to our service after found with L1 burst fracture s/p T12-L1 laminectomy and T10-L4 fusion 1/21/22, recent admission late February for tenting of skin concerning for displaced hardware and small inferior wound dehiscence, sent in 3/17/22 by nursing home with now superior wound dehiscence at previous are of skin tenting and persistent inferior wound dehiscence, now s/p removal of thoracolumbar instrumentation and complex wound closure with Dr. Gaitan/Dr. Schumacher POD#1  - Doing well postop    PLAN:  - D/w Dr. Gaitan  - Q4 neuro checks  - Clamshell brace when OOB  - Continue deep MERCEDES (no line on "left side) and superficial rahul drains (with blue line, on "right side") for now, plan to d/c deep drain when < 50 cc/24 hours, plan to d/c superficial drain when < 30 cc/24 hours  - Pain control PRN: APAP/Oxy/Robaxin PRN  - Continue Lipitor 20 QHS; continue metoprolol 12.5 Q12  - CCB diet; VitC/MVI  - Senna/miralax to prevent constipation  - Chronic velasco; continue cardura QHS  - Ancef 1g Q8 x 3 days  - Continue synthroid  - MISS AC  - SCDs for DVT ppx; ACT held for now as fresh postop  - PT/OT  - OOB to chair daily  - when in bed place wedge under patient on either side every few hours to avoid constant direct pressure on wound to promote optimize wound healing  - Dispo back to nursing home when drains d/lenin/medically stable

## 2022-03-24 NOTE — PHYSICAL THERAPY INITIAL EVALUATION ADULT - ADDITIONAL COMMENTS
Pt reports that he lives with his wife in a 1 story house with 3 steps to enter (+2 rails). Per previous PT consult, Pt reports that a cane, RW, shower chair, raised toilet seat; amb with devices at times; son not working and assisted with ADLs; was at subacute rehab prior to arrival

## 2022-03-24 NOTE — PHYSICAL THERAPY INITIAL EVALUATION ADULT - GAIT DISTANCE, PT EVAL
2' forward + 2' backward (x2 at bedside with sitting rest break between trials); 2' side stepping right at edge of edge

## 2022-03-24 NOTE — PHYSICAL THERAPY INITIAL EVALUATION ADULT - GENERAL OBSERVATIONS, REHAB EVAL
Pt received on 3Tower in bed in a semirecumbent position with (+) MERCEDES drain, (+) Ray drain, (+) velasco, (+) IV access, and (+) TLSO (clamshell) at bedside. Pt agreeable to PT reporting 0/10 back pain throughout session. TLSO (clamshell) was donned prior to performance of out of bed mobility. Ambulation distance limited by decreased strength and balance.

## 2022-03-24 NOTE — DIETITIAN INITIAL EVALUATION ADULT. - PERTINENT MEDS FT
MEDICATIONS  (STANDING):  ascorbic acid 500 milliGRAM(s) Oral daily  atorvastatin 20 milliGRAM(s) Oral at bedtime  ceFAZolin   IVPB 1000 milliGRAM(s) IV Intermittent every 8 hours  dextrose 40% Gel 15 Gram(s) Oral once  dextrose 5%. 1000 milliLiter(s) (50 mL/Hr) IV Continuous <Continuous>  dextrose 5%. 1000 milliLiter(s) (100 mL/Hr) IV Continuous <Continuous>  dextrose 50% Injectable 25 Gram(s) IV Push once  dextrose 50% Injectable 12.5 Gram(s) IV Push once  dextrose 50% Injectable 25 Gram(s) IV Push once  doxazosin 4 milliGRAM(s) Oral at bedtime  glucagon  Injectable 1 milliGRAM(s) IntraMuscular once  influenza  Vaccine (HIGH DOSE) 0.7 milliLiter(s) IntraMuscular once  insulin lispro (ADMELOG) corrective regimen sliding scale   SubCutaneous three times a day before meals  levothyroxine 50 MICROGram(s) Oral daily  metoprolol tartrate 12.5 milliGRAM(s) Oral every 12 hours  multivitamin 1 Tablet(s) Oral daily  polyethylene glycol 3350 17 Gram(s) Oral daily  senna 2 Tablet(s) Oral at bedtime    MEDICATIONS  (PRN):  acetaminophen     Tablet .. 650 milliGRAM(s) Oral every 6 hours PRN Temp greater or equal to 38C (100.4F), Mild Pain (1 - 3)  methocarbamol 750 milliGRAM(s) Oral every 8 hours PRN Muscle Spasm/Back Pain  oxyCODONE    IR 5 milliGRAM(s) Oral every 4 hours PRN Moderate Pain (4 - 6)  oxyCODONE    IR 10 milliGRAM(s) Oral every 4 hours PRN Severe Pain (7 - 10)

## 2022-03-24 NOTE — PROGRESS NOTE ADULT - SUBJECTIVE AND OBJECTIVE BOX
INTERVAL HPI/OVERNIGHT EVENTS:  76y Male extensive PMH including HTN, HLD, DM, hypothyroidism, asbestosis, CAD s/p PCI, afib on Eliquis, well known to our service after found with L1 burst fracture s/p T12-L1 laminectomy and T10-L4 fusion 1/21/22, recent admission late February for tenting of skin concerning for displaced hardware and small inferior wound dehiscence, sent in 3/17/22 by nursing home with now superior wound dehiscence at previous are of skin tenting and persistent inferior wound dehiscence, now s/p removal of thoracolumbar instrumentation and complex wound closure with Dr. Gaitan/Dr. Schumacher POD#1. Patient seen earlier today, laying in bed, pain well controlled but attests to incisional pain. States he would like to get out of bed    Vital Signs Last 24 Hrs  T(C): 36.3 (24 Mar 2022 07:24), Max: 36.9 (23 Mar 2022 18:58)  T(F): 97.4 (24 Mar 2022 07:24), Max: 98.5 (23 Mar 2022 18:58)  HR: 66 (24 Mar 2022 07:24) (66 - 104)  BP: 95/56 (24 Mar 2022 07:24) (95/56 - 125/67)  BP(mean): 82 (24 Mar 2022 03:26) (82 - 82)  RR: 20 (24 Mar 2022 07:24) (15 - 20)  SpO2: 95% (24 Mar 2022 07:24) (90% - 100%)    PHYSICAL EXAM:  GENERAL: NAD  HEAD:  Atraumatic  [place] COMA SCORE: E- 4 V- 5 M- 6=15  MENTAL STATUS: AAO x3; Appropriately conversant without aphasia; following commands  CRANIAL NERVES: PERRL. EOMI without nystagmus. Facial sensation intact V1-3 distribution b/l. Face symmetric w/ normal eye closure and smile, tongue midline. Hearing grossly intact. Speech clear  MOTOR: strength 5/5 b/l upper extremities; with encouragement b/l LE 5/5  SENSATION: grossly intact to light touch all extremities  SPINE: Mepilex dressing c/d/i. Deep MERCEDES drain to bulb suction with serosanguinous output total 125 cc overnight; Superficial rahul drain to bulb suction with serosanguinous output total 50 cc overnight  CHEST/LUNG: Nonlabored breathing  ABDOMEN: Soft, nontender, nondistended  EXTREMITIES: Non edematous; no pain to palpation    LABS:                        11.2   7.19  )-----------( 492      ( 24 Mar 2022 06:36 )             35.6     03-24    138  |  101  |  13.0  ----------------------------<  195<H>  4.4   |  24.0  |  0.39<L>    Ca    8.7      24 Mar 2022 06:36  Phos  4.2     03-24  Mg     1.8     03-24    PT/INR - ( 23 Mar 2022 06:11 )   PT: 13.0 sec;   INR: 1.12 ratio      PTT - ( 23 Mar 2022 06:11 )  PTT:30.8 sec    03-23 @ 07:01  -  03-24 @ 07:00  --------------------------------------------------------  IN: 250 mL / OUT: 1255 mL / NET: -1005 mL    03-24 @ 07:01  -  03-24 @ 16:00  --------------------------------------------------------  IN: 0 mL / OUT: 40 mL / NET: -40 mL    RADIOLOGY & ADDITIONAL TESTS:  US Duplex Venous Lower Ext Complete, Bilateral (03.20.22 @ 20:58)  IMPRESSION:  No obvious deep vein thrombosis in either lower extremity.    MR Thoracic Spine w/wo IV Cont (03.20.22 @ 17:09)  IMPRESSION:  1. Significant streak artifact from surgical hardware in lower thoracic   spine with nonvisualization of the canal space and the cord at the   surgical levels. There appears to be mild edema posterior to the surgical   hardware seen on STIR images which is nonspecific. Infectious pathology   is in the differential diagnosis. No significant enhancement is seen   after contrast administration. Evaluation of osseous structures and soft   tissues surrounding surgical hardware however is very limited and   nondiagnostic secondary to significant streak  artifact.  2. Limited visualization of moderate bilateral pleural effusions with   parenchymal disease in bilateral lungs likely   consolidation/atelectasis/infiltrates. Please refer to the recent   thoracic spine CT for further details.

## 2022-03-24 NOTE — DIETITIAN INITIAL EVALUATION ADULT. - ORAL INTAKE PTA/DIET HISTORY
Pt reports good PO intake now ( observed lunch >75% consumed), put lost 30lbs in 3 months during other hospitalization. HBV protein foods encouraged.

## 2022-03-24 NOTE — DIETITIAN INITIAL EVALUATION ADULT. - ETIOLOGY
Group Topic: BH KERMIT Process Group    Date: 12/25/2020  Start Time: 0900  End Time: 0950  Facilitators: XUAN Nuno    Focus:  Check In Process Group  Number in attendance: 9        Method: Group    Attendance: Present    Mood/Affect: Appropriate    Behavior/Socialization: Appropriate to group and Provided feedback to peers    Participation: Active    Overall Patient Response to Group: Appropriate to topic    Individual Response to Group: Pt attended to the discussion respectfully and contributed when appropriate. Pt shared that \"yesterday was long\". He reflected on missing his kids during [place], especially his son who is having his first [place] this year, and pt has to miss it. Pt reflected on self-care activities that he is going to practice today to cope with missing his family. Pt reported that his goal is to talk to his family and to go to the gym.      RADHA Nuno, XUAN  12/25/2020     related to inadequate protein calorie intake in the setting of previous prolonged hospitalization

## 2022-03-24 NOTE — PHYSICAL THERAPY INITIAL EVALUATION ADULT - DIAGNOSIS, PT EVAL
Decreased functional mobility due to decreased strength and decreased balance as a result of surgical procedure (s/p Removal of thoracolumbar instrumentation w/ complex muscle flap plastics closure (by Dr Scooby Gaitan & Dr. José Schumacher))

## 2022-03-24 NOTE — PHYSICAL THERAPY INITIAL EVALUATION ADULT - TRANSFER SAFETY CONCERNS NOTED: SIT/STAND, REHAB EVAL
performed from an elevated surface; Pt pulls from walker to stand despite verbal cues to push from bed; Pt requires consistent verbal cues to fully extend bilateral hips and knee and achieve a full standing position

## 2022-03-24 NOTE — DIETITIAN INITIAL EVALUATION ADULT. - PERTINENT LABORATORY DATA
03-24 Na138 mmol/L Glu 195 mg/dL<H> K+ 4.4 mmol/L Cr  0.39 mg/dL<L> BUN 13.0 mg/dL Phos 4.2 mg/dL Alb n/a   PAB n/a

## 2022-03-24 NOTE — DIETITIAN INITIAL EVALUATION ADULT. - OTHER INFO
75 Y/O male known to our service w/ hx of L1 burst fracture, had a CT T/L spine, found w/ new superior wound dehiscence without active drainage.

## 2022-03-24 NOTE — PHYSICAL THERAPY INITIAL EVALUATION ADULT - DID THE PATIENT HAVE SURGERY?
s/p Removal of thoracolumbar instrumentation w/ complex muscle flap plastics closure (by Dr Scooby Gaitan & Dr. José Schumacher)/yes

## 2022-03-24 NOTE — PHYSICAL THERAPY INITIAL EVALUATION ADULT - BED MOBILITY LIMITATIONS, REHAB EVAL
increased physical assist for sit to supine bed mobility required for LE management; Pt demonstrated controlled speed of movement and awareness of surroundings to maintain safety

## 2022-03-25 LAB
ANION GAP SERPL CALC-SCNC: 12 MMOL/L — SIGNIFICANT CHANGE UP (ref 5–17)
BUN SERPL-MCNC: 14.3 MG/DL — SIGNIFICANT CHANGE UP (ref 8–20)
CALCIUM SERPL-MCNC: 8.4 MG/DL — LOW (ref 8.6–10.2)
CHLORIDE SERPL-SCNC: 99 MMOL/L — SIGNIFICANT CHANGE UP (ref 98–107)
CO2 SERPL-SCNC: 24 MMOL/L — SIGNIFICANT CHANGE UP (ref 22–29)
CREAT SERPL-MCNC: 0.42 MG/DL — LOW (ref 0.5–1.3)
EGFR: 110 ML/MIN/1.73M2 — SIGNIFICANT CHANGE UP
GLUCOSE BLDC GLUCOMTR-MCNC: 159 MG/DL — HIGH (ref 70–99)
GLUCOSE BLDC GLUCOMTR-MCNC: 174 MG/DL — HIGH (ref 70–99)
GLUCOSE BLDC GLUCOMTR-MCNC: 206 MG/DL — HIGH (ref 70–99)
GLUCOSE BLDC GLUCOMTR-MCNC: 215 MG/DL — HIGH (ref 70–99)
GLUCOSE SERPL-MCNC: 161 MG/DL — HIGH (ref 70–99)
HCT VFR BLD CALC: 34.5 % — LOW (ref 39–50)
HGB BLD-MCNC: 10.9 G/DL — LOW (ref 13–17)
MAGNESIUM SERPL-MCNC: 1.9 MG/DL — SIGNIFICANT CHANGE UP (ref 1.6–2.6)
MCHC RBC-ENTMCNC: 29.7 PG — SIGNIFICANT CHANGE UP (ref 27–34)
MCHC RBC-ENTMCNC: 31.6 GM/DL — LOW (ref 32–36)
MCV RBC AUTO: 94 FL — SIGNIFICANT CHANGE UP (ref 80–100)
PHOSPHATE SERPL-MCNC: 3.3 MG/DL — SIGNIFICANT CHANGE UP (ref 2.4–4.7)
PLATELET # BLD AUTO: 457 K/UL — HIGH (ref 150–400)
POTASSIUM SERPL-MCNC: 3.6 MMOL/L — SIGNIFICANT CHANGE UP (ref 3.5–5.3)
POTASSIUM SERPL-SCNC: 3.6 MMOL/L — SIGNIFICANT CHANGE UP (ref 3.5–5.3)
RBC # BLD: 3.67 M/UL — LOW (ref 4.2–5.8)
RBC # FLD: 14.9 % — HIGH (ref 10.3–14.5)
SODIUM SERPL-SCNC: 135 MMOL/L — SIGNIFICANT CHANGE UP (ref 135–145)
SURGICAL PATHOLOGY STUDY: SIGNIFICANT CHANGE UP
WBC # BLD: 7.23 K/UL — SIGNIFICANT CHANGE UP (ref 3.8–10.5)
WBC # FLD AUTO: 7.23 K/UL — SIGNIFICANT CHANGE UP (ref 3.8–10.5)

## 2022-03-25 RX ADMIN — Medication 12.5 MILLIGRAM(S): at 05:08

## 2022-03-25 RX ADMIN — Medication 100 MILLIGRAM(S): at 13:55

## 2022-03-25 RX ADMIN — Medication 4 MILLIGRAM(S): at 21:10

## 2022-03-25 RX ADMIN — Medication 2: at 12:19

## 2022-03-25 RX ADMIN — Medication 1 TABLET(S): at 12:23

## 2022-03-25 RX ADMIN — Medication 100 MILLIGRAM(S): at 21:11

## 2022-03-25 RX ADMIN — SENNA PLUS 2 TABLET(S): 8.6 TABLET ORAL at 21:11

## 2022-03-25 RX ADMIN — Medication 500 MILLIGRAM(S): at 12:25

## 2022-03-25 RX ADMIN — Medication 650 MILLIGRAM(S): at 03:38

## 2022-03-25 RX ADMIN — Medication 100 MILLIGRAM(S): at 05:08

## 2022-03-25 RX ADMIN — POLYETHYLENE GLYCOL 3350 17 GRAM(S): 17 POWDER, FOR SOLUTION ORAL at 12:25

## 2022-03-25 RX ADMIN — Medication 2: at 07:48

## 2022-03-25 RX ADMIN — OXYCODONE HYDROCHLORIDE 10 MILLIGRAM(S): 5 TABLET ORAL at 12:23

## 2022-03-25 RX ADMIN — Medication 650 MILLIGRAM(S): at 00:44

## 2022-03-25 RX ADMIN — Medication 4: at 17:01

## 2022-03-25 RX ADMIN — Medication 12.5 MILLIGRAM(S): at 17:01

## 2022-03-25 RX ADMIN — METHOCARBAMOL 750 MILLIGRAM(S): 500 TABLET, FILM COATED ORAL at 21:11

## 2022-03-25 RX ADMIN — Medication 50 MICROGRAM(S): at 05:08

## 2022-03-25 RX ADMIN — ATORVASTATIN CALCIUM 20 MILLIGRAM(S): 80 TABLET, FILM COATED ORAL at 21:10

## 2022-03-25 RX ADMIN — OXYCODONE HYDROCHLORIDE 10 MILLIGRAM(S): 5 TABLET ORAL at 13:20

## 2022-03-25 NOTE — OCCUPATIONAL THERAPY INITIAL EVALUATION ADULT - PERSONAL SAFETY AND JUDGMENT, REHAB EVAL
+chair alarm, use of call bell for mobility reinforced.  Pt perseverating on need to use toilet in next 15 min to 3 hrs.  RN made aware./impaired

## 2022-03-25 NOTE — OCCUPATIONAL THERAPY INITIAL EVALUATION ADULT - MANUAL MUSCLE TESTING RESULTS, REHAB EVAL
at least 4/5 bilateral elbows, grasp.  Bilateral shoulders not formally assessed due to spinal surgery/precautions.

## 2022-03-25 NOTE — OCCUPATIONAL THERAPY INITIAL EVALUATION ADULT - GENERAL OBSERVATIONS, REHAB EVAL
Pt received seated in chair, +bilateral VCDs, +IV, +velasco, +right and Left MERCEDES drains, +TLSO, agreeable to OT eval.

## 2022-03-25 NOTE — OCCUPATIONAL THERAPY INITIAL EVALUATION ADULT - RANGE OF MOTION EXAMINATION, UPPER EXTREMITY
except bilateral shoulders limited to 90 by this writer due to spinal precautions./bilateral UE Active ROM was WFL  (within functional limits)

## 2022-03-25 NOTE — PROGRESS NOTE ADULT - ASSESSMENT
76y Male extensive PMH including HTN, HLD, DM, hypothyroidism, asbestosis, CAD s/p PCI, afib on Eliquis, well known to our service after found with L1 burst fracture s/p T12-L1 laminectomy and T10-L4 fusion 1/21/22, recent admission late February for tenting of skin concerning for displaced hardware and small inferior wound dehiscence, sent in 3/17/22 by nursing home with now superior wound dehiscence at previous are of skin tenting and persistent inferior wound dehiscence, now s/p removal of thoracolumbar instrumentation and complex wound closure with Dr. Gaitan/Dr. Schumacher on 3/23, now POD#2.    Plan:  -D/w attending Dr. Gaitan   -Q4hr neuro checks   -Clam shell brace when OOB  -Deep MERCEDES drain on  (no line on "left side) and superficial rahul drain (with blue line, on "right side") for now, plan to d/c deep drain when < 50 cc/24 hours, plan to d/c superficial drain when < 30 cc/24 hours  -Monitor and record drain output Q1hr  -Pain control PRN: APAP/Oxy/Robaxin PRN  -Continue Lipitor 20 QHS; continue metoprolol 12.5 Q12  -CCB diet; VitC/MVI  -Senna/miralax to prevent constipation  -Chronic velasco; continue cardura QHS  -Ancef 1g Q8 x 3 days  -Continue synthroid 50mcg daily   -MISS AC  - SCDs b/l for DVT ppx; ACT held for now as fresh postop  -PT/OT; PT recommending discharge back to subacute rehab when stable   -OOB to chair daily  -When in bed place wedge under patient on either side every few hours to avoid constant direct pressure on wound to promote optimize wound healing  -Dispo back to nursing home when drains d/lenin/medically stable

## 2022-03-25 NOTE — OCCUPATIONAL THERAPY INITIAL EVALUATION ADULT - PERTINENT HX OF CURRENT PROBLEM, REHAB EVAL
s/p removal of thoracolumbar instrumentation and complex wound closure with Dr. Gaitan/Dr. Schumacher.  Pt had L1 burst fracture s/p T12-L1 laminectomy and T10-L4 fusion 1/21/22,

## 2022-03-25 NOTE — OCCUPATIONAL THERAPY INITIAL EVALUATION ADULT - ADDITIONAL COMMENTS
Pt is questionable historian but reports being independent with all mobility and self care prior to initial surgery in January 2022.  Pt arrived from [facility] and needs assist with mobility and self care.  Level of assist needed unclear.  Pt reports having the following equipment at home:  SAC, RW, shower chair, raised toilet seat, grab bar in tub.

## 2022-03-25 NOTE — PROGRESS NOTE ADULT - NS ATTEND AMEND GEN_ALL_CORE FT
NSGY Attg:    see above    patient seen and examined    agree with exam as above    LE 5/5    I explained the risks, benefits, and alternatives of operative intervention (hardware removal and wound closure by plastic surgery with the patient and his wife (Josefina Dumont -- [phone]) as below:    benefit: hopeful improved wound healing, hopeful prevention of new/progressive skin breakdown/infection/sepsis   alternative: no surgical intervention with local wound care; replacement/revision of hardware with or without extension of fusion  risks: bleeding, infection, CSF leak, failure of procedure, de-stabilization of the spine, progressive deformity, need for re-operation, worsening pain, seizure, stroke, coma, death, DVT, PE, MI, PNA, UTI, difficulty/failure to intubate or extubate, new or worsening numbness, tingling, weakness, paralysis, sensory changes, difficulty/inability to ambulate, sexual dysfunction, incontinence    The patient and his wife verbalize their understanding of the above.  They understand the high risk for hardware failure with re-instrumentation/extension of fusion.  They understand the risk of worsening pain, progressive deformity, progressive deficit with removal of hardware.  The patient and his wife wish to proceed with removal or hardware, wound closure by plastics, and use of a brace post-operatively (in order to mitigate the risk of DVT, PE, pna, sacral decub, and other complications of immobilization).
NSGY Attg:    see above    patient seen and examined    agree with exam as above  patient denies back pain  LE 5/5  sensation intact to LT    I have answered all of the patient's additional questions regarding surgical intervention as well as those of his wife.  They wish to proceed with removal of hardware and complex wound closure.
NSGY Attg:    see above    patient seen and examined    patient endorses ambulating with brace and walker without significant back pain    agree with above
NSGY Attg:    see above    patient seen and examined    agree with exam and plan as above
NSGY Attg:    see above    patient seen and examined     agree with exam and plan as above

## 2022-03-25 NOTE — PROGRESS NOTE ADULT - SUBJECTIVE AND OBJECTIVE BOX
HPI:  76M with extensive PMH including HTN, HLD, DM, hypothyroidism, asbestosis, CAD s/p PCI, afib on Eliquis, well known to our service after found with L1 burst fracture s/p T12-L1 laminectomy and T10-L4 fusion 1/21/22, recent admission late February for tenting of skin concerning for displaced hardware and small inferior wound dehiscence, now sent in by nursing home with now superior wound dehiscence at previous are of skin tenting and persistent inferior wound dehiscence. Now s/p removal of thoracolumbar instrumentation and complex wound closure by Dr. Gaitan and Dr. Schumacher on 3/23. Deep BP and superficial rahul drains placed to full bulb suction.     INTERVAL HPI/OVERNIGHT EVENTS:  Patient seen and examined by neurosurgery team. Patient now POD#2. He reports mild back discomfort. He is eager to have MERCEDES drains removed. He expresses wishes to get OOB of bed today and ambulate with PT.     Vital Signs Last 24 Hrs  T(C): 36.6 (25 Mar 2022 07:22), Max: 36.6 (25 Mar 2022 07:22)  T(F): 97.8 (25 Mar 2022 07:22), Max: 97.8 (25 Mar 2022 07:22)  HR: 73 (25 Mar 2022 07:22) (73 - 82)  BP: 103/60 (25 Mar 2022 07:22) (97/58 - 103/60)  BP(mean): --  RR: 18 (25 Mar 2022 07:22) (18 - 20)  SpO2: 92% (25 Mar 2022 07:22) (91% - 92%)    PHYSICAL EXAM:  GENERAL: NAD, calm, cooperative   HEAD: Atraumatic, normocephalic  DRAINS: Superficial meena drain to bulb suction, serosanguinous fluid noted in bulb, deep MERCEDES drain to bulb suction with serosanguinous output in bulb.  WOUND: Mepilex dressing in place, C/D/I, no active drainage or bleeding noted   [place] COMA SCORE: E-4 V-5 M-6 = 15  MENTAL STATUS: AAO x3; Awake; Opens eyes spontaneously; Appropriately conversant without aphasia; Following commands   CRANIAL NERVES: PERRL. EOMI without nystagmus. Facial sensation intact V1-3 distribution b/l. Face symmetric w/ normal eye closure and smile, tongue midline. Hard of hearing. Speech clear  MOTOR: RUE 5/5; LUE 5/5; RLE 5/5 with encouragement; LLE 4/5 with encouragement   SENSATION: Grossly intact to light touch all extremities  CHEST/LUNG: Nonlabored breathing, no signs of respiratory distress   HEART: +S1/+S2; Regular rate and rhythm  ABDOMEN: Soft, nontender, nondistended  EXTREMITIES: No calf tenderness b/l  SKIN: Warm, dry      LABS:                        10.9   7.23  )-----------( 457      ( 25 Mar 2022 08:07 )             34.5     03-25    135  |  99  |  14.3  ----------------------------<  161<H>  3.6   |  24.0  |  0.42<L>    Ca    8.4<L>      25 Mar 2022 08:07  Phos  3.3     03-25  Mg     1.9     03-25 03-24 @ 07:01 - 03-25 @ 07:00  --------------------------------------------------------  IN: 0 mL / OUT: 330 mL / NET: -330 mL    03-25 @ 07:01  -  03-25 @ 13:00  --------------------------------------------------------  IN: 0 mL / OUT: 35 mL / NET: -35 mL        RADIOLOGY & ADDITIONAL TESTS:  US Duplex Venous Lower Ext Complete, Bilateral (03.20.22 @ 20:58):  IMPRESSION:  No obvious deep vein thrombosis in either lower extremity.    MR Thoracic Spine w/wo IV Cont (03.20.22 @ 17:09):  IMPRESSION:  1. Significant streak artifact from surgical hardware in lower thoracic   spine with nonvisualization of the canal space and the cord at the   surgical levels. There appears to be mild edema posterior to the surgical   hardware seen on STIR images which is nonspecific. Infectious pathology   is in the differential diagnosis. No significant enhancement is seen   after contrast administration. Evaluation of osseous structures and soft   tissues surrounding surgical hardware however is very limited and   nondiagnostic secondary to significant streak  artifact.  2. Limited visualization of moderate bilateral pleural effusions with   parenchymal disease in bilateral lungs likely   consolidation/atelectasis/infiltrates. Please refer to the recent   thoracic spine CT for further details.    MR Lumbar Spine w/wo IV Cont (03.20.22 @ 17:02):  IMPRESSION:  1. Surgery has been performed in the interval with multilevel bilateral   transpedicular screw and elzbieta fixation assemblies extending from T12 to L4   across a bursting fracture deformity involving the 1st lumbar segment.  Retropulsion of a fragment along the posterior margin of the deformed L1   vertebral body produces canal stenosis and suspected mass-effect upon the   conus susceptibility artifact producing some obscuration of anatomic   detail.  2.  No other fractures are identified and there is no other significant   central canal stenosis at remaining lumbar levels.

## 2022-03-25 NOTE — OCCUPATIONAL THERAPY INITIAL EVALUATION ADULT - NS ASR OT EQUIP NEEDS DISCH
Pt. arrived for Paracentesis. VSS. IV started. Ready for procedure. Cheng SAENZ CAPA  
Pt already has RW, shower chair, raised toilet seat, SAC, grab bar in tub.  Pending progress, pt may need transfer tub bench.

## 2022-03-26 LAB
ANION GAP SERPL CALC-SCNC: 11 MMOL/L — SIGNIFICANT CHANGE UP (ref 5–17)
BUN SERPL-MCNC: 13.4 MG/DL — SIGNIFICANT CHANGE UP (ref 8–20)
CALCIUM SERPL-MCNC: 8.3 MG/DL — LOW (ref 8.6–10.2)
CHLORIDE SERPL-SCNC: 98 MMOL/L — SIGNIFICANT CHANGE UP (ref 98–107)
CO2 SERPL-SCNC: 24 MMOL/L — SIGNIFICANT CHANGE UP (ref 22–29)
CREAT SERPL-MCNC: 0.41 MG/DL — LOW (ref 0.5–1.3)
EGFR: 111 ML/MIN/1.73M2 — SIGNIFICANT CHANGE UP
GLUCOSE BLDC GLUCOMTR-MCNC: 149 MG/DL — HIGH (ref 70–99)
GLUCOSE BLDC GLUCOMTR-MCNC: 155 MG/DL — HIGH (ref 70–99)
GLUCOSE BLDC GLUCOMTR-MCNC: 175 MG/DL — HIGH (ref 70–99)
GLUCOSE BLDC GLUCOMTR-MCNC: 234 MG/DL — HIGH (ref 70–99)
GLUCOSE SERPL-MCNC: 157 MG/DL — HIGH (ref 70–99)
HCT VFR BLD CALC: 31.3 % — LOW (ref 39–50)
HGB BLD-MCNC: 9.7 G/DL — LOW (ref 13–17)
MAGNESIUM SERPL-MCNC: 1.8 MG/DL — SIGNIFICANT CHANGE UP (ref 1.6–2.6)
MCHC RBC-ENTMCNC: 28.8 PG — SIGNIFICANT CHANGE UP (ref 27–34)
MCHC RBC-ENTMCNC: 31 GM/DL — LOW (ref 32–36)
MCV RBC AUTO: 92.9 FL — SIGNIFICANT CHANGE UP (ref 80–100)
PHOSPHATE SERPL-MCNC: 3.1 MG/DL — SIGNIFICANT CHANGE UP (ref 2.4–4.7)
PLATELET # BLD AUTO: 398 K/UL — SIGNIFICANT CHANGE UP (ref 150–400)
POTASSIUM SERPL-MCNC: 3.8 MMOL/L — SIGNIFICANT CHANGE UP (ref 3.5–5.3)
POTASSIUM SERPL-SCNC: 3.8 MMOL/L — SIGNIFICANT CHANGE UP (ref 3.5–5.3)
RBC # BLD: 3.37 M/UL — LOW (ref 4.2–5.8)
RBC # FLD: 14.8 % — HIGH (ref 10.3–14.5)
SODIUM SERPL-SCNC: 133 MMOL/L — LOW (ref 135–145)
WBC # BLD: 6.64 K/UL — SIGNIFICANT CHANGE UP (ref 3.8–10.5)
WBC # FLD AUTO: 6.64 K/UL — SIGNIFICANT CHANGE UP (ref 3.8–10.5)

## 2022-03-26 RX ORDER — SODIUM CHLORIDE 9 MG/ML
1000 INJECTION INTRAMUSCULAR; INTRAVENOUS; SUBCUTANEOUS ONCE
Refills: 0 | Status: COMPLETED | OUTPATIENT
Start: 2022-03-26 | End: 2022-03-26

## 2022-03-26 RX ADMIN — Medication 650 MILLIGRAM(S): at 12:22

## 2022-03-26 RX ADMIN — Medication 100 MILLIGRAM(S): at 13:33

## 2022-03-26 RX ADMIN — Medication 100 MILLIGRAM(S): at 05:03

## 2022-03-26 RX ADMIN — Medication 1 TABLET(S): at 12:22

## 2022-03-26 RX ADMIN — ATORVASTATIN CALCIUM 20 MILLIGRAM(S): 80 TABLET, FILM COATED ORAL at 21:24

## 2022-03-26 RX ADMIN — Medication 4 MILLIGRAM(S): at 21:24

## 2022-03-26 RX ADMIN — POLYETHYLENE GLYCOL 3350 17 GRAM(S): 17 POWDER, FOR SOLUTION ORAL at 12:26

## 2022-03-26 RX ADMIN — Medication 12.5 MILLIGRAM(S): at 05:02

## 2022-03-26 RX ADMIN — Medication 50 MICROGRAM(S): at 05:02

## 2022-03-26 RX ADMIN — SODIUM CHLORIDE 1000 MILLILITER(S): 9 INJECTION INTRAMUSCULAR; INTRAVENOUS; SUBCUTANEOUS at 08:30

## 2022-03-26 RX ADMIN — Medication 650 MILLIGRAM(S): at 05:01

## 2022-03-26 RX ADMIN — Medication 500 MILLIGRAM(S): at 12:22

## 2022-03-26 RX ADMIN — Medication 4: at 13:33

## 2022-03-26 RX ADMIN — Medication 650 MILLIGRAM(S): at 12:32

## 2022-03-26 NOTE — PROGRESS NOTE ADULT - ASSESSMENT
76M w/ extensive PMH including HTN, HLD, DM, hypothyroidism, asbestosis, CAD s/p PCI, afib on Eliquis, well known to our service after found with L1 burst fracture s/p T12-L1 laminectomy and T10-L4 fusion 1/21/22, recent admission late February for tenting of skin concerning for displaced hardware and small inferior wound dehiscence, sent in 3/17/22 by nursing home with now superior wound dehiscence at previous are of skin tenting and persistent inferior wound dehiscence,.  s/p removal of thoracolumbar instrumentation and complex wound closure with Dr. Gaitan/Dr. Schumacher POD#3      Plan  - Q4 neuro checks  - Clamshell brace when OOB  - Deep MERCEDES x 1 to full suction. Do not discontinue until < 55 cc in 24 hours  - Superficial Ray (blue line) x 1 to full suction. Do not discontinue until < 30 cc in 24 hours  - Drain care per plastics  - Pain control PRN: Tylenol 650q6/Oxy 5/10q4/Robaxin PRN  - Continue Lipitor 20 QHS; continue Metoprolol 12.5 Q12  - CCB diet; VitC/MVI  - Senna/Miralax to prevent constipation  - Chronic velasco; continue cardura QHS  - Ancef 1g Q8 x 3 days  - Continue synthroid  - MISS AC  - SCDs for DVT ppx; ACT held for now as fresh postop  - PT/OT  - AM labs  - OOB to chair daily  - When in bed; place wedge under patient on either side every few hours to avoid constant direct pressure on wound to promote optimize wound healing  - Dispo back to nursing home when drains d/lenin & medically stable  - D/w Dr. Andino

## 2022-03-26 NOTE — PROGRESS NOTE ADULT - SUBJECTIVE AND OBJECTIVE BOX
INTERVAL HPI/OVERNIGHT EVENTS:  76y Male extensive PMH including HTN, HLD, DM, hypothyroidism, asbestosis, CAD s/p PCI, afib on Eliquis, well known to our service after found with L1 burst fracture s/p T12-L1 laminectomy and T10-L4 fusion 1/21/22, recent admission late February for tenting of skin concerning for displaced hardware and small inferior wound dehiscence, sent in 3/17/22 by nursing home with now superior wound dehiscence at previous are of skin tenting and persistent inferior wound dehiscence.  s/p removal of thoracolumbar instrumentation and complex wound closure with Dr. Gaitan/Dr. Schumacher POD#3  Patient seen and examined this morning with neurosurgical team. Patient states pain is controlled. Discussed again strict adherence to clam shell brace whenever OOB. Earlier in morning, patient was hypotensive s/p Metoprolol, asymptomatic. NS bolus ordered. Will continue to monitor. Deep MERCEDES x 1 out put 70 cc in 24 hours; Superficial rahul x 1 put 20 cc in 24 hours.     Vital Signs Last 24 Hrs  T(C): 36.7 (26 Mar 2022 08:31), Max: 36.9 (25 Mar 2022 19:59)  T(F): 98 (26 Mar 2022 08:31), Max: 98.5 (25 Mar 2022 19:59)  HR: 68 (26 Mar 2022 08:31) (68 - 87)  BP: 77/45 (26 Mar 2022 08:31) (77/45 - 101/63)  BP(mean): --  RR: 18 (26 Mar 2022 05:14) (16 - 20)  SpO2: 90% (26 Mar 2022 08:31) (90% - 93%)      PHYSICAL EXAM:  GENERAL: NAD  HEAD:  Atraumatic  [place] COMA SCORE: E- 4 V- 5 M- 6=15  MENTAL STATUS: AAO x3; Appropriately conversant without aphasia; following commands  CRANIAL NERVES: PERRL. EOMI without nystagmus. Facial sensation intact V1-3 distribution b/l. Face symmetric w/ normal eye closure and smile, tongue midline. Hearing grossly intact. Speech clear  MOTOR: strength 5/5 b/l upper extremities; with encouragement b/l LE 5/5  SENSATION: grossly intact to light touch all extremities  SPINE: Mepilex dressing c/d/i. Deep MERCEDES drain to bulb suction with serosanguinous output total  cc overnight; Superficial rahul drain to bulb suction with serosanguinous output total 30 cc overnight  CHEST/LUNG: Nonlabored breathing  ABDOMEN: Soft, nontender, nondistended  EXTREMITIES: Non edematous; no pain to palpation    LABS:                        9.7    6.64  )-----------( 398      ( 26 Mar 2022 07:49 )             31.3     03-26    133<L>  |  98  |  13.4  ----------------------------<  157<H>  3.8   |  24.0  |  0.41<L>    Ca    8.3<L>      26 Mar 2022 07:49  Phos  3.1     03-26  Mg     1.8     03-26 03-25 @ 07:01  -  03-26 @ 07:00  --------------------------------------------------------  IN: 0 mL / OUT: 990 mL / NET: -990 mL      RADIOLOGY & ADDITIONAL TESTS:  US Duplex Venous Lower Ext Complete, Bilateral (03.20.22 @ 20:58)  IMPRESSION:  No obvious deep vein thrombosis in either lower extremity.    MR Thoracic Spine w/wo IV Cont (03.20.22 @ 17:09)  IMPRESSION:  1. Significant streak artifact from surgical hardware in lower thoracic   spine with nonvisualization of the canal space and the cord at the   surgical levels. There appears to be mild edema posterior to the surgical   hardware seen on STIR images which is nonspecific. Infectious pathology   is in the differential diagnosis. No significant enhancement is seen   after contrast administration. Evaluation of osseous structures and soft   tissues surrounding surgical hardware however is very limited and   nondiagnostic secondary to significant streak  artifact.  2. Limited visualization of moderate bilateral pleural effusions with   parenchymal disease in bilateral lungs likely   consolidation/atelectasis/infiltrates. Please refer to the recent   thoracic spine CT for further details. INTERVAL HPI/OVERNIGHT EVENTS:  76y Male extensive PMH including HTN, HLD, DM, hypothyroidism, asbestosis, CAD s/p PCI, afib on Eliquis, well known to our service after found with L1 burst fracture s/p T12-L1 laminectomy and T10-L4 fusion 1/21/22, recent admission late February for tenting of skin concerning for displaced hardware and small inferior wound dehiscence, sent in 3/17/22 by nursing home with now superior wound dehiscence at previous are of skin tenting and persistent inferior wound dehiscence.  s/p removal of thoracolumbar instrumentation and complex wound closure with Dr. Gaitan/Dr. Schumacher POD#3  Patient seen and examined this morning with neurosurgical team. Patient states pain is controlled. Discussed again strict adherence to clam shell brace whenever OOB. Earlier in morning, patient was hypotensive s/p Metoprolol, asymptomatic. NS bolus ordered. Will continue to monitor. Deep MERCEDES x 1 out put 70 cc in 24 hours; Superficial rahul x 1 put 20 cc in 24 hours. Pending final drain output.     Vital Signs Last 24 Hrs  T(C): 36.7 (26 Mar 2022 08:31), Max: 36.9 (25 Mar 2022 19:59)  T(F): 98 (26 Mar 2022 08:31), Max: 98.5 (25 Mar 2022 19:59)  HR: 68 (26 Mar 2022 08:31) (68 - 87)  BP: 77/45 (26 Mar 2022 08:31) (77/45 - 101/63)  BP(mean): --  RR: 18 (26 Mar 2022 05:14) (16 - 20)  SpO2: 90% (26 Mar 2022 08:31) (90% - 93%)      PHYSICAL EXAM:  GENERAL: NAD  HEAD: Atraumatic, normocephalic   [place] COMA SCORE: E- 4 V- 5 M- 6=15  MENTAL STATUS: AAO x3; Appropriately conversant without aphasia; following commands  CRANIAL NERVES: PERRL. EOMI without nystagmus. Facial sensation intact V1-3 distribution b/l. Face symmetric w/ normal eye closure and smile, tongue midline. Hearing grossly intact. Speech clear  MOTOR: strength 5/5 b/l upper extremities; with encouragement b/l LE 5/5  SENSATION: grossly intact to light touch all extremities  SPINE: Mepilex dressing c/d/i. Deep MERCEDES drain to bulb suction with serosanguinous output; Superficial rahul drain to bulb suction with serosanguinous output.  CHEST/LUNG: Nonlabored breathing      LABS:                        9.7    6.64  )-----------( 398      ( 26 Mar 2022 07:49 )             31.3     03-26    133<L>  |  98  |  13.4  ----------------------------<  157<H>  3.8   |  24.0  |  0.41<L>    Ca    8.3<L>      26 Mar 2022 07:49  Phos  3.1     03-26  Mg     1.8     03-26 03-25 @ 07:01  -  03-26 @ 07:00  --------------------------------------------------------  IN: 0 mL / OUT: 990 mL / NET: -990 mL      RADIOLOGY & ADDITIONAL TESTS:  US Duplex Venous Lower Ext Complete, Bilateral (03.20.22 @ 20:58)  IMPRESSION:  No obvious deep vein thrombosis in either lower extremity.    MR Thoracic Spine w/wo IV Cont (03.20.22 @ 17:09)  IMPRESSION:  1. Significant streak artifact from surgical hardware in lower thoracic   spine with nonvisualization of the canal space and the cord at the   surgical levels. There appears to be mild edema posterior to the surgical   hardware seen on STIR images which is nonspecific. Infectious pathology   is in the differential diagnosis. No significant enhancement is seen   after contrast administration. Evaluation of osseous structures and soft   tissues surrounding surgical hardware however is very limited and   nondiagnostic secondary to significant streak  artifact.  2. Limited visualization of moderate bilateral pleural effusions with   parenchymal disease in bilateral lungs likely   consolidation/atelectasis/infiltrates. Please refer to the recent   thoracic spine CT for further details.

## 2022-03-27 LAB
ANION GAP SERPL CALC-SCNC: 12 MMOL/L — SIGNIFICANT CHANGE UP (ref 5–17)
BUN SERPL-MCNC: 11.6 MG/DL — SIGNIFICANT CHANGE UP (ref 8–20)
CA-I BLD-SCNC: 1.18 MMOL/L — SIGNIFICANT CHANGE UP (ref 1.15–1.33)
CALCIUM SERPL-MCNC: 8.2 MG/DL — LOW (ref 8.6–10.2)
CHLORIDE SERPL-SCNC: 102 MMOL/L — SIGNIFICANT CHANGE UP (ref 98–107)
CO2 SERPL-SCNC: 24 MMOL/L — SIGNIFICANT CHANGE UP (ref 22–29)
CREAT SERPL-MCNC: 0.39 MG/DL — LOW (ref 0.5–1.3)
EGFR: 113 ML/MIN/1.73M2 — SIGNIFICANT CHANGE UP
GLUCOSE BLDC GLUCOMTR-MCNC: 150 MG/DL — HIGH (ref 70–99)
GLUCOSE BLDC GLUCOMTR-MCNC: 167 MG/DL — HIGH (ref 70–99)
GLUCOSE BLDC GLUCOMTR-MCNC: 187 MG/DL — HIGH (ref 70–99)
GLUCOSE BLDC GLUCOMTR-MCNC: 337 MG/DL — HIGH (ref 70–99)
GLUCOSE SERPL-MCNC: 148 MG/DL — HIGH (ref 70–99)
HCT VFR BLD CALC: 31.1 % — LOW (ref 39–50)
HGB BLD-MCNC: 9.9 G/DL — LOW (ref 13–17)
MAGNESIUM SERPL-MCNC: 1.9 MG/DL — SIGNIFICANT CHANGE UP (ref 1.6–2.6)
MCHC RBC-ENTMCNC: 29.6 PG — SIGNIFICANT CHANGE UP (ref 27–34)
MCHC RBC-ENTMCNC: 31.8 GM/DL — LOW (ref 32–36)
MCV RBC AUTO: 93.1 FL — SIGNIFICANT CHANGE UP (ref 80–100)
PHOSPHATE SERPL-MCNC: 3.1 MG/DL — SIGNIFICANT CHANGE UP (ref 2.4–4.7)
PLATELET # BLD AUTO: 398 K/UL — SIGNIFICANT CHANGE UP (ref 150–400)
POTASSIUM SERPL-MCNC: 4 MMOL/L — SIGNIFICANT CHANGE UP (ref 3.5–5.3)
POTASSIUM SERPL-SCNC: 4 MMOL/L — SIGNIFICANT CHANGE UP (ref 3.5–5.3)
RBC # BLD: 3.34 M/UL — LOW (ref 4.2–5.8)
RBC # FLD: 14.7 % — HIGH (ref 10.3–14.5)
SODIUM SERPL-SCNC: 138 MMOL/L — SIGNIFICANT CHANGE UP (ref 135–145)
WBC # BLD: 5.73 K/UL — SIGNIFICANT CHANGE UP (ref 3.8–10.5)
WBC # FLD AUTO: 5.73 K/UL — SIGNIFICANT CHANGE UP (ref 3.8–10.5)

## 2022-03-27 RX ORDER — INSULIN LISPRO 100/ML
VIAL (ML) SUBCUTANEOUS
Refills: 0 | Status: DISCONTINUED | OUTPATIENT
Start: 2022-03-27 | End: 2022-03-30

## 2022-03-27 RX ORDER — INSULIN LISPRO 100/ML
VIAL (ML) SUBCUTANEOUS
Refills: 0 | Status: DISCONTINUED | OUTPATIENT
Start: 2022-03-27 | End: 2022-03-27

## 2022-03-27 RX ADMIN — Medication 650 MILLIGRAM(S): at 16:22

## 2022-03-27 RX ADMIN — Medication 650 MILLIGRAM(S): at 00:36

## 2022-03-27 RX ADMIN — Medication 2: at 12:10

## 2022-03-27 RX ADMIN — Medication 50 MICROGRAM(S): at 05:15

## 2022-03-27 RX ADMIN — Medication 650 MILLIGRAM(S): at 01:10

## 2022-03-27 RX ADMIN — Medication 500 MILLIGRAM(S): at 12:11

## 2022-03-27 RX ADMIN — Medication 12.5 MILLIGRAM(S): at 05:14

## 2022-03-27 RX ADMIN — POLYETHYLENE GLYCOL 3350 17 GRAM(S): 17 POWDER, FOR SOLUTION ORAL at 12:12

## 2022-03-27 RX ADMIN — Medication 650 MILLIGRAM(S): at 16:51

## 2022-03-27 RX ADMIN — Medication 4 MILLIGRAM(S): at 22:06

## 2022-03-27 RX ADMIN — ATORVASTATIN CALCIUM 20 MILLIGRAM(S): 80 TABLET, FILM COATED ORAL at 22:06

## 2022-03-27 RX ADMIN — Medication 8: at 22:26

## 2022-03-27 RX ADMIN — Medication 1 TABLET(S): at 12:11

## 2022-03-27 RX ADMIN — Medication 2: at 08:56

## 2022-03-27 NOTE — PROGRESS NOTE ADULT - ASSESSMENT
POD# 4 removal of hardware/wound washout and closure   - continue drains (managed by plastic surgery)   - OOB with brace   - PT  - dispo planning

## 2022-03-27 NOTE — PROGRESS NOTE ADULT - SUBJECTIVE AND OBJECTIVE BOX
76y Male extensive PMH including HTN, HLD, DM, hypothyroidism, asbestosis, CAD s/p PCI, afib on Eliquis, well known to our service after found with L1 burst fracture s/p T12-L1 laminectomy and T10-L4 fusion 1/21/22, recent admission late February for tenting of skin concerning for displaced hardware and small inferior wound dehiscence, now sent in by nursing home with now superior wound dehiscence at previous are of skin tenting and persistent inferior wound dehiscence. Patient and wife at bedside report they were sent in by nursing home to "rule out infection in the bone." Denies fevers, chills, weakness, paresthesias, chest pain, palpitations, SOB.   (17 Mar 2022 19:24)      INTERVAL HPI/OVERNIGHT EVENTS:  No events overnight     Vital Signs Last 24 Hrs  T(C): 36.8 (27 Mar 2022 08:25), Max: 36.8 (26 Mar 2022 21:22)  T(F): 98.3 (27 Mar 2022 08:25), Max: 98.3 (27 Mar 2022 08:25)  HR: 82 (27 Mar 2022 08:25) (82 - 88)  BP: 103/65 (27 Mar 2022 08:25) (93/65 - 108/67)  BP(mean): --  RR: 19 (27 Mar 2022 05:22) (19 - 19)  SpO2: 94% (27 Mar 2022 08:25) (94% - 95%)    PHYSICAL EXAM:  GENERAL: NAD  Awake, alert and oriented x3   FORD x4 with 5/5 strength   Dressing C/D/I with 2 drains in place   CHEST/LUNG: Clear to auscultation bilaterally  HEART: +S1/+S2  ABDOMEN: Soft, nontender  EXTREMITIES:  2+ peripheral pulses  SKIN: Warm, dry    LABS:                        9.9    5.73  )-----------( 398      ( 27 Mar 2022 05:38 )             31.1     03-27    138  |  102  |  11.6  ----------------------------<  148<H>  4.0   |  24.0  |  0.39<L>    Ca    8.2<L>      27 Mar 2022 05:38  Phos  3.1     03-27  Mg     1.9     03-27 03-26 @ 07:01  -  03-27 @ 07:00  --------------------------------------------------------  IN: 0 mL / OUT: 2935 mL / NET: -2935 mL      CAPRINI SCORE [CLOT]:  Patient has an estimated Caprini score of greater than 5.  However, the patient's unique clinical situation will be addressed in an individual manner to determine appropriate anticoagulation treatment, if any.

## 2022-03-28 LAB
CULTURE RESULTS: SIGNIFICANT CHANGE UP
GLUCOSE BLDC GLUCOMTR-MCNC: 114 MG/DL — HIGH (ref 70–99)
GLUCOSE BLDC GLUCOMTR-MCNC: 172 MG/DL — HIGH (ref 70–99)
GLUCOSE BLDC GLUCOMTR-MCNC: 220 MG/DL — HIGH (ref 70–99)
GLUCOSE BLDC GLUCOMTR-MCNC: 252 MG/DL — HIGH (ref 70–99)
SPECIMEN SOURCE: SIGNIFICANT CHANGE UP

## 2022-03-28 PROCEDURE — 99232 SBSQ HOSP IP/OBS MODERATE 35: CPT

## 2022-03-28 RX ORDER — TAMSULOSIN HYDROCHLORIDE 0.4 MG/1
0.4 CAPSULE ORAL DAILY
Refills: 0 | Status: DISCONTINUED | OUTPATIENT
Start: 2022-03-28 | End: 2022-03-30

## 2022-03-28 RX ADMIN — SENNA PLUS 2 TABLET(S): 8.6 TABLET ORAL at 21:10

## 2022-03-28 RX ADMIN — Medication 12.5 MILLIGRAM(S): at 05:36

## 2022-03-28 RX ADMIN — Medication 50 MICROGRAM(S): at 05:36

## 2022-03-28 RX ADMIN — Medication 650 MILLIGRAM(S): at 00:59

## 2022-03-28 RX ADMIN — Medication 2: at 09:04

## 2022-03-28 RX ADMIN — ATORVASTATIN CALCIUM 20 MILLIGRAM(S): 80 TABLET, FILM COATED ORAL at 21:11

## 2022-03-28 RX ADMIN — Medication 4: at 21:11

## 2022-03-28 RX ADMIN — Medication 650 MILLIGRAM(S): at 01:40

## 2022-03-28 RX ADMIN — Medication 12.5 MILLIGRAM(S): at 17:40

## 2022-03-28 RX ADMIN — Medication 6: at 12:36

## 2022-03-28 RX ADMIN — Medication 1 TABLET(S): at 12:38

## 2022-03-28 RX ADMIN — Medication 500 MILLIGRAM(S): at 12:38

## 2022-03-28 RX ADMIN — METHOCARBAMOL 750 MILLIGRAM(S): 500 TABLET, FILM COATED ORAL at 21:11

## 2022-03-28 RX ADMIN — Medication 4 MILLIGRAM(S): at 21:11

## 2022-03-28 RX ADMIN — TAMSULOSIN HYDROCHLORIDE 0.4 MILLIGRAM(S): 0.4 CAPSULE ORAL at 12:41

## 2022-03-28 RX ADMIN — POLYETHYLENE GLYCOL 3350 17 GRAM(S): 17 POWDER, FOR SOLUTION ORAL at 12:38

## 2022-03-28 NOTE — PROGRESS NOTE ADULT - ASSESSMENT
Assessment:  76y Male      Assessment:  76y Male s/p removal of hardware/wound washout and closure. POS#5 (path neg, cx neg to date)    Plan  - cont neuro checks q4hrs  - ambulate with a brace  - monitor MERCEDES output, plan to d/c tomorrow  - dressing change w/ Telfa daily by an RN (per Endy)  - Keep BP <160, cont metoprolol  - hold off AC  - cont velasco for 48hr, started on flomax in addition to Cardura  - monitor FS, cont SS & DM diet, add Glucerna shakes & prosource for severe protein calorie malnutrition  - cont PT/OOB/ - pT will need WILLIAM  - cont SCDs for DVT prophylaxis  - Chemical DVt prophylaxis - after drain removal, will clarify w/ attending    Plan discussed with attending on morining rounds       Assessment:  76y Male s/p removal of hardware/wound washout and closure. POS#5 (path neg, cx neg to date)    Plan  - cont neuro checks q4hrs  - ambulate with a brace  - monitor MERCEDES output, plan to d/c tomorrow  - dressing change w/ Telfa daily by an RN (marcela Schumacher)  - Keep BP <160, cont metoprolol  - hold off AC  - cont velasco - chronic,  started on flomax in addition to Cardura  - monitor FS, cont SS & DM diet, add Glucerna shakes & prosource for severe protein calorie malnutrition  - cont PT/OOB/ - pT will need WILLIAM  - cont SCDs for DVT prophylaxis  - Chemical DVt prophylaxis - after drain removal, will clarify w/ attending    Plan discussed with attending on morining rounds

## 2022-03-28 NOTE — PROGRESS NOTE ADULT - SUBJECTIVE AND OBJECTIVE BOX
HPI: 76y Male extensive PMH including HTN, HLD, DM, hypothyroidism, asbestosis, CAD s/p PCI, afib on Eliquis, well known to our service after found with L1 burst fracture s/p T12-L1 laminectomy and T10-L4 fusion 1/21/22, recent admission late February for tenting of skin concerning for displaced hardware and small inferior wound dehiscence, now sent in by nursing home with now superior wound dehiscence at previous are of skin tenting and persistent inferior wound dehiscence. Patient and wife at bedside report they were sent in by nursing home to "rule out infection in the bone." Denies fevers, chills, weakness, paresthesias, chest pain, palpitations, SOB.    Interval history: Patient underwent removal of hardware/wound washout and closure on 3/23. POD #5. Patient tolerated procedure well with no complications. Patient failed TOV & requires velasco, on doxazosyn,       Physical Exam:  Constitutional: NAD    Neuro  * Mental Status:  GCS 15:  E(4), V(5), M(6).  Awake, alert, oriented to conversation.  * Cranial Nerves: Cnii-Cnxii grossly intact. PERRL, EOMI, tongue midline, no gaze deviation  * Motor: RUE 5/5, LUE 5/5, RLE 5/5, LLE 5/5  * Sensory: Sensation intact to light touch  * Reflexes: Not assessed  * Gait: Not assessed      Vitals:  Vital Signs Last 24 Hrs  T(C): 36.4 (28 Mar 2022 07:54), Max: 36.6 (27 Mar 2022 22:05)  T(F): 97.6 (28 Mar 2022 07:54), Max: 97.8 (27 Mar 2022 22:05)  HR: 86 (28 Mar 2022 07:54) (74 - 86)  BP: 109/69 (28 Mar 2022 07:54) (90/51 - 127/76)  BP(mean): --  RR: 18 (28 Mar 2022 05:01) (18 - 19)  SpO2: 91% (28 Mar 2022 07:54) (91% - 94%)    I&O:  I&O's Detail      Labs & Radiology:                        9.9    5.73  )-----------( 398      ( 27 Mar 2022 05:38 )             31.1       03-27    138  |  102  |  11.6  ----------------------------<  148<H>  4.0   |  24.0  |  0.39<L>    Ca    8.2<L>      27 Mar 2022 05:38  Phos  3.1     03-27  Mg     1.9     03-27                              CAPILLARY BLOOD GLUCOSE      POCT Blood Glucose.: 172 mg/dL (28 Mar 2022 09:02)  POCT Blood Glucose.: 337 mg/dL (27 Mar 2022 22:17)  POCT Blood Glucose.: 150 mg/dL (27 Mar 2022 16:20)  POCT Blood Glucose.: 167 mg/dL (27 Mar 2022 12:09)      Neurosurgery Imaging:                   HPI: 76y Male extensive PMH including HTN, HLD, DM, [place], hypothyroidism, asbestosis, CAD s/p PCI, afib on Eliquis, well known to our service after found with L1 burst fracture s/p T12-L1 laminectomy and T10-L4 fusion 1/21/22, recent admission late February for tenting of skin concerning for displaced hardware and small inferior wound dehiscence, now sent in by nursing home with now superior wound dehiscence at previous are of skin tenting and persistent inferior wound dehiscence. Patient and wife at bedside report they were sent in by nursing home to "rule out infection in the bone." Denies fevers, chills, weakness, paresthesias, chest pain, palpitations, SOB.    Interval history: Patient underwent removal of hardware/wound washout and closure on 3/23. POD #5. Patient tolerated procedure well with no complications. Patient failed TOV & requires velasco, on doxazosyn, will add flomax & try another TOV in 48hrs.   Ray drain/superficial removed (5cc/24hr)  Left drain/deep -10cc (plan to remove tomorrow)    Physical Exam:  Constitutional: NAD, comfortable in bed, eating breakfast    Neuro  * Mental Status:  GCS 15:  E(4), V(5), M(6).  Awake, alert, oriented to conversation. Speech is clear, face symmetric  * Cranial Nerves: Cnii-Cnxii grossly intact. PERRL, EOMI, tongue midline, no gaze deviation  * Motor: RUE 5/5, LUE 5/5, RLE 5/5, LLE 5/5 (examined in bed),   * Sensory: Sensation intact to light touch  * Reflexes: Not assessed  * Gait: Not assessed  * Wound: dressing changed to telfa. R drain/superficial d/lenin.     Heart: S1, S2  Lungs: CTA  Abm: + BS, soft, NT/ND    Vital Signs Last 24 Hrs  T(C): 36.4 (28 Mar 2022 07:54), Max: 36.6 (27 Mar 2022 22:05)  T(F): 97.6 (28 Mar 2022 07:54), Max: 97.8 (27 Mar 2022 22:05)  HR: 86 (28 Mar 2022 07:54) (74 - 86)  BP: 109/69 (28 Mar 2022 07:54) (90/51 - 127/76)  RR: 18 (28 Mar 2022 05:01) (18 - 19)  SpO2: 91% (28 Mar 2022 07:54) (91% - 94%)    Labs & Radiology:                      9.9    5.73  )-----------( 398      ( 27 Mar 2022 05:38 )             31.1     138  |  102  |  11.6  ----------------------------<  148<H>  4.0   |  24.0  |  0.39<L>    Ca    8.2<L>      27 Mar 2022 05:38  Phos  3.1     03-27  Mg     1.9     03-27                              CAPILLARY BLOOD GLUCOSE      POCT Blood Glucose.: 172 mg/dL (28 Mar 2022 09:02)  POCT Blood Glucose.: 337 mg/dL (27 Mar 2022 22:17)  POCT Blood Glucose.: 150 mg/dL (27 Mar 2022 16:20)  POCT Blood Glucose.: 167 mg/dL (27 Mar 2022 12:09)      Neurosurgery Imaging:                   HPI: 76y Male extensive PMH including HTN, HLD, DM, [place], hypothyroidism, asbestosis, CAD s/p PCI, afib on Eliquis, well known to our service after found with L1 burst fracture s/p T12-L1 laminectomy and T10-L4 fusion 1/21/22, recent admission late February for tenting of skin concerning for displaced hardware and small inferior wound dehiscence, now sent in by nursing home with now superior wound dehiscence at previous are of skin tenting and persistent inferior wound dehiscence. Patient and wife at bedside report they were sent in by nursing home to "rule out infection in the bone." Denies fevers, chills, weakness, paresthesias, chest pain, palpitations, SOB.    Interval history: Patient underwent removal of hardware/wound washout and closure on 3/23. POD #5. Patient tolerated procedure well with no complications. Patient has a chronic velasco which was changed in OR  Ray drain/superficial removed (5cc/24hr)  Left drain/deep -10cc (plan to remove tomorrow)    Physical Exam:  Constitutional: NAD, comfortable in bed, eating breakfast    Neuro  * Mental Status:  GCS 15:  E(4), V(5), M(6).  Awake, alert, oriented to conversation. Speech is clear, face symmetric  * Cranial Nerves: Cnii-Cnxii grossly intact. PERRL, EOMI, tongue midline, no gaze deviation  * Motor: RUE 5/5, LUE 5/5, RLE 5/5, LLE 5/5 (examined in bed),   * Sensory: Sensation intact to light touch  * Reflexes: Not assessed  * Gait: Not assessed  * Wound: dressing changed to telfa. R drain/superficial d/lenin.     Heart: S1, S2  Lungs: CTA  Abm: + BS, soft, NT/ND    Vital Signs Last 24 Hrs  T(C): 36.4 (28 Mar 2022 07:54), Max: 36.6 (27 Mar 2022 22:05)  T(F): 97.6 (28 Mar 2022 07:54), Max: 97.8 (27 Mar 2022 22:05)  HR: 86 (28 Mar 2022 07:54) (74 - 86)  BP: 109/69 (28 Mar 2022 07:54) (90/51 - 127/76)  RR: 18 (28 Mar 2022 05:01) (18 - 19)  SpO2: 91% (28 Mar 2022 07:54) (91% - 94%)    Labs & Radiology:                      9.9    5.73  )-----------( 398      ( 27 Mar 2022 05:38 )             31.1     138  |  102  |  11.6  ----------------------------<  148<H>  4.0   |  24.0  |  0.39<L>    Ca    8.2<L>      27 Mar 2022 05:38  Phos  3.1     03-27  Mg     1.9     03-27                              CAPILLARY BLOOD GLUCOSE      POCT Blood Glucose.: 172 mg/dL (28 Mar 2022 09:02)  POCT Blood Glucose.: 337 mg/dL (27 Mar 2022 22:17)  POCT Blood Glucose.: 150 mg/dL (27 Mar 2022 16:20)  POCT Blood Glucose.: 167 mg/dL (27 Mar 2022 12:09)      Neurosurgery Imaging:

## 2022-03-28 NOTE — PROGRESS NOTE ADULT - SUBJECTIVE AND OBJECTIVE BOX
POD 5 s/p spinal closure following hardware removal.  No issues.  AVSS  JPs serosang    Spinal incision c/d/i.  No collections, wound edges viable.      A/P: Remove deep drain today  Superficial drain tomorrow or Wed if less than 30cc/24h  Daily dressing change with telfa island  Plan as per NS

## 2022-03-28 NOTE — PROGRESS NOTE ADULT - NSPROGADDITIONALINFOA_GEN_ALL_CORE
NSGY Attg:    see above    patient seen and examined by PA staff    agree with exam and plan as above

## 2022-03-29 ENCOUNTER — APPOINTMENT (OUTPATIENT)
Dept: UROLOGY | Facility: CLINIC | Age: 77
End: 2022-03-29

## 2022-03-29 LAB
GLUCOSE BLDC GLUCOMTR-MCNC: 178 MG/DL — HIGH (ref 70–99)
GLUCOSE BLDC GLUCOMTR-MCNC: 183 MG/DL — HIGH (ref 70–99)
GLUCOSE BLDC GLUCOMTR-MCNC: 208 MG/DL — HIGH (ref 70–99)
GLUCOSE BLDC GLUCOMTR-MCNC: 217 MG/DL — HIGH (ref 70–99)
SARS-COV-2 RNA SPEC QL NAA+PROBE: SIGNIFICANT CHANGE UP

## 2022-03-29 RX ORDER — ENOXAPARIN SODIUM 100 MG/ML
30 INJECTION SUBCUTANEOUS ONCE
Refills: 0 | Status: DISCONTINUED | OUTPATIENT
Start: 2022-03-29 | End: 2022-03-29

## 2022-03-29 RX ORDER — ENOXAPARIN SODIUM 100 MG/ML
30 INJECTION SUBCUTANEOUS EVERY 24 HOURS
Refills: 0 | Status: DISCONTINUED | OUTPATIENT
Start: 2022-03-29 | End: 2022-03-30

## 2022-03-29 RX ADMIN — Medication 2: at 18:13

## 2022-03-29 RX ADMIN — Medication 12.5 MILLIGRAM(S): at 05:09

## 2022-03-29 RX ADMIN — POLYETHYLENE GLYCOL 3350 17 GRAM(S): 17 POWDER, FOR SOLUTION ORAL at 11:34

## 2022-03-29 RX ADMIN — TAMSULOSIN HYDROCHLORIDE 0.4 MILLIGRAM(S): 0.4 CAPSULE ORAL at 11:34

## 2022-03-29 RX ADMIN — Medication 2: at 09:52

## 2022-03-29 RX ADMIN — Medication 4 MILLIGRAM(S): at 21:14

## 2022-03-29 RX ADMIN — ENOXAPARIN SODIUM 30 MILLIGRAM(S): 100 INJECTION SUBCUTANEOUS at 21:14

## 2022-03-29 RX ADMIN — Medication 12.5 MILLIGRAM(S): at 18:12

## 2022-03-29 RX ADMIN — Medication 4: at 21:13

## 2022-03-29 RX ADMIN — Medication 1 TABLET(S): at 11:34

## 2022-03-29 RX ADMIN — Medication 50 MICROGRAM(S): at 05:09

## 2022-03-29 RX ADMIN — Medication 500 MILLIGRAM(S): at 11:34

## 2022-03-29 RX ADMIN — Medication 4: at 12:05

## 2022-03-29 RX ADMIN — ATORVASTATIN CALCIUM 20 MILLIGRAM(S): 80 TABLET, FILM COATED ORAL at 21:14

## 2022-03-29 NOTE — PROGRESS NOTE ADULT - NS PANP COMMENT GEN_ALL_CORE FT
ANGELINA Attg:    see above    patient seen and examined    no significant back pain with weight-bearing in brace  LE 5/5 to confrontational testing    agree with plan as above  d/c MERCEDES today  start Lovenox tonight  brace when OOB  d/c planning

## 2022-03-29 NOTE — PROGRESS NOTE ADULT - REASON FOR ADMISSION
Wound dehiscence

## 2022-03-29 NOTE — PROGRESS NOTE ADULT - PROVIDER SPECIALTY LIST ADULT
Neurosurgery
Orthopedics
Orthopedics
Plastic Surgery
Hospitalist
Neurosurgery
Orthopedics
Plastic Surgery
Neurosurgery

## 2022-03-29 NOTE — PROGRESS NOTE ADULT - NS_MD_PANP_GEN_ALL_CORE

## 2022-03-29 NOTE — PROGRESS NOTE ADULT - NUTRITIONAL ASSESSMENT
This patient has been assessed with a concern for Malnutrition and has been determined to have a diagnosis/diagnoses of Severe protein-calorie malnutrition and Underweight (BMI < 19).    This patient is being managed with:   Diet Consistent Carbohydrate/No Snacks-  Entered: Mar 23 2022  7:04PM    
This patient has been assessed with a concern for Malnutrition and has been determined to have a diagnosis/diagnoses of Severe protein-calorie malnutrition and Underweight (BMI < 19).    This patient is being managed with:   Diet Consistent Carbohydrate/No Snacks-  No Carb Prosource (1pkg = 15gms Protein)     Qty per Day:  1  Supplement Feeding Modality:  Oral  Glucerna Shake Cans or Servings Per Day:  1       Frequency:  Three Times a day  Entered: Mar 28 2022 11:20AM    
This patient has been assessed with a concern for Malnutrition and has been determined to have a diagnosis/diagnoses of Severe protein-calorie malnutrition and Underweight (BMI < 19).    This patient is being managed with:   Diet Consistent Carbohydrate/No Snacks-  Entered: Mar 23 2022  7:04PM    
Diet, Consistent Carbohydrate/No Snacks (03-17-22 @ 19:19) [Active]
This patient has been assessed with a concern for Malnutrition and has been determined to have a diagnosis/diagnoses of Severe protein-calorie malnutrition and Underweight (BMI < 19).    This patient is being managed with:   Diet Consistent Carbohydrate/No Snacks-  Entered: Mar 23 2022  7:04PM    
4

## 2022-03-29 NOTE — PROGRESS NOTE ADULT - NS ATTEND BILL GEN_ALL_CORE
Attending to bill
PA/NP to bill
Attending to bill
Attending to bill

## 2022-03-29 NOTE — PROGRESS NOTE ADULT - SUBJECTIVE AND OBJECTIVE BOX
HPI: 76y Male extensive PMH including HTN, HLD, DM, hypothyroidism, asbestosis, CAD s/p PCI, afib on Eliquis, well known to our service after found with L1 burst fracture s/p T12-L1 laminectomy and T10-L4 fusion 1/21/22, recent admission late February for tenting of skin concerning for displaced hardware and small inferior wound dehiscence, now sent in by nursing home with now superior wound dehiscence at previous are of skin tenting and persistent inferior wound dehiscence. Patient and wife at bedside report they were sent in by nursing home to "rule out infection in the bone." Denies fevers, chills, weakness, paresthesias, chest pain, palpitations, SOB.   (17 Mar 2022 19:24)      INTERVAL OVERNIGHT EVENTS: 3/29  76y Male seen lying comfortably in bed. Tolerating diet. Passing gas/BM. Cotton in draining urine. Denies headache, n/v/d, fevers, chills, chest pain, SOB.     Vital Signs Last 24 Hrs  T(C): 36.8 (29 Mar 2022 08:00), Max: 36.8 (29 Mar 2022 04:46)  T(F): 98.2 (29 Mar 2022 08:00), Max: 98.2 (29 Mar 2022 04:46)  HR: 93 (29 Mar 2022 08:00) (88 - 94)  BP: 105/70 (29 Mar 2022 08:00) (105/70 - 111/66)  BP(mean): --  RR: 18 (29 Mar 2022 08:00) (18 - 18)  SpO2: 90% (29 Mar 2022 08:00) (90% - 94%)    PHYSICAL EXAM:  GENERAL: NAD, well-groomed, well-developed  HEAD:  Atraumatic, normocephalic  DRAINS: removed today   WOUND: Dressing changed, clean dry intact  MENTAL STATUS: AAO x3,  Appropriately conversant without aphasia, following simple commands  CRANIAL NERVES: Facial sensation intact V1-3 distribution b/l. Face symmetric w/ normal eye closure and smile, tongue midline. Hearing grossly intact. Speech clear. Head turning and shoulder shrug intact.   MOTOR: strength 5/5 b/l upper and lower extremities  SENSATION: grossly intact to light touch all extremities        03-28 @ 07:01 - 03-29 @ 07:00  --------------------------------------------------------  IN: 0 mL / OUT: 450 mL / NET: -450 mL    03-29 @ 07:01 - 03-29 @ 10:46  --------------------------------------------------------  IN: 360 mL / OUT: 0 mL / NET: 360 mL        RADIOLOGY & ADDITIONAL TESTS:    < from: MR Thoracic Spine w/wo IV Cont (03.20.22 @ 17:09) >  1. Significant streak artifact from surgical hardware in lower thoracic   spine with nonvisualization of the canal space and the cord at the   surgical levels. There appears to be mild edema posterior to the surgical   hardware seen on STIR images which is nonspecific. Infectious pathology   is in the differential diagnosis. No significant enhancement is seen   after contrast administration. Evaluation of osseous structures and soft   tissues surrounding surgical hardware however is very limited and   nondiagnostic secondary to significant streak  artifact.    2. Limited visualization of moderate bilateral pleural effusions with   parenchymal disease in bilateral lungs likely   consolidation/atelectasis/infiltrates.            CAPRINI SCORE [CLOT]:  Patient has an estimated Caprini score of greater than 5.  However, the patient's unique clinical situation will be addressed in an individual manner to determine appropriate anticoagulation treatment, if any.

## 2022-03-29 NOTE — PROGRESS NOTE ADULT - ASSESSMENT
77 y/o male admitted on 3/17 for hardware failure & wound dehiscence. He is now s/p removal of hardware, complex wound closure on 3/23, he is POD # 6    1. Remaining drain removed today. New dressing applied  2. Cotton to remain  3. Covid swab ordered for WILLIAM tomorrow  4. Cont physical therapy  5. Cont pain control  6. Mechanical DVT prophylaxis

## 2022-03-30 ENCOUNTER — TRANSCRIPTION ENCOUNTER (OUTPATIENT)
Age: 77
End: 2022-03-30

## 2022-03-30 VITALS
TEMPERATURE: 98 F | RESPIRATION RATE: 18 BRPM | SYSTOLIC BLOOD PRESSURE: 116 MMHG | DIASTOLIC BLOOD PRESSURE: 66 MMHG | HEART RATE: 78 BPM | OXYGEN SATURATION: 93 %

## 2022-03-30 LAB
GLUCOSE BLDC GLUCOMTR-MCNC: 132 MG/DL — HIGH (ref 70–99)
GLUCOSE BLDC GLUCOMTR-MCNC: 201 MG/DL — HIGH (ref 70–99)

## 2022-03-30 PROCEDURE — U0005: CPT

## 2022-03-30 PROCEDURE — 83735 ASSAY OF MAGNESIUM: CPT

## 2022-03-30 PROCEDURE — 72157 MRI CHEST SPINE W/O & W/DYE: CPT | Mod: MG

## 2022-03-30 PROCEDURE — 86850 RBC ANTIBODY SCREEN: CPT

## 2022-03-30 PROCEDURE — 84100 ASSAY OF PHOSPHORUS: CPT

## 2022-03-30 PROCEDURE — 85025 COMPLETE CBC W/AUTO DIFF WBC: CPT

## 2022-03-30 PROCEDURE — 72158 MRI LUMBAR SPINE W/O & W/DYE: CPT

## 2022-03-30 PROCEDURE — 85027 COMPLETE CBC AUTOMATED: CPT

## 2022-03-30 PROCEDURE — 80048 BASIC METABOLIC PNL TOTAL CA: CPT

## 2022-03-30 PROCEDURE — 86140 C-REACTIVE PROTEIN: CPT

## 2022-03-30 PROCEDURE — C1889: CPT

## 2022-03-30 PROCEDURE — 76000 FLUOROSCOPY <1 HR PHYS/QHP: CPT

## 2022-03-30 PROCEDURE — 72131 CT LUMBAR SPINE W/O DYE: CPT | Mod: MG

## 2022-03-30 PROCEDURE — 85652 RBC SED RATE AUTOMATED: CPT

## 2022-03-30 PROCEDURE — 87040 BLOOD CULTURE FOR BACTERIA: CPT

## 2022-03-30 PROCEDURE — 80053 COMPREHEN METABOLIC PANEL: CPT

## 2022-03-30 PROCEDURE — 82330 ASSAY OF CALCIUM: CPT

## 2022-03-30 PROCEDURE — 99285 EMERGENCY DEPT VISIT HI MDM: CPT

## 2022-03-30 PROCEDURE — 72128 CT CHEST SPINE W/O DYE: CPT | Mod: MG

## 2022-03-30 PROCEDURE — 85730 THROMBOPLASTIN TIME PARTIAL: CPT

## 2022-03-30 PROCEDURE — 87075 CULTR BACTERIA EXCEPT BLOOD: CPT

## 2022-03-30 PROCEDURE — G1004: CPT

## 2022-03-30 PROCEDURE — 97167 OT EVAL HIGH COMPLEX 60 MIN: CPT

## 2022-03-30 PROCEDURE — 82962 GLUCOSE BLOOD TEST: CPT

## 2022-03-30 PROCEDURE — 71045 X-RAY EXAM CHEST 1 VIEW: CPT

## 2022-03-30 PROCEDURE — 87070 CULTURE OTHR SPECIMN AEROBIC: CPT

## 2022-03-30 PROCEDURE — 93005 ELECTROCARDIOGRAM TRACING: CPT

## 2022-03-30 PROCEDURE — 86901 BLOOD TYPING SEROLOGIC RH(D): CPT

## 2022-03-30 PROCEDURE — 36415 COLL VENOUS BLD VENIPUNCTURE: CPT

## 2022-03-30 PROCEDURE — 86900 BLOOD TYPING SEROLOGIC ABO: CPT

## 2022-03-30 PROCEDURE — 85610 PROTHROMBIN TIME: CPT

## 2022-03-30 PROCEDURE — 97163 PT EVAL HIGH COMPLEX 45 MIN: CPT

## 2022-03-30 PROCEDURE — 88300 SURGICAL PATH GROSS: CPT

## 2022-03-30 PROCEDURE — 93970 EXTREMITY STUDY: CPT

## 2022-03-30 PROCEDURE — U0003: CPT

## 2022-03-30 RX ORDER — ENOXAPARIN SODIUM 100 MG/ML
40 INJECTION SUBCUTANEOUS
Qty: 0 | Refills: 0 | DISCHARGE
Start: 2022-03-30

## 2022-03-30 RX ORDER — POLYETHYLENE GLYCOL 3350 17 G/17G
17 POWDER, FOR SOLUTION ORAL
Qty: 0 | Refills: 0 | DISCHARGE
Start: 2022-03-30

## 2022-03-30 RX ORDER — ATORVASTATIN CALCIUM 80 MG/1
1 TABLET, FILM COATED ORAL
Qty: 0 | Refills: 0 | DISCHARGE
Start: 2022-03-30

## 2022-03-30 RX ORDER — METFORMIN HYDROCHLORIDE 850 MG/1
0 TABLET ORAL
Qty: 0 | Refills: 0 | DISCHARGE

## 2022-03-30 RX ORDER — ASPIRIN/CALCIUM CARB/MAGNESIUM 324 MG
81 TABLET ORAL DAILY
Refills: 0 | Status: DISCONTINUED | OUTPATIENT
Start: 2022-03-30 | End: 2022-03-30

## 2022-03-30 RX ORDER — SENNA PLUS 8.6 MG/1
2 TABLET ORAL
Qty: 0 | Refills: 0 | DISCHARGE
Start: 2022-03-30

## 2022-03-30 RX ORDER — DOXAZOSIN MESYLATE 4 MG
1 TABLET ORAL
Qty: 0 | Refills: 0 | DISCHARGE
Start: 2022-03-30

## 2022-03-30 RX ORDER — ACETAMINOPHEN 500 MG
2 TABLET ORAL
Qty: 0 | Refills: 0 | DISCHARGE
Start: 2022-03-30

## 2022-03-30 RX ORDER — METHOCARBAMOL 500 MG/1
1 TABLET, FILM COATED ORAL
Qty: 0 | Refills: 0 | DISCHARGE
Start: 2022-03-30

## 2022-03-30 RX ORDER — EMPAGLIFLOZIN 10 MG/1
1 TABLET, FILM COATED ORAL
Qty: 0 | Refills: 0 | DISCHARGE

## 2022-03-30 RX ORDER — ASCORBIC ACID 60 MG
1 TABLET,CHEWABLE ORAL
Qty: 0 | Refills: 0 | DISCHARGE
Start: 2022-03-30

## 2022-03-30 RX ORDER — ASPIRIN/CALCIUM CARB/MAGNESIUM 324 MG
1 TABLET ORAL
Qty: 0 | Refills: 0 | DISCHARGE
Start: 2022-03-30

## 2022-03-30 RX ORDER — ATORVASTATIN CALCIUM 80 MG/1
1 TABLET, FILM COATED ORAL
Qty: 0 | Refills: 0 | DISCHARGE

## 2022-03-30 RX ADMIN — Medication 4: at 12:58

## 2022-03-30 RX ADMIN — Medication 500 MILLIGRAM(S): at 12:59

## 2022-03-30 RX ADMIN — Medication 50 MICROGRAM(S): at 05:00

## 2022-03-30 RX ADMIN — TAMSULOSIN HYDROCHLORIDE 0.4 MILLIGRAM(S): 0.4 CAPSULE ORAL at 12:59

## 2022-03-30 RX ADMIN — Medication 81 MILLIGRAM(S): at 12:59

## 2022-03-30 RX ADMIN — Medication 1 TABLET(S): at 12:59

## 2022-03-30 NOTE — DISCHARGE NOTE PROVIDER - NSDCCPTREATMENT_GEN_ALL_CORE_FT
PRINCIPAL PROCEDURE  Procedure: Complex removal, hardware, spine  Findings and Treatment:       SECONDARY PROCEDURE  Procedure: Closure, surgical wound, spinal region, secondary  Findings and Treatment:

## 2022-03-30 NOTE — DISCHARGE NOTE NURSING/CASE MANAGEMENT/SOCIAL WORK - NSDCPEFALRISK_GEN_ALL_CORE
For information on Fall & Injury Prevention, visit: [email or web address] OR  [email or web address] OR  https://www.cdc.gov/steadi/patient.html

## 2022-03-30 NOTE — DISCHARGE NOTE NURSING/CASE MANAGEMENT/SOCIAL WORK - PATIENT PORTAL LINK FT
You can access the FollowMyHealth Patient Portal offered by [facility] by registering at the following website: [email or web address]. By joining [facility]’s FollowMyHealth portal, you will also be able to view your health information using other applications (apps) compatible with our system.

## 2022-03-30 NOTE — DISCHARGE NOTE PROVIDER - NSDCMRMEDTOKEN_GEN_ALL_CORE_FT
acetaminophen 325 mg oral tablet: 2 tab(s) orally every 6 hours, As needed, Temp greater or equal to 38C (100.4F), Mild Pain (1 - 3)  ascorbic acid 500 mg oral tablet: 1 tab(s) orally once a day  aspirin 81 mg oral delayed release tablet: 1 tab(s) orally once a day resume on 1/29/22  atorvastatin 20 mg oral tablet: 1 tab(s) orally once a day (at bedtime)  doxazosin 4 mg oral tablet: 1 tab(s) orally once a day (at bedtime)  enoxaparin: 40 unit(s)  once a day  insulin lispro 100 units/mL injectable solution: ISS  levothyroxine 50 mcg (0.05 mg) oral tablet: 1 tab(s) orally once a day  methocarbamol 750 mg oral tablet: 1 tab(s) orally every 8 hours, As needed, Muscle Spasm/Back Pain  Metoprolol Succinate ER 25 mg oral tablet, extended release: 1 tab(s) orally once a day  Multiple Vitamins oral tablet: 1 tab(s) orally once a day  oxyCODONE 5 mg oral tablet: 1 tab(s) orally every 4 hours, As needed, Moderate Pain (4 - 6)  polyethylene glycol 3350 oral powder for reconstitution: 17 gram(s) orally once a day  senna oral tablet: 2 tab(s) orally once a day (at bedtime)   acetaminophen 325 mg oral tablet: 2 tab(s) orally every 6 hours, As needed, Temp greater or equal to 38C (100.4F), Mild Pain (1 - 3)  ascorbic acid 500 mg oral tablet: 1 tab(s) orally once a day  Aspirin Low Dose 81 mg oral tablet, chewable: 1 tab(s) orally once a day  atorvastatin 20 mg oral tablet: 1 tab(s) orally once a day (at bedtime)  doxazosin 4 mg oral tablet: 1 tab(s) orally once a day (at bedtime)  enoxaparin: 40 unit(s)  once a day  insulin lispro 100 units/mL injectable solution: ISS  levothyroxine 50 mcg (0.05 mg) oral tablet: 1 tab(s) orally once a day  methocarbamol 750 mg oral tablet: 1 tab(s) orally every 8 hours, As needed, Muscle Spasm/Back Pain  Metoprolol Succinate ER 25 mg oral tablet, extended release: 1 tab(s) orally once a day  Multiple Vitamins oral tablet: 1 tab(s) orally once a day  oxyCODONE 5 mg oral tablet: 1 tab(s) orally every 4 hours, As needed, Moderate Pain (4 - 6)  polyethylene glycol 3350 oral powder for reconstitution: 17 gram(s) orally once a day  senna oral tablet: 2 tab(s) orally once a day (at bedtime)

## 2022-03-30 NOTE — DISCHARGE NOTE PROVIDER - NSDCFUADDINST_GEN_ALL_CORE_FT
Must wear clamshell brace when out of bed  Keep incision clean and dry  Follow up with Dr. Gaitan and Dr. Schumacher in 1 week for wound check  May shower surgical incision but pat gently to dry and keep clean and dry. Avoid constant pressure on incision-- while in bed, try to place wedge underneath to promote optimal wound healing. Encourage out of bed as tolerated with brace in place  DO NOT RESUME ELIQUIS UNTIL 14 DAYS POSTOP Must wear clamshell brace when out of bed  Keep incision clean and dry-- ultimately at this point can leave incision open to air however while he was in the hospital he was laying in bed a lot and had bowel movements at times, therefore to avoid compromise to the wound, new Mepilex dressings were placed to promote wound healing. While in rehab, if unable to keep wound clean and dry without dressing, may continue to provide dressing changes  Follow up with Dr. Gaitan and Dr. Schumacher in 1 week for wound check  May shower surgical incision but pat gently to dry and keep clean and dry. Avoid constant pressure on incision-- while in bed, try to place wedge underneath to promote optimal wound healing. Encourage out of bed as tolerated with brace in place  DO NOT RESUME ELIQUIS UNTIL 14 DAYS POSTOP

## 2022-03-30 NOTE — CHART NOTE - NSCHARTNOTEFT_GEN_A_CORE
POST-OPERATIVE NOTE  Procedure: S/p Removal of thoracolumbar instrumentation w/ complex muscle flap plastics closure   Diagnosis/Indication: Hardware failure w/ loosening  Surgeon: Dr Scooby Gaitan & Dr. José Schumacher     INTERVAL HPI/ACUTE EVENTS:  Patient tolerated procedure well. Hardware removed w/ complex muscle flap closure. Neuromonitoring at baseline throughout procedure. Superficial Ray drain x1 to full suction, Deep MERCEDES x1 to full suction. Extubated in OR, hemodynamically stable, transferred to PACU. Patient seen and examined later in evening, following commands. Has had some apple sauce. Drowsy but arousable. Denies any pain beside L eye discomfort.     VITALS:  T(C): 36.5 (03-23-22 @ 21:07), Max: 36.9 (03-23-22 @ 07:34)  HR: 86 (03-23-22 @ 21:07) (82 - 104)  BP: 114/71 (03-23-22 @ 21:07) (103/64 - 125/67)  RR: 18 (03-23-22 @ 21:07) (15 - 19)  SpO2: 91% (03-23-22 @ 21:07) (91% - 100%)  Wt(kg): --    PHYSICAL EXAM:  GENERAL: NAD, well-groomed, drowsy 2/2 anesthesia   HEAD: Atraumatic, normocephalic.   DRAINS: Superficial Ray drain x1 to full suction, Deep MERCEDES x1 to full suction. Serosanguinous drainage noted.  WOUND: Dressing clean, dry, intact  [place] COMA SCORE: E-3 V-5 M-6 = 14       E: 4= opens eyes spontaneously 3= to voice 2= to noxious 1= no opening       V: 5= oriented 4= confused 3= inappropriate words 2= incomprehensible sounds 1= nonverbal 1T= intubated       M: 6= follows commands 5= localizes 4= withdraws 3= flexor posturing 2= extensor posturing 1= no movement  MENTAL STATUS: AAO x3; Awake. Opens eyes to voice. Appropriately conversant without aphasia. following simple commands  CRANIAL NERVES: Visual acuity normal for age, EOMI without nystagmus. Facial sensation intact V1-3 distribution b/l. Face symmetric w/ normal eye closure and smile, tongue midline. Hearing grossly intact. Speech clear, mild dysarthria 2/2 lack of dentures   MOTOR: strength 5/5 b/l upper and lower extremities with encouragement   SENSATION: grossly intact to light touch all extremities  CHEST/LUNG: Nonlabored breaths  SKIN: Warm, dry    LABS:             10.5   5.71  )-----------( 470      ( 23 Mar 2022 06:11 )             33.4     03-23    139  |  103  |  11.6  ----------------------------<  134<H>  3.9   |  24.0  |  0.46<L>    Ca    8.6      23 Mar 2022 06:11  Phos  3.3     03-23  Mg     2.1     03-23        RADIOLOGY/OTHER:  MR Thoracic Spine w/wo IV Cont (03.20.22 @ 17:09)   IMPRESSION:  1. Significant streak artifact from surgical hardware in lower thoracic   spine with nonvisualization of the canal space and the cord at the   surgical levels. There appears to be mild edema posterior to the surgical   hardware seen on STIR images which is nonspecific. Infectious pathology   is in the differential diagnosis. No significant enhancement is seen   after contrast administration. Evaluation of osseous structures and soft   tissues surrounding surgical hardware however is very limited and   nondiagnostic secondary to significant streak  artifact.  2. Limited visualization of moderate bilateral pleural effusions with   parenchymal disease in bilateral lungs likely   consolidation/atelectasis/infiltrates. Please refer to the recent   thoracic spine CT for further details.      ASSESSMENT  76M w/ extensive PMH including HTN, HLD, DM, hypothyroidism, asbestosis, CAD s/p PCI, afib on Eliquis, well known to our service after found with L1 burst fracture s/p T12-L1 laminectomy and T10-L4 fusion 1/21/22, presenting w/ hardware failure w/ loosening, skin tenting and persistent inferior wound dehiscence w/ new superior wound dehiscence.   S/p Removal of thoracolumbar instrumentation w/ complex muscle flap plastics closure POD#0      PLAN  - Q4 neuro checks  - Deep MERCEDES x 1 to full suction. Do not discontinue until < 55 cc in 24 hours  - Superficial Ray (blue line) x 1 to full suction. Do not discontinue until < 30 cc in 24 hours  - Drain care per plastics  - Pain control PRN: avoid oversedation; Tylenol 650q6, Oxy 5/10q4  - Robaxin 750q8 PRN muscle spasms  - Normotensive  - Lopressor 12.5q12  - Ancef x 3 doses   - Lipitor 20 qhs  - Synthroid 50 daily  - Cardura 4 qhs  - Miralax, Senna, MTV, Vitamin C  - NS@50 until tolerating PO  - Advance diet as tolerated- consistent carbs w/o snacks  - Continue velasco d/t retention  - Incentive spirometry   - b/l SCDs. hold AC/AP/DVT prophylaxis at this time  - TLSO clam shell brace whenever OOB. Discussed importance of brace compliance  - AM labs  - PT/OT   - D/w Dr. Gaitan
Source: Patient [ ]  Family [ ]   other [ x]  77 y/o male admitted on 3/17 for hardware failure & wound dehiscence. He is now s/p removal of hardware, complex wound closure on 3/23, he is POD # 6    Current Diet: Diet, Consistent Carbohydrate/No Snacks:   No Carb Prosource (1pkg = 15gms Protein)     Qty per Day:  1  Supplement Feeding Modality:  Oral  Glucerna Shake Cans or Servings Per Day:  1       Frequency:  Three Times a day (03-28-22 @ 11:23)      Patient reports [ ] nausea  [ ] vomiting [ ] diarrhea [ ] constipation  [ ]chewing problems [ ] swallowing issues  [ ] other:     PO intake:  < 50% [ ]   50-75%  [ ]   %  [ ]  other :    Source for PO intake [ ] Patient [ ] family [ ] chart [ ] staff [ ] other      Current Weight:   3/27 71.4 kg  3/23 71.7 kg  3/22 71. 7 kg  % Weight Change     Pertinent Medications: MEDICATIONS  (STANDING):  ascorbic acid 500 milliGRAM(s) Oral daily  atorvastatin 20 milliGRAM(s) Oral at bedtime  dextrose 40% Gel 15 Gram(s) Oral once  dextrose 5%. 1000 milliLiter(s) (50 mL/Hr) IV Continuous <Continuous>  dextrose 5%. 1000 milliLiter(s) (100 mL/Hr) IV Continuous <Continuous>  dextrose 50% Injectable 25 Gram(s) IV Push once  dextrose 50% Injectable 12.5 Gram(s) IV Push once  dextrose 50% Injectable 25 Gram(s) IV Push once  doxazosin 4 milliGRAM(s) Oral at bedtime  glucagon  Injectable 1 milliGRAM(s) IntraMuscular once  influenza  Vaccine (HIGH DOSE) 0.7 milliLiter(s) IntraMuscular once  insulin lispro (ADMELOG) corrective regimen sliding scale   SubCutaneous Before meals and at bedtime  levothyroxine 50 MICROGram(s) Oral daily  metoprolol tartrate 12.5 milliGRAM(s) Oral every 12 hours  multivitamin 1 Tablet(s) Oral daily  polyethylene glycol 3350 17 Gram(s) Oral daily  senna 2 Tablet(s) Oral at bedtime  tamsulosin 0.4 milliGRAM(s) Oral daily    MEDICATIONS  (PRN):  acetaminophen     Tablet .. 650 milliGRAM(s) Oral every 6 hours PRN Temp greater or equal to 38C (100.4F), Mild Pain (1 - 3)  methocarbamol 750 milliGRAM(s) Oral every 8 hours PRN Muscle Spasm/Back Pain  oxyCODONE    IR 5 milliGRAM(s) Oral every 4 hours PRN Moderate Pain (4 - 6)  oxyCODONE    IR 10 milliGRAM(s) Oral every 4 hours PRN Severe Pain (7 - 10)    Pertinent Labs:   Source: Patient [ ]  Family [ ]   other [ ]    Current Diet:     Patient reports [ ] nausea  [ ] vomiting [ ] diarrhea [ ] constipation  [ ]chewing problems [ ] swallowing issues  [ ] other:     PO intake:  < 50% [ ]   50-75%  [ ]   %  [ ]  other :    Source for PO intake [ ] Patient [ ] family [ ] chart [ ] staff [ ] other    Enteral /Parenteral Nutrition:     Current Weight:     % Weight Change     Pertinent Medications: MEDICATIONS  (STANDING):  ascorbic acid 500 milliGRAM(s) Oral daily  atorvastatin 20 milliGRAM(s) Oral at bedtime  dextrose 40% Gel 15 Gram(s) Oral once  dextrose 5%. 1000 milliLiter(s) (50 mL/Hr) IV Continuous <Continuous>  dextrose 5%. 1000 milliLiter(s) (100 mL/Hr) IV Continuous <Continuous>  dextrose 50% Injectable 25 Gram(s) IV Push once  dextrose 50% Injectable 12.5 Gram(s) IV Push once  dextrose 50% Injectable 25 Gram(s) IV Push once  doxazosin 4 milliGRAM(s) Oral at bedtime  glucagon  Injectable 1 milliGRAM(s) IntraMuscular once  influenza  Vaccine (HIGH DOSE) 0.7 milliLiter(s) IntraMuscular once  insulin lispro (ADMELOG) corrective regimen sliding scale   SubCutaneous Before meals and at bedtime  levothyroxine 50 MICROGram(s) Oral daily  metoprolol tartrate 12.5 milliGRAM(s) Oral every 12 hours  multivitamin 1 Tablet(s) Oral daily  polyethylene glycol 3350 17 Gram(s) Oral daily  senna 2 Tablet(s) Oral at bedtime  tamsulosin 0.4 milliGRAM(s) Oral daily    MEDICATIONS  (PRN):  acetaminophen     Tablet .. 650 milliGRAM(s) Oral every 6 hours PRN Temp greater or equal to 38C (100.4F), Mild Pain (1 - 3)  methocarbamol 750 milliGRAM(s) Oral every 8 hours PRN Muscle Spasm/Back Pain  oxyCODONE    IR 5 milliGRAM(s) Oral every 4 hours PRN Moderate Pain (4 - 6)  oxyCODONE    IR 10 milliGRAM(s) Oral every 4 hours PRN Severe Pain (7 - 10)    Pertinent Labs:         Skin: midline back sx incision     Nutrition focused physical exam conducted - found signs of malnutrition [ ]absent [ ]present    Subcutaneous fat loss: [ ] Orbital fat pads region, [ ]Buccal fat region, [ ]Triceps region,  [ ]Ribs region    Muscle wasting: [ ]Temples region, [ ]Clavicle region, [ ]Shoulder region, [ ]Scapula region, [ ]Interosseous region,  [ ]thigh region, [ ]Calf region    Estimated Needs:   [ ] no change since previous assessment  [ ] recalculated:     Current Nutrition Diagnosis:  Pt presents at risk secondary to Malnutrition severe chronic,  related to inadequate protein calorie intake in the setting of previous prolonged hospitalization, as evidenced by PO<75% est needs, 16.8% weight loss, and physical findings. PO intake is fair, HBV protein foods encouraged. Aware of plan for dc to [facility].      Recommendations:   1) Continue Diet with Glucerna TID  2) Daily weight  3) Encourage PO intake, assist with meals prn        Monitoring and Evaluation:   [x ] PO intake [x ] Tolerance to diet prescription [X] Weights  [X] Follow up per protocol [X] Labs:
Patient seen and examined with Dr. Gaitan  Neuro exam stable  Incision c/d/i-- new sterile dressing placed  Stable for discharge to [facility] today  Wife updated via telephone

## 2022-03-30 NOTE — DISCHARGE NOTE PROVIDER - HOSPITAL COURSE
6y Male extensive PMH including HTN, HLD, DM, hypothyroidism, asbestosis, CAD s/p PCI, afib on Eliquis, well known to our service after found with L1 burst fracture s/p T12-L1 laminectomy and T10-L4 fusion 1/21/22, recent admission late February for tenting of skin concerning for displaced hardware and small inferior wound dehiscence, sent in 3/17/22 by nursing home with now superior wound dehiscence at previous are of skin tenting and persistent inferior wound dehiscence  3/23: s/p removal of thoracolumbar instrumentation and complex wound closure with Dr. Gaitan/Dr. Schumacher  3/24-3/27: stable   3/28: Ray drain removed   3/29: MERCEDES drain removed. Lovenox started   3/30: Aspirin 81 started. Stable for discharge 6y Male extensive PMH including HTN, HLD, DM, hypothyroidism, asbestosis, CAD s/p PCI, afib on Eliquis, well known to our service after found with L1 burst fracture s/p T12-L1 laminectomy and T10-L4 fusion 1/21/22, recent admission late February for tenting of skin concerning for displaced hardware and small inferior wound dehiscence, sent in 3/17/22 by nursing home with now superior wound dehiscence at previous are of skin tenting and persistent inferior wound dehiscence  3/23: s/p removal of thoracolumbar instrumentation and complex wound closure with Dr. Gaitan/Dr. Schumacher  3/24-3/27: stable   3/28: Ray drain removed   3/29: MERCEDES drain removed. Lovenox started   3/30: Aspirin 81 started. Stable for discharge. 76y Male extensive PMH including HTN, HLD, DM, hypothyroidism, asbestosis, CAD s/p PCI, afib on Eliquis, well known to our service after found with L1 burst fracture s/p T12-L1 laminectomy and T10-L4 fusion 1/21/22, recent admission late February for tenting of skin concerning for displaced hardware and small inferior wound dehiscence, sent in 3/17/22 by nursing home with now superior wound dehiscence at previous are of skin tenting and persistent inferior wound dehiscence  3/23: s/p removal of thoracolumbar instrumentation and complex wound closure with Dr. Gaitan/Dr. Schumacher  3/24-3/27: stable   3/28: Ray drain removed   3/29: MERCEDES drain removed. Lovenox started   3/30: Aspirin 81 started. Stable for discharge.

## 2022-03-30 NOTE — DISCHARGE NOTE PROVIDER - DETAILS OF MALNUTRITION DIAGNOSIS/DIAGNOSES
This patient has been assessed with a concern for Malnutrition and was treated during this hospitalization for the following Nutrition diagnosis/diagnoses:     -  03/24/2022: Severe protein-calorie malnutrition   -  03/24/2022: Underweight (BMI < 19)

## 2022-03-30 NOTE — DISCHARGE NOTE PROVIDER - CARE PROVIDER_API CALL
Scooby Gaitan)  Neurosurgery  [address], [address]  [place] [address]  Phone: [phone]  Fax: [phone]  Follow Up Time:     Gautam Schumacher)  Plastic Surgery; Surgery of the Hand  [address], [address]  [place] [address]  Phone: [phone]  Fax: [phone]  Follow Up Time:

## 2022-03-30 NOTE — DISCHARGE NOTE PROVIDER - NSDCCPCAREPLAN_GEN_ALL_CORE_FT
PRINCIPAL DISCHARGE DIAGNOSIS  Diagnosis: Complication, surgical  Assessment and Plan of Treatment: Wound dehiscence, spinal hardware failure       PRINCIPAL DISCHARGE DIAGNOSIS  Diagnosis: SAH (subarachnoid hemorrhage)  Assessment and Plan of Treatment:       SECONDARY DISCHARGE DIAGNOSES  Diagnosis: DVT, lower extremity  Assessment and Plan of Treatment:     Diagnosis: Urinary retention  Assessment and Plan of Treatment:

## 2022-03-30 NOTE — DISCHARGE NOTE PROVIDER - NSDCHC_MEDRECLITE_GEN_ALL_CORE
Metoprolol 12.5mg bid   Lisinopril 2.5mg daily diuretic lasix as above. -JARVIS results pending, awaiting further plan on valve intervention vs. sending home w/medical management 1. Maintain hemodynamic stability  2. Plan for valve replacement on Monday 1. Maintain hemodynamic stability  2. Plan for valve replacement on Monday. ICD implanted.  Continue Amio load to complete 10 grams. Click to Modify Medication Indication on Note Save

## 2022-03-30 NOTE — DISCHARGE NOTE PROVIDER - CARE PROVIDERS DIRECT ADDRESSES
[email or web address]

## 2022-03-30 NOTE — DISCHARGE NOTE PROVIDER - NSDCCAREPROVSEEN_GEN_ALL_CORE_FT
NSGY Attg:    see above    patient seen and examined    agree with above  LE 5/5    agree with plan as above  brace when OOB

## 2022-04-22 ENCOUNTER — APPOINTMENT (OUTPATIENT)
Dept: NEUROSURGERY | Facility: CLINIC | Age: 77
End: 2022-04-22
Payer: MEDICARE

## 2022-04-22 VITALS
WEIGHT: 158 LBS | SYSTOLIC BLOOD PRESSURE: 108 MMHG | HEART RATE: 79 BPM | BODY MASS INDEX: 19.65 KG/M2 | DIASTOLIC BLOOD PRESSURE: 72 MMHG | HEIGHT: 75 IN | TEMPERATURE: 97.7 F | OXYGEN SATURATION: 95 %

## 2022-04-22 PROCEDURE — 99213 OFFICE O/P EST LOW 20 MIN: CPT

## 2022-04-22 NOTE — REASON FOR VISIT
[de-identified] : ospital Course: \par Discharge Date	30-Mar-2022 \par Admission Date	17-Mar-2022 18:42 \par Reason for Admission	Wound dehiscence \par Hospital Course	 \par 76y Male extensive PMH including HTN, HLD, DM, hypothyroidism, asbestosis, CAD \par s/p PCI, afib on Eliquis, well known to our service after found with L1 burst \par fracture s/p T12-L1 laminectomy and T10-L4 fusion 1/21/22, recent admission \par late February for tenting of skin concerning for displaced hardware and small \par inferior wound dehiscence, sent in 3/17/22 by nursing home with now superior \par wound dehiscence at previous are of skin tenting and persistent inferior wound \par dehiscence \par 3/23: s/p removal of thoracolumbar instrumentation and complex wound closure \par with Dr. Gaitan/Dr. Schumacher \par Patient presents today for post op visit and wound check.   He endorses minimal thoracic region pain. Pain intensity 3/10.  He is urinating in a velasco bag.  He is ambulating with assistance of a walker.  \par Incision is healing well. \par No fever or chills.

## 2022-04-22 NOTE — ASSESSMENT
[FreeTextEntry1] : Mr. Dumont is doing well from the post operative perspective.  Patient was evaluated by Dr. Schumacher (plastics) - no additional follow up recommended. \par His incision is healing well\par Plan:\par CT thoracic and lumbar spine to assess bone structures.\par Continue hard LSO brace when OOB\par Maintain fall precautions.\par \par Continue physical therapy.\par Patient has been given an opportunity to ask and have their questions answered to their satisfaction.\par Patient knows to call the office if there are any new or worsening symptoms.\par

## 2022-04-22 NOTE — PHYSICAL EXAM
[General Appearance - Alert] : alert [General Appearance - In No Acute Distress] : in no acute distress [Longitudinal] : longitudinal [Clean] : clean [Dry] : dry [Intact] : intact [No Drainage] : without drainage [Normal Skin] : normal [Person] : oriented to person [Place] : oriented to place [Time] : oriented to time [Short Term Intact] : short term memory intact [Remote Intact] : remote memory intact [Span Intact] : the attention span was normal [Concentration Intact] : normal concentrating ability [Fluency] : fluency intact [Comprehension] : comprehension intact [Current Events] : adequate knowledge of current events [Past History] : adequate knowledge of personal past history [Vocabulary] : adequate range of vocabulary [Cranial Nerves Optic (II)] : visual acuity intact bilaterally,  pupils equal round and reactive to light [Cranial Nerves Oculomotor (III)] : extraocular motion intact [Cranial Nerves Trigeminal (V)] : facial sensation intact symmetrically [Cranial Nerves Facial (VII)] : face symmetrical [Cranial Nerves Glossopharyngeal (IX)] : tongue and palate midline [Cranial Nerves Accessory (XI - Cranial And Spinal)] : head turning and shoulder shrug symmetric [Cranial Nerves Hypoglossal (XII)] : there was no tongue deviation with protrusion [Motor Tone] : muscle tone was normal in all four extremities [Motor Strength] : muscle strength was normal in all four extremities [No Muscle Atrophy] : normal bulk in all four extremities [Sensation Tactile Decrease] : light touch was intact [Sensation Pain / Temperature Decrease] : pain and temperature was intact [Abnormal Walk] : normal gait [Balance] : balance was intact [No Visual Abnormalities] : no visible abnormailities [Antalgic] : antalgic [Erythema] : not erythematous [Tender] : not tender [FreeTextEntry1] : thoracic region  [Past-pointing] : there was no past-pointing [Tremor] : no tremor present [FreeTextEntry8] : presents in a wheelchair- able to walk a couple of steps in the room

## 2022-06-08 ENCOUNTER — APPOINTMENT (OUTPATIENT)
Dept: NEUROSURGERY | Facility: CLINIC | Age: 77
End: 2022-06-08

## 2022-06-13 ENCOUNTER — APPOINTMENT (OUTPATIENT)
Dept: NEUROSURGERY | Facility: CLINIC | Age: 77
End: 2022-06-13

## 2022-06-13 VITALS
HEART RATE: 97 BPM | OXYGEN SATURATION: 98 % | SYSTOLIC BLOOD PRESSURE: 114 MMHG | TEMPERATURE: 97.4 F | HEIGHT: 75 IN | BODY MASS INDEX: 18.4 KG/M2 | DIASTOLIC BLOOD PRESSURE: 68 MMHG | WEIGHT: 148 LBS

## 2022-06-13 PROCEDURE — 99213 OFFICE O/P EST LOW 20 MIN: CPT

## 2022-06-13 NOTE — ASSESSMENT
[FreeTextEntry1] : Mr. Dumont is doing well from the post operative perspective.  Patient was evaluated by Dr. Schumacher (plastics) - no additional follow up recommended. \par His incision is healing well. Preliminarily the CT lumbar spine is stable. \par Plan:\par Will obtain disc for formal  review.  \par Continue hard LSO brace when OOB\par Maintain fall precautions.\par \par Continue physical therapy.\par Patient has been given an opportunity to ask and have their questions answered to their satisfaction.\par Patient knows to call the office if there are any new or worsening symptoms.\par \par \par I, Dr. Scooby Gaitan, personally performed the evaluation and management (E/M) services for this patient.  That E/M includes assessment of the initial examination, assessing the pertinent medical and surgical history, and establishing the plan of care.  At the time of the visit, my ACP, Angie Aguilar, actively participated in the evaluation and management services for this patient to be followed going forward in accordance with the plan documented in the clinical note.\par \par \par

## 2022-06-13 NOTE — REASON FOR VISIT
[de-identified] : ospital Course: \par Discharge Date	30-Mar-2022 \par Admission Date	17-Mar-2022 18:42 \par Reason for Admission	Wound dehiscence \par Hospital Course	 \par 76y Male extensive PMH including HTN, HLD, DM, hypothyroidism, asbestosis, CAD \par s/p PCI, afib on Eliquis, well known to our service after found with L1 burst \par fracture s/p T12-L1 laminectomy and T10-L4 fusion 1/21/22, recent admission \par late February for tenting of skin concerning for displaced hardware and small \par inferior wound dehiscence, sent in 3/17/22 by nursing home with now superior \par wound dehiscence at previous are of skin tenting and persistent inferior wound \par dehiscence \par 3/23: s/p removal of thoracolumbar instrumentation and complex wound closure \par with Dr. Gaitan/Dr. Schumacher \par Patient presents today for post op visit and wound check.   He endorses minimal thoracic region pain. Pain intensity 3/10.  He is urinating in a velasco bag.  He is ambulating with assistance of a walker.  \par Incision is healing well. \par No fever or chills.

## 2022-06-13 NOTE — PHYSICAL EXAM
[General Appearance - Alert] : alert [General Appearance - In No Acute Distress] : in no acute distress [Longitudinal] : longitudinal [Clean] : clean [Dry] : dry [Intact] : intact [No Drainage] : without drainage [Normal Skin] : normal [Erythema] : not erythematous [Tender] : not tender [FreeTextEntry1] : thoracic region  [Person] : oriented to person [Place] : oriented to place [Time] : oriented to time [Short Term Intact] : short term memory intact [Remote Intact] : remote memory intact [Span Intact] : the attention span was normal [Concentration Intact] : normal concentrating ability [Fluency] : fluency intact [Comprehension] : comprehension intact [Current Events] : adequate knowledge of current events [Past History] : adequate knowledge of personal past history [Cranial Nerves Optic (II)] : visual acuity intact bilaterally,  pupils equal round and reactive to light [Vocabulary] : adequate range of vocabulary [Cranial Nerves Oculomotor (III)] : extraocular motion intact [Cranial Nerves Trigeminal (V)] : facial sensation intact symmetrically [Cranial Nerves Facial (VII)] : face symmetrical [Cranial Nerves Accessory (XI - Cranial And Spinal)] : head turning and shoulder shrug symmetric [Cranial Nerves Glossopharyngeal (IX)] : tongue and palate midline [Cranial Nerves Hypoglossal (XII)] : there was no tongue deviation with protrusion [Motor Tone] : muscle tone was normal in all four extremities [Motor Strength] : muscle strength was normal in all four extremities [No Muscle Atrophy] : normal bulk in all four extremities [Sensation Tactile Decrease] : light touch was intact [Sensation Pain / Temperature Decrease] : pain and temperature was intact [Abnormal Walk] : normal gait [Balance] : balance was intact [Past-pointing] : there was no past-pointing [Tremor] : no tremor present [FreeTextEntry8] : presents in a wheelchair- able to walk a couple of steps in the room  [No Visual Abnormalities] : no visible abnormailities [Antalgic] : antalgic

## 2022-06-13 NOTE — DATA REVIEWED
[de-identified] : XAM:  CT LUMBAR SPINE WITHOUT CONTRAST\par \par Note - This patient has received 1 CT studies and 0 Myocardial Perfusion studies within our network over the previous 12 month period.\par \par HISTORY:  Follow-up fracture\par \par TECHNIQUE:  CT was performed without contrast with axial multislice multidetector technique. Sagittal, coronal and axial reformatted images were then obtained. One or more of the following dose reduction techniques were used: automated exposure control, adjustment of the mA and/or kV according to patient size, use of iterative reconstruction technique. \par \par COMPARISON:  12/13/2021\par \par FINDINGS:   There is a burst fracture of L1 with approximately 80% loss of height, progressed compared with the prior study. Retropulsion into the spinal canal measures approximately 15 mL. Stenosis post posterior decompression at the L1 level. There is retrolisthesis of L1 and L2 with retrolisthesis of the spinal canal cranial to T12. Stigmata of hardware within the T10-11 and T12, L2, L3 and L4 vertebral bodies. Morselized bone graft posteriorly. No fusion across the facets. Soft tissue swelling and severe central canal stenosis at L1.\par \par Mild anterolisthesis of L4 and L5. Mild loss of disc space height across remainder of the lumbar spine. Mild degenerative endplate change at L4/L5 and L5/S1.\par \par Impression:\par 1. Posterior decompression at L1; progressive loss of height at the L1 burst fracture with retrolisthesis of L1 into the spinal canal and severe central canal stenosis.\par 2. Removal of hardware from T10 through L3.\par 3. Mild lumbar spondylosis.\par

## 2022-06-17 NOTE — PHYSICAL THERAPY INITIAL EVALUATION ADULT - LEVEL OF CONSCIOUSNESS, REHAB EVAL
alert Ivermectin Counseling:  Patient instructed to take medication on an empty stomach with a full glass of water.  Patient informed of potential adverse effects including but not limited to nausea, diarrhea, dizziness, itching, and swelling of the extremities or lymph nodes.  The patient verbalized understanding of the proper use and possible adverse effects of ivermectin.  All of the patient's questions and concerns were addressed.

## 2022-09-13 ENCOUNTER — APPOINTMENT (OUTPATIENT)
Dept: NEUROSURGERY | Facility: CLINIC | Age: 77
End: 2022-09-13

## 2022-09-13 VITALS
TEMPERATURE: 97.3 F | WEIGHT: 153 LBS | SYSTOLIC BLOOD PRESSURE: 121 MMHG | OXYGEN SATURATION: 95 % | HEIGHT: 72 IN | HEART RATE: 87 BPM | DIASTOLIC BLOOD PRESSURE: 73 MMHG | BODY MASS INDEX: 20.72 KG/M2

## 2022-09-13 PROCEDURE — 99213 OFFICE O/P EST LOW 20 MIN: CPT

## 2022-09-13 NOTE — ASSESSMENT
[FreeTextEntry1] : Mr. Dumont is doing well from the post operative perspective.  Patient was evaluated by Dr. Schumacher (plastics) - no additional follow up recommended. \par His incision is healing well. Preliminarily the CT lumbar spine is stable. The hard LSO brace is ill fitting. \par Plan:\par Repeat CT thoracic and lumbar spine now\par Start physical therapy for strengtheniing. \par Continue hard LSO brace when OOB\par Maintain fall precautions.\par \par Continue physical therapy.\par Patient has been given an opportunity to ask and have their questions answered to their satisfaction.\par Patient knows to call the office if there are any new or worsening symptoms.\par \par \par \par \par

## 2022-09-13 NOTE — PHYSICAL EXAM
[General Appearance - Alert] : alert [General Appearance - In No Acute Distress] : in no acute distress [Longitudinal] : longitudinal [Clean] : clean [Dry] : dry [Intact] : intact [No Drainage] : without drainage [Normal Skin] : normal [Erythema] : not erythematous [Tender] : not tender [FreeTextEntry1] : thoracic region  [Person] : oriented to person [Place] : oriented to place [Time] : oriented to time [Short Term Intact] : short term memory intact [Remote Intact] : remote memory intact [Span Intact] : the attention span was normal [Concentration Intact] : normal concentrating ability [Fluency] : fluency intact [Comprehension] : comprehension intact [Current Events] : adequate knowledge of current events [Past History] : adequate knowledge of personal past history [Vocabulary] : adequate range of vocabulary [Cranial Nerves Optic (II)] : visual acuity intact bilaterally,  pupils equal round and reactive to light [Cranial Nerves Oculomotor (III)] : extraocular motion intact [Cranial Nerves Trigeminal (V)] : facial sensation intact symmetrically [Cranial Nerves Facial (VII)] : face symmetrical [Cranial Nerves Glossopharyngeal (IX)] : tongue and palate midline [Cranial Nerves Accessory (XI - Cranial And Spinal)] : head turning and shoulder shrug symmetric [Cranial Nerves Hypoglossal (XII)] : there was no tongue deviation with protrusion [Motor Tone] : muscle tone was normal in all four extremities [Motor Strength] : muscle strength was normal in all four extremities [No Muscle Atrophy] : normal bulk in all four extremities [Sensation Tactile Decrease] : light touch was intact [Sensation Pain / Temperature Decrease] : pain and temperature was intact [Abnormal Walk] : normal gait [Balance] : balance was intact [Past-pointing] : there was no past-pointing [Tremor] : no tremor present [FreeTextEntry8] : presents in a wheelchair- able to walk a couple of steps in the room  [No Visual Abnormalities] : no visible abnormailities [Antalgic] : antalgic

## 2022-09-13 NOTE — REASON FOR VISIT
[de-identified] : ospital Course: \par Discharge Date	30-Mar-2022 \par Admission Date	17-Mar-2022 18:42 \par Reason for Admission	Wound dehiscence \par Hospital Course	 \par 76y Male extensive PMH including HTN, HLD, DM, hypothyroidism, asbestosis, CAD \par s/p PCI, afib on Eliquis, well known to our service after found with L1 burst \par fracture s/p T12-L1 laminectomy and T10-L4 fusion 1/21/22, recent admission \par late February for tenting of skin concerning for displaced hardware and small \par inferior wound dehiscence, sent in 3/17/22 by nursing home with now superior \par wound dehiscence at previous are of skin tenting and persistent inferior wound \par dehiscence \par 3/23: s/p removal of thoracolumbar instrumentation and complex wound closure \par with Dr. Gaitan/Dr. Schumacher \par Patient presents today for post op visit and wound check.   He endorses minimal thoracic region pain. Pain intensity 3/10.  He is urinating in a velasco bag.  He is ambulating with assistance of a walker.  \par Incision is healing well. \par No fever or chills.

## 2022-09-13 NOTE — DATA REVIEWED
[de-identified] : XAM:  CT LUMBAR SPINE WITHOUT CONTRAST\par \par Note - This patient has received 1 CT studies and 0 Myocardial Perfusion studies within our network over the previous 12 month period.\par \par HISTORY:  Follow-up fracture\par \par TECHNIQUE:  CT was performed without contrast with axial multislice multidetector technique. Sagittal, coronal and axial reformatted images were then obtained. One or more of the following dose reduction techniques were used: automated exposure control, adjustment of the mA and/or kV according to patient size, use of iterative reconstruction technique. \par \par COMPARISON:  12/13/2021\par \par FINDINGS:   There is a burst fracture of L1 with approximately 80% loss of height, progressed compared with the prior study. Retropulsion into the spinal canal measures approximately 15 mL. Stenosis post posterior decompression at the L1 level. There is retrolisthesis of L1 and L2 with retrolisthesis of the spinal canal cranial to T12. Stigmata of hardware within the T10-11 and T12, L2, L3 and L4 vertebral bodies. Morselized bone graft posteriorly. No fusion across the facets. Soft tissue swelling and severe central canal stenosis at L1.\par \par Mild anterolisthesis of L4 and L5. Mild loss of disc space height across remainder of the lumbar spine. Mild degenerative endplate change at L4/L5 and L5/S1.\par \par Impression:\par 1. Posterior decompression at L1; progressive loss of height at the L1 burst fracture with retrolisthesis of L1 into the spinal canal and severe central canal stenosis.\par 2. Removal of hardware from T10 through L3.\par 3. Mild lumbar spondylosis.\par

## 2022-11-08 ENCOUNTER — OFFICE (OUTPATIENT)
Dept: URBAN - METROPOLITAN AREA CLINIC 94 | Facility: CLINIC | Age: 77
Setting detail: OPHTHALMOLOGY
End: 2022-11-08
Payer: MEDICARE

## 2022-11-08 DIAGNOSIS — H35.3221: ICD-10-CM

## 2022-11-08 DIAGNOSIS — H35.3112: ICD-10-CM

## 2022-11-08 DIAGNOSIS — H43.393: ICD-10-CM

## 2022-11-08 DIAGNOSIS — E11.9: ICD-10-CM

## 2022-11-08 PROCEDURE — 67028 INJECTION EYE DRUG: CPT | Performed by: OPHTHALMOLOGY

## 2022-11-08 PROCEDURE — 92134 CPTRZ OPH DX IMG PST SGM RTA: CPT | Performed by: OPHTHALMOLOGY

## 2022-11-08 ASSESSMENT — KERATOMETRY
OS_K1POWER_DIOPTERS: 43.75
OS_K2POWER_DIOPTERS: 45.00
OD_K1POWER_DIOPTERS: 44.00
OD_AXISANGLE_DEGREES: 065
OD_K2POWER_DIOPTERS: 45.00
OS_AXISANGLE_DEGREES: 115
METHOD_AUTO_MANUAL: AUTO

## 2022-11-08 ASSESSMENT — AXIALLENGTH_DERIVED
OS_AL: 23.3223
OD_AL: 23.8607

## 2022-11-08 ASSESSMENT — SPHEQUIV_DERIVED
OD_SPHEQUIV: -1.625
OS_SPHEQUIV: -0.125

## 2022-11-08 ASSESSMENT — REFRACTION_AUTOREFRACTION
OS_SPHERE: +0.25
OS_AXIS: 062
OD_AXIS: 166
OS_CYLINDER: -0.75
OD_SPHERE: -1.25
OD_CYLINDER: -0.75

## 2022-11-08 ASSESSMENT — VISUAL ACUITY
OD_BCVA: 20/200
OS_BCVA: 20/40-1

## 2022-11-08 ASSESSMENT — TONOMETRY
OD_IOP_MMHG: 19
OS_IOP_MMHG: 18

## 2022-11-08 ASSESSMENT — CONFRONTATIONAL VISUAL FIELD TEST (CVF)
OD_FINDINGS: FULL
OS_FINDINGS: FULL

## 2022-11-12 ENCOUNTER — TRANSCRIPTION ENCOUNTER (OUTPATIENT)
Age: 77
End: 2022-11-12

## 2022-11-13 ENCOUNTER — TRANSCRIPTION ENCOUNTER (OUTPATIENT)
Age: 77
End: 2022-11-13

## 2022-11-13 ENCOUNTER — INPATIENT (INPATIENT)
Facility: HOSPITAL | Age: 77
LOS: 2 days | Discharge: HOME | DRG: 351 | End: 2022-11-16
Attending: SURGERY | Admitting: SURGERY
Payer: MEDICARE

## 2022-11-13 VITALS
OXYGEN SATURATION: 98 % | SYSTOLIC BLOOD PRESSURE: 130 MMHG | TEMPERATURE: 98 F | HEART RATE: 80 BPM | HEIGHT: 75 IN | DIASTOLIC BLOOD PRESSURE: 74 MMHG | RESPIRATION RATE: 20 BRPM | WEIGHT: 169.98 LBS

## 2022-11-13 DIAGNOSIS — Z98.89 OTHER SPECIFIED POSTPROCEDURAL STATES: Chronic | ICD-10-CM

## 2022-11-13 DIAGNOSIS — K56.609 UNSPECIFIED INTESTINAL OBSTRUCTION, UNSPECIFIED AS TO PARTIAL VERSUS COMPLETE OBSTRUCTION: ICD-10-CM

## 2022-11-13 DIAGNOSIS — Z09 ENCOUNTER FOR FOLLOW-UP EXAMINATION AFTER COMPLETED TREATMENT FOR CONDITIONS OTHER THAN MALIGNANT NEOPLASM: Chronic | ICD-10-CM

## 2022-11-13 DIAGNOSIS — Z90.12 ACQUIRED ABSENCE OF LEFT BREAST AND NIPPLE: Chronic | ICD-10-CM

## 2022-11-13 DIAGNOSIS — Z90.49 ACQUIRED ABSENCE OF OTHER SPECIFIED PARTS OF DIGESTIVE TRACT: Chronic | ICD-10-CM

## 2022-11-13 LAB
ALBUMIN SERPL ELPH-MCNC: 4.4 G/DL — SIGNIFICANT CHANGE UP (ref 3.3–5.2)
ALP SERPL-CCNC: 135 U/L — HIGH (ref 40–120)
ALT FLD-CCNC: 18 U/L — SIGNIFICANT CHANGE UP
ANION GAP SERPL CALC-SCNC: 16 MMOL/L — SIGNIFICANT CHANGE UP (ref 5–17)
APPEARANCE UR: ABNORMAL
APTT BLD: 34.8 SEC — SIGNIFICANT CHANGE UP (ref 27.5–35.5)
APTT BLD: 36.8 SEC — HIGH (ref 27.5–35.5)
AST SERPL-CCNC: 17 U/L — SIGNIFICANT CHANGE UP
BACTERIA # UR AUTO: ABNORMAL
BASE EXCESS BLDV CALC-SCNC: 0.6 MMOL/L — SIGNIFICANT CHANGE UP (ref -2–3)
BASOPHILS # BLD AUTO: 0.06 K/UL — SIGNIFICANT CHANGE UP (ref 0–0.2)
BASOPHILS NFR BLD AUTO: 0.4 % — SIGNIFICANT CHANGE UP (ref 0–2)
BILIRUB SERPL-MCNC: 1 MG/DL — SIGNIFICANT CHANGE UP (ref 0.4–2)
BILIRUB UR-MCNC: NEGATIVE — SIGNIFICANT CHANGE UP
BLD GP AB SCN SERPL QL: SIGNIFICANT CHANGE UP
BUN SERPL-MCNC: 23.8 MG/DL — HIGH (ref 8–20)
CA-I SERPL-SCNC: 1.17 MMOL/L — SIGNIFICANT CHANGE UP (ref 1.15–1.33)
CALCIUM SERPL-MCNC: 10.1 MG/DL — SIGNIFICANT CHANGE UP (ref 8.4–10.5)
CHLORIDE BLDV-SCNC: 101 MMOL/L — SIGNIFICANT CHANGE UP (ref 96–108)
CHLORIDE SERPL-SCNC: 97 MMOL/L — SIGNIFICANT CHANGE UP (ref 96–108)
CO2 SERPL-SCNC: 23 MMOL/L — SIGNIFICANT CHANGE UP (ref 22–29)
COLOR SPEC: ABNORMAL
CREAT SERPL-MCNC: 0.7 MG/DL — SIGNIFICANT CHANGE UP (ref 0.5–1.3)
DIFF PNL FLD: ABNORMAL
EGFR: 94 ML/MIN/1.73M2 — SIGNIFICANT CHANGE UP
EOSINOPHIL # BLD AUTO: 0.01 K/UL — SIGNIFICANT CHANGE UP (ref 0–0.5)
EOSINOPHIL NFR BLD AUTO: 0.1 % — SIGNIFICANT CHANGE UP (ref 0–6)
EPI CELLS # UR: SIGNIFICANT CHANGE UP
GAS PNL BLDV: 134 MMOL/L — LOW (ref 136–145)
GAS PNL BLDV: SIGNIFICANT CHANGE UP
GAS PNL BLDV: SIGNIFICANT CHANGE UP
GLUCOSE BLDC GLUCOMTR-MCNC: 141 MG/DL — HIGH (ref 70–99)
GLUCOSE BLDV-MCNC: 179 MG/DL — HIGH (ref 70–99)
GLUCOSE SERPL-MCNC: 169 MG/DL — HIGH (ref 70–99)
GLUCOSE UR QL: NEGATIVE — SIGNIFICANT CHANGE UP
HCO3 BLDV-SCNC: 26 MMOL/L — SIGNIFICANT CHANGE UP (ref 22–29)
HCT VFR BLD CALC: 42.5 % — SIGNIFICANT CHANGE UP (ref 39–50)
HCT VFR BLD CALC: 43.7 % — SIGNIFICANT CHANGE UP (ref 39–50)
HCT VFR BLDA CALC: 45 % — SIGNIFICANT CHANGE UP
HGB BLD CALC-MCNC: 15.1 G/DL — SIGNIFICANT CHANGE UP (ref 12.6–17.4)
HGB BLD-MCNC: 14.5 G/DL — SIGNIFICANT CHANGE UP (ref 13–17)
HGB BLD-MCNC: 14.8 G/DL — SIGNIFICANT CHANGE UP (ref 13–17)
IMM GRANULOCYTES NFR BLD AUTO: 0.9 % — SIGNIFICANT CHANGE UP (ref 0–0.9)
INR BLD: 1.32 RATIO — HIGH (ref 0.88–1.16)
INR BLD: 1.41 RATIO — HIGH (ref 0.88–1.16)
KETONES UR-MCNC: ABNORMAL
LACTATE BLDV-MCNC: 2.9 MMOL/L — HIGH (ref 0.5–2)
LEUKOCYTE ESTERASE UR-ACNC: ABNORMAL
LIDOCAIN IGE QN: 16 U/L — LOW (ref 22–51)
LYMPHOCYTES # BLD AUTO: 0.97 K/UL — LOW (ref 1–3.3)
LYMPHOCYTES # BLD AUTO: 5.9 % — LOW (ref 13–44)
MCHC RBC-ENTMCNC: 31.4 PG — SIGNIFICANT CHANGE UP (ref 27–34)
MCHC RBC-ENTMCNC: 31.5 PG — SIGNIFICANT CHANGE UP (ref 27–34)
MCHC RBC-ENTMCNC: 33.9 GM/DL — SIGNIFICANT CHANGE UP (ref 32–36)
MCHC RBC-ENTMCNC: 34.1 GM/DL — SIGNIFICANT CHANGE UP (ref 32–36)
MCV RBC AUTO: 92 FL — SIGNIFICANT CHANGE UP (ref 80–100)
MCV RBC AUTO: 93 FL — SIGNIFICANT CHANGE UP (ref 80–100)
MONOCYTES # BLD AUTO: 0.94 K/UL — HIGH (ref 0–0.9)
MONOCYTES NFR BLD AUTO: 5.7 % — SIGNIFICANT CHANGE UP (ref 2–14)
NEUTROPHILS # BLD AUTO: 14.33 K/UL — HIGH (ref 1.8–7.4)
NEUTROPHILS NFR BLD AUTO: 87 % — HIGH (ref 43–77)
NITRITE UR-MCNC: POSITIVE
PCO2 BLDV: 49 MMHG — SIGNIFICANT CHANGE UP (ref 42–55)
PH BLDV: 7.34 — SIGNIFICANT CHANGE UP (ref 7.32–7.43)
PH UR: 5 — SIGNIFICANT CHANGE UP (ref 5–8)
PLATELET # BLD AUTO: 444 K/UL — HIGH (ref 150–400)
PLATELET # BLD AUTO: 466 K/UL — HIGH (ref 150–400)
PO2 BLDV: 59 MMHG — HIGH (ref 25–45)
POTASSIUM BLDV-SCNC: 5.9 MMOL/L — HIGH (ref 3.5–5.1)
POTASSIUM SERPL-MCNC: 4.9 MMOL/L — SIGNIFICANT CHANGE UP (ref 3.5–5.3)
POTASSIUM SERPL-SCNC: 4.9 MMOL/L — SIGNIFICANT CHANGE UP (ref 3.5–5.3)
PROT SERPL-MCNC: 8.1 G/DL — SIGNIFICANT CHANGE UP (ref 6.6–8.7)
PROT UR-MCNC: 300 MG/DL — SIGNIFICANT CHANGE UP
PROTHROM AB SERPL-ACNC: 15.4 SEC — HIGH (ref 10.5–13.4)
PROTHROM AB SERPL-ACNC: 16.4 SEC — HIGH (ref 10.5–13.4)
RAPID RVP RESULT: SIGNIFICANT CHANGE UP
RBC # BLD: 4.62 M/UL — SIGNIFICANT CHANGE UP (ref 4.2–5.8)
RBC # BLD: 4.7 M/UL — SIGNIFICANT CHANGE UP (ref 4.2–5.8)
RBC # FLD: 13.9 % — SIGNIFICANT CHANGE UP (ref 10.3–14.5)
RBC # FLD: 13.9 % — SIGNIFICANT CHANGE UP (ref 10.3–14.5)
RBC CASTS # UR COMP ASSIST: >50 /HPF (ref 0–4)
SAO2 % BLDV: 83.6 % — SIGNIFICANT CHANGE UP
SARS-COV-2 RNA SPEC QL NAA+PROBE: SIGNIFICANT CHANGE UP
SODIUM SERPL-SCNC: 136 MMOL/L — SIGNIFICANT CHANGE UP (ref 135–145)
SP GR SPEC: 1.02 — SIGNIFICANT CHANGE UP (ref 1.01–1.02)
UROBILINOGEN FLD QL: NEGATIVE — SIGNIFICANT CHANGE UP
WBC # BLD: 15.22 K/UL — HIGH (ref 3.8–10.5)
WBC # BLD: 16.46 K/UL — HIGH (ref 3.8–10.5)
WBC # FLD AUTO: 15.22 K/UL — HIGH (ref 3.8–10.5)
WBC # FLD AUTO: 16.46 K/UL — HIGH (ref 3.8–10.5)
WBC UR QL: ABNORMAL /HPF (ref 0–5)

## 2022-11-13 PROCEDURE — 93010 ELECTROCARDIOGRAM REPORT: CPT

## 2022-11-13 PROCEDURE — 99285 EMERGENCY DEPT VISIT HI MDM: CPT

## 2022-11-13 PROCEDURE — 74177 CT ABD & PELVIS W/CONTRAST: CPT | Mod: 26,MD

## 2022-11-13 DEVICE — LIGATING CLIPS WECK HORIZON MEDIUM (BLUE) 6: Type: IMPLANTABLE DEVICE | Site: LEFT | Status: FUNCTIONAL

## 2022-11-13 RX ORDER — ONDANSETRON 8 MG/1
4 TABLET, FILM COATED ORAL ONCE
Refills: 0 | Status: COMPLETED | OUTPATIENT
Start: 2022-11-13 | End: 2022-11-13

## 2022-11-13 RX ORDER — MORPHINE SULFATE 50 MG/1
4 CAPSULE, EXTENDED RELEASE ORAL ONCE
Refills: 0 | Status: DISCONTINUED | OUTPATIENT
Start: 2022-11-13 | End: 2022-11-13

## 2022-11-13 RX ORDER — ASPIRIN/CALCIUM CARB/MAGNESIUM 324 MG
81 TABLET ORAL DAILY
Refills: 0 | Status: DISCONTINUED | OUTPATIENT
Start: 2022-11-13 | End: 2022-11-16

## 2022-11-13 RX ORDER — DIATRIZOATE MEGLUMINE 180 MG/ML
30 INJECTION, SOLUTION INTRAVESICAL ONCE
Refills: 0 | Status: COMPLETED | OUTPATIENT
Start: 2022-11-13 | End: 2022-11-13

## 2022-11-13 RX ORDER — ONDANSETRON 8 MG/1
4 TABLET, FILM COATED ORAL ONCE
Refills: 0 | Status: DISCONTINUED | OUTPATIENT
Start: 2022-11-13 | End: 2022-11-14

## 2022-11-13 RX ORDER — LEVOTHYROXINE SODIUM 125 MCG
50 TABLET ORAL DAILY
Refills: 0 | Status: DISCONTINUED | OUTPATIENT
Start: 2022-11-13 | End: 2022-11-16

## 2022-11-13 RX ORDER — SODIUM CHLORIDE 9 MG/ML
1000 INJECTION, SOLUTION INTRAVENOUS
Refills: 0 | Status: DISCONTINUED | OUTPATIENT
Start: 2022-11-13 | End: 2022-11-14

## 2022-11-13 RX ORDER — GLUCAGON INJECTION, SOLUTION 0.5 MG/.1ML
1 INJECTION, SOLUTION SUBCUTANEOUS ONCE
Refills: 0 | Status: DISCONTINUED | OUTPATIENT
Start: 2022-11-13 | End: 2022-11-16

## 2022-11-13 RX ORDER — METOPROLOL TARTRATE 50 MG
25 TABLET ORAL DAILY
Refills: 0 | Status: DISCONTINUED | OUTPATIENT
Start: 2022-11-13 | End: 2022-11-13

## 2022-11-13 RX ORDER — METOPROLOL TARTRATE 50 MG
12.5 TABLET ORAL
Refills: 0 | Status: DISCONTINUED | OUTPATIENT
Start: 2022-11-13 | End: 2022-11-14

## 2022-11-13 RX ORDER — ATORVASTATIN CALCIUM 80 MG/1
20 TABLET, FILM COATED ORAL AT BEDTIME
Refills: 0 | Status: DISCONTINUED | OUTPATIENT
Start: 2022-11-13 | End: 2022-11-16

## 2022-11-13 RX ORDER — DEXTROSE 50 % IN WATER 50 %
15 SYRINGE (ML) INTRAVENOUS ONCE
Refills: 0 | Status: DISCONTINUED | OUTPATIENT
Start: 2022-11-13 | End: 2022-11-16

## 2022-11-13 RX ORDER — DEXTROSE 50 % IN WATER 50 %
25 SYRINGE (ML) INTRAVENOUS ONCE
Refills: 0 | Status: DISCONTINUED | OUTPATIENT
Start: 2022-11-13 | End: 2022-11-16

## 2022-11-13 RX ORDER — FENTANYL CITRATE 50 UG/ML
50 INJECTION INTRAVENOUS
Refills: 0 | Status: DISCONTINUED | OUTPATIENT
Start: 2022-11-13 | End: 2022-11-14

## 2022-11-13 RX ORDER — SODIUM CHLORIDE 9 MG/ML
1000 INJECTION INTRAMUSCULAR; INTRAVENOUS; SUBCUTANEOUS ONCE
Refills: 0 | Status: COMPLETED | OUTPATIENT
Start: 2022-11-13 | End: 2022-11-13

## 2022-11-13 RX ORDER — FENTANYL CITRATE 50 UG/ML
25 INJECTION INTRAVENOUS
Refills: 0 | Status: DISCONTINUED | OUTPATIENT
Start: 2022-11-13 | End: 2022-11-14

## 2022-11-13 RX ORDER — INSULIN LISPRO 100/ML
VIAL (ML) SUBCUTANEOUS
Refills: 0 | Status: DISCONTINUED | OUTPATIENT
Start: 2022-11-13 | End: 2022-11-14

## 2022-11-13 RX ORDER — DEXTROSE 50 % IN WATER 50 %
12.5 SYRINGE (ML) INTRAVENOUS ONCE
Refills: 0 | Status: DISCONTINUED | OUTPATIENT
Start: 2022-11-13 | End: 2022-11-16

## 2022-11-13 RX ORDER — ENOXAPARIN SODIUM 100 MG/ML
40 INJECTION SUBCUTANEOUS EVERY 24 HOURS
Refills: 0 | Status: DISCONTINUED | OUTPATIENT
Start: 2022-11-13 | End: 2022-11-16

## 2022-11-13 RX ADMIN — ATORVASTATIN CALCIUM 20 MILLIGRAM(S): 80 TABLET, FILM COATED ORAL at 23:40

## 2022-11-13 RX ADMIN — SODIUM CHLORIDE 1000 MILLILITER(S): 9 INJECTION INTRAMUSCULAR; INTRAVENOUS; SUBCUTANEOUS at 15:19

## 2022-11-13 RX ADMIN — DIATRIZOATE MEGLUMINE 30 MILLILITER(S): 180 INJECTION, SOLUTION INTRAVESICAL at 14:01

## 2022-11-13 RX ADMIN — ONDANSETRON 4 MILLIGRAM(S): 8 TABLET, FILM COATED ORAL at 13:55

## 2022-11-13 RX ADMIN — MORPHINE SULFATE 4 MILLIGRAM(S): 50 CAPSULE, EXTENDED RELEASE ORAL at 13:55

## 2022-11-13 NOTE — ED PROVIDER NOTE - PHYSICAL EXAMINATION
Vital Signs per nursing documentation  Gen: well appearing, no acute distress  HEENT: NCAT, MMM  Cardiac: regular rate rhythm, normal S1S2  Chest: clear to auscultation bilateral, no wheezes or crackles  Abdomen: soft, tender diffusely, palpable lump in left groin   Extremity: no gross deformity, good perfusion  Skin: no rash  Neuro: nonfocal neuro exam, gait steady

## 2022-11-13 NOTE — ED PROVIDER NOTE - IV ALTEPLASE EXCL ABS HIDDEN
Spoke to pt's mother, states pt received her cpap machine and [facility] is needing an order for cpap supplies. Will contact dme, closed for today.    show

## 2022-11-13 NOTE — BRIEF OPERATIVE NOTE - OPERATION/FINDINGS
incarcerated femoral hernia, sac opened, questionable area of intestine which improved and noted to have peristalsis, sac suture ligated, perfomed damien hernia repair

## 2022-11-13 NOTE — H&P ADULT - HISTORY OF PRESENT ILLNESS
HPI: 77y Male with PMHx of Afib (on eliquis), DM, HTN, HLD present to ED with abdominal pain, patient states his pain started yesterday associated with vomiting 2x, patient with L inguinal hernia he endorses severe pain from. Patient denies fevers/chills, denies lightheadedness/dizziness, denies SOB/chest pain.     ROS: 10-system review is otherwise negative except HPI above.      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      S/P ORIF (open reduction internal fixation) fracture  right elbow 1962, LILLIANA 3 months later      S/P cholecystectomy      S/P inguinal hernia repair  bilateral      S/P T&amp;A (status post tonsillectomy and adenoidectomy)  as child      S/P angioplasty with stent  2013 4 stents      S/P excision of skin lesion, follow-up exam  benign testicle      S/P colonoscopy  4 years ago, no polyp        FAMILY HISTORY:  Family history of heart attack (Father)    Family history of stroke (Mother)    Family history of diabetes mellitus (Mother, Sibling)    Family history of cardiomegaly (Mother)    Family history of colon cancer (Sibling)    Family history of pancreatic cancer (Sibling)      Family history not pertinent as reviewed with the patient.    SOCIAL HISTORY:  Denies any toxic habits    ALLERGIES: NKA No Known Allergies      HOME MEDICATIONS:   Home Medications:  acetaminophen 325 mg oral tablet: 2 tab(s) orally every 6 hours, As needed, Temp greater or equal to 38C (100.4F), Mild Pain (1 - 3) (30 Mar 2022 11:42)  ascorbic acid 500 mg oral tablet: 1 tab(s) orally once a day (30 Mar 2022 11:42)  Aspirin Low Dose 81 mg oral tablet, chewable: 1 tab(s) orally once a day (30 Mar 2022 11:48)  atorvastatin 20 mg oral tablet: 1 tab(s) orally once a day (at bedtime) (30 Mar 2022 11:42)  doxazosin 4 mg oral tablet: 1 tab(s) orally once a day (at bedtime) (30 Mar 2022 11:42)  enoxaparin: 40 unit(s)  once a day (30 Mar 2022 11:42)  insulin lispro 100 units/mL injectable solution: ISS (28 Jan 2022 10:34)  levothyroxine 50 mcg (0.05 mg) oral tablet: 1 tab(s) orally once a day (24 Jan 2022 13:33)  methocarbamol 750 mg oral tablet: 1 tab(s) orally every 8 hours, As needed, Muscle Spasm/Back Pain (30 Mar 2022 11:42)  Metoprolol Succinate ER 25 mg oral tablet, extended release: 1 tab(s) orally once a day (24 Jan 2022 13:33)  Multiple Vitamins oral tablet: 1 tab(s) orally once a day (30 Mar 2022 11:42)  oxyCODONE 5 mg oral tablet: 1 tab(s) orally every 4 hours, As needed, Moderate Pain (4 - 6) (28 Jan 2022 10:34)  polyethylene glycol 3350 oral powder for reconstitution: 17 gram(s) orally once a day (30 Mar 2022 11:42)  senna oral tablet: 2 tab(s) orally once a day (at bedtime) (30 Mar 2022 11:42)      --------------------------------------------------------------------------------------------    PHYSICAL EXAM:   General: NAD, Lying in bed comfortably  Neuro: A+Ox3  HEENT: EOMI, PERRLA, MMM  Cardio: RRR  Resp: Non labored breathing on RA  GI/Abd: Soft, NT/ND, L inguinal mass non reducible , non tender , soft   Vascular: All 4 extremities warm and well perfused.   Pelvis: stable  Musculoskeletal: All 4 extremities moving spontaneously, no limitations, no spinal tenderness.  --------------------------------------------------------------------------------------------    LABS                 14.5   16.46  )----------(  444       ( 13 Nov 2022 13:45 )               42.5      136    |  97     |  23.8   ----------------------------<  169        ( 13 Nov 2022 13:45 )  4.9     |  23.0   |  0.70     Ca    10.1       ( 13 Nov 2022 13:45 )    TPro  8.1    /  Alb  4.4    /  TBili  1.0    /  DBili  x      /  AST  17     /  ALT  18     /  AlkPhos  135    ( 13 Nov 2022 13:45 )    LIVER FUNCTIONS - ( 13 Nov 2022 13:45 )  Alb: 4.4 g/dL / Pro: 8.1 g/dL / ALK PHOS: 135 U/L / ALT: 18 U/L / AST: 17 U/L / GGT: x           PT/INR -  15.4 sec / 1.32 ratio   ( 13 Nov 2022 13:45 )       PTT -  34.8 sec   ( 13 Nov 2022 13:45 )  CAPILLARY BLOOD GLUCOSE            14:20 - VBG - pH: 7.340 | pCO2: 49    | pO2: 59    | Lactate: 2.90     --------------------------------------------------------------------------------------------  IMAGING  < from: CT Abdomen and Pelvis w/ Oral Cont and w/ IV Cont (11.13.22 @ 18:47) >  IMPRESSION:  Small bowel obstruction secondary to left inguinal hernia    Significant urinary bladder wall thickening with mucosal enhancement may   represent underlying cystitis      Findings were discussed with Dr. JUDE SHEFFIELD [phone] 11/13/2022 7:00   PM by Dr. De La Vega with read back confirmation.    < end of copied text >

## 2022-11-13 NOTE — ED ADULT NURSE REASSESSMENT NOTE - NS ED NURSE REASSESS COMMENT FT1
Assumed care of pt from previous RN. Pt presents to ED c/o abdominal pain. Respirations are even and unlabored on room air. IV intact and flushed with normal saline. Pt offers no complaints of pain or discomfort at this time. Pt awaiting surgery team consult.

## 2022-11-13 NOTE — H&P ADULT - ASSESSMENT
77y Male with PMHx of Afib (on eliquis), DM, HTN, HLD present to ED with abdominal pain, unreducible hernia , WBC16, elevated lactate, CT with evidence of SBO       Plan:   Add emergently to OR with Dr Meza   NPO/ IVF   pain control   Plan discussed with Dr Meza

## 2022-11-13 NOTE — H&P ADULT - ATTENDING COMMENTS
77y Male with PMHx of Afib (on eliquis), DM, HTN, HLD present to ED with abdominal pain, patient states his pain started yesterday associated with vomiting 2x, patient with L inguinal hernia he endorses severe pain from. Patient denies fevers/chills, denies lightheadedness/dizziness, denies SOB/chest pain.   Awake alert  Bilateral BS  Abdomen soft, slightly distended.  LIH hernia vs. femoral, incarcerated and contains SB. For OR emergently. Patient is on Eliquis and therefore will manage depending on intraoperative findings and possible degree of hemorrhage. Do not see reason to give Andexxa pre op.

## 2022-11-13 NOTE — ED PROVIDER NOTE - OBJECTIVE STATEMENT
77 YM with Hx of HTN, HLD, DM, hypothyroidism, CAD s/p PCI, afib on Eliquis here with abdominal pain since last night a/w vomiting. REports across is mid abdomen a/w vomit green in color. Last BM yesterday. Known h/o of hernias. Denies fever, cough, cp, sob, back pain, falls, diarrhea

## 2022-11-14 LAB
A1C WITH ESTIMATED AVERAGE GLUCOSE RESULT: 6.6 % — HIGH (ref 4–5.6)
ALBUMIN SERPL ELPH-MCNC: 3.7 G/DL — SIGNIFICANT CHANGE UP (ref 3.3–5.2)
ALBUMIN SERPL ELPH-MCNC: 4.5 G/DL — SIGNIFICANT CHANGE UP (ref 3.3–5.2)
ALP SERPL-CCNC: 104 U/L — SIGNIFICANT CHANGE UP (ref 40–120)
ALP SERPL-CCNC: 133 U/L — HIGH (ref 40–120)
ALT FLD-CCNC: 12 U/L — SIGNIFICANT CHANGE UP
ALT FLD-CCNC: 17 U/L — SIGNIFICANT CHANGE UP
ANION GAP SERPL CALC-SCNC: 14 MMOL/L — SIGNIFICANT CHANGE UP (ref 5–17)
ANION GAP SERPL CALC-SCNC: 16 MMOL/L — SIGNIFICANT CHANGE UP (ref 5–17)
APTT BLD: 35.7 SEC — HIGH (ref 27.5–35.5)
AST SERPL-CCNC: 15 U/L — SIGNIFICANT CHANGE UP
AST SERPL-CCNC: 19 U/L — SIGNIFICANT CHANGE UP
BASOPHILS # BLD AUTO: 0.03 K/UL — SIGNIFICANT CHANGE UP (ref 0–0.2)
BASOPHILS NFR BLD AUTO: 0.3 % — SIGNIFICANT CHANGE UP (ref 0–2)
BILIRUB SERPL-MCNC: 1.2 MG/DL — SIGNIFICANT CHANGE UP (ref 0.4–2)
BILIRUB SERPL-MCNC: 1.2 MG/DL — SIGNIFICANT CHANGE UP (ref 0.4–2)
BUN SERPL-MCNC: 20.3 MG/DL — HIGH (ref 8–20)
BUN SERPL-MCNC: 22.2 MG/DL — HIGH (ref 8–20)
CALCIUM SERPL-MCNC: 8.8 MG/DL — SIGNIFICANT CHANGE UP (ref 8.4–10.5)
CALCIUM SERPL-MCNC: 9.6 MG/DL — SIGNIFICANT CHANGE UP (ref 8.4–10.5)
CHLORIDE SERPL-SCNC: 102 MMOL/L — SIGNIFICANT CHANGE UP (ref 96–108)
CHLORIDE SERPL-SCNC: 99 MMOL/L — SIGNIFICANT CHANGE UP (ref 96–108)
CO2 SERPL-SCNC: 24 MMOL/L — SIGNIFICANT CHANGE UP (ref 22–29)
CO2 SERPL-SCNC: 25 MMOL/L — SIGNIFICANT CHANGE UP (ref 22–29)
CREAT SERPL-MCNC: 0.55 MG/DL — SIGNIFICANT CHANGE UP (ref 0.5–1.3)
CREAT SERPL-MCNC: 0.67 MG/DL — SIGNIFICANT CHANGE UP (ref 0.5–1.3)
EGFR: 101 ML/MIN/1.73M2 — SIGNIFICANT CHANGE UP
EGFR: 95 ML/MIN/1.73M2 — SIGNIFICANT CHANGE UP
EOSINOPHIL # BLD AUTO: 0.06 K/UL — SIGNIFICANT CHANGE UP (ref 0–0.5)
EOSINOPHIL NFR BLD AUTO: 0.7 % — SIGNIFICANT CHANGE UP (ref 0–6)
ESTIMATED AVERAGE GLUCOSE: 143 MG/DL — HIGH (ref 68–114)
GLUCOSE BLDC GLUCOMTR-MCNC: 103 MG/DL — HIGH (ref 70–99)
GLUCOSE BLDC GLUCOMTR-MCNC: 104 MG/DL — HIGH (ref 70–99)
GLUCOSE BLDC GLUCOMTR-MCNC: 190 MG/DL — HIGH (ref 70–99)
GLUCOSE SERPL-MCNC: 129 MG/DL — HIGH (ref 70–99)
GLUCOSE SERPL-MCNC: 155 MG/DL — HIGH (ref 70–99)
HCT VFR BLD CALC: 37.6 % — LOW (ref 39–50)
HGB BLD-MCNC: 12.7 G/DL — LOW (ref 13–17)
IMM GRANULOCYTES NFR BLD AUTO: 0.7 % — SIGNIFICANT CHANGE UP (ref 0–0.9)
INR BLD: 1.54 RATIO — HIGH (ref 0.88–1.16)
LACTATE SERPL-SCNC: 2.3 MMOL/L — HIGH (ref 0.5–2)
LACTATE SERPL-SCNC: 3.2 MMOL/L — HIGH (ref 0.5–2)
LYMPHOCYTES # BLD AUTO: 1.23 K/UL — SIGNIFICANT CHANGE UP (ref 1–3.3)
LYMPHOCYTES # BLD AUTO: 13.9 % — SIGNIFICANT CHANGE UP (ref 13–44)
MAGNESIUM SERPL-MCNC: 1.8 MG/DL — SIGNIFICANT CHANGE UP (ref 1.6–2.6)
MCHC RBC-ENTMCNC: 31.2 PG — SIGNIFICANT CHANGE UP (ref 27–34)
MCHC RBC-ENTMCNC: 33.8 GM/DL — SIGNIFICANT CHANGE UP (ref 32–36)
MCV RBC AUTO: 92.4 FL — SIGNIFICANT CHANGE UP (ref 80–100)
MONOCYTES # BLD AUTO: 1.1 K/UL — HIGH (ref 0–0.9)
MONOCYTES NFR BLD AUTO: 12.4 % — SIGNIFICANT CHANGE UP (ref 2–14)
NEUTROPHILS # BLD AUTO: 6.38 K/UL — SIGNIFICANT CHANGE UP (ref 1.8–7.4)
NEUTROPHILS NFR BLD AUTO: 72 % — SIGNIFICANT CHANGE UP (ref 43–77)
PHOSPHATE SERPL-MCNC: 4 MG/DL — SIGNIFICANT CHANGE UP (ref 2.4–4.7)
PLATELET # BLD AUTO: 387 K/UL — SIGNIFICANT CHANGE UP (ref 150–400)
POTASSIUM SERPL-MCNC: 4.6 MMOL/L — SIGNIFICANT CHANGE UP (ref 3.5–5.3)
POTASSIUM SERPL-MCNC: 4.9 MMOL/L — SIGNIFICANT CHANGE UP (ref 3.5–5.3)
POTASSIUM SERPL-SCNC: 4.6 MMOL/L — SIGNIFICANT CHANGE UP (ref 3.5–5.3)
POTASSIUM SERPL-SCNC: 4.9 MMOL/L — SIGNIFICANT CHANGE UP (ref 3.5–5.3)
PROT SERPL-MCNC: 6.7 G/DL — SIGNIFICANT CHANGE UP (ref 6.6–8.7)
PROT SERPL-MCNC: 7.8 G/DL — SIGNIFICANT CHANGE UP (ref 6.6–8.7)
PROTHROM AB SERPL-ACNC: 17.9 SEC — HIGH (ref 10.5–13.4)
RBC # BLD: 4.07 M/UL — LOW (ref 4.2–5.8)
RBC # FLD: 14 % — SIGNIFICANT CHANGE UP (ref 10.3–14.5)
SODIUM SERPL-SCNC: 140 MMOL/L — SIGNIFICANT CHANGE UP (ref 135–145)
SODIUM SERPL-SCNC: 140 MMOL/L — SIGNIFICANT CHANGE UP (ref 135–145)
WBC # BLD: 8.86 K/UL — SIGNIFICANT CHANGE UP (ref 3.8–10.5)
WBC # FLD AUTO: 8.86 K/UL — SIGNIFICANT CHANGE UP (ref 3.8–10.5)

## 2022-11-14 RX ORDER — SODIUM CHLORIDE 9 MG/ML
500 INJECTION, SOLUTION INTRAVENOUS ONCE
Refills: 0 | Status: COMPLETED | OUTPATIENT
Start: 2022-11-14 | End: 2022-11-14

## 2022-11-14 RX ORDER — MAGNESIUM OXIDE 400 MG ORAL TABLET 241.3 MG
400 TABLET ORAL
Refills: 0 | Status: COMPLETED | OUTPATIENT
Start: 2022-11-14 | End: 2022-11-15

## 2022-11-14 RX ORDER — ACETAMINOPHEN 500 MG
650 TABLET ORAL EVERY 6 HOURS
Refills: 0 | Status: DISCONTINUED | OUTPATIENT
Start: 2022-11-14 | End: 2022-11-16

## 2022-11-14 RX ORDER — INSULIN LISPRO 100/ML
VIAL (ML) SUBCUTANEOUS
Refills: 0 | Status: DISCONTINUED | OUTPATIENT
Start: 2022-11-14 | End: 2022-11-16

## 2022-11-14 RX ORDER — SODIUM CHLORIDE 9 MG/ML
1000 INJECTION, SOLUTION INTRAVENOUS
Refills: 0 | Status: DISCONTINUED | OUTPATIENT
Start: 2022-11-14 | End: 2022-11-15

## 2022-11-14 RX ADMIN — Medication 650 MILLIGRAM(S): at 08:30

## 2022-11-14 RX ADMIN — SODIUM CHLORIDE 100 MILLILITER(S): 9 INJECTION, SOLUTION INTRAVENOUS at 00:24

## 2022-11-14 RX ADMIN — SODIUM CHLORIDE 100 MILLILITER(S): 9 INJECTION, SOLUTION INTRAVENOUS at 03:11

## 2022-11-14 RX ADMIN — SODIUM CHLORIDE 75 MILLILITER(S): 9 INJECTION, SOLUTION INTRAVENOUS at 14:59

## 2022-11-14 RX ADMIN — ATORVASTATIN CALCIUM 20 MILLIGRAM(S): 80 TABLET, FILM COATED ORAL at 21:19

## 2022-11-14 RX ADMIN — ENOXAPARIN SODIUM 40 MILLIGRAM(S): 100 INJECTION SUBCUTANEOUS at 11:45

## 2022-11-14 RX ADMIN — Medication 650 MILLIGRAM(S): at 08:05

## 2022-11-14 RX ADMIN — SODIUM CHLORIDE 500 MILLILITER(S): 9 INJECTION, SOLUTION INTRAVENOUS at 07:21

## 2022-11-14 RX ADMIN — Medication 81 MILLIGRAM(S): at 11:45

## 2022-11-14 RX ADMIN — SODIUM CHLORIDE 100 MILLILITER(S): 9 INJECTION, SOLUTION INTRAVENOUS at 07:15

## 2022-11-14 RX ADMIN — Medication 50 MICROGRAM(S): at 06:11

## 2022-11-14 RX ADMIN — SODIUM CHLORIDE 500 MILLILITER(S): 9 INJECTION, SOLUTION INTRAVENOUS at 02:08

## 2022-11-14 RX ADMIN — MAGNESIUM OXIDE 400 MG ORAL TABLET 400 MILLIGRAM(S): 241.3 TABLET ORAL at 17:19

## 2022-11-14 RX ADMIN — SODIUM CHLORIDE 1000 MILLILITER(S): 9 INJECTION, SOLUTION INTRAVENOUS at 05:11

## 2022-11-14 NOTE — CHART NOTE - NSCHARTNOTEFT_GEN_A_CORE
SICU TRANSFER NOTE  -----------------------------  ICU Admission Date: 11-13-22  Transfer Date: 11-14-22 @ 08:40    Admission Diagnosis: Incarcerated L femoral hernia       Procedures: [facility] Procedure     Consultants:  None     Medications  acetaminophen     Tablet .. 650 milliGRAM(s) Oral every 6 hours PRN  aspirin  chewable 81 milliGRAM(s) Oral daily  atorvastatin 20 milliGRAM(s) Oral at bedtime  dextrose 5%. 1000 milliLiter(s) IV Continuous <Continuous>  dextrose 5%. 1000 milliLiter(s) IV Continuous <Continuous>  dextrose 50% Injectable 25 Gram(s) IV Push once  dextrose 50% Injectable 12.5 Gram(s) IV Push once  dextrose 50% Injectable 25 Gram(s) IV Push once  dextrose Oral Gel 15 Gram(s) Oral once PRN  enoxaparin Injectable 40 milliGRAM(s) SubCutaneous every 24 hours  glucagon  Injectable 1 milliGRAM(s) IntraMuscular once  insulin lispro (ADMELOG) corrective regimen sliding scale   SubCutaneous three times a day before meals  levothyroxine 50 MICROGram(s) Oral daily  magnesium oxide 400 milliGRAM(s) Oral three times a day with meals  multiple electrolytes Injection Type 1 1000 milliLiter(s) IV Continuous <Continuous>  ondansetron Injectable 4 milliGRAM(s) IV Push once PRN      [ x] I attest I have reviewed and reconciled all medications prior to transfer    IV Fluids  lactated ringers.: Solution, 1000 milliLiter(s) infuse at 100 mL/Hr  sodium chloride 0.9% Bolus:   1000 milliLiter(s), IV Bolus, once, infuse over 60 Minute(s), Stop After 1 Doses  Provider's Contact #: [phone]      I have discussed this case with Trauma Surgery Resident upon transfer and all questions regarding ICU course were answered.  The following items are to be followed up:  Neuro:   - Pain control (tyl), patient pain well controlled at this time. Add PRN medications as needed     Pulm:  -  RA, no acute issues     CV:  -  Monitor on tele, BP soft. (given 500 cc bolus x2).   - Hold home metoprolol   - Resume Eliquis on 11/15    GI:   - CLD, +BM , advance diet as tolerated   - Bowel regimen once tolerating reg diet     :   - Cotton in place  - Strict Is/Os, TOV on today   - +UA, asymptomatic   - F/U UCx, no abx indicated at this time    Heme/ID:   - HDS, no abx indicated at this time (f/u UCx)     Endo:   - SSI   - Levothyroxine resumed     DVT ppx: Lovenox, ASA resumed     Dispo: Floor

## 2022-11-14 NOTE — CHART NOTE - NSCHARTNOTEFT_GEN_A_CORE
SICU DOWNGRADE NOTE    SUBJECTIVE:  77y Male transferred to floor from SICU care, POD1 from Left inguinal hernia repair. Patient feels well, pain is controlled. Tolerating a CLD at this time. Dressings are c/d/i. Wife called and updated. No nausea, vomiting, fevers chills, chest pain, or SOB. Asking to go home. Advised that he will need to tolerate a diet and have bowel function prior to discharge.       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      S/P ORIF (open reduction internal fixation) fracture  right elbow , LILLIANA 3 months later      S/P cholecystectomy      S/P inguinal hernia repair  bilateral      S/P T&amp;A (status post tonsillectomy and adenoidectomy)  as child      S/P angioplasty with stent   4 stents      S/P excision of skin lesion, follow-up exam  benign testicle      S/P colonoscopy  4 years ago, no polyp        Allergies: No Known Allergies    -------------------------------------------------------------------------------------------    OBJECTIVE:    PHYSICAL EXAM:  GENERAL: Laying in bed in NAD, answering appropriately  HEAD:  Atraumatic, Normocephalic  EYES: EOMI, conjunctiva and sclera clear  NECK: Supple, full ROM  CHEST/LUNG: Unlabored, no conversational dyspnea  HEART: Regular rate and rhythm;  ABDOMEN: Soft, mildly and appropriately tender, dressing clean and intact and lightly stained. Nondistended;  EXTREMITIES:  WWP, moving spontaneously  PSYCH: AAOx3  NEUROLOGY: non-focal  SKIN: No rashes or lesions    -------------------------------------------------------------------------------------------  Vitals:  T(C): 36.4 (22 @ 12:00), Max: 36.9 (22 @ 04:00)  HR: 64 (22 @ 13:15) (64 - 100)  BP: 99/63 (22 @ 13:15) (95/52 - 131/76)  RR: 17 (22 @ 13:15) (14 - 20)  SpO2: 96% (22 @ 13:15) (94% - 100%)  Wt(kg): --    -------------------------------------------------------------------------------------------  I&O's Summary    2022 07:  -  2022 07:00  --------------------------------------------------------  IN: 1530 mL / OUT: 495 mL / NET: 1035 mL    2022 07:01  -  2022 13:45  --------------------------------------------------------  IN: 0 mL / OUT: 500 mL / NET: -500 mL      -------------------------------------------------------------------------------------------  Labs:  Labs:  CAPILLARY BLOOD GLUCOSE      POCT Blood Glucose.: 103 mg/dL (2022 11:54)  POCT Blood Glucose.: 141 mg/dL (2022 22:58)                          12.7   8.86  )-----------( 387      ( 2022 06:00 )             37.6       Auto Neutrophil %: 72.0 % (22 @ 06:00)  Auto Immature Granulocyte %: 0.7 % (22 @ 06:00)        140  |  102  |  20.3<H>  ----------------------------<  129<H>  4.6   |  24.0  |  0.55      Calcium, Total Serum: 8.8 mg/dL (22 @ 06:00)      LFTs:             6.7  | 1.2  | 15       ------------------[104     ( 2022 06:00 )  3.7  | x    | 12          Lipase:x      Amylase:x         Lactate, Blood: 2.3 mmol/L (22 @ 06:00)  Lactate, Blood: 3.2 mmol/L (22 @ 23:25)  Blood Gas Venous - Lactate: 2.90 mmol/L (22 @ 14:20)      Coags:     17.9   ----< 1.54    ( 2022 06:00 )     35.7                Urinalysis Basic - ( 2022 23:00 )    Color: Red / Appearance: very cloudy / S.025 / pH: x  Gluc: x / Ketone: Trace  / Bili: Negative / Urobili: Negative   Blood: x / Protein: 300 mg/dL / Nitrite: Positive   Leuk Esterase: Moderate / RBC: >50 /HPF / WBC 6-10 /HPF   Sq Epi: x / Non Sq Epi: Occasional / Bacteria: Few          -------------------------------------------------------------------------------------------  Active Medications:  MEDICATIONS  (STANDING):  aspirin  chewable 81 milliGRAM(s) Oral daily  atorvastatin 20 milliGRAM(s) Oral at bedtime  dextrose 50% Injectable 25 Gram(s) IV Push once  dextrose 50% Injectable 12.5 Gram(s) IV Push once  dextrose 50% Injectable 25 Gram(s) IV Push once  enoxaparin Injectable 40 milliGRAM(s) SubCutaneous every 24 hours  glucagon  Injectable 1 milliGRAM(s) IntraMuscular once  insulin lispro (ADMELOG) corrective regimen sliding scale   SubCutaneous three times a day before meals  levothyroxine 50 MICROGram(s) Oral daily  magnesium oxide 400 milliGRAM(s) Oral three times a day with meals  multiple electrolytes Injection Type 1 1000 milliLiter(s) (75 mL/Hr) IV Continuous <Continuous>    MEDICATIONS  (PRN):  acetaminophen     Tablet .. 650 milliGRAM(s) Oral every 6 hours PRN Moderate Pain (4 - 6)  dextrose Oral Gel 15 Gram(s) Oral once PRN Blood Glucose LESS THAN 70 milliGRAM(s)/deciliter  ondansetron Injectable 4 milliGRAM(s) IV Push once PRN Nausea and/or Vomiting    -------------------------------------------------------------------------------------------      A/P  77y Male with PMHx of Afib (on eliquis), DM, HTN, HLD present to ED with abdominal pain, and irreducible left groin hernia now POD1 s/p hernia repair. Doing well postoperatively, tolerating CLD, and hemodynamically stable.       Plan:   - PRN pain control  - Maintain clear liquid diet, ADAT  - monitor bowel function  - Holding eliquis, likely restart tomorrow  - Holding BP meds at this time  - Likely d/c velasco tomorrow AM  - DVT ppx

## 2022-11-14 NOTE — PROGRESS NOTE ADULT - SUBJECTIVE AND OBJECTIVE BOX
INTERVAL HPI/OVERNIGHT EVENTS/SUBJECTIVE: Pt doing well post op day one from Left femoral hernia repair. Denies current abdominal pain, NV, CP, SOB, Dysuria pre or post op but requests Cotton to be removed since he has had them prior for prolonged period.    ICU Vital Signs Last 24 Hrs  T(C): 36.7 (2022 23:00), Max: 36.8 (2022 12:26)  T(F): 98.1 (2022 23:00), Max: 98.3 (2022 22:25)  HR: 98 (2022 00:00) (68 - 99)  BP: 113/54 (2022 00:00) (108/81 - 131/76)  BP(mean): --  ABP: --  ABP(mean): --  RR: 18 (2022 00:00) (14 - 20)  SpO2: 96% (2022 00:00) (94% - 100%)    O2 Parameters below as of 2022 00:00  Patient On (Oxygen Delivery Method): room air            I&O's Detail    2022 07:01  -  2022 01:06  --------------------------------------------------------  IN:    Lactated Ringers: 200 mL  Total IN: 200 mL    OUT:    Indwelling Catheter - Urethral (mL): 60 mL  Total OUT: 60 mL    Total NET: 140 mL                MEDICATIONS  (STANDING):  aspirin  chewable 81 milliGRAM(s) Oral daily  atorvastatin 20 milliGRAM(s) Oral at bedtime  dextrose 5%. 1000 milliLiter(s) (100 mL/Hr) IV Continuous <Continuous>  dextrose 5%. 1000 milliLiter(s) (50 mL/Hr) IV Continuous <Continuous>  dextrose 50% Injectable 25 Gram(s) IV Push once  dextrose 50% Injectable 12.5 Gram(s) IV Push once  dextrose 50% Injectable 25 Gram(s) IV Push once  enoxaparin Injectable 40 milliGRAM(s) SubCutaneous every 24 hours  glucagon  Injectable 1 milliGRAM(s) IntraMuscular once  insulin lispro (ADMELOG) corrective regimen sliding scale   SubCutaneous three times a day before meals  levothyroxine 50 MICROGram(s) Oral daily  metoprolol tartrate 12.5 milliGRAM(s) Oral two times a day  multiple electrolytes Injection Type 1 1000 milliLiter(s) (100 mL/Hr) IV Continuous <Continuous>  multiple electrolytes Injection Type 1 Bolus 500 milliLiter(s) IV Bolus once    MEDICATIONS  (PRN):  dextrose Oral Gel 15 Gram(s) Oral once PRN Blood Glucose LESS THAN 70 milliGRAM(s)/deciliter  fentaNYL    Injectable 25 MICROGram(s) IV Push every 5 minutes PRN Moderate Pain (4 - 6)  fentaNYL    Injectable 50 MICROGram(s) IV Push every 10 minutes PRN Severe Pain (7 - 10)  ondansetron Injectable 4 milliGRAM(s) IV Push once PRN Nausea and/or Vomiting      PHYSICAL EXAM:     Gen: NAD, very thin and frail appearing, No cyanosis, Pallor.    Eyes: PERRL ~ 3mm, EOMI,     Neurological: A&Ox3, GCS 15, No focal deficit.     Neck: Supple. NT AT, FROM no pain.  No JVD. No meningeal signs    Pulmonary: NAD, CTA, = BL .      Cardiovascular: RRR, S1, S2, No Murmurs, rubs or gallops noted.    Gastrointestinal: ND, Soft, NT. Left inguinal area has bandage CDI.    Extremities: NT, AT, no edema, erythema or palpable cord noted.  FROM, = 2+ pulses throughout.      LABS:  CBC Full  -  ( 2022 23:25 )  WBC Count : 15.22 K/uL  RBC Count : 4.70 M/uL  Hemoglobin : 14.8 g/dL  Hematocrit : 43.7 %  Platelet Count - Automated : 466 K/uL  Mean Cell Volume : 93.0 fl  Mean Cell Hemoglobin : 31.5 pg  Mean Cell Hemoglobin Concentration : 33.9 gm/dL  Auto Neutrophil # : x  Auto Lymphocyte # : x  Auto Monocyte # : x  Auto Eosinophil # : x  Auto Basophil # : x  Auto Neutrophil % : x  Auto Lymphocyte % : x  Auto Monocyte % : x  Auto Eosinophil % : x  Auto Basophil % : x    11-13    140  |  99  |  22.2<H>  ----------------------------<  155<H>  4.9   |  25.0  |  0.67    Ca    9.6      2022 23:25    TPro  7.8  /  Alb  4.5  /  TBili  1.2  /  DBili  x   /  AST  19  /  ALT  17  /  AlkPhos  133<H>  11-13    PT/INR - ( 2022 23:25 )   PT: 16.4 sec;   INR: 1.41 ratio         PTT - ( 2022 23:25 )  PTT:36.8 sec  Urinalysis Basic - ( 2022 23:00 )    Color: Red / Appearance: very cloudy / S.025 / pH: x  Gluc: x / Ketone: Trace  / Bili: Negative / Urobili: Negative   Blood: x / Protein: 300 mg/dL / Nitrite: Positive   Leuk Esterase: Moderate / RBC: >50 /HPF / WBC 6-10 /HPF   Sq Epi: x / Non Sq Epi: Occasional / Bacteria: Few      RECENT CULTURES:      LIVER FUNCTIONS - ( 2022 23:25 )  Alb: 4.5 g/dL / Pro: 7.8 g/dL / ALK PHOS: 133 U/L / ALT: 17 U/L / AST: 19 U/L / GGT: x               CAPILLARY BLOOD GLUCOSE      RADIOLOGY & ADDITIONAL STUDIES:    ASSESSMENT/PLAN:  77yMale presenting with: Left femoral hernia with incarcerated bowel, essential hypertension, lactic acidosis.     Neurological: I will order Tylenol for any pain as needed. I will DC PACU Anesthesia orders for Fentanyl that has not been used.     Pulmonary: I will order and urge Incentive spirometry and mobilize pt in AM.     Cardiovascular: I will order lower than home dose betablocker split BID with hold parameters to maintain rate control of A-fib.  I will maintain IVF and repeat Lactate later today.     Gastrointestinal: NPO for tonight except meds.    Genitourinary: Keep Cotton tonight and check urine output Q 2.    Heme: I have ordered Prophylactic Lovenox for the AM.  Will hold full dose ELiquis unitl possibly     ID: I will send Ua and Ucx.  I will not treat Ua for now unless fever.    Skin: Dressing changes per primary surgical team.    Lines/ Tubes: DC Cotton in AM    Dispo: Pt appears stable for downgrade to regular bed if labs / lactate iproved in AM.             CRITICAL CARE TIME SPENT:

## 2022-11-14 NOTE — PROGRESS NOTE ADULT - NS ATTEND AMEND GEN_ALL_CORE FT
77 year old man s/p repair of incarcerated L femoral hernia overnight.   Seen and examined by me in PACU this AM.  Denies pain, no complaints.   Hemodynamically stable.   Abdomen benign.     Plan for transfer to floor  Clear liquid diet  ASHWINI velasco tomorrow   Resume Eliquis tomorrow

## 2022-11-15 LAB
ALBUMIN SERPL ELPH-MCNC: 3.1 G/DL — LOW (ref 3.3–5.2)
ALP SERPL-CCNC: 81 U/L — SIGNIFICANT CHANGE UP (ref 40–120)
ALT FLD-CCNC: 8 U/L — SIGNIFICANT CHANGE UP
ANION GAP SERPL CALC-SCNC: 10 MMOL/L — SIGNIFICANT CHANGE UP (ref 5–17)
AST SERPL-CCNC: 10 U/L — SIGNIFICANT CHANGE UP
BASOPHILS # BLD AUTO: 0.03 K/UL — SIGNIFICANT CHANGE UP (ref 0–0.2)
BASOPHILS NFR BLD AUTO: 0.5 % — SIGNIFICANT CHANGE UP (ref 0–2)
BILIRUB SERPL-MCNC: 1 MG/DL — SIGNIFICANT CHANGE UP (ref 0.4–2)
BUN SERPL-MCNC: 12.5 MG/DL — SIGNIFICANT CHANGE UP (ref 8–20)
CALCIUM SERPL-MCNC: 8 MG/DL — LOW (ref 8.4–10.5)
CHLORIDE SERPL-SCNC: 103 MMOL/L — SIGNIFICANT CHANGE UP (ref 96–108)
CO2 SERPL-SCNC: 27 MMOL/L — SIGNIFICANT CHANGE UP (ref 22–29)
CREAT SERPL-MCNC: 0.45 MG/DL — LOW (ref 0.5–1.3)
EGFR: 107 ML/MIN/1.73M2 — SIGNIFICANT CHANGE UP
EOSINOPHIL # BLD AUTO: 0.23 K/UL — SIGNIFICANT CHANGE UP (ref 0–0.5)
EOSINOPHIL NFR BLD AUTO: 3.5 % — SIGNIFICANT CHANGE UP (ref 0–6)
GLUCOSE BLDC GLUCOMTR-MCNC: 115 MG/DL — HIGH (ref 70–99)
GLUCOSE BLDC GLUCOMTR-MCNC: 143 MG/DL — HIGH (ref 70–99)
GLUCOSE BLDC GLUCOMTR-MCNC: 158 MG/DL — HIGH (ref 70–99)
GLUCOSE BLDC GLUCOMTR-MCNC: 178 MG/DL — HIGH (ref 70–99)
GLUCOSE SERPL-MCNC: 115 MG/DL — HIGH (ref 70–99)
HCT VFR BLD CALC: 32.1 % — LOW (ref 39–50)
HGB BLD-MCNC: 10.9 G/DL — LOW (ref 13–17)
IMM GRANULOCYTES NFR BLD AUTO: 0.6 % — SIGNIFICANT CHANGE UP (ref 0–0.9)
LYMPHOCYTES # BLD AUTO: 1.5 K/UL — SIGNIFICANT CHANGE UP (ref 1–3.3)
LYMPHOCYTES # BLD AUTO: 23.1 % — SIGNIFICANT CHANGE UP (ref 13–44)
MAGNESIUM SERPL-MCNC: 1.8 MG/DL — SIGNIFICANT CHANGE UP (ref 1.6–2.6)
MCHC RBC-ENTMCNC: 31.4 PG — SIGNIFICANT CHANGE UP (ref 27–34)
MCHC RBC-ENTMCNC: 34 GM/DL — SIGNIFICANT CHANGE UP (ref 32–36)
MCV RBC AUTO: 92.5 FL — SIGNIFICANT CHANGE UP (ref 80–100)
MONOCYTES # BLD AUTO: 1.05 K/UL — HIGH (ref 0–0.9)
MONOCYTES NFR BLD AUTO: 16.2 % — HIGH (ref 2–14)
NEUTROPHILS # BLD AUTO: 3.64 K/UL — SIGNIFICANT CHANGE UP (ref 1.8–7.4)
NEUTROPHILS NFR BLD AUTO: 56.1 % — SIGNIFICANT CHANGE UP (ref 43–77)
PHOSPHATE SERPL-MCNC: 2.3 MG/DL — LOW (ref 2.4–4.7)
PLATELET # BLD AUTO: 335 K/UL — SIGNIFICANT CHANGE UP (ref 150–400)
POTASSIUM SERPL-MCNC: 3.6 MMOL/L — SIGNIFICANT CHANGE UP (ref 3.5–5.3)
POTASSIUM SERPL-SCNC: 3.6 MMOL/L — SIGNIFICANT CHANGE UP (ref 3.5–5.3)
PROT SERPL-MCNC: 5.6 G/DL — LOW (ref 6.6–8.7)
RBC # BLD: 3.47 M/UL — LOW (ref 4.2–5.8)
RBC # FLD: 14 % — SIGNIFICANT CHANGE UP (ref 10.3–14.5)
SODIUM SERPL-SCNC: 140 MMOL/L — SIGNIFICANT CHANGE UP (ref 135–145)
WBC # BLD: 6.49 K/UL — SIGNIFICANT CHANGE UP (ref 3.8–10.5)
WBC # FLD AUTO: 6.49 K/UL — SIGNIFICANT CHANGE UP (ref 3.8–10.5)

## 2022-11-15 RX ORDER — CEFTRIAXONE 500 MG/1
1000 INJECTION, POWDER, FOR SOLUTION INTRAMUSCULAR; INTRAVENOUS EVERY 24 HOURS
Refills: 0 | Status: DISCONTINUED | OUTPATIENT
Start: 2022-11-16 | End: 2022-11-16

## 2022-11-15 RX ORDER — CEFTRIAXONE 500 MG/1
1000 INJECTION, POWDER, FOR SOLUTION INTRAMUSCULAR; INTRAVENOUS ONCE
Refills: 0 | Status: COMPLETED | OUTPATIENT
Start: 2022-11-15 | End: 2022-11-15

## 2022-11-15 RX ORDER — POTASSIUM PHOSPHATE, MONOBASIC POTASSIUM PHOSPHATE, DIBASIC 236; 224 MG/ML; MG/ML
15 INJECTION, SOLUTION INTRAVENOUS ONCE
Refills: 0 | Status: COMPLETED | OUTPATIENT
Start: 2022-11-15 | End: 2022-11-15

## 2022-11-15 RX ORDER — CEFTRIAXONE 500 MG/1
INJECTION, POWDER, FOR SOLUTION INTRAMUSCULAR; INTRAVENOUS
Refills: 0 | Status: DISCONTINUED | OUTPATIENT
Start: 2022-11-15 | End: 2022-11-15

## 2022-11-15 RX ORDER — CEFTRIAXONE 500 MG/1
INJECTION, POWDER, FOR SOLUTION INTRAMUSCULAR; INTRAVENOUS
Refills: 0 | Status: DISCONTINUED | OUTPATIENT
Start: 2022-11-15 | End: 2022-11-16

## 2022-11-15 RX ADMIN — ENOXAPARIN SODIUM 40 MILLIGRAM(S): 100 INJECTION SUBCUTANEOUS at 11:45

## 2022-11-15 RX ADMIN — ATORVASTATIN CALCIUM 20 MILLIGRAM(S): 80 TABLET, FILM COATED ORAL at 21:48

## 2022-11-15 RX ADMIN — CEFTRIAXONE 1000 MILLIGRAM(S): 500 INJECTION, POWDER, FOR SOLUTION INTRAMUSCULAR; INTRAVENOUS at 09:54

## 2022-11-15 RX ADMIN — POTASSIUM PHOSPHATE, MONOBASIC POTASSIUM PHOSPHATE, DIBASIC 62.5 MILLIMOLE(S): 236; 224 INJECTION, SOLUTION INTRAVENOUS at 08:16

## 2022-11-15 RX ADMIN — Medication 81 MILLIGRAM(S): at 11:45

## 2022-11-15 RX ADMIN — MAGNESIUM OXIDE 400 MG ORAL TABLET 400 MILLIGRAM(S): 241.3 TABLET ORAL at 08:16

## 2022-11-15 RX ADMIN — Medication 50 MICROGRAM(S): at 06:35

## 2022-11-15 RX ADMIN — Medication 2: at 11:48

## 2022-11-15 RX ADMIN — Medication 2: at 21:48

## 2022-11-15 NOTE — PROGRESS NOTE ADULT - SUBJECTIVE AND OBJECTIVE BOX
SUBJECTIVE/24 hour events:  77y Male presenting with incarcerated left femoral hernia, POD2 from Left inguinal hernia repair. Patient with no acute events overnight, pain is controlled, tolerating a CLD at this time. Dressings are c/d/i.  No nausea, vomiting, fevers chills, chest pain, or SOB. Cotton continued for failed TOV      Vital Signs Last 24 Hrs  T(C): 37.1 (15 Nov 2022 00:09), Max: 37.1 (15 Nov 2022 00:09)  T(F): 98.8 (15 Nov 2022 00:09), Max: 98.8 (15 Nov 2022 00:09)  HR: 81 (15 Nov 2022 00:09) (64 - 95)  BP: 122/65 (15 Nov 2022 00:09) (95/52 - 122/65)  BP(mean): 69 (14 Nov 2022 13:45) (63 - 72)  RR: 18 (15 Nov 2022 00:09) (16 - 19)  SpO2: 91% (15 Nov 2022 00:09) (91% - 99%)    Parameters below as of 15 Nov 2022 00:09  Patient On (Oxygen Delivery Method): room air      Drug Dosing Weight  Height (cm): 190.5 (13 Nov 2022 12:26)  Weight (kg): 77.1 (13 Nov 2022 12:26)  BMI (kg/m2): 21.2 (13 Nov 2022 12:26)  BSA (m2): 2.05 (13 Nov 2022 12:26)  I&O's Detail    13 Nov 2022 07:01  -  14 Nov 2022 07:00  --------------------------------------------------------  IN:    Lactated Ringers: 200 mL    multiple electrolytes Injection Type 1 Bolus: 1000 mL    multiple electrolytes Injection Type 1.: 300 mL    Oral Fluid: 30 mL  Total IN: 1530 mL    OUT:    Indwelling Catheter - Urethral (mL): 495 mL  Total OUT: 495 mL    Total NET: 1035 mL      14 Nov 2022 07:01  -  15 Nov 2022 03:33  --------------------------------------------------------  IN:    multiple electrolytes Injection Type 1.: 375 mL  Total IN: 375 mL    OUT:    Indwelling Catheter - Urethral (mL): 775 mL  Total OUT: 775 mL    Total NET: -400 mL        Allergies    No Known Allergies    Intolerances                              12.7   8.86  )-----------( 387      ( 14 Nov 2022 06:00 )             37.6   11-14    140  |  102  |  20.3<H>  ----------------------------<  129<H>  4.6   |  24.0  |  0.55    Ca    8.8      14 Nov 2022 06:00  Phos  4.0     11-14  Mg     1.8     11-14    TPro  6.7  /  Alb  3.7  /  TBili  1.2  /  DBili  x   /  AST  15  /  ALT  12  /  AlkPhos  104  11-14  PT/INR - ( 14 Nov 2022 06:00 )   PT: 17.9 sec;   INR: 1.54 ratio         PTT - ( 14 Nov 2022 06:00 )  PTT:35.7 sec    ROS:    PHYSICAL EXAM:  GENERAL: Laying in bed in NAD,   HEAD:  Atraumatic, Normocephalic  EYES: EOMI, conjunctiva and sclera clear  NECK: Supple, full ROM  CHEST/LUNG: Unlabored, no conversational dyspnea  HEART: Regular rate and rhythm;  ABDOMEN: Soft, mildly and appropriately tender, dressing clean and intact and lightly stained. Nondistended;  EXTREMITIES:  WWP, moving spontaneously  PSYCH: AAOx3  NEUROLOGY: non-focal  SKIN: No rashes or lesions    MEDICATIONS  (STANDING):  aspirin  chewable 81 milliGRAM(s) Oral daily  atorvastatin 20 milliGRAM(s) Oral at bedtime  dextrose 50% Injectable 25 Gram(s) IV Push once  dextrose 50% Injectable 12.5 Gram(s) IV Push once  dextrose 50% Injectable 25 Gram(s) IV Push once  enoxaparin Injectable 40 milliGRAM(s) SubCutaneous every 24 hours  glucagon  Injectable 1 milliGRAM(s) IntraMuscular once  insulin lispro (ADMELOG) corrective regimen sliding scale   SubCutaneous Before meals and at bedtime  levothyroxine 50 MICROGram(s) Oral daily  magnesium oxide 400 milliGRAM(s) Oral three times a day with meals  multiple electrolytes Injection Type 1 1000 milliLiter(s) (75 mL/Hr) IV Continuous <Continuous>    MEDICATIONS  (PRN):  acetaminophen     Tablet .. 650 milliGRAM(s) Oral every 6 hours PRN Moderate Pain (4 - 6)  dextrose Oral Gel 15 Gram(s) Oral once PRN Blood Glucose LESS THAN 70 milliGRAM(s)/deciliter      RADIOLOGY STUDIES:    CULTURES:

## 2022-11-15 NOTE — PROGRESS NOTE ADULT - ATTENDING COMMENTS
Awake alert  Hemodynamic intact  Bilateral BS  Abdomen soft, active BS. Tolerates diet / advance  L femoral hernia repair clean / dry

## 2022-11-15 NOTE — PATIENT PROFILE ADULT - NSPROPOAPRESSUREINJURY_GEN_A_NUR
A Healthy Lifestyle: Care Instructions Your Care Instructions A healthy lifestyle can help you feel good, stay at a healthy weight, and have plenty of energy for both work and play. A healthy lifestyle is something you can share with your whole family. A healthy lifestyle also can lower your risk for serious health problems, such as high blood pressure, heart disease, and diabetes. You can follow a few steps listed below to improve your health and the health of your family. Follow-up care is a key part of your treatment and safety. Be sure to make and go to all appointments, and call your doctor if you are having problems. It's also a good idea to know your test results and keep a list of the medicines you take. How can you care for yourself at home? · Do not eat too much sugar, fat, or fast foods. You can still have dessert and treats now and then. The goal is moderation. · Start small to improve your eating habits. Pay attention to portion sizes, drink less juice and soda pop, and eat more fruits and vegetables. ? Eat a healthy amount of food. A 3-ounce serving of meat, for example, is about the size of a deck of cards. Fill the rest of your plate with vegetables and whole grains. ? Limit the amount of soda and sports drinks you have every day. Drink more water when you are thirsty. ? Eat plenty of fruits and vegetables every day. Have an apple or some carrot sticks as an afternoon snack instead of a candy bar. Try to have fruits and/or vegetables at every meal. 
· Make exercise part of your daily routine. You may want to start with simple activities, such as walking, bicycling, or slow swimming. Try to be active 30 to 60 minutes every day. You do not need to do all 30 to 60 minutes all at once. For example, you can exercise 3 times a day for 10 or 20 minutes.  Moderate exercise is safe for most people, but it is always a good idea to talk to your doctor before starting an exercise program. 
· Keep moving. [employer] the lawn, work in the garden, or [employer]. Take the stairs instead of the elevator at work. · If you smoke, quit. People who smoke have an increased risk for heart attack, stroke, cancer, and other lung illnesses. Quitting is hard, but there are ways to boost your chance of quitting tobacco for good. ? Use nicotine gum, patches, or lozenges. ? Ask your doctor about stop-smoking programs and medicines. ? Keep trying. In addition to reducing your risk of diseases in the future, you will notice some benefits soon after you stop using tobacco. If you have shortness of breath or asthma symptoms, they will likely get better within a few weeks after you quit. · Limit how much alcohol you drink. Moderate amounts of alcohol (up to 2 drinks a day for men, 1 drink a day for women) are okay. But drinking too much can lead to liver problems, high blood pressure, and other health problems. Family health If you have a family, there are many things you can do together to improve your health. · Eat meals together as a family as often as possible. · Eat healthy foods. This includes fruits, vegetables, lean meats and dairy, and whole grains. · Include your family in your fitness plan. Most people think of activities such as jogging or tennis as the way to fitness, but there are many ways you and your family can be more active. Anything that makes you breathe hard and gets your heart pumping is exercise. Here are some tips: 
? Walk to do errands or to take your child to school or the bus. 
? Go for a family bike ride after dinner instead of watching TV. Where can you learn more? Go to http://www.gray.com/ Enter P859 in the search box to learn more about \"A Healthy Lifestyle: Care Instructions. \" Current as of: September 23, 2020               Content Version: 12.8 © 4857-2767 [employer].   
Care instructions adapted under license by [employer] (which disclaims liability or warranty for this information). If you have questions about a medical condition or this instruction, always ask your healthcare professional. [address] any warranty or liability for your use of this information. no

## 2022-11-15 NOTE — PATIENT PROFILE ADULT - FALL HARM RISK - HARM RISK INTERVENTIONS
Assistance with ambulation/Assistance OOB with selected safe patient handling equipment/Communicate Risk of Fall with Harm to all staff/Discuss with provider need for PT consult/Monitor gait and stability/Orthostatic vital signs/Provide patient with walking aids - walker, cane, crutches/Reinforce activity limits and safety measures with patient and family/Sit up slowly, dangle for a short time, stand at bedside before walking/Tailored Fall Risk Interventions/Use of alarms - bed, chair and/or voice tab/Visual Cue: Yellow wristband and red socks/Bed in lowest position, wheels locked, appropriate side rails in place/Call bell, personal items and telephone in reach/Instruct patient to call for assistance before getting out of bed or chair/Non-slip footwear when patient is out of bed/Lena to call system/Physically safe environment - no spills, clutter or unnecessary equipment/Purposeful Proactive Rounding/Room/bathroom lighting operational, light cord in reach

## 2022-11-16 ENCOUNTER — TRANSCRIPTION ENCOUNTER (OUTPATIENT)
Age: 77
End: 2022-11-16

## 2022-11-16 VITALS
OXYGEN SATURATION: 97 % | RESPIRATION RATE: 18 BRPM | HEART RATE: 74 BPM | DIASTOLIC BLOOD PRESSURE: 73 MMHG | SYSTOLIC BLOOD PRESSURE: 127 MMHG | TEMPERATURE: 98 F

## 2022-11-16 LAB
-  AMPICILLIN: SIGNIFICANT CHANGE UP
-  CIPROFLOXACIN: SIGNIFICANT CHANGE UP
-  DAPTOMYCIN: SIGNIFICANT CHANGE UP
-  LEVOFLOXACIN: SIGNIFICANT CHANGE UP
-  LINEZOLID: SIGNIFICANT CHANGE UP
-  NITROFURANTOIN: SIGNIFICANT CHANGE UP
-  TETRACYCLINE: SIGNIFICANT CHANGE UP
-  VANCOMYCIN: SIGNIFICANT CHANGE UP
ANION GAP SERPL CALC-SCNC: 12 MMOL/L — SIGNIFICANT CHANGE UP (ref 5–17)
BASOPHILS # BLD AUTO: 0.02 K/UL — SIGNIFICANT CHANGE UP (ref 0–0.2)
BASOPHILS NFR BLD AUTO: 0.3 % — SIGNIFICANT CHANGE UP (ref 0–2)
BUN SERPL-MCNC: 9.4 MG/DL — SIGNIFICANT CHANGE UP (ref 8–20)
CALCIUM SERPL-MCNC: 8.3 MG/DL — LOW (ref 8.4–10.5)
CHLORIDE SERPL-SCNC: 104 MMOL/L — SIGNIFICANT CHANGE UP (ref 96–108)
CO2 SERPL-SCNC: 25 MMOL/L — SIGNIFICANT CHANGE UP (ref 22–29)
CREAT SERPL-MCNC: 0.4 MG/DL — LOW (ref 0.5–1.3)
CULTURE RESULTS: SIGNIFICANT CHANGE UP
EGFR: 111 ML/MIN/1.73M2 — SIGNIFICANT CHANGE UP
EOSINOPHIL # BLD AUTO: 0.18 K/UL — SIGNIFICANT CHANGE UP (ref 0–0.5)
EOSINOPHIL NFR BLD AUTO: 2.4 % — SIGNIFICANT CHANGE UP (ref 0–6)
GLUCOSE BLDC GLUCOMTR-MCNC: 125 MG/DL — HIGH (ref 70–99)
GLUCOSE BLDC GLUCOMTR-MCNC: 229 MG/DL — HIGH (ref 70–99)
GLUCOSE SERPL-MCNC: 104 MG/DL — HIGH (ref 70–99)
HCT VFR BLD CALC: 32.8 % — LOW (ref 39–50)
HGB BLD-MCNC: 11.4 G/DL — LOW (ref 13–17)
IMM GRANULOCYTES NFR BLD AUTO: 0.7 % — SIGNIFICANT CHANGE UP (ref 0–0.9)
LYMPHOCYTES # BLD AUTO: 1.51 K/UL — SIGNIFICANT CHANGE UP (ref 1–3.3)
LYMPHOCYTES # BLD AUTO: 20.2 % — SIGNIFICANT CHANGE UP (ref 13–44)
MAGNESIUM SERPL-MCNC: 1.7 MG/DL — SIGNIFICANT CHANGE UP (ref 1.6–2.6)
MCHC RBC-ENTMCNC: 31.7 PG — SIGNIFICANT CHANGE UP (ref 27–34)
MCHC RBC-ENTMCNC: 34.8 GM/DL — SIGNIFICANT CHANGE UP (ref 32–36)
MCV RBC AUTO: 91.1 FL — SIGNIFICANT CHANGE UP (ref 80–100)
METHOD TYPE: SIGNIFICANT CHANGE UP
MONOCYTES # BLD AUTO: 1.03 K/UL — HIGH (ref 0–0.9)
MONOCYTES NFR BLD AUTO: 13.8 % — SIGNIFICANT CHANGE UP (ref 2–14)
NEUTROPHILS # BLD AUTO: 4.7 K/UL — SIGNIFICANT CHANGE UP (ref 1.8–7.4)
NEUTROPHILS NFR BLD AUTO: 62.6 % — SIGNIFICANT CHANGE UP (ref 43–77)
ORGANISM # SPEC MICROSCOPIC CNT: SIGNIFICANT CHANGE UP
ORGANISM # SPEC MICROSCOPIC CNT: SIGNIFICANT CHANGE UP
PHOSPHATE SERPL-MCNC: 2.5 MG/DL — SIGNIFICANT CHANGE UP (ref 2.4–4.7)
PLATELET # BLD AUTO: 356 K/UL — SIGNIFICANT CHANGE UP (ref 150–400)
POTASSIUM SERPL-MCNC: 3.4 MMOL/L — LOW (ref 3.5–5.3)
POTASSIUM SERPL-SCNC: 3.4 MMOL/L — LOW (ref 3.5–5.3)
RBC # BLD: 3.6 M/UL — LOW (ref 4.2–5.8)
RBC # FLD: 13.7 % — SIGNIFICANT CHANGE UP (ref 10.3–14.5)
SODIUM SERPL-SCNC: 140 MMOL/L — SIGNIFICANT CHANGE UP (ref 135–145)
SPECIMEN SOURCE: SIGNIFICANT CHANGE UP
WBC # BLD: 7.49 K/UL — SIGNIFICANT CHANGE UP (ref 3.8–10.5)
WBC # FLD AUTO: 7.49 K/UL — SIGNIFICANT CHANGE UP (ref 3.8–10.5)

## 2022-11-16 PROCEDURE — 85730 THROMBOPLASTIN TIME PARTIAL: CPT

## 2022-11-16 PROCEDURE — 96375 TX/PRO/DX INJ NEW DRUG ADDON: CPT

## 2022-11-16 PROCEDURE — 86901 BLOOD TYPING SEROLOGIC RH(D): CPT

## 2022-11-16 PROCEDURE — 85610 PROTHROMBIN TIME: CPT

## 2022-11-16 PROCEDURE — 85014 HEMATOCRIT: CPT

## 2022-11-16 PROCEDURE — 83605 ASSAY OF LACTIC ACID: CPT

## 2022-11-16 PROCEDURE — 83690 ASSAY OF LIPASE: CPT

## 2022-11-16 PROCEDURE — 84100 ASSAY OF PHOSPHORUS: CPT

## 2022-11-16 PROCEDURE — 82962 GLUCOSE BLOOD TEST: CPT

## 2022-11-16 PROCEDURE — 86900 BLOOD TYPING SEROLOGIC ABO: CPT

## 2022-11-16 PROCEDURE — 85027 COMPLETE CBC AUTOMATED: CPT

## 2022-11-16 PROCEDURE — 83735 ASSAY OF MAGNESIUM: CPT

## 2022-11-16 PROCEDURE — 82947 ASSAY GLUCOSE BLOOD QUANT: CPT

## 2022-11-16 PROCEDURE — 96374 THER/PROPH/DIAG INJ IV PUSH: CPT

## 2022-11-16 PROCEDURE — 86850 RBC ANTIBODY SCREEN: CPT

## 2022-11-16 PROCEDURE — 93005 ELECTROCARDIOGRAM TRACING: CPT

## 2022-11-16 PROCEDURE — 84132 ASSAY OF SERUM POTASSIUM: CPT

## 2022-11-16 PROCEDURE — 36415 COLL VENOUS BLD VENIPUNCTURE: CPT

## 2022-11-16 PROCEDURE — 81001 URINALYSIS AUTO W/SCOPE: CPT

## 2022-11-16 PROCEDURE — 80053 COMPREHEN METABOLIC PANEL: CPT

## 2022-11-16 PROCEDURE — 85018 HEMOGLOBIN: CPT

## 2022-11-16 PROCEDURE — 85025 COMPLETE CBC W/AUTO DIFF WBC: CPT

## 2022-11-16 PROCEDURE — 99285 EMERGENCY DEPT VISIT HI MDM: CPT | Mod: 25

## 2022-11-16 PROCEDURE — 74177 CT ABD & PELVIS W/CONTRAST: CPT | Mod: MD

## 2022-11-16 PROCEDURE — 87086 URINE CULTURE/COLONY COUNT: CPT

## 2022-11-16 PROCEDURE — 84295 ASSAY OF SERUM SODIUM: CPT

## 2022-11-16 PROCEDURE — 82803 BLOOD GASES ANY COMBINATION: CPT

## 2022-11-16 PROCEDURE — 0225U NFCT DS DNA&RNA 21 SARSCOV2: CPT

## 2022-11-16 PROCEDURE — 82435 ASSAY OF BLOOD CHLORIDE: CPT

## 2022-11-16 PROCEDURE — 82330 ASSAY OF CALCIUM: CPT

## 2022-11-16 PROCEDURE — 87186 SC STD MICRODIL/AGAR DIL: CPT

## 2022-11-16 PROCEDURE — C9399: CPT

## 2022-11-16 PROCEDURE — 83036 HEMOGLOBIN GLYCOSYLATED A1C: CPT

## 2022-11-16 PROCEDURE — 80048 BASIC METABOLIC PNL TOTAL CA: CPT

## 2022-11-16 RX ORDER — ACETAMINOPHEN 500 MG
2 TABLET ORAL
Qty: 0 | Refills: 0 | DISCHARGE
Start: 2022-11-16

## 2022-11-16 RX ORDER — MAGNESIUM SULFATE 500 MG/ML
2 VIAL (ML) INJECTION ONCE
Refills: 0 | Status: DISCONTINUED | OUTPATIENT
Start: 2022-11-16 | End: 2022-11-16

## 2022-11-16 RX ORDER — APIXABAN 2.5 MG/1
5 TABLET, FILM COATED ORAL EVERY 12 HOURS
Refills: 0 | Status: DISCONTINUED | OUTPATIENT
Start: 2022-11-16 | End: 2022-11-16

## 2022-11-16 RX ORDER — POTASSIUM CHLORIDE 20 MEQ
40 PACKET (EA) ORAL ONCE
Refills: 0 | Status: COMPLETED | OUTPATIENT
Start: 2022-11-16 | End: 2022-11-16

## 2022-11-16 RX ORDER — APIXABAN 2.5 MG/1
1 TABLET, FILM COATED ORAL
Qty: 0 | Refills: 0 | DISCHARGE
Start: 2022-11-16

## 2022-11-16 RX ORDER — POTASSIUM CHLORIDE 20 MEQ
40 PACKET (EA) ORAL ONCE
Refills: 0 | Status: DISCONTINUED | OUTPATIENT
Start: 2022-11-16 | End: 2022-11-16

## 2022-11-16 RX ORDER — SODIUM,POTASSIUM PHOSPHATES 278-250MG
2 POWDER IN PACKET (EA) ORAL ONCE
Refills: 0 | Status: DISCONTINUED | OUTPATIENT
Start: 2022-11-16 | End: 2022-11-16

## 2022-11-16 RX ORDER — MAGNESIUM SULFATE 500 MG/ML
2 VIAL (ML) INJECTION ONCE
Refills: 0 | Status: COMPLETED | OUTPATIENT
Start: 2022-11-16 | End: 2022-11-16

## 2022-11-16 RX ADMIN — Medication 81 MILLIGRAM(S): at 12:07

## 2022-11-16 RX ADMIN — Medication 40 MILLIEQUIVALENT(S): at 08:22

## 2022-11-16 RX ADMIN — Medication 50 MICROGRAM(S): at 05:07

## 2022-11-16 RX ADMIN — CEFTRIAXONE 1000 MILLIGRAM(S): 500 INJECTION, POWDER, FOR SOLUTION INTRAMUSCULAR; INTRAVENOUS at 08:13

## 2022-11-16 RX ADMIN — Medication 25 GRAM(S): at 08:13

## 2022-11-16 RX ADMIN — Medication 4: at 12:46

## 2022-11-16 NOTE — PHYSICAL THERAPY INITIAL EVALUATION ADULT - PERTINENT HX OF CURRENT PROBLEM, REHAB EVAL
present to ED with abdominal pain, and irreducible left groin hernia now POD3 s/p hernia repair. Doing well postoperatively, progressing well

## 2022-11-16 NOTE — DISCHARGE NOTE PROVIDER - HOSPITAL COURSE
Admission HPI: 77y Male with PMHx of Afib (on eliquis), DM, HTN, HLD present to ED with abdominal pain, patient states his pain started yesterday associated with vomiting 2x, patient with L inguinal hernia he endorses severe pain from. Patient denies fevers/chills, denies lightheadedness/dizziness, denies SOB/chest pain.     Hospital Course: CT abd & pelvis showed small bowel obstruction secondary to left inguinal hernia; significant urinary bladder wall thickening with mucosal enhancement may represent underlying cystitis. Patient was admitted to the ACS service. Taken to the OR - found to have incarcerated femoral hernia. Hernia repaired - no bowel resected. Pt tolerated procedure well & transferred to surgical floors in stable condition. Diet advanced & pt tolerated well. Ucx sent on admission resulted + > 100,000 gram positives. Started on IV abx. Culture sensitized as enterococcus - susceptible to ampicillin. Abx switched to PO augmentin. Pt has normal WBC. Afebrile. Tolerating diet, pain controlled. Cotton removed & pt voiding. PT evaluated patient prior to discharge & recommended _________________________. Admission HPI: 77y Male with PMHx of Afib (on eliquis), DM, HTN, HLD present to ED with abdominal pain, patient states his pain started yesterday associated with vomiting 2x, patient with L inguinal hernia he endorses severe pain from. Patient denies fevers/chills, denies lightheadedness/dizziness, denies SOB/chest pain.     Hospital Course: CT abd & pelvis showed small bowel obstruction secondary to left inguinal hernia; significant urinary bladder wall thickening with mucosal enhancement may represent underlying cystitis. Patient was admitted to the ACS service. Taken to the OR - found to have incarcerated femoral hernia. Hernia repaired - no bowel resected. Pt tolerated procedure well & transferred to surgical floors in stable condition. Diet advanced & pt tolerated well. Ucx sent on admission resulted + > 100,000 gram positives. Started on IV abx. Culture sensitized as enterococcus - susceptible to ampicillin. Abx switched to PO augmentin. Pt has normal WBC. Afebrile. Tolerating diet, pain controlled. Cotton removed & pt voiding. PT evaluated patient prior to discharge & recommended discharge home with home PT.    Patient is advised to RETURN TO THE EMERGENCY DEPARTMENT for any of the following - worsening pain, fever/chills, nausea/vomiting, altered mental status, chest pain, shortness of breath, or any other new / worsening symptom.

## 2022-11-16 NOTE — DISCHARGE NOTE PROVIDER - NSDCCPCAREPLAN_GEN_ALL_CORE_FT
PRINCIPAL DISCHARGE DIAGNOSIS  Diagnosis: SBO (small bowel obstruction)  Assessment and Plan of Treatment: Follow up: Please call and make an appointment with the Acute Care Surgery Clinic 10-14 days after discharge. Also, please call and make an appointment with your primary care physician as per your usual schedule.   Activity: May return to normal activities as tolerated, however refrain from heavy lifting > 10-15 lbs.  Diet: May continue regular diet.  Medications: Please resume all home medications as previously prescribed. Tylenol for pain relief, as needed.   Wound Care: Please, keep wound site clean and dry. You may shower, but do not bathe.  Patient is advised to RETURN TO THE EMERGENCY DEPARTMENT for any of the following - worsening pain, fever/chills, nausea/vomiting, altered mental status, chest pain, shortness of breath, or any other new / worsening symptom.      SECONDARY DISCHARGE DIAGNOSES  Diagnosis: Incarcerated femoral hernia  Assessment and Plan of Treatment: See above    Diagnosis: Urinary tract infection  Assessment and Plan of Treatment: Please continue course of Augmentin (oral antibiotics) as prescribed & follow-up with your urologist 1-2 weeks after discharge.

## 2022-11-16 NOTE — PHYSICAL THERAPY INITIAL EVALUATION ADULT - GAIT DEVIATIONS NOTED, PT EVAL
kyphotic posture with bilateral knees in slight flexion throughout stance phase/decreased trav/decreased step length/decreased stride length

## 2022-11-16 NOTE — DISCHARGE NOTE PROVIDER - NSFOLLOWUPCLINICS_GEN_ALL_ED_FT
[facility] Acute Care Surgery  Acute Care Surgery  [address] [place] [id number]  Phone: [phone]  Fax:

## 2022-11-16 NOTE — PROGRESS NOTE ADULT - ASSESSMENT
A/P  77y Male with PMHx of Afib (on eliquis), DM, HTN, HLD present to ED with abdominal pain, and irreducible left groin hernia now POD2 s/p hernia repair. Doing well postoperatively, tolerating CLD, and hemodynamically stable.       Plan:   - continue good glucose control  - PRN pain control  - Maintain clear liquid diet, ADAT  - monitor bowel function  - Holding eliquis, likely restart ??  - Holding BP meds at this time  - next TOV?  - DVT ppx.  
A/P  77y Male with PMHx of Afib (on eliquis), DM, HTN, HLD present to ED with abdominal pain, and irreducible left groin hernia now POD3 s/p hernia repair. Doing well postoperatively, progressing well      Plan:   - continue good glucose control  - PRN pain control  - monitor bowel function  - Holding eliquis, likely 11/16  - Holding BP meds at this time  - DVT ppx.

## 2022-11-16 NOTE — PHYSICAL THERAPY INITIAL EVALUATION ADULT - ADDITIONAL COMMENTS
Pt reports living in private home with wife who is home able to assist/supervise. Pt has 4 RUBY with handrail and no stairs inside. Independent with RW prior to admission.

## 2022-11-16 NOTE — DISCHARGE NOTE NURSING/CASE MANAGEMENT/SOCIAL WORK - PATIENT PORTAL LINK FT
You can access the FollowMyHealth Patient Portal offered by [facility] by registering at the following website: [email or web address]. By joining [email or web address]’s FollowMyHealth portal, you will also be able to view your health information using other applications (apps) compatible with our system.

## 2022-11-16 NOTE — PROGRESS NOTE ADULT - SUBJECTIVE AND OBJECTIVE BOX
SUBJECTIVE/24 hour events:  77y Male presenting with incarcerated left femoral hernia, POD3  from Left inguinal hernia repair. Patient with no acute events overnight, pain is controlled, tolerating a regular (carb controlled) at this time. Dressings are c/d/i.  No nausea, vomiting, fevers chills, chest pain, or SOB. passed TOV      Vital Signs Last 24 Hrs  T(C): 36.7 (16 Nov 2022 00:31), Max: 36.9 (15 Nov 2022 20:17)  T(F): 98 (16 Nov 2022 00:31), Max: 98.4 (15 Nov 2022 20:17)  HR: 56 (16 Nov 2022 00:31) (56 - 92)  BP: 132/78 (16 Nov 2022 00:31) (122/75 - 144/88)  BP(mean): --  RR: 18 (16 Nov 2022 00:31) (18 - 18)  SpO2: 95% (16 Nov 2022 00:31) (93% - 96%)    Parameters below as of 16 Nov 2022 00:31  Patient On (Oxygen Delivery Method): room air        Drug Dosing Weight  Height (cm): 190.5 (13 Nov 2022 12:26)  Weight (kg): 77.1 (13 Nov 2022 12:26)  BMI (kg/m2): 21.2 (13 Nov 2022 12:26)  BSA (m2): 2.05 (13 Nov 2022 12:26)    I&O's Detail    14 Nov 2022 07:01  -  15 Nov 2022 07:00  --------------------------------------------------------  IN:    multiple electrolytes Injection Type 1.: 1275 mL  Total IN: 1275 mL    OUT:    Indwelling Catheter - Urethral (mL): 1375 mL    Voided (mL): 900 mL  Total OUT: 2275 mL    Total NET: -1000 mL      15 Nov 2022 07:01  -  16 Nov 2022 04:37  --------------------------------------------------------  IN:    Oral Fluid: 1080 mL  Total IN: 1080 mL    OUT:    Indwelling Catheter - Urethral (mL): 750 mL    Voided (mL): 1925 mL  Total OUT: 2675 mL    Total NET: -1595 mL          Allergies    No Known Allergies    Intolerances                  ROS:    PHYSICAL EXAM:  GENERAL: Laying in bed in NAD,   HEAD:  Atraumatic, Normocephalic  EYES: EOMI, conjunctiva and sclera clear  NECK: Supple, full ROM  CHEST/LUNG: Unlabored, no conversational dyspnea  HEART: Regular rate and rhythm;  ABDOMEN: Soft, mildly and appropriately tender, dressing clean and intact and lightly stained. Nondistended;  EXTREMITIES:  WWP, moving spontaneously  PSYCH: AAOx3  NEUROLOGY: non-focal  SKIN: No rashes or lesions    MEDICATIONS  (STANDING):  aspirin  chewable 81 milliGRAM(s) Oral daily  atorvastatin 20 milliGRAM(s) Oral at bedtime  cefTRIAXone Injectable.      cefTRIAXone Injectable. 1000 milliGRAM(s) IV Push every 24 hours  dextrose 50% Injectable 25 Gram(s) IV Push once  dextrose 50% Injectable 12.5 Gram(s) IV Push once  dextrose 50% Injectable 25 Gram(s) IV Push once  enoxaparin Injectable 40 milliGRAM(s) SubCutaneous every 24 hours  glucagon  Injectable 1 milliGRAM(s) IntraMuscular once  insulin lispro (ADMELOG) corrective regimen sliding scale   SubCutaneous Before meals and at bedtime  levothyroxine 50 MICROGram(s) Oral daily    MEDICATIONS  (PRN):  acetaminophen     Tablet .. 650 milliGRAM(s) Oral every 6 hours PRN Moderate Pain (4 - 6)  dextrose Oral Gel 15 Gram(s) Oral once PRN Blood Glucose LESS THAN 70 milliGRAM(s)/deciliter

## 2022-11-16 NOTE — DISCHARGE NOTE PROVIDER - NSDCMRMEDTOKEN_GEN_ALL_CORE_FT
acetaminophen 325 mg oral tablet: 2 tab(s) orally every 6 hours, As needed, Temp greater or equal to 38C (100.4F), Mild Pain (1 - 3)  ascorbic acid 500 mg oral tablet: 1 tab(s) orally once a day  Aspirin Low Dose 81 mg oral tablet, chewable: 1 tab(s) orally once a day  atorvastatin 20 mg oral tablet: 1 tab(s) orally once a day (at bedtime)  doxazosin 4 mg oral tablet: 1 tab(s) orally once a day (at bedtime)  enoxaparin: 40 unit(s)  once a day  insulin lispro 100 units/mL injectable solution: ISS  levothyroxine 50 mcg (0.05 mg) oral tablet: 1 tab(s) orally once a day  methocarbamol 750 mg oral tablet: 1 tab(s) orally every 8 hours, As needed, Muscle Spasm/Back Pain  Metoprolol Succinate ER 25 mg oral tablet, extended release: 1 tab(s) orally once a day  Multiple Vitamins oral tablet: 1 tab(s) orally once a day  oxyCODONE 5 mg oral tablet: 1 tab(s) orally every 4 hours, As needed, Moderate Pain (4 - 6)  polyethylene glycol 3350 oral powder for reconstitution: 17 gram(s) orally once a day  senna oral tablet: 2 tab(s) orally once a day (at bedtime)   acetaminophen 325 mg oral tablet: 2 tab(s) orally every 6 hours, As needed, Moderate Pain (4 - 6)  amoxicillin-clavulanate 875 mg-125 mg oral tablet: 1 tab(s) orally every 12 hours  apixaban 5 mg oral tablet: 1 tab(s) orally every 12 hours  ascorbic acid 500 mg oral tablet: 1 tab(s) orally once a day  Aspirin Low Dose 81 mg oral tablet, chewable: 1 tab(s) orally once a day  atorvastatin 20 mg oral tablet: 1 tab(s) orally once a day (at bedtime)  doxazosin 4 mg oral tablet: 1 tab(s) orally once a day (at bedtime)  insulin lispro 100 units/mL injectable solution: ISS  levothyroxine 50 mcg (0.05 mg) oral tablet: 1 tab(s) orally once a day  methocarbamol 750 mg oral tablet: 1 tab(s) orally every 8 hours, As needed, Muscle Spasm/Back Pain  Metoprolol Succinate ER 25 mg oral tablet, extended release: 1 tab(s) orally once a day  Multiple Vitamins oral tablet: 1 tab(s) orally once a day  oxyCODONE 5 mg oral tablet: 1 tab(s) orally every 4 hours, As needed, Moderate Pain (4 - 6)  polyethylene glycol 3350 oral powder for reconstitution: 17 gram(s) orally once a day  senna oral tablet: 2 tab(s) orally once a day (at bedtime)

## 2022-11-16 NOTE — PROGRESS NOTE ADULT - ATTENDING COMMENTS
Awake alert  Hemodynamic stable  Bilateral BS  Abdomen soft, active BS  Femoral hernia incision clean dry  DC patient

## 2022-12-06 ENCOUNTER — APPOINTMENT (OUTPATIENT)
Dept: TRAUMA SURGERY | Facility: CLINIC | Age: 77
End: 2022-12-06
Payer: MEDICARE

## 2022-12-06 VITALS
RESPIRATION RATE: 14 BRPM | HEART RATE: 70 BPM | SYSTOLIC BLOOD PRESSURE: 92 MMHG | DIASTOLIC BLOOD PRESSURE: 58 MMHG | HEIGHT: 72 IN | TEMPERATURE: 97.1 F | OXYGEN SATURATION: 97 %

## 2022-12-06 DIAGNOSIS — Z87.19 OTHER SPECIFIED POSTPROCEDURAL STATES: ICD-10-CM

## 2022-12-06 DIAGNOSIS — Z98.890 OTHER SPECIFIED POSTPROCEDURAL STATES: ICD-10-CM

## 2022-12-06 PROCEDURE — 99024 POSTOP FOLLOW-UP VISIT: CPT

## 2022-12-06 NOTE — PHYSICAL EXAM
[Abdominal Masses] : No abdominal masses [de-identified] : S/NT/ND. Groin incision site CDI without erythema or drainage

## 2022-12-06 NOTE — HISTORY OF PRESENT ILLNESS
[FreeTextEntry1] : Patient is a 77-year-old male here for follow-up after repair of femoral hernia.  He has no complaints about his wound or his testicle.  He only complains about pain in his back after his back surgery.  He denies nausea, vomiting, or testicular pain.

## 2022-12-07 PROBLEM — H16.223: Status: ACTIVE | Noted: 2022-12-07

## 2022-12-11 NOTE — ED ADULT TRIAGE NOTE - MODE OF ARRIVAL
SEEG data reviewed in detail with the patient and        Interictal       Runs               
Walk in

## 2022-12-13 ENCOUNTER — OFFICE (OUTPATIENT)
Dept: URBAN - METROPOLITAN AREA CLINIC 94 | Facility: CLINIC | Age: 77
Setting detail: OPHTHALMOLOGY
End: 2022-12-13
Payer: MEDICARE

## 2022-12-13 DIAGNOSIS — H43.393: ICD-10-CM

## 2022-12-13 DIAGNOSIS — H35.3221: ICD-10-CM

## 2022-12-13 PROCEDURE — 92235 FLUORESCEIN ANGRPH MLTIFRAME: CPT | Performed by: OPHTHALMOLOGY

## 2022-12-13 PROCEDURE — 92134 CPTRZ OPH DX IMG PST SGM RTA: CPT | Performed by: OPHTHALMOLOGY

## 2022-12-13 PROCEDURE — 67028 INJECTION EYE DRUG: CPT | Performed by: OPHTHALMOLOGY

## 2022-12-13 NOTE — ASU PREOP CHECKLIST - HEIGHT IN CM
PCP: Vignesh Garcia MD    Last appt: 10/14/2022  Future Appointments   Date Time Provider Juan Antonio Cruz   4/14/2023 11:00 AM Vignesh Garcia MD [facility] AMB       Requested Prescriptions     Pending Prescriptions Disp Refills    zolpidem CR (AMBIEN CR) 12.5 mg tablet 90 Tablet 0     Sig: Take 1 Tablet by mouth nightly.        Prior labs and Blood pressures:  BP Readings from Last 3 Encounters:   10/14/22 130/70   05/31/22 (!) 172/119   05/16/22 (!) 119/90     Lab Results   Component Value Date/Time    Sodium 141 10/07/2022 09:42 AM    Potassium 4.1 10/07/2022 09:42 AM    Chloride 108 10/07/2022 09:42 AM    CO2 28 10/07/2022 09:42 AM    Anion gap 5 10/07/2022 09:42 AM    Glucose 101 (H) 10/07/2022 09:42 AM    BUN 19 10/07/2022 09:42 AM    Creatinine 1.01 10/07/2022 09:42 AM    BUN/Creatinine ratio 19 10/07/2022 09:42 AM    GFR est AA >60 09/08/2021 10:20 AM    GFR est non-AA >60 09/08/2021 10:20 AM    Calcium 9.4 10/07/2022 09:42 AM     Lab Results   Component Value Date/Time    Hemoglobin A1c 6.0 (H) 10/07/2022 09:42 AM     Lab Results   Component Value Date/Time    Cholesterol, total 142 10/07/2022 09:42 AM    HDL Cholesterol 45 10/07/2022 09:42 AM    LDL, calculated 76 10/07/2022 09:42 AM    VLDL, calculated 21 10/07/2022 09:42 AM    Triglyceride 105 10/07/2022 09:42 AM    CHOL/HDL Ratio 3.2 10/07/2022 09:42 AM     Lab Results   Component Value Date/Time    Vitamin D 25-Hydroxy 39.5 02/11/2021 11:55 AM       Lab Results   Component Value Date/Time    TSH 0.93 09/08/2021 10:20 AM    TSH, 3rd generation 0.85 08/27/2020 10:21 AM 190

## 2022-12-14 ASSESSMENT — AXIALLENGTH_DERIVED
OS_AL: 23.3223
OD_AL: 23.8607

## 2022-12-14 ASSESSMENT — KERATOMETRY
OD_K1POWER_DIOPTERS: 44.00
OS_K1POWER_DIOPTERS: 43.75
OD_AXISANGLE_DEGREES: 065
OD_K2POWER_DIOPTERS: 45.00
OS_K2POWER_DIOPTERS: 45.00
OS_AXISANGLE_DEGREES: 115
METHOD_AUTO_MANUAL: AUTO

## 2022-12-14 ASSESSMENT — SPHEQUIV_DERIVED
OD_SPHEQUIV: -1.625
OS_SPHEQUIV: -0.125

## 2022-12-14 ASSESSMENT — REFRACTION_AUTOREFRACTION
OS_CYLINDER: -0.75
OS_SPHERE: +0.25
OD_AXIS: 166
OD_CYLINDER: -0.75
OS_AXIS: 062
OD_SPHERE: -1.25

## 2022-12-14 ASSESSMENT — VISUAL ACUITY
OS_BCVA: 20/40-1
OD_BCVA: 20/250

## 2023-01-06 ENCOUNTER — APPOINTMENT (OUTPATIENT)
Dept: NEUROSURGERY | Facility: CLINIC | Age: 78
End: 2023-01-06
Payer: MEDICARE

## 2023-01-06 VITALS
BODY MASS INDEX: 21.67 KG/M2 | TEMPERATURE: 97.5 F | HEART RATE: 78 BPM | OXYGEN SATURATION: 94 % | HEIGHT: 72 IN | SYSTOLIC BLOOD PRESSURE: 115 MMHG | WEIGHT: 160 LBS | DIASTOLIC BLOOD PRESSURE: 75 MMHG

## 2023-01-06 PROCEDURE — 99213 OFFICE O/P EST LOW 20 MIN: CPT

## 2023-01-06 NOTE — ASSESSMENT
[FreeTextEntry1] : Mr. Dumont is doing well from the post operative perspective. \par Patient's primary complaint is ill fitting brace.\par Plan:\par Patient will be referred to [facility] for adjustment of hard shell LSO brace. \par Maintain fall precautions.\par \par Continue physical therapy.\par Patient has been given an opportunity to ask and have their questions answered to their satisfaction.\par Patient knows to call the office if there are any new or worsening symptoms.\par \par \par \par \par

## 2023-01-06 NOTE — REASON FOR VISIT
[de-identified] : ospital Course: \par Discharge Date	30-Mar-2022 \par Admission Date	17-Mar-2022 18:42 \par Reason for Admission	Wound dehiscence \par Hospital Course	 \par 76y Male extensive PMH including HTN, HLD, DM, hypothyroidism, asbestosis, CAD \par s/p PCI, afib on Eliquis, well known to our service after found with L1 burst \par fracture s/p T12-L1 laminectomy and T10-L4 fusion 1/21/22, recent admission \par late February for tenting of skin concerning for displaced hardware and small \par inferior wound dehiscence, sent in 3/17/22 by nursing home with now superior \par wound dehiscence at previous are of skin tenting and persistent inferior wound \par dehiscence \par 3/23: s/p removal of thoracolumbar instrumentation and complex wound closure \par with Dr. Gaitan/Dr. Schumacher \par Patient presents today for post op visit and wound check.   He endorses minimal thoracic region pain. Pain intensity 3/10.  He is urinating in a velasco bag.  He is ambulating with assistance of a walker.  \par Incision is healing well. \par No fever or chills.

## 2023-01-06 NOTE — PHYSICAL EXAM
[General Appearance - Alert] : alert [General Appearance - In No Acute Distress] : in no acute distress [Longitudinal] : longitudinal [Clean] : clean [Dry] : dry [Intact] : intact [No Drainage] : without drainage [Normal Skin] : normal [Erythema] : not erythematous [Tender] : not tender [FreeTextEntry1] : thoracic region  [Person] : oriented to person [Place] : oriented to place [Time] : oriented to time [Short Term Intact] : short term memory intact [Remote Intact] : remote memory intact [Span Intact] : the attention span was normal [Concentration Intact] : normal concentrating ability [Fluency] : fluency intact [Comprehension] : comprehension intact [Current Events] : adequate knowledge of current events [Past History] : adequate knowledge of personal past history [Vocabulary] : adequate range of vocabulary [Cranial Nerves Optic (II)] : visual acuity intact bilaterally,  pupils equal round and reactive to light [Cranial Nerves Oculomotor (III)] : extraocular motion intact [Cranial Nerves Trigeminal (V)] : facial sensation intact symmetrically [Cranial Nerves Facial (VII)] : face symmetrical [Cranial Nerves Glossopharyngeal (IX)] : tongue and palate midline [Cranial Nerves Accessory (XI - Cranial And Spinal)] : head turning and shoulder shrug symmetric [Motor Tone] : muscle tone was normal in all four extremities [Cranial Nerves Hypoglossal (XII)] : there was no tongue deviation with protrusion [Motor Strength] : muscle strength was normal in all four extremities [No Muscle Atrophy] : normal bulk in all four extremities [Sensation Tactile Decrease] : light touch was intact [Sensation Pain / Temperature Decrease] : pain and temperature was intact [Abnormal Walk] : normal gait [Balance] : balance was intact [Past-pointing] : there was no past-pointing [Tremor] : no tremor present [FreeTextEntry8] : presents in a wheelchair- able to walk a couple of steps in the room  [No Visual Abnormalities] : no visible abnormailities [Antalgic] : antalgic

## 2023-02-14 ENCOUNTER — OFFICE (OUTPATIENT)
Dept: URBAN - METROPOLITAN AREA CLINIC 94 | Facility: CLINIC | Age: 78
Setting detail: OPHTHALMOLOGY
End: 2023-02-14
Payer: MEDICARE

## 2023-02-14 DIAGNOSIS — H43.393: ICD-10-CM

## 2023-02-14 DIAGNOSIS — E11.9: ICD-10-CM

## 2023-02-14 DIAGNOSIS — H35.3112: ICD-10-CM

## 2023-02-14 DIAGNOSIS — H35.3221: ICD-10-CM

## 2023-02-14 PROCEDURE — 67028 INJECTION EYE DRUG: CPT | Performed by: OPHTHALMOLOGY

## 2023-02-14 PROCEDURE — 92250 FUNDUS PHOTOGRAPHY W/I&R: CPT | Performed by: OPHTHALMOLOGY

## 2023-02-14 ASSESSMENT — CONFRONTATIONAL VISUAL FIELD TEST (CVF)
OD_FINDINGS: FULL
OS_FINDINGS: FULL

## 2023-02-14 ASSESSMENT — REFRACTION_AUTOREFRACTION
OD_CYLINDER: -0.75
OS_SPHERE: +0.25
OD_SPHERE: -1.25
OD_AXIS: 166
OS_AXIS: 062
OS_CYLINDER: -0.75

## 2023-02-14 ASSESSMENT — SPHEQUIV_DERIVED
OD_SPHEQUIV: -1.625
OS_SPHEQUIV: -0.125

## 2023-02-14 ASSESSMENT — KERATOMETRY
OS_K2POWER_DIOPTERS: 45.00
OS_AXISANGLE_DEGREES: 115
METHOD_AUTO_MANUAL: AUTO
OD_K1POWER_DIOPTERS: 44.00
OD_AXISANGLE_DEGREES: 065
OS_K1POWER_DIOPTERS: 43.75
OD_K2POWER_DIOPTERS: 45.00

## 2023-02-14 ASSESSMENT — VISUAL ACUITY
OD_BCVA: 20/200
OS_BCVA: 20/40-1

## 2023-02-14 ASSESSMENT — AXIALLENGTH_DERIVED
OD_AL: 23.8607
OS_AL: 23.3223

## 2023-02-14 ASSESSMENT — TONOMETRY: OD_IOP_MMHG: 19

## 2023-02-17 PROBLEM — Z98.890 S/P INGUINAL HERNIA REPAIR: Status: ACTIVE | Noted: 2022-12-06

## 2023-04-18 ENCOUNTER — OFFICE (OUTPATIENT)
Dept: URBAN - METROPOLITAN AREA CLINIC 94 | Facility: CLINIC | Age: 78
Setting detail: OPHTHALMOLOGY
End: 2023-04-18
Payer: MEDICARE

## 2023-04-18 DIAGNOSIS — H43.393: ICD-10-CM

## 2023-04-18 DIAGNOSIS — H35.3221: ICD-10-CM

## 2023-04-18 DIAGNOSIS — H35.3112: ICD-10-CM

## 2023-04-18 DIAGNOSIS — E11.9: ICD-10-CM

## 2023-04-18 PROCEDURE — 92235 FLUORESCEIN ANGRPH MLTIFRAME: CPT | Performed by: OPHTHALMOLOGY

## 2023-04-18 PROCEDURE — 67028 INJECTION EYE DRUG: CPT | Performed by: OPHTHALMOLOGY

## 2023-04-18 PROCEDURE — 92134 CPTRZ OPH DX IMG PST SGM RTA: CPT | Performed by: OPHTHALMOLOGY

## 2023-04-18 ASSESSMENT — REFRACTION_AUTOREFRACTION
OS_CYLINDER: -0.75
OD_CYLINDER: -0.75
OD_SPHERE: -1.25
OS_SPHERE: +0.25
OD_AXIS: 166
OS_AXIS: 062

## 2023-04-18 ASSESSMENT — AXIALLENGTH_DERIVED
OD_AL: 23.8607
OS_AL: 23.3223

## 2023-04-18 ASSESSMENT — KERATOMETRY
METHOD_AUTO_MANUAL: AUTO
OS_AXISANGLE_DEGREES: 115
OD_K2POWER_DIOPTERS: 45.00
OD_K1POWER_DIOPTERS: 44.00
OS_K1POWER_DIOPTERS: 43.75
OD_AXISANGLE_DEGREES: 065
OS_K2POWER_DIOPTERS: 45.00

## 2023-04-18 ASSESSMENT — TONOMETRY
OD_IOP_MMHG: 16
OS_IOP_MMHG: 16

## 2023-04-18 ASSESSMENT — VISUAL ACUITY
OD_BCVA: 20/300
OS_BCVA: 20/40

## 2023-04-18 ASSESSMENT — SPHEQUIV_DERIVED
OD_SPHEQUIV: -1.625
OS_SPHEQUIV: -0.125

## 2023-04-18 ASSESSMENT — CONFRONTATIONAL VISUAL FIELD TEST (CVF)
OD_FINDINGS: FULL
OS_FINDINGS: FULL

## 2023-04-26 NOTE — PHYSICAL THERAPY INITIAL EVALUATION ADULT - PATIENT PROFILE REVIEW, REHAB EVAL
I CALLED DARCY AT [facility] AND LET HER KNOW THAT THE PATIENT ALREADY HAS A MACHINE THAT HE RECEIVED HIS REPLACEMENT MACHINE THAT I BELIEVE THAT HE JUST NEEDS THE SETTINGS ON THE MACHINE.    yes

## 2023-05-05 ENCOUNTER — APPOINTMENT (OUTPATIENT)
Dept: NEUROSURGERY | Facility: CLINIC | Age: 78
End: 2023-05-05
Payer: MEDICARE

## 2023-05-05 VITALS
DIASTOLIC BLOOD PRESSURE: 66 MMHG | BODY MASS INDEX: 24.38 KG/M2 | HEART RATE: 61 BPM | TEMPERATURE: 97.1 F | OXYGEN SATURATION: 97 % | HEIGHT: 72 IN | WEIGHT: 180 LBS | SYSTOLIC BLOOD PRESSURE: 113 MMHG

## 2023-05-05 PROCEDURE — 99214 OFFICE O/P EST MOD 30 MIN: CPT

## 2023-05-05 NOTE — ASSESSMENT
[FreeTextEntry1] : Mr. Dumont is doing well from the post operative perspective. \par Patient's primary complaint is ill fitting brace.\par Plan:\par Patient will be referred to [facility] for adjustment of hard shell LSO brace. \par Patient may resume driving short distances. \par Maintain fall precautions.\par \par Continue physical therapy.\par Patient has been given an opportunity to ask and have their questions answered to their satisfaction.\par Patient knows to call the office if there are any new or worsening symptoms.\par \par \par \par \par

## 2023-05-05 NOTE — REASON FOR VISIT
[Follow-Up: _____] : a [unfilled] follow-up visit [de-identified] : ospital Course: \par Discharge Date	30-Mar-2022 \par Admission Date	17-Mar-2022 18:42 \par Reason for Admission	Wound dehiscence \par Hospital Course	 \par 76y Male extensive PMH including HTN, HLD, DM, hypothyroidism, asbestosis, CAD \par s/p PCI, afib on Eliquis, well known to our service after found with L1 burst \par fracture s/p T12-L1 laminectomy and T10-L4 fusion 1/21/22, recent admission \par late February for tenting of skin concerning for displaced hardware and small \par inferior wound dehiscence, sent in 3/17/22 by nursing home with now superior \par wound dehiscence at previous are of skin tenting and persistent inferior wound \par dehiscence \par 3/23: s/p removal of thoracolumbar instrumentation and complex wound closure \par with Dr. Gaitan/Dr. Schumacher \par Patient presents today for post op visit and wound check.   He endorses minimal thoracic region pain. Pain intensity 3/10.  He is urinating in a velasco bag.  He is ambulating with assistance of a walker.  \par Incision is healing well. \par No fever or chills.

## 2023-05-05 NOTE — DATA REVIEWED
[de-identified] : XAM:  CT LUMBAR SPINE WITHOUT CONTRAST\par \par Note - This patient has received 1 CT studies and 0 Myocardial Perfusion studies within our network over the previous 12 month period.\par \par HISTORY:  Follow-up fracture\par \par TECHNIQUE:  CT was performed without contrast with axial multislice multidetector technique. Sagittal, coronal and axial reformatted images were then obtained. One or more of the following dose reduction techniques were used: automated exposure control, adjustment of the mA and/or kV according to patient size, use of iterative reconstruction technique. \par \par COMPARISON:  12/13/2021\par \par FINDINGS:   There is a burst fracture of L1 with approximately 80% loss of height, progressed compared with the prior study. Retropulsion into the spinal canal measures approximately 15 mL. Stenosis post posterior decompression at the L1 level. There is retrolisthesis of L1 and L2 with retrolisthesis of the spinal canal cranial to T12. Stigmata of hardware within the T10-11 and T12, L2, L3 and L4 vertebral bodies. Morselized bone graft posteriorly. No fusion across the facets. Soft tissue swelling and severe central canal stenosis at L1.\par \par Mild anterolisthesis of L4 and L5. Mild loss of disc space height across remainder of the lumbar spine. Mild degenerative endplate change at L4/L5 and L5/S1.\par \par Impression:\par 1. Posterior decompression at L1; progressive loss of height at the L1 burst fracture with retrolisthesis of L1 into the spinal canal and severe central canal stenosis.\par 2. Removal of hardware from T10 through L3.\par 3. Mild lumbar spondylosis.\par

## 2023-06-20 ENCOUNTER — OFFICE (OUTPATIENT)
Dept: URBAN - METROPOLITAN AREA CLINIC 94 | Facility: CLINIC | Age: 78
Setting detail: OPHTHALMOLOGY
End: 2023-06-20
Payer: MEDICARE

## 2023-06-20 DIAGNOSIS — H35.3112: ICD-10-CM

## 2023-06-20 DIAGNOSIS — H35.3221: ICD-10-CM

## 2023-06-20 PROCEDURE — 67028 INJECTION EYE DRUG: CPT | Performed by: OPHTHALMOLOGY

## 2023-06-20 PROCEDURE — 92134 CPTRZ OPH DX IMG PST SGM RTA: CPT | Performed by: OPHTHALMOLOGY

## 2023-06-20 ASSESSMENT — REFRACTION_AUTOREFRACTION
OS_SPHERE: +0.25
OD_SPHERE: -1.25
OS_CYLINDER: -0.75
OS_AXIS: 062
OD_CYLINDER: -0.75
OD_AXIS: 166

## 2023-06-20 ASSESSMENT — AXIALLENGTH_DERIVED
OS_AL: 23.3223
OD_AL: 23.8607

## 2023-06-20 ASSESSMENT — KERATOMETRY
METHOD_AUTO_MANUAL: AUTO
OS_K2POWER_DIOPTERS: 45.00
OD_K2POWER_DIOPTERS: 45.00
OS_AXISANGLE_DEGREES: 115
OS_K1POWER_DIOPTERS: 43.75
OD_AXISANGLE_DEGREES: 065
OD_K1POWER_DIOPTERS: 44.00

## 2023-06-20 ASSESSMENT — CONFRONTATIONAL VISUAL FIELD TEST (CVF)
OD_FINDINGS: FULL
OS_FINDINGS: FULL

## 2023-06-20 ASSESSMENT — TONOMETRY
OS_IOP_MMHG: 16
OD_IOP_MMHG: 16

## 2023-06-20 ASSESSMENT — VISUAL ACUITY
OD_BCVA: CF 1FT
OS_BCVA: 20/40+1

## 2023-06-20 ASSESSMENT — SPHEQUIV_DERIVED
OD_SPHEQUIV: -1.625
OS_SPHEQUIV: -0.125

## 2023-06-28 NOTE — PHYSICAL THERAPY INITIAL EVALUATION ADULT - BALANCE DISTURBANCE, SYSTEM IMPAIRMENT CONTRIBUTE, REHAB EVAL
June 28, 2023    Garcia Cullen  [address] [facility]  [place] [address]          Dear Jose Berman:  MRN: [id number]    Your lab work was due to be drawn on 3/31/23.  We contacted your lab and were unable to get test results.  It is very important to get your labs drawn as scheduled.  We cannot monitor you for rejection, infections, or drug toxicity side effects without lab results.  Please call us at [phone] as soon as possible to let us know when you plan to have labs drawn.    Sincerely,    Marie Ramos, RN,BSN,CCTC    Your Liver Transplant Coordinator    [facility]  [facility] [place] [address]  [phone]        neuromuscular/musculoskeletal

## 2023-07-30 NOTE — BRIEF OPERATIVE NOTE - NSICDXBRIEFPROCEDURE_GEN_ALL_CORE_FT
PROCEDURES:  Femoral hernia repair 13-Nov-2022 22:35:39  Deyanira Smith   - Drink plenty of fluids.  - Return to ED if you have worsening symptoms.    - Please follow-up with your primary care doctor.  Please call for an appointment in the next 1-3 days but if you cannot follow-up with your primary care doctor please return to the ED for any urgent issues.  - You were given a copy of the tests performed today.  Please bring the results with you and review them with your primary care doctor.  - If you have any worsening of symptoms or any other concerns please return to the ED immediately.  - Please continue taking your home medications as directed.

## 2023-09-16 NOTE — BRIEF OPERATIVE NOTE - OPERATION/FINDINGS
hardware failure with loosening
Bilateral paraspinal muscle flap, complex closure spine wound 28cm
hardware failure with looseing
No

## 2023-09-19 ENCOUNTER — OFFICE (OUTPATIENT)
Dept: URBAN - METROPOLITAN AREA CLINIC 94 | Facility: CLINIC | Age: 78
Setting detail: OPHTHALMOLOGY
End: 2023-09-19
Payer: MEDICARE

## 2023-09-19 DIAGNOSIS — H35.3221: ICD-10-CM

## 2023-09-19 DIAGNOSIS — H35.3112: ICD-10-CM

## 2023-09-19 DIAGNOSIS — E11.9: ICD-10-CM

## 2023-09-19 PROCEDURE — 92134 CPTRZ OPH DX IMG PST SGM RTA: CPT | Performed by: OPHTHALMOLOGY

## 2023-09-19 PROCEDURE — 67028 INJECTION EYE DRUG: CPT | Performed by: OPHTHALMOLOGY

## 2023-09-19 ASSESSMENT — SPHEQUIV_DERIVED
OS_SPHEQUIV: -0.125
OD_SPHEQUIV: -1.625

## 2023-09-19 ASSESSMENT — TONOMETRY
OS_IOP_MMHG: 18
OD_IOP_MMHG: 20

## 2023-09-19 ASSESSMENT — CONFRONTATIONAL VISUAL FIELD TEST (CVF)
OD_FINDINGS: FULL
OS_FINDINGS: FULL

## 2023-09-19 ASSESSMENT — REFRACTION_AUTOREFRACTION
OS_CYLINDER: -0.75
OD_SPHERE: -1.25
OS_AXIS: 062
OD_AXIS: 166
OS_SPHERE: +0.25
OD_CYLINDER: -0.75

## 2023-09-19 ASSESSMENT — AXIALLENGTH_DERIVED
OS_AL: 23.3223
OD_AL: 23.8607

## 2023-09-19 ASSESSMENT — KERATOMETRY
OD_K2POWER_DIOPTERS: 45.00
OS_K1POWER_DIOPTERS: 43.75
METHOD_AUTO_MANUAL: AUTO
OD_AXISANGLE_DEGREES: 065
OS_K2POWER_DIOPTERS: 45.00
OS_AXISANGLE_DEGREES: 115
OD_K1POWER_DIOPTERS: 44.00

## 2023-09-19 ASSESSMENT — VISUAL ACUITY
OD_BCVA: CF@2FT
OS_BCVA: 20/40+

## 2023-11-28 ENCOUNTER — APPOINTMENT (OUTPATIENT)
Dept: NEUROSURGERY | Facility: CLINIC | Age: 78
End: 2023-11-28
Payer: MEDICARE

## 2023-11-28 VITALS
HEART RATE: 66 BPM | TEMPERATURE: 97.4 F | SYSTOLIC BLOOD PRESSURE: 139 MMHG | WEIGHT: 180 LBS | OXYGEN SATURATION: 95 % | DIASTOLIC BLOOD PRESSURE: 78 MMHG | HEIGHT: 72 IN | BODY MASS INDEX: 24.38 KG/M2

## 2023-11-28 PROCEDURE — 99214 OFFICE O/P EST MOD 30 MIN: CPT

## 2023-12-19 NOTE — PHYSICAL EXAM
[Erythema] : not erythematous [Tender] : not tender [FreeTextEntry1] : thoracic region  [Past-pointing] : there was no past-pointing [Tremor] : no tremor present [FreeTextEntry8] : presents in a wheelchair- able to walk a couple of steps in the room

## 2023-12-19 NOTE — ASSESSMENT
[FreeTextEntry1] : Mr. Dumont is doing well from the post operative perspective.  Patient's primary complaint is ill fitting brace.  Plan:  Patient will be referred to [facility] for adjustment of hard shell LSO brace.  Patient may resume driving short distances.  Maintain fall precautions.    Continue physical therapy.  Patient has been given an opportunity to ask and have their questions answered to their satisfaction.  Patient knows to call the office if there are any new or worsening symptoms.

## 2023-12-19 NOTE — DATA REVIEWED
[de-identified] : XAM:  CT LUMBAR SPINE WITHOUT CONTRAST\par  \par  Note - This patient has received 1 CT studies and 0 Myocardial Perfusion studies within our network over the previous 12 month period.\par  \par  HISTORY:  Follow-up fracture\par  \par  TECHNIQUE:  CT was performed without contrast with axial multislice multidetector technique. Sagittal, coronal and axial reformatted images were then obtained. One or more of the following dose reduction techniques were used: automated exposure control, adjustment of the mA and/or kV according to patient size, use of iterative reconstruction technique. \par  \par  COMPARISON:  12/13/2021\par  \par  FINDINGS:   There is a burst fracture of L1 with approximately 80% loss of height, progressed compared with the prior study. Retropulsion into the spinal canal measures approximately 15 mL. Stenosis post posterior decompression at the L1 level. There is retrolisthesis of L1 and L2 with retrolisthesis of the spinal canal cranial to T12. Stigmata of hardware within the T10-11 and T12, L2, L3 and L4 vertebral bodies. Morselized bone graft posteriorly. No fusion across the facets. Soft tissue swelling and severe central canal stenosis at L1.\par  \par  Mild anterolisthesis of L4 and L5. Mild loss of disc space height across remainder of the lumbar spine. Mild degenerative endplate change at L4/L5 and L5/S1.\par  \par  Impression:\par  1. Posterior decompression at L1; progressive loss of height at the L1 burst fracture with retrolisthesis of L1 into the spinal canal and severe central canal stenosis.\par  2. Removal of hardware from T10 through L3.\par  3. Mild lumbar spondylosis.\par

## 2023-12-19 NOTE — ADDENDUM
[FreeTextEntry1] : -	Jt has a severe compression deformity of the L1 vertebra.   -	In your notes you indicate that Jt has no visible abnormalities. Would it be possible to add that he does have a hunched posture which is a result of his compression deformity that requires a rigid brace that is custom made to Jt's anatomy? A stock brace will not fit properly nor offer the necessary support.   -	Considering the severity of his fracture, It is essential that his trunk be stabilized in all planes.      -	His previous brace is not appropriate for his current needs. He was given a brace while in the hospital, but it is not fitting him well due to the shape of his anatomy and is not supportive enough to stabilize his trunk in the coronal and transverse plane. It does not have the ability to keep his trunk out of flexion which is placing more downwards pressure on the effected vertebrae and the brace cannot be modified beyond what has already been done to it. He is not a good candidate for a stock brace that will meet his needs. The shape of his anatomy, his frail skin, bony anatomy and the required level of stabilization requires a custom made TLSO. A stock brace will leave him vulnerable to inadequate protection, skin erosion and a poor outcome.      -	The brace will stabilize the trunk, decrease pain and help prevent worsening of the condition.   -	He is ambulatory around the house with the help of a walker.   -	Please include that it is critical for him to wear this brace all of the time and a new one is absolutely warranted.

## 2024-01-09 ENCOUNTER — APPOINTMENT (OUTPATIENT)
Dept: NEUROSURGERY | Facility: CLINIC | Age: 79
End: 2024-01-09
Payer: MEDICARE

## 2024-01-09 VITALS
WEIGHT: 180 LBS | OXYGEN SATURATION: 97 % | SYSTOLIC BLOOD PRESSURE: 136 MMHG | DIASTOLIC BLOOD PRESSURE: 87 MMHG | TEMPERATURE: 98.1 F | HEIGHT: 72 IN | BODY MASS INDEX: 24.38 KG/M2 | HEART RATE: 70 BPM

## 2024-01-09 DIAGNOSIS — S32.010A WEDGE COMPRESSION FRACTURE OF FIRST LUMBAR VERTEBRA, INITIAL ENCOUNTER FOR CLOSED FRACTURE: ICD-10-CM

## 2024-01-09 DIAGNOSIS — M47.816 SPONDYLOSIS W/OUT MYELOPATHY OR RADICULOPATHY, LUMBAR REGION: ICD-10-CM

## 2024-01-09 PROCEDURE — 99214 OFFICE O/P EST MOD 30 MIN: CPT

## 2024-01-19 NOTE — PHYSICAL THERAPY INITIAL EVALUATION ADULT - LEVEL OF INDEPENDENCE: GAIT, REHAB EVAL
Currently following with cardiology .   Home Medications: amiodarone 200 mg qam to maintain sinus rhythm,   Eliquis 5 mg qd for thromboembolic prophylaxis, metoprolol tartate 25 mg qd    -Continue following with cardiology  -F/U with pcp in 3 months        2 steps at bedside Anita HOLT

## 2024-01-23 ENCOUNTER — OFFICE (OUTPATIENT)
Dept: URBAN - METROPOLITAN AREA CLINIC 94 | Facility: CLINIC | Age: 79
Setting detail: OPHTHALMOLOGY
End: 2024-01-23
Payer: MEDICARE

## 2024-01-23 DIAGNOSIS — H35.3112: ICD-10-CM

## 2024-01-23 DIAGNOSIS — H35.3223: ICD-10-CM

## 2024-01-23 PROCEDURE — 92134 CPTRZ OPH DX IMG PST SGM RTA: CPT | Performed by: OPHTHALMOLOGY

## 2024-01-23 PROCEDURE — 92012 INTRM OPH EXAM EST PATIENT: CPT | Performed by: OPHTHALMOLOGY

## 2024-01-23 ASSESSMENT — CONFRONTATIONAL VISUAL FIELD TEST (CVF)
OD_FINDINGS: FULL
OS_FINDINGS: FULL

## 2024-01-23 ASSESSMENT — REFRACTION_AUTOREFRACTION
OS_CYLINDER: -0.75
OD_CYLINDER: -0.75
OS_SPHERE: +0.25
OD_SPHERE: -1.25
OD_AXIS: 166
OS_AXIS: 062

## 2024-01-23 ASSESSMENT — SPHEQUIV_DERIVED
OD_SPHEQUIV: -1.625
OS_SPHEQUIV: -0.125

## 2024-04-23 ENCOUNTER — OFFICE (OUTPATIENT)
Dept: URBAN - METROPOLITAN AREA CLINIC 94 | Facility: CLINIC | Age: 79
Setting detail: OPHTHALMOLOGY
End: 2024-04-23
Payer: MEDICARE

## 2024-04-23 DIAGNOSIS — H35.3112: ICD-10-CM

## 2024-04-23 DIAGNOSIS — H35.3223: ICD-10-CM

## 2024-04-23 PROCEDURE — 92012 INTRM OPH EXAM EST PATIENT: CPT | Performed by: OPHTHALMOLOGY

## 2024-04-23 PROCEDURE — 92134 CPTRZ OPH DX IMG PST SGM RTA: CPT | Performed by: OPHTHALMOLOGY

## 2024-05-09 ENCOUNTER — APPOINTMENT (OUTPATIENT)
Dept: PULMONOLOGY | Facility: CLINIC | Age: 79
End: 2024-05-09
Payer: MEDICARE

## 2024-05-09 VITALS — WEIGHT: 163 LBS | RESPIRATION RATE: 16 BRPM | HEIGHT: 72 IN | BODY MASS INDEX: 22.08 KG/M2

## 2024-05-09 VITALS
SYSTOLIC BLOOD PRESSURE: 124 MMHG | OXYGEN SATURATION: 96 % | DIASTOLIC BLOOD PRESSURE: 68 MMHG | HEART RATE: 60 BPM | RESPIRATION RATE: 16 BRPM

## 2024-05-09 DIAGNOSIS — J92.0 PLEURAL PLAQUE WITH PRESENCE OF ASBESTOS: ICD-10-CM

## 2024-05-09 DIAGNOSIS — J98.4 OTHER DISORDERS OF LUNG: ICD-10-CM

## 2024-05-09 PROCEDURE — 99215 OFFICE O/P EST HI 40 MIN: CPT

## 2024-05-09 PROCEDURE — G2211 COMPLEX E/M VISIT ADD ON: CPT

## 2024-05-09 NOTE — RESULTS/DATA
[TextEntry] : Exam requested by: GINO OLIVEIRA [address] [facility], [address] [place] [address] SITE PERFORMED: [facility] Patient: DARIEL STRATTON YOB: 1945 Phone: [phone] MRN: [id number] Acc: [id number] Date of Exam: 04-   EXAM:  X-RAY CHEST 2 VIEWS, PA AND LATERAL HISTORY:  History of asbestosis exposure.. TECHNIQUE:  PA and lateral chest radiographs were obtained.   COMPARISON:  None.  FINDINGS: There is a large diaphragmatic hernia. Lungs are hyperinflated with extensive pleural plaque-like calcifications consistent with asbestosis. There are no acute infiltrates or mass lesions. Cardiovascular silhouette cannot be adequately evaluated. The pulmonary zeynep and mediastinal contours are within normal limits.  There is an increased dorsal kyphosis with spurs throughout the spine. The visualized rib cage appears intact.  IMPRESSION:  FNA lungs with extensive pleural plaque-like calcifications consistent with asbestosis. No acute pulmonary infiltrates or mass lesions. Large diaphragmatic hernia. Marked kyphosis with spurs within the spine.  Thank you for the opportunity to participate in the care of this patient.     EMERITA CÁRDENAS MD  - Electronically Signed: 04- 4:39 PM  Physician to Physician Direct Line is: [phone] Copy to:DR RAVEN TAYLOR MD  	 Exam requested by: GINO OLIVEIRA [address] [facility], [address] [place] [address] SITE PERFORMED: [facility] Patient: DARIEL STRATTON YOB: 1945 Phone: [phone] MRN: [id number] Acc: [id number] Date of Exam: 09-   EXAM:  CT CHEST WITHOUT CONTRAST  Note - This patient has received 0 CT studies and 0 Myocardial Perfusion studies within our network over the previous 12 month period. HISTORY:  Follow-up nodules. Asbestos exposure.  TECHNIQUE: CT of the chest is performed. Contrast Technique: Without  Range/Planes: [place] of lungs to the adrenal glands. Axial, coronal, and sagittal.  One or more of the following dose reduction techniques were used: automated exposure control, adjustment of the mA and/or kV according to patient size, use of iterative reconstruction technique.  COMPARISON:  6/10/2020   FINDINGS: CHEST THYROID: Visualized portion is unremarkable. LUNGS/PLEURA: Right lung volume loss with suggestion of prior wedge resection in the right lower lobe with calcified surgical suture noted. Multifocal parenchymal scarring is visualized in the bilateral lower lobes and throughout the right lung. No suspicious nodule is seen. Redemonstration of bilateral calcified pleural plaques.  TRACHEA AND BRONCHI: Bronchial wall thickening. Mild traction bronchiectasis is seen in the right lower lobe and right middle lobe. HEART AND PERICARDIUM: Coronary artery calcifications. Coronary artery stents are also present. The heart is mildly enlarged. VASCULATURE: Calcification of the aortic arch. Ascending aorta measures 4.2 cm AP diameter, unchanged. LYMPH NODES AND MEDIASTINUM: No enlarged mediastinal, hilar, supraclavicular, or axillary lymph nodes. SOFT TISSUES: Musculature and subcutaneous tissues about the chest are unremarkable. BONES: There is no suspicious lytic or blastic osseous lesion.  FINDINGS: VISUALIZED PORTION OF THE ABDOMEN  GASTROESOPHAGEAL JUNCTION: A large hiatal hernia is noted, containing a portion of the stomach and the bowel. OTHER ORGANS: Post cholecystectomy.  IMPRESSION:    1. Redemonstration of bilateral calcified pleural plaques consistent with asbestos-related pleural disease. 2. Multifocal parenchymal scarring with right lung volume loss, not significantly changed from the prior exam. 3. Large hiatal hernia containing the stomach and portions of the bowel.  Thank you for the opportunity to participate in the care of this patient.     KEENAN CARLSON MD  - Electronically Signed: 09- 9:26 AM  Physician to Physician Direct Line is: [phone] Copy to:YOHAN DANGELO MD Copy to:SCHUYLER PACHECO MD Copy to:KYLAH GOMEZ DO

## 2024-05-09 NOTE — HISTORY OF PRESENT ILLNESS
[TextBox_4] : 78-year-old male non-smoker seen today for follow-up of his asbestosis.  Patient was last seen in this office in September 2021.  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  Workup included normal lung biopsy in 2015 with noncaseating granuloma.  No subsequent therapy was had and patient has been followed up since.  On his last evaluation he had a CAT scan of the chest which is included below.  His course is complicated by hypertension hyperlipidemia hypothyroidism coronary artery disease status post PCI, atrial fibrillation on Eliquis and status post T12 laminectomy and T10-T4 fusion with complicating dehiscence of his hardware requiring removal.  He denies any significant cough wheeze sputum production or chest pain.  He does use a shelf for back support.  No history of leg edema, paroxysmal nocturnal dyspnea or orthopnea

## 2024-05-09 NOTE — REASON FOR VISIT
[de-identified] : ospital Course: \par  Discharge Date	30-Mar-2022 \par  Admission Date	17-Mar-2022 18:42 \par  Reason for Admission	Wound dehiscence \par  Hospital Course	 \par  76y Male extensive PMH including HTN, HLD, DM, hypothyroidism, asbestosis, CAD \par  s/p PCI, afib on Eliquis, well known to our service after found with L1 burst \par  fracture s/p T12-L1 laminectomy and T10-L4 fusion 1/21/22, recent admission \par  late February for tenting of skin concerning for displaced hardware and small \par  inferior wound dehiscence, sent in 3/17/22 by nursing home with now superior \par  wound dehiscence at previous are of skin tenting and persistent inferior wound \par  dehiscence \par  3/23: s/p removal of thoracolumbar instrumentation and complex wound closure \par  with Dr. Gaitan/Dr. Schumacher \par  Patient presents today for post op visit and wound check.   He endorses minimal thoracic region pain. Pain intensity 3/10.  He is urinating in a velasco bag.  He is ambulating with assistance of a walker.  \par  Incision is healing well. \par  No fever or chills.  Aroldo: Case endorsed to hospitalist for admission. Will start heparin.

## 2024-05-09 NOTE — CONSULT LETTER
[Dear  ___] : Dear  [unfilled], [Consult Letter:] : I had the pleasure of evaluating your patient, [unfilled]. [Please see my note below.] : Please see my note below. [Consult Closing:] : Thank you very much for allowing me to participate in the care of this patient.  If you have any questions, please do not hesitate to contact me. [Sincerely,] : Sincerely, [FreeTextEntry3] : Solomon Wright MD FCCP Pulmonary/Critical Care/Sleep Medicine Department of Internal Medicine  [facility]

## 2024-05-09 NOTE — DISCUSSION/SUMMARY
[FreeTextEntry1] : 78-year-old male seen today for routine follow-up of asbestos related lung injury complicated by chest wall restriction secondary to spinal disease.  Patient has no complaints of dyspnea but is compromised by back disease.  Follow-up routine chest x-ray showed no significant change but a CAT scan will be performed.  This will be subsequently followed up by full pulmonary function test.

## 2024-05-09 NOTE — PHYSICAL EXAM
[No Acute Distress] : no acute distress [Normal Oropharynx] : normal oropharynx [Normal Appearance] : normal appearance [No Neck Mass] : no neck mass [Normal Rate/Rhythm] : normal rate/rhythm [Normal S1, S2] : normal s1, s2 [No Murmurs] : no murmurs [No Resp Distress] : no resp distress [Clear to Auscultation Bilaterally] : clear to auscultation bilaterally [No Abnormalities] : no abnormalities [Kyphosis] : kyphosis [Scoliosis] : scoliosis [Benign] : benign [Normal Gait] : normal gait [No Clubbing] : no clubbing [No Cyanosis] : no cyanosis [No Edema] : no edema [FROM] : FROM [Normal Color/ Pigmentation] : normal color/ pigmentation [No Focal Deficits] : no focal deficits [Oriented x3] : oriented x3 [Normal Affect] : normal affect

## 2024-05-09 NOTE — PROCEDURE
[FreeTextEntry1] : Last spirometry performed in 2017 shows a moderate degree of restriction without airway obstruction

## 2024-06-13 ENCOUNTER — APPOINTMENT (OUTPATIENT)
Dept: CT IMAGING | Facility: CLINIC | Age: 79
End: 2024-06-13

## 2024-06-14 PROBLEM — H52.7 REFRACTIVE ERROR ; BOTH EYES: Status: ACTIVE | Noted: 2024-06-14

## 2024-06-28 DIAGNOSIS — Z77.090 CONTACT WITH AND (SUSPECTED) EXPOSURE TO ASBESTOS: ICD-10-CM

## 2024-07-24 ENCOUNTER — APPOINTMENT (OUTPATIENT)
Dept: PULMONOLOGY | Facility: CLINIC | Age: 79
End: 2024-07-24
Payer: MEDICARE

## 2024-07-24 VITALS
OXYGEN SATURATION: 96 % | DIASTOLIC BLOOD PRESSURE: 75 MMHG | HEART RATE: 54 BPM | HEIGHT: 72 IN | RESPIRATION RATE: 16 BRPM | SYSTOLIC BLOOD PRESSURE: 130 MMHG | BODY MASS INDEX: 21.94 KG/M2 | WEIGHT: 162 LBS

## 2024-07-24 VITALS — WEIGHT: 162 LBS | HEIGHT: 72 IN | BODY MASS INDEX: 21.94 KG/M2

## 2024-07-24 DIAGNOSIS — Z77.090 CONTACT WITH AND (SUSPECTED) EXPOSURE TO ASBESTOS: ICD-10-CM

## 2024-07-24 DIAGNOSIS — J92.0 PLEURAL PLAQUE WITH PRESENCE OF ASBESTOS: ICD-10-CM

## 2024-07-24 DIAGNOSIS — J98.4 OTHER DISORDERS OF LUNG: ICD-10-CM

## 2024-07-24 PROCEDURE — 99215 OFFICE O/P EST HI 40 MIN: CPT | Mod: 25

## 2024-07-24 PROCEDURE — 94727 GAS DIL/WSHOT DETER LNG VOL: CPT

## 2024-07-24 PROCEDURE — 94010 BREATHING CAPACITY TEST: CPT

## 2024-07-24 PROCEDURE — 94729 DIFFUSING CAPACITY: CPT

## 2024-07-24 PROCEDURE — 85018 HEMOGLOBIN: CPT | Mod: QW

## 2024-07-24 NOTE — DISCUSSION/SUMMARY
[FreeTextEntry1] : 79-year-old male with a known history of asbestos related lung injury consistent with his recent CAT scan.  Several new nodules most likely inflammatory in nature.  There has been a decrease in diffusion capacity which may be due to progressive fibrosis versus worsening chest wall restriction secondary to significant kyphosis.  Patient relatively asymptomatic without hypoxemia.  Will continue to follow.  No evidence of malignancy.  Follow-up CAT scan in 6 months

## 2024-07-24 NOTE — RESULTS/DATA
[TextEntry] : EXAM:  CT CHEST WITHOUT CONTRAST Note - This patient has received 0 CT studies and 0 Myocardial Perfusion studies within our network over the previous  12 month period. HISTORY:  Follow-up nodules as specimens exposure TECHNIQUE: CT of the chest is performed. Contrast Technique: Without  Range/Planes: [place] of lungs to the adrenal glands. Axial, coronal, and sagittal.  One or more of the following dose reduction techniques were used: automated exposure control, adjustment of the mA  and/or kV according to patient size, use of iterative reconstruction technique.  COMPARISON:  CT chest 9/7/2021, 6/10/2020 and CT chest 10/4/2018 . FINDINGS: CHEST THYROID: Visualized portion is unremarkable. LUNGS: Evidence of a prior wedge resection or VATS in the right lower lobe. Stable loss of volume in these demonstrated in the right lung and particularly in the right lower lobe and right middle  lobe. Scarring and compressive atelectasis is demonstrated in the right middle lobe and both lower lobes moderate  increase. Stable scarring is demonstrated in the lingula Nodules Stable 1.0 x 0.6 cm solid nodule left lower lobe, (125:5). Stable 0.6 cm solid nodule left lower lobe (203:5) Stable 0.3 cm solid nodule right lower lobe near calcified granuloma, (134:5). New 0.6 cm peripheral triangular density in the right middle lobe most likely atelectasis (203:5) New 0.9 cm triangular density right lower lobe most likely atelectasis or (203:5). Calcified granulomas are occasionally present. TRACHEA AND BRONCHI: Traction bronchiectasis right upper lobe. No endobronchial lesions. PLEURA: Extensive calcified pleural and diaphragmatic pleural plaques without significant change. This is related to the  patient's exposure. There are no free pleural effusions identified. There is also mild increased deposition of pleural fat. SITE PERFORMED: [facility] Confidential Printed: 07- 3:15 PM DARIEL STRATTON (Exam: 06- 3:10 PM) Page 1 of 2 Tel: [phone] - Fax: [phone] - [email or web address] Continued: Page 2 of 2 Patient: MAIA STRATTONIN YOB: 1945 HEART AND PERICARDIUM: The heart is normal size without pericardial effusion extensive calcifications are present  in the coronary arteries. Coronary artery stents are demonstrated. VASCULATURE: Atherosclerotic calcification of the aortic arch. Stable ectasia of the ascending aorta measuring 4.2 cm. LYMPH NODES AND MEDIASTINUM: Unremarkable. SOFT TISSUES: Unremarkable. BONES: Multilevel degenerative changes thoracic and upper lumbar spines. Mild compression fracture at T6 compression fracture seen at the superior aspect of L1 however is not entirely present. FINDINGS: VISUALIZED PORTION OF THE ABDOMEN  GASTROESOPHAGEAL JUNCTION: Large posterior diaphragmatic hernia which has increased in size from the prior  examinations. This hernia measures 10.4 x 12.0 x 15 cm and the hiatus measures 8.7 cm. The prior examination this  hernia measures 7.2 x 7.2 x 10.8 cm. The hernia contains omental fat and increase amount of the large bowel loops  most likely transverse colon. The hernia also contains stomach and is most likely a paraesophageal hernia rather than a  sliding hiatal hernia. OTHER ORGANS: Evidence of a cholecystectomy. The pancreas is atrophic. IMPRESSION:   1. Extensive calcified stable pleural and diaphragmatic pleural plaques compatible with the patient's asbestos exposure. 2. Increasing scarring bilaterally and stable loss of volume in the right lung. 3. Prior wedge resection or VATS in the right lower lobe. 4. Stable nodules bilaterally however there are new peripheral triangular density in the right middle lobe and right lower  lobe which are most likely atelectasis however the possibility of malignancy is not excluded and follow-up CT of the  chest in 3-6 months time is recommended. 5. Large posterior diaphragmatic hernia which has increased in size from the prior examinations containing omentum  increase amount of colon and the stomach which is most likely a paraesophageal hernia rather than a sliding hiatal  hernia. 6. Stable 4.2 cm ectasia of the ascending aorta. Thank you for the opportunity to participate in the care of this patient.  RAMIRO VILLASEÑOR MD - Electronically Signed: 06-27-2

## 2024-07-24 NOTE — END OF VISIT
[FreeTextEntry3] : CT reviewed on PACS with patient [Time Spent: ___ minutes] : I have spent [unfilled] minutes of time on the encounter.

## 2024-07-24 NOTE — HISTORY OF PRESENT ILLNESS
[TextBox_4] : 79-year-old male lifelong non-smoker seen today in follow-up for known history of asbestosis.  Exposure was during his  service in the early 60s with a workup done and did include a open lung biopsy in 2015 with noncaseating granuloma.  He had no significant follow-up since and his course was further complicated by hyperlipidemia, hypothyroidism, coronary artery disease, PCI, atrial fibrillation T12 laminectomy and T10-T4 fusion.  He denies any complaints of cough wheeze or shortness of breath.  Exercise is limited by chronic back disease.

## 2024-07-24 NOTE — CONSULT LETTER
[Dear  ___] : Dear  [unfilled], [Consult Letter:] : I had the pleasure of evaluating your patient, [unfilled]. [Please see my note below.] : Please see my note below. [Consult Closing:] : Thank you very much for allowing me to participate in the care of this patient.  If you have any questions, please do not hesitate to contact me. [Sincerely,] : Sincerely, [FreeTextEntry3] : Solomon Wright MD FCCP Pulmonary/Critical Care/Sleep Medicine Department of Internal Medicine  [facility]

## 2024-07-24 NOTE — PROCEDURE
[FreeTextEntry1] : 7/24/2024: Pulmonary function test compared to earlier studies from 2015 demonstrated no significant change in forced vital capacity or FEV1.  Lung volumes also are stable.  There is a moderate reduction in diffusion capacity but which is also consistent with his specific diffusion capacity

## 2024-09-12 ENCOUNTER — NON-APPOINTMENT (OUTPATIENT)
Age: 79
End: 2024-09-12

## 2024-10-21 ENCOUNTER — EMERGENCY (EMERGENCY)
Facility: HOSPITAL | Age: 79
LOS: 1 days | Discharge: HOME | End: 2024-10-21
Attending: EMERGENCY MEDICINE
Payer: MEDICARE

## 2024-10-21 VITALS
DIASTOLIC BLOOD PRESSURE: 90 MMHG | OXYGEN SATURATION: 95 % | TEMPERATURE: 98 F | WEIGHT: 149.91 LBS | HEART RATE: 74 BPM | HEIGHT: 71 IN | SYSTOLIC BLOOD PRESSURE: 153 MMHG | RESPIRATION RATE: 16 BRPM

## 2024-10-21 VITALS
DIASTOLIC BLOOD PRESSURE: 63 MMHG | RESPIRATION RATE: 18 BRPM | HEART RATE: 72 BPM | TEMPERATURE: 98 F | OXYGEN SATURATION: 95 % | SYSTOLIC BLOOD PRESSURE: 105 MMHG

## 2024-10-21 DIAGNOSIS — Z98.89 OTHER SPECIFIED POSTPROCEDURAL STATES: Chronic | ICD-10-CM

## 2024-10-21 DIAGNOSIS — Z90.49 ACQUIRED ABSENCE OF OTHER SPECIFIED PARTS OF DIGESTIVE TRACT: Chronic | ICD-10-CM

## 2024-10-21 DIAGNOSIS — Z09 ENCOUNTER FOR FOLLOW-UP EXAMINATION AFTER COMPLETED TREATMENT FOR CONDITIONS OTHER THAN MALIGNANT NEOPLASM: Chronic | ICD-10-CM

## 2024-10-21 DIAGNOSIS — Z90.12 ACQUIRED ABSENCE OF LEFT BREAST AND NIPPLE: Chronic | ICD-10-CM

## 2024-10-21 LAB
ACANTHOCYTES BLD QL SMEAR: SLIGHT — SIGNIFICANT CHANGE UP
ALBUMIN SERPL ELPH-MCNC: 3.5 G/DL — SIGNIFICANT CHANGE UP (ref 3.3–5.2)
ALP SERPL-CCNC: 64 U/L — SIGNIFICANT CHANGE UP (ref 40–120)
ALT FLD-CCNC: 14 U/L — SIGNIFICANT CHANGE UP
ANION GAP SERPL CALC-SCNC: 11 MMOL/L — SIGNIFICANT CHANGE UP (ref 5–17)
ANISOCYTOSIS BLD QL: SLIGHT — SIGNIFICANT CHANGE UP
APTT BLD: 37.3 SEC — HIGH (ref 24.5–35.6)
AST SERPL-CCNC: 18 U/L — SIGNIFICANT CHANGE UP
BASOPHILS # BLD AUTO: 0.07 K/UL — SIGNIFICANT CHANGE UP (ref 0–0.2)
BASOPHILS NFR BLD AUTO: 0.9 % — SIGNIFICANT CHANGE UP (ref 0–2)
BILIRUB SERPL-MCNC: 0.5 MG/DL — SIGNIFICANT CHANGE UP (ref 0.4–2)
BUN SERPL-MCNC: 17.6 MG/DL — SIGNIFICANT CHANGE UP (ref 8–20)
CALCIUM SERPL-MCNC: 9 MG/DL — SIGNIFICANT CHANGE UP (ref 8.4–10.5)
CHLORIDE SERPL-SCNC: 101 MMOL/L — SIGNIFICANT CHANGE UP (ref 96–108)
CO2 SERPL-SCNC: 26 MMOL/L — SIGNIFICANT CHANGE UP (ref 22–29)
CREAT SERPL-MCNC: 0.5 MG/DL — SIGNIFICANT CHANGE UP (ref 0.5–1.3)
EGFR: 102 ML/MIN/1.73M2 — SIGNIFICANT CHANGE UP
ELLIPTOCYTES BLD QL SMEAR: SLIGHT — SIGNIFICANT CHANGE UP
EOSINOPHIL # BLD AUTO: 0.21 K/UL — SIGNIFICANT CHANGE UP (ref 0–0.5)
EOSINOPHIL NFR BLD AUTO: 2.6 % — SIGNIFICANT CHANGE UP (ref 0–6)
GIANT PLATELETS BLD QL SMEAR: PRESENT — SIGNIFICANT CHANGE UP
GLUCOSE SERPL-MCNC: 117 MG/DL — HIGH (ref 70–99)
HCT VFR BLD CALC: 34.2 % — LOW (ref 39–50)
HGB BLD-MCNC: 11.7 G/DL — LOW (ref 13–17)
INR BLD: 1.48 RATIO — HIGH (ref 0.85–1.16)
LYMPHOCYTES # BLD AUTO: 1.19 K/UL — SIGNIFICANT CHANGE UP (ref 1–3.3)
LYMPHOCYTES # BLD AUTO: 14.8 % — SIGNIFICANT CHANGE UP (ref 13–44)
MANUAL SMEAR VERIFICATION: SIGNIFICANT CHANGE UP
MCHC RBC-ENTMCNC: 31.2 PG — SIGNIFICANT CHANGE UP (ref 27–34)
MCHC RBC-ENTMCNC: 34.2 GM/DL — SIGNIFICANT CHANGE UP (ref 32–36)
MCV RBC AUTO: 91.2 FL — SIGNIFICANT CHANGE UP (ref 80–100)
MICROCYTES BLD QL: SLIGHT — SIGNIFICANT CHANGE UP
MONOCYTES # BLD AUTO: 0.84 K/UL — SIGNIFICANT CHANGE UP (ref 0–0.9)
MONOCYTES NFR BLD AUTO: 10.4 % — SIGNIFICANT CHANGE UP (ref 2–14)
MYELOCYTES NFR BLD: 0.9 % — HIGH (ref 0–0)
NEUTROPHILS # BLD AUTO: 4.91 K/UL — SIGNIFICANT CHANGE UP (ref 1.8–7.4)
NEUTROPHILS NFR BLD AUTO: 60.9 % — SIGNIFICANT CHANGE UP (ref 43–77)
OVALOCYTES BLD QL SMEAR: SLIGHT — SIGNIFICANT CHANGE UP
PLAT MORPH BLD: NORMAL — SIGNIFICANT CHANGE UP
PLATELET # BLD AUTO: 371 K/UL — SIGNIFICANT CHANGE UP (ref 150–400)
POIKILOCYTOSIS BLD QL AUTO: SIGNIFICANT CHANGE UP
POLYCHROMASIA BLD QL SMEAR: SLIGHT — SIGNIFICANT CHANGE UP
POTASSIUM SERPL-MCNC: 4.1 MMOL/L — SIGNIFICANT CHANGE UP (ref 3.5–5.3)
POTASSIUM SERPL-SCNC: 4.1 MMOL/L — SIGNIFICANT CHANGE UP (ref 3.5–5.3)
PROT SERPL-MCNC: 6.3 G/DL — LOW (ref 6.6–8.7)
PROTHROM AB SERPL-ACNC: 17.1 SEC — HIGH (ref 9.9–13.4)
RBC # BLD: 3.75 M/UL — LOW (ref 4.2–5.8)
RBC # FLD: 13.3 % — SIGNIFICANT CHANGE UP (ref 10.3–14.5)
RBC BLD AUTO: ABNORMAL
SCHISTOCYTES BLD QL AUTO: SLIGHT — SIGNIFICANT CHANGE UP
SODIUM SERPL-SCNC: 138 MMOL/L — SIGNIFICANT CHANGE UP (ref 135–145)
VARIANT LYMPHS # BLD: 9.5 % — HIGH (ref 0–6)
WBC # BLD: 8.06 K/UL — SIGNIFICANT CHANGE UP (ref 3.8–10.5)
WBC # FLD AUTO: 8.06 K/UL — SIGNIFICANT CHANGE UP (ref 3.8–10.5)

## 2024-10-21 PROCEDURE — 99284 EMERGENCY DEPT VISIT MOD MDM: CPT

## 2024-10-21 RX ORDER — OXYMETAZOLINE HCL 0.05 %
2 SPRAY, NON-AEROSOL (ML) NASAL
Qty: 1 | Refills: 0
Start: 2024-10-21 | End: 2024-10-23

## 2024-10-21 NOTE — ED ADULT NURSE NOTE - NSFALLHARMRISKINTERV_ED_ALL_ED

## 2024-10-21 NOTE — ED PROVIDER NOTE - PATIENT PORTAL LINK FT
You can access the FollowMyHealth Patient Portal offered by [facility] by registering at the following website: [email or web address]. By joining [facility]’s FollowMyHealth portal, you will also be able to view your health information using other applications (apps) compatible with our system.

## 2024-10-21 NOTE — ED ADULT TRIAGE NOTE - NS ED NURSE BANDS TYPE
-For GERD - we will change to omeprazole 20mg twice a day -working better    -For anxiety - likely due to caregiver burnout.  Recommend time for yourself to recoup - see if /parents /in laws could help watch kids, give you a break.      -For migraines - recommend nurtec every other day.  This will prevent migraines.  If you get a migraine - use maxalt to break migraine.  Ok to use tylenol and ibuprofen.     -For sore throat - rapid strep neg.  Culture ordered.  Tylenol/ibuprofen.     Phone visit in 6 weeks and in office in 3 months.    Name band;

## 2024-10-21 NOTE — ED PROVIDER NOTE - NSFOLLOWUPINSTRUCTIONS_ED_ALL_ED_FT
1) Please follow-up with your primary care doctor in the next 5-7 days.  Please call tomorrow for an appointment.  If you cannot follow-up with your primary care doctor please return to the ED for any urgent issues.  2) You were given a copy of the tests performed today.  Please bring the results with you and review them with your primary care doctor.  3) If you have any worsening of symptoms or any other concerns please return to the ED immediately.  4) Please continue taking your home medications as directed.    Nosebleed    WHAT YOU NEED TO KNOW:    What do I need to know about a nosebleed? A nosebleed, or epistaxis, occurs when one or more of the blood vessels in your nose break. You may have dark or bright red blood from one or both nostrils. A nosebleed can be caused by any of the following:    Cold, dry air    Trauma from picking your nose or a direct blow to your nose    Abnormal nose structure, such as a deviated septum    Irritation or inflammation from a cold, respiratory infection, or allergies    An object stuck in your nose    Certain medicines, such as blood thinners  How is a nosebleed diagnosed?    A nasal exam may show blood clots or swelling. Your healthcare provider will use an instrument called a speculum to check the inside of your nose. This gently opens your nostrils so your healthcare provider can see what part of your nose is bleeding.    A nasal endoscopy is a deeper exam of the inside of your nose. Your healthcare provider uses a scope (thin, flexible tube with a light and camera on the end) to see further into your nose.  What first aid should I do for a nosebleed?    Sit up and lean forward. This will help prevent you from swallowing blood. Spit blood and saliva into a bowl.    Apply pressure to your nose. Use 2 fingers to pinch your nose shut for 10 to 15 minutes. This will help stop the bleeding.    Apply ice on the bridge of your nose to decrease swelling and bleeding. Use a cold pack or put crushed ice in a plastic bag. Cover it with a towel to protect your skin.    Pack your nose with a cotton ball, tissue, tampon, or gauze bandage to stop the bleeding.  How is a severe nosebleed treated? You may need any of the following if the bleeding does not stop after first aid is done:    Medicines may be applied to a small piece of cotton and placed in your nose. Medicine may also be sprayed in or applied directly to your nose. You may need medicine to prevent an infection. If bleeding is severe, medicine may be injected into a blood vessel in your nose.    Cautery is when a chemical or electric device is used to seal the blood vessels. This may be done to stop bleeding or prevent more bleeding. Local anesthesia may be used.    Nasal packing is when layers of gauze are placed in your nose to provide pressure and stop the bleeding. Local anesthesia may be used. Nasal packing is usually removed in 2 to 3 days.    Embolization is a procedure used to stop the bleeding from inside your nose. Medical glue or a small balloon device may be used to clog the blood vessels in your nose.    Surgery may be needed if your nosebleed returns over and over long-term. An artery may be tied to stop bleeding. Damaged tissue or an abnormal structure in your nose may be repaired.  How can I help prevent another nosebleed?    Keep your nose moist. Put a small amount of petroleum jelly inside your nostrils as needed. Use a saline (saltwater) nasal spray. Do not put anything else inside your nose unless your healthcare provider says it is okay. Do not use oil-based lubricants if you use oxygen therapy. They may be flammable.    Use a cool mist humidifier to increase air moisture in your home. This will help your nose stay moist.  Humidifier      Do not pick or blow your nose for at least a week. You can irritate or damage your nose if you pick it. Blowing your nose too hard may cause the bleeding to start again. Do not bend over or strain as this can cause the bleeding to start again.    Avoid irritants such as tobacco smoke or chemical sprays such as .  When should I seek immediate care?    Your nose is still bleeding after 20 minutes, even after you pinch it.    Your nasal packing is soaked with blood.    You have a foul-smelling discharge coming out of your nose.    You feel so weak and dizzy that you have trouble standing up.    You have trouble breathing or talking.  When should I contact my healthcare provider?    You have a fever and are vomiting.    You have pain in and around your nose.    Your nasal pack is loose.    You have questions or concerns about your condition or care.  CARE AGREEMENT:    You have the right to help plan your care. Learn about your health condition and how it may be treated. Discuss treatment options with your healthcare providers to decide what care you want to receive. You always have the right to refuse treatment.    © [employer] 1973, 2024

## 2024-10-21 NOTE — ED ADULT NURSE NOTE - OBJECTIVE STATEMENT
pt to ED with c/o intermittent epistaxis x 1 week. pt denies any falls, or trauma. pt on Eliquis and ASA 81. pt states he woke up today with a sore throat, spit up some blood with small clots. pt denies any pain, dizziness, lightheadedness. A+O x3. RR even and unlabored on RA.

## 2024-10-21 NOTE — ED PROVIDER NOTE - MDM ORDERS SUBMITTED SELECTION
Texted patient on [employer] to let him know we've cancelled his future appointments since he messaged us that he is going to be getting care elsewhere due to work comp.   
Not Applicable

## 2024-10-21 NOTE — ED PROVIDER NOTE - CLINICAL SUMMARY MEDICAL DECISION MAKING FREE TEXT BOX
78 yo M hx of Afib (on eliquis), DM, HTN, HLD. CAD stent x 3 Presents with intermittent epistaxis in the morning x 1 week.  Patient denies any trauma.  Compliant with Eliquis.  Bleeding currently stopped.   No chest pain or shortness of breath.      Given symptoms been ongoing for 1 week.  Will check blood work to assess for anemia.  No active bleed at the moment, no acute intervention needed.  Instruction for epistaxis with good compression educated.  Patient will need to follow-up with ENT.  Recommend Afrin nasal spray at home if symptoms return. 80 yo M hx of Afib (on eliquis), DM, HTN, HLD. CAD stent x 3 Presents with intermittent epistaxis in the morning x 1 week.  Patient denies any trauma.  Compliant with Eliquis.  Bleeding currently stopped.   No chest pain or shortness of breath.      Given symptoms been ongoing for 1 week. Hb 11.   No active bleed at the moment, no acute intervention needed.  Instruction for epistaxis with good compression educated.  Patient will need to follow-up with ENT.  Recommend Afrin nasal spray at home if symptoms return.

## 2024-10-21 NOTE — ED PROVIDER NOTE - PHYSICAL EXAMINATION
VITAL SIGNS: I have reviewed nursing notes and confirm.  CONSTITUTIONAL:  in no acute distress.  SKIN: Skin exam is warm and dry, no acute rash.  HEAD: Normocephalic; atraumatic.  EYES: PERRL, EOM intact; conjunctiva and sclera clear.  ENT: No nasal discharge; airway clear. Throat clear. (+) dried blood to right nare  NECK: Supple; non tender.    CARD: Regular rate and rhythm.  RESP: No wheezes,  no rales or rhonchi.   ABD:  soft; non-distended; non-tender;   EXT: Normal ROM. No clubbing, cyanosis or edema.  NEURO: Alert, oriented. Grossly unremarkable. No focal deficits.  moves all extremities,  normal gait   PSYCH: Cooperative, appropriate.

## 2024-10-21 NOTE — ED PROVIDER NOTE - OBJECTIVE STATEMENT
78 yo M hx of Afib (on eliquis), DM, HTN, HLD. CAD stent x 3 Presents with intermittent epistaxis in the morning x 1 week.  Patient denies any trauma.  Compliant with Eliquis.  Bleeding currently stopped.   No chest pain or shortness of breath.

## 2024-10-21 NOTE — ED PROVIDER NOTE - NS ED ROS FT
CONSTITUTIONAL - no  fever, no diaphoresis, no weight change  SKIN - no rash  HEMATOLOGIC - no bleeding, no bruising  EYES - no eye pain, no blurred vision  ENT - no change in hearing, no pain (+) epistaxis   RESPIRATORY - no shortness of breath, no cough  CARDIAC - no chest pain, no palpitations  GI - no abd pain, no nausea, no vomiting, no diarrhea, no constipation, no bleeding  GENITO-URINARY - no discharge, no dysuria; no hematuria,   ENDO - no polydipsia, no polyuria, no heat/no cold intolerance  MUSCULOSKELETAL - no joint pain, no swelling, no redness  NEUROLOGIC - no weakness, no headache, no anesthesia, no paresthesias  PSYCH - no anxiety, non suicidal, non homicidal, no hallucination, no depression

## 2024-10-21 NOTE — ED ADULT TRIAGE NOTE - CHIEF COMPLAINT QUOTE
Patient c/o nosebleed x1 week. Patient is spitting up blood clots. Patient states he is on Eliquis, Denies pain.

## 2024-10-21 NOTE — ED PROVIDER NOTE - CARE PROVIDER_API CALL
Bran Childs  Otolaryngology  [address] [place] [address], [address]  [place] [address]  Phone: [phone]  Fax: [phone]  Follow Up Time: 4-6 Days

## 2024-10-23 ENCOUNTER — OFFICE (OUTPATIENT)
Dept: URBAN - METROPOLITAN AREA CLINIC 94 | Facility: CLINIC | Age: 79
Setting detail: OPHTHALMOLOGY
End: 2024-10-23
Payer: MEDICARE

## 2024-10-23 DIAGNOSIS — H35.3223: ICD-10-CM

## 2024-10-23 DIAGNOSIS — H35.3112: ICD-10-CM

## 2024-10-23 PROCEDURE — 92134 CPTRZ OPH DX IMG PST SGM RTA: CPT | Performed by: OPHTHALMOLOGY

## 2024-10-23 PROCEDURE — 92012 INTRM OPH EXAM EST PATIENT: CPT | Performed by: OPHTHALMOLOGY

## 2024-10-23 ASSESSMENT — KERATOMETRY
OS_K2POWER_DIOPTERS: 45.00
OS_AXISANGLE_DEGREES: 115
OD_K1POWER_DIOPTERS: 44.00
OD_AXISANGLE_DEGREES: 065
METHOD_AUTO_MANUAL: AUTO
OD_K2POWER_DIOPTERS: 45.00
OS_K1POWER_DIOPTERS: 43.75

## 2024-10-23 ASSESSMENT — VISUAL ACUITY
OS_BCVA: 20/40+1
OD_BCVA: CF@2FT

## 2024-10-23 ASSESSMENT — REFRACTION_AUTOREFRACTION
OD_AXIS: 166
OD_CYLINDER: -0.75
OD_SPHERE: -1.25
OS_CYLINDER: -0.75
OS_SPHERE: +0.25
OS_AXIS: 062

## 2024-10-23 ASSESSMENT — TONOMETRY
OS_IOP_MMHG: 18
OD_IOP_MMHG: 17

## 2024-10-24 DIAGNOSIS — R04.0 EPISTAXIS: ICD-10-CM

## 2024-10-24 DIAGNOSIS — I48.91 UNSPECIFIED ATRIAL FIBRILLATION: ICD-10-CM

## 2024-10-24 DIAGNOSIS — I10 ESSENTIAL (PRIMARY) HYPERTENSION: ICD-10-CM

## 2024-10-24 DIAGNOSIS — Z95.5 PRESENCE OF CORONARY ANGIOPLASTY IMPLANT AND GRAFT: ICD-10-CM

## 2024-10-24 DIAGNOSIS — Z79.01 LONG TERM (CURRENT) USE OF ANTICOAGULANTS: ICD-10-CM

## 2024-10-24 DIAGNOSIS — E11.9 TYPE 2 DIABETES MELLITUS WITHOUT COMPLICATIONS: ICD-10-CM

## 2024-11-20 PROCEDURE — 85025 COMPLETE CBC W/AUTO DIFF WBC: CPT

## 2024-11-20 PROCEDURE — 85610 PROTHROMBIN TIME: CPT

## 2024-11-20 PROCEDURE — 80053 COMPREHEN METABOLIC PANEL: CPT

## 2024-11-20 PROCEDURE — 99283 EMERGENCY DEPT VISIT LOW MDM: CPT

## 2024-11-20 PROCEDURE — 36415 COLL VENOUS BLD VENIPUNCTURE: CPT

## 2024-11-20 PROCEDURE — 85730 THROMBOPLASTIN TIME PARTIAL: CPT

## 2024-12-18 ENCOUNTER — APPOINTMENT (OUTPATIENT)
Dept: PULMONOLOGY | Facility: CLINIC | Age: 79
End: 2024-12-18
Payer: MEDICARE

## 2024-12-18 VITALS — BODY MASS INDEX: 20.32 KG/M2 | WEIGHT: 150 LBS | HEIGHT: 72 IN

## 2024-12-18 VITALS
DIASTOLIC BLOOD PRESSURE: 76 MMHG | RESPIRATION RATE: 16 BRPM | SYSTOLIC BLOOD PRESSURE: 120 MMHG | HEART RATE: 74 BPM | OXYGEN SATURATION: 96 %

## 2024-12-18 DIAGNOSIS — J92.0 PLEURAL PLAQUE WITH PRESENCE OF ASBESTOS: ICD-10-CM

## 2024-12-18 DIAGNOSIS — J98.4 OTHER DISORDERS OF LUNG: ICD-10-CM

## 2024-12-18 PROCEDURE — 85018 HEMOGLOBIN: CPT | Mod: QW

## 2024-12-18 PROCEDURE — 99215 OFFICE O/P EST HI 40 MIN: CPT | Mod: 25

## 2024-12-18 PROCEDURE — 94729 DIFFUSING CAPACITY: CPT

## 2024-12-18 PROCEDURE — 94010 BREATHING CAPACITY TEST: CPT

## 2024-12-18 PROCEDURE — 94727 GAS DIL/WSHOT DETER LNG VOL: CPT

## 2024-12-18 RX ORDER — APIXABAN 5 MG/1
5 TABLET, FILM COATED ORAL
Refills: 0 | Status: ACTIVE | COMMUNITY

## 2024-12-18 RX ORDER — SITAGLIPTIN 100 MG/1
100 TABLET, FILM COATED ORAL
Refills: 0 | Status: ACTIVE | COMMUNITY

## 2024-12-18 RX ORDER — LEVOTHYROXINE SODIUM 0.05 MG/1
50 TABLET ORAL
Refills: 0 | Status: ACTIVE | COMMUNITY

## 2024-12-18 RX ORDER — EZETIMIBE 10 MG/1
10 TABLET ORAL
Refills: 0 | Status: ACTIVE | COMMUNITY

## 2024-12-30 PROBLEM — H16.223: Status: ACTIVE | Noted: 2024-12-30

## 2025-01-15 PROBLEM — H52.7 REFRACTIVE ERROR ; BOTH EYES: Status: ACTIVE | Noted: 2025-01-15

## 2025-02-07 ENCOUNTER — APPOINTMENT (OUTPATIENT)
Dept: NEUROSURGERY | Facility: CLINIC | Age: 80
End: 2025-02-07

## 2025-02-13 ENCOUNTER — NON-APPOINTMENT (OUTPATIENT)
Age: 80
End: 2025-02-13

## 2025-02-13 ENCOUNTER — APPOINTMENT (OUTPATIENT)
Dept: NEUROSURGERY | Facility: CLINIC | Age: 80
End: 2025-02-13
Payer: MEDICARE

## 2025-02-13 VITALS
SYSTOLIC BLOOD PRESSURE: 125 MMHG | TEMPERATURE: 97.1 F | HEART RATE: 49 BPM | WEIGHT: 150 LBS | DIASTOLIC BLOOD PRESSURE: 78 MMHG | BODY MASS INDEX: 20.34 KG/M2

## 2025-02-13 DIAGNOSIS — S32.010A WEDGE COMPRESSION FRACTURE OF FIRST LUMBAR VERTEBRA, INITIAL ENCOUNTER FOR CLOSED FRACTURE: ICD-10-CM

## 2025-02-13 PROCEDURE — 99213 OFFICE O/P EST LOW 20 MIN: CPT

## 2025-02-13 NOTE — PATIENT PROFILE ADULT - DO YOU FEEL LIKE HURTING YOURSELF OR OTHERS?
no
Other (Free Text): Pt was advised we do not do laser removal in the office. Pt was advised our [place] office could do it.
Render Risk Assessment In Note?: no
Detail Level: Zone
Note Text (......Xxx Chief Complaint.): This diagnosis correlates with the
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
## 2025-03-10 ENCOUNTER — APPOINTMENT (OUTPATIENT)
Dept: NEUROSURGERY | Facility: CLINIC | Age: 80
End: 2025-03-10
Payer: MEDICARE

## 2025-03-10 VITALS
DIASTOLIC BLOOD PRESSURE: 77 MMHG | HEIGHT: 72 IN | SYSTOLIC BLOOD PRESSURE: 117 MMHG | BODY MASS INDEX: 20.32 KG/M2 | OXYGEN SATURATION: 95 % | WEIGHT: 150 LBS | TEMPERATURE: 97.9 F | HEART RATE: 83 BPM

## 2025-03-10 DIAGNOSIS — S32.010A WEDGE COMPRESSION FRACTURE OF FIRST LUMBAR VERTEBRA, INITIAL ENCOUNTER FOR CLOSED FRACTURE: ICD-10-CM

## 2025-03-10 PROCEDURE — 99214 OFFICE O/P EST MOD 30 MIN: CPT

## 2025-04-23 ENCOUNTER — OFFICE (OUTPATIENT)
Dept: URBAN - METROPOLITAN AREA CLINIC 94 | Facility: CLINIC | Age: 80
Setting detail: OPHTHALMOLOGY
End: 2025-04-23
Payer: MEDICARE

## 2025-04-23 DIAGNOSIS — H43.393: ICD-10-CM

## 2025-04-23 DIAGNOSIS — H35.3223: ICD-10-CM

## 2025-04-23 DIAGNOSIS — E11.9: ICD-10-CM

## 2025-04-23 DIAGNOSIS — H35.3112: ICD-10-CM

## 2025-04-23 PROCEDURE — 92134 CPTRZ OPH DX IMG PST SGM RTA: CPT | Performed by: OPHTHALMOLOGY

## 2025-04-23 PROCEDURE — 92014 COMPRE OPH EXAM EST PT 1/>: CPT | Performed by: OPHTHALMOLOGY

## 2025-04-23 PROCEDURE — 92235 FLUORESCEIN ANGRPH MLTIFRAME: CPT | Performed by: OPHTHALMOLOGY

## 2025-04-23 ASSESSMENT — KERATOMETRY
OS_K2POWER_DIOPTERS: 45.00
OD_AXISANGLE_DEGREES: 065
OS_K1POWER_DIOPTERS: 43.75
OD_K2POWER_DIOPTERS: 45.00
OS_AXISANGLE_DEGREES: 115
OD_K1POWER_DIOPTERS: 44.00
METHOD_AUTO_MANUAL: AUTO

## 2025-04-23 ASSESSMENT — VISUAL ACUITY
OS_BCVA: 20/50-1
OD_BCVA: CF@2FT

## 2025-04-23 ASSESSMENT — REFRACTION_AUTOREFRACTION
OS_SPHERE: +0.25
OS_AXIS: 062
OD_AXIS: 166
OD_CYLINDER: -0.75
OD_SPHERE: -1.25
OS_CYLINDER: -0.75

## 2025-04-23 ASSESSMENT — CONFRONTATIONAL VISUAL FIELD TEST (CVF)
OD_FINDINGS: FULL
OS_FINDINGS: FULL

## 2025-04-23 ASSESSMENT — TONOMETRY
OS_IOP_MMHG: 17
OD_IOP_MMHG: 18

## 2025-04-29 DIAGNOSIS — R91.1 SOLITARY PULMONARY NODULE: ICD-10-CM

## 2025-04-30 PROBLEM — R91.1 PULMONARY NODULE: Status: ACTIVE | Noted: 2025-04-30

## 2025-06-24 NOTE — OCCUPATIONAL THERAPY INITIAL EVALUATION ADULT - LEVEL OF INDEPENDENCE: EATING, OT EVAL
Fidel Moe is a 73 y.o. year old male patient who presents for mesenteric lymphadenectomy and small bowel resection with Dr. Barnes on 6/24. Acute Pain consulted for block for postoperative pain control.     Anticipated Postop Pain Issues -   Palliative: typically relieved with IV analgesics and regional local anesthetics  Provocative: typically with movement  Quality: typically burning and aching  Radiation: typically none  Severity: typically severe 8-10/10  Timing: typically constant    Medical History[1]     Surgical History[2]     Family History[3]     Social History     Socioeconomic History    Marital status:      Spouse name: Not on file    Number of children: Not on file    Years of education: Not on file    Highest education level: Not on file   Occupational History    Not on file   Tobacco Use    Smoking status: Former     Current packs/day: 1.50     Average packs/day: 1.5 packs/day for 25.2 years (37.8 ttl pk-yrs)     Types: Cigarettes     Start date: 2004     Quit date: 1970    Smokeless tobacco: Never    Tobacco comments:     none   Vaping Use    Vaping status: Never Used   Substance and Sexual Activity    Alcohol use: Not Currently     Comment: na    Drug use: Never    Sexual activity: Not Currently     Partners: Female     Comment: no comment   Other Topics Concern    Not on file   Social History Narrative    Not on file     Social Drivers of Health     Financial Resource Strain: Low Risk  (4/11/2025)    Overall Financial Resource Strain (CARDIA)     Difficulty of Paying Living Expenses: Not very hard   Food Insecurity: No Food Insecurity (4/11/2025)    Hunger Vital Sign     Worried About Running Out of Food in the Last Year: Never true     Ran Out of Food in the Last Year: Never true   Transportation Needs: No Transportation Needs (4/14/2025)    PRAPARE - Transportation     Lack of Transportation (Medical): No     Lack of Transportation (Non-Medical): No   Physical Activity:  Sufficiently Active (4/11/2025)    Exercise Vital Sign     Days of Exercise per Week: 5 days     Minutes of Exercise per Session: 30 min   Recent Concern: Physical Activity - Insufficiently Active (4/1/2025)    Exercise Vital Sign     Days of Exercise per Week: 2 days     Minutes of Exercise per Session: 20 min   Stress: Stress Concern Present (4/1/2025)    [place] [employer] - Occupational Stress Questionnaire     Feeling of Stress : To some extent   Social Connections: Socially Isolated (4/1/2025)    Social Connection and Isolation Panel [NHANES]     Frequency of Communication with Friends and Family: Once a week     Frequency of Social Gatherings with Friends and Family: Once a week     Attends Evangelical Services: Never     Active Member of Clubs or Organizations: No     Attends Club or Organization Meetings: Never     Marital Status:    Intimate Partner Violence: Not At Risk (4/11/2025)    Humiliation, Afraid, Rape, and Kick questionnaire     Fear of Current or Ex-Partner: No     Emotionally Abused: No     Physically Abused: No     Sexually Abused: No   Housing Stability: Low Risk  (4/14/2025)    Housing Stability Vital Sign     Unable to Pay for Housing in the Last Year: No     Number of Times Moved in the Last Year: 1     Homeless in the Last Year: No        RX Allergies[4]      Review of Systems  Gen: No fatigue, anorexia, insomnia, fever.   Eyes: No vision loss, double vision, drainage, eye pain.   ENT: No pharyngitis, dry mouth, no hearing changes or ear discharge  Cardiac: No chest pain, palpitations, syncope, near syncope.   Pulmonary: No shortness of breath, cough, hemoptysis.   Heme/lymph: No swollen glands, fever, bleeding.   GI: No abdominal pain, change in bowel habits, melena, hematemesis, hematochezia, nausea, vomiting, diarrhea.   : No discharge, dysuria, frequency, urgency, hematuria.  Endo: No polyuria or weight loss.   Musculoskeletal: Negative for any pain or  loss of ROM/weakness  Skin: No rashes or lesions  Neuro: Normal speech, no numbness or weakness. No gait difficulties  Review of systems is otherwise negative unless stated above or in history of present illness.    Physical Exam:  Constitutional:  no distress, alert and cooperative  Eyes: clear sclera  Head/Neck: No apparent injury, trachea midline  Respiratory/Thorax: Patent airways, thorax symmetric, breathing comfortably  Cardiovascular: no pitting edema  Gastrointestinal: Nondistended  Musculoskeletal: ROM intact  Extremities: no clubbing  Neurological: alert, salcedo x4  Psychological: Appropriate affect    Results for orders placed or performed during the hospital encounter of 06/24/25 (from the past 24 hours)   POCT GLUCOSE   Result Value Ref Range    POCT Glucose 167 (H) 74 - 99 mg/dL     *Note: Due to a large number of results and/or encounters for the requested time period, some results have not been displayed. A complete set of results can be found in Results Review.        Fidel Moe is a 73 y.o. year old male patient who presents for mesenteric lymphadenectomy and small bowel resection with Dr. Barnes on 6/24. Acute Pain consulted for block for postoperative pain control.     Plan:    - Bilateral QL blocks with catheters performed intraoperatively on 6/24  - Pain medications per primary team  - Will see on POD1 if inpatient    Acute Pain Team  pg [phone] ph [phone].         [1]   Past Medical History:  Diagnosis Date    Allergic     Body mass index (BMI)40.0-44.9, adult 08/23/2021    Body mass index (BMI) of 40.0 to 44.9 in adult    Cancer (Multi)     current lung CA on chemo and planned lobectomy    CHF (congestive heart failure)     COPD (chronic obstructive pulmonary disease) (Multi)     Diastolic dysfunction     DM2 (diabetes mellitus, type 2) (Multi)     Headache     HTN (hypertension)     Hyperlipidemia     Hypomagnesemia     Hypothyroidism     LBBB (left bundle branch block)     NICM  (nonischemic cardiomyopathy) (Multi)     IRAM (obstructive sleep apnea)     Positive colorectal cancer screening using Cologuard test 01/31/2023    3/28/2023: Colonoscopy revealed adenomatous polyps    Vitamin D deficiency    [2]   Past Surgical History:  Procedure Laterality Date    APPENDECTOMY  07/23/2015    Appendectomy    BUNIONECTOMY  07/23/2015    Simple Bunion Exostectomy (Silver Procedure)    CARDIAC CATHETERIZATION N/A 03/10/2025    Procedure: Left Heart Cath, With LV;  Surgeon: Juan Ramon Schuster MD;  Location: [facility];  Service: Cardiovascular;  Laterality: N/A;    COLON SURGERY  2023    COLONOSCOPY      w/ polypectomy, 5/23    LITHOTRIPSY  08/17/2015    Renal Lithotripsy    OTHER SURGICAL HISTORY  07/23/2015    Neurorrhaphy Of Ulnar Nerve Right Hand    ROTATOR CUFF REPAIR  08/17/2015    Rotator Cuff Repair    TONSILLECTOMY  07/23/2015    Tonsillectomy   [3]   Family History  Problem Relation Name Age of Onset    Diabetes Mother Therese     Other (arteriosclerotic cardiovascular disease) Mother Therese     Hypertension Mother Therese     Diabetes type I Mother Therese     Heart disease Mother Therese     Colon cancer Father anders ervin     Lung cancer Father anders ervin     Cancer Father anders ervin     Lung disease Father anders ervin    [4]   Allergies  Allergen Reactions    Metoprolol GI Upset    Nivolumab Other     See Adverse Drug Note from 10/22/2024. Back pain, chest pressure 15 minutes into the Nivolumab infusion. Given additional medications and was able to complete the remainder of the Nivolumab without further incident.    Aspirin GI Upset     For higher doses other than baby aspirin.     Codeine Nausea Only and Other     vomiting    Dapagliflozin Dizziness     Thirsty, increased appetite    Gabapentin Chills, Dizziness and Drowsiness    Hydrocodone-Acetaminophen Nausea/vomiting    Hydrocodone-Guaifenesin Nausea/vomiting     Oxycodone-Acetaminophen Nausea/vomiting    Propoxyphene N-Acetaminophen Nausea Only and Other     vomiting    Propoxyphene-Acetaminophen Nausea/vomiting    Squid Hives    Triamcinolone Acetonide Rash      Did not directly observe

## 2025-07-02 NOTE — OCCUPATIONAL THERAPY INITIAL EVALUATION ADULT - FINE MOTOR COORDINATION, RIGHT HAND, GRAPHOMOTOR SKILLS, OT EVAL
Lara was notified that per Dr. Ruby today he said she could weight-bear as tolerated. Lara states she will update their chart with that info.    "I'm ambidextrous"

## 2025-07-09 ENCOUNTER — APPOINTMENT (OUTPATIENT)
Dept: PULMONOLOGY | Facility: CLINIC | Age: 80
End: 2025-07-09
Payer: MEDICARE

## 2025-07-09 VITALS
SYSTOLIC BLOOD PRESSURE: 112 MMHG | HEIGHT: 72 IN | WEIGHT: 150 LBS | OXYGEN SATURATION: 99 % | DIASTOLIC BLOOD PRESSURE: 72 MMHG | HEART RATE: 69 BPM | BODY MASS INDEX: 20.32 KG/M2

## 2025-07-09 VITALS — RESPIRATION RATE: 16 BRPM

## 2025-07-09 PROCEDURE — G2211 COMPLEX E/M VISIT ADD ON: CPT

## 2025-07-09 PROCEDURE — 99215 OFFICE O/P EST HI 40 MIN: CPT

## 2025-08-25 PROBLEM — H16.223 KERATOCONJUNCTIVITIS SICCA ; BOTH EYES: Status: ACTIVE | Noted: 2025-08-25

## 2025-08-25 PROBLEM — H43.393 VITREOUS FLOATERS ; BOTH EYES: Status: ACTIVE | Noted: 2025-08-25

## 2025-09-08 ENCOUNTER — APPOINTMENT (OUTPATIENT)
Dept: NEUROSURGERY | Facility: CLINIC | Age: 80
End: 2025-09-08
Payer: MEDICARE

## 2025-09-08 VITALS
BODY MASS INDEX: 20.72 KG/M2 | HEART RATE: 86 BPM | OXYGEN SATURATION: 87 % | DIASTOLIC BLOOD PRESSURE: 88 MMHG | HEIGHT: 72 IN | TEMPERATURE: 97.2 F | SYSTOLIC BLOOD PRESSURE: 134 MMHG | WEIGHT: 153 LBS

## 2025-09-08 DIAGNOSIS — S32.010A WEDGE COMPRESSION FRACTURE OF FIRST LUMBAR VERTEBRA, INITIAL ENCOUNTER FOR CLOSED FRACTURE: ICD-10-CM

## 2025-09-08 PROCEDURE — 99214 OFFICE O/P EST MOD 30 MIN: CPT

## 2025-09-08 RX ORDER — TAMSULOSIN HYDROCHLORIDE 0.4 MG/1
0.4 CAPSULE ORAL
Refills: 0 | Status: ACTIVE | COMMUNITY

## 2025-09-08 RX ORDER — FINASTERIDE 5 MG/1
5 TABLET, FILM COATED ORAL
Refills: 0 | Status: ACTIVE | COMMUNITY

## 2025-09-08 RX ORDER — VIT A/VIT C/VIT E/ZINC/COPPER 2148-113
TABLET ORAL
Refills: 0 | Status: ACTIVE | COMMUNITY

## 2025-09-08 RX ORDER — MIDODRINE HYDROCHLORIDE 10 MG/1
10 TABLET ORAL
Refills: 0 | Status: ACTIVE | COMMUNITY

## (undated) DEVICE — WARMING BLANKET UPPER ADULT

## (undated) DEVICE — BLADE SURGICAL #15 CARBON

## (undated) DEVICE — DRSG KCI GRANUFOAM MED VAC SENSA TRAC

## (undated) DEVICE — PACK EXTREMITY SOUTHSIDE

## (undated) DEVICE — DRAPE INSTRUMENT POUCH

## (undated) DEVICE — PACK NEURO SO SIDE

## (undated) DEVICE — TIP BALL DIRECT DISP

## (undated) DEVICE — SPONGE PEANUT AUTO COUNT

## (undated) DEVICE — DRSG VAC GRANUFOAM LG

## (undated) DEVICE — BLANKET WARMER UPPER ADULT

## (undated) DEVICE — POSITIONER OA ARM ELEVATOR DISP

## (undated) DEVICE — NDL HYPO REGULAR BEVEL 25G X 1.5" (BLUE)

## (undated) DEVICE — FOLEY TRAY 16FR LF URINE METER SURESTEP

## (undated) DEVICE — Device

## (undated) DEVICE — LIGASURE ATLAS 10MM 20CM

## (undated) DEVICE — DRAPE MICROSCOPE LEICA  26X150"

## (undated) DEVICE — SYR CONTROL LUER LOK 10CC

## (undated) DEVICE — DRILL BIT MEDTRONIC 5.0MM PLATINIUM

## (undated) DEVICE — DRSG OPSITE 2.5 X 2"

## (undated) DEVICE — SOL IRR BAG NS 0.9% 3000ML

## (undated) DEVICE — GOWN TRIMAX LG

## (undated) DEVICE — SUT VICRYL 2-0 18" CP-2 UNDYED

## (undated) DEVICE — STAPLER SKIN PROXIMATE

## (undated) DEVICE — DRSG TELFA 3 X 4

## (undated) DEVICE — GLV 7 PROTEXIS

## (undated) DEVICE — BASIN SET DOUBLE

## (undated) DEVICE — LAP PAD W RING 18 X 18"

## (undated) DEVICE — DRSG SENSA TRAC SMALL

## (undated) DEVICE — SUT MONOCRYL 4-0 27" PS-2 UNDYED

## (undated) DEVICE — POSITIONER FOAM EGG CRATE ULNAR (2PCS)

## (undated) DEVICE — LEGEND BALL 2MMX10CM

## (undated) DEVICE — KT PULSAVAC WOUND W/ SPRAYTIP

## (undated) DEVICE — CULTURESWAB WITHOUT CHARCOAL

## (undated) DEVICE — SUT SILK 2-0 18" SH (POP-OFF)

## (undated) DEVICE — DRAPE 3/4 SHEET 52X76"

## (undated) DEVICE — WRAP COMPRESSION CALF MED

## (undated) DEVICE — GOWN XL W TOWEL

## (undated) DEVICE — CULTURESWAB WITH CHARCOAL

## (undated) DEVICE — DRAPE CRANIOTOMY W POUCH 100X104"

## (undated) DEVICE — DISSECTING TOOL MIDAS REX 10CM 2MM

## (undated) DEVICE — DRAIN JACKSON PRATT 15FR ROUND END NO TROCAR

## (undated) DEVICE — DRAIN JACKSON PRATT 7MM FLAT FULL W 15 FR TROCAR

## (undated) DEVICE — SUT VICRYL 0 18" CT-1 UNDYED

## (undated) DEVICE — BLANKET WARMER FULL ADULT

## (undated) DEVICE — DRAPE HALF SHEET 40X57"

## (undated) DEVICE — PACK MINOR WITH LAP

## (undated) DEVICE — VENODYNE/SCD SLEEVE CALF LARGE

## (undated) DEVICE — DRAPE 1/2 SHEET 40X57"

## (undated) DEVICE — DRAPE TOWEL BLUE 17" X 24"

## (undated) DEVICE — ELCTR EDGE BOVIE INSULATED NEEDLE TIP 6.5"

## (undated) DEVICE — GLV 7.5 PROTEXIS

## (undated) DEVICE — NDL SPINAL 18G X 3.5"

## (undated) DEVICE — SUT VICRYL 3-0 18" CP-2 UNDYED

## (undated) DEVICE — GLV 7.5 PROTEXIS (WHITE)

## (undated) DEVICE — DRAPE SPLIT SHEETS 77X108"

## (undated) DEVICE — SUT VICRYL 2-0 54" REEL UNDYED

## (undated) DEVICE — BLADE SURGICAL #10 CARBON

## (undated) DEVICE — DRAIN PENROSE .5" X 18" LATEX

## (undated) DEVICE — DRAPE C ARM UNIVERSAL